# Patient Record
Sex: FEMALE | Race: WHITE | NOT HISPANIC OR LATINO | Employment: OTHER | URBAN - METROPOLITAN AREA
[De-identification: names, ages, dates, MRNs, and addresses within clinical notes are randomized per-mention and may not be internally consistent; named-entity substitution may affect disease eponyms.]

---

## 2017-02-03 ENCOUNTER — HOSPITAL ENCOUNTER (INPATIENT)
Facility: HOSPITAL | Age: 32
LOS: 6 days | Discharge: HOME | DRG: 882 | End: 2017-02-09
Attending: PSYCHIATRY & NEUROLOGY | Admitting: PSYCHIATRY & NEUROLOGY
Payer: MEDICAID

## 2017-02-03 DIAGNOSIS — F41.8 ANXIETY ASSOCIATED WITH DEPRESSION: ICD-10-CM

## 2017-02-03 DIAGNOSIS — F43.25 ADJUSTMENT DISORDER WITH MIXED DISTURBANCE OF EMOTIONS AND CONDUCT: ICD-10-CM

## 2017-02-03 DIAGNOSIS — F32.A DEPRESSION, UNSPECIFIED DEPRESSION TYPE: ICD-10-CM

## 2017-02-03 DIAGNOSIS — Z87.42 HISTORY OF PCOS: Primary | ICD-10-CM

## 2017-02-03 PROBLEM — E11.9 LONG-TERM INSULIN USE IN TYPE 2 DIABETES: Status: ACTIVE | Noted: 2017-02-03

## 2017-02-03 PROBLEM — E66.9 OBESITY: Status: ACTIVE | Noted: 2017-02-03

## 2017-02-03 PROBLEM — E78.5 HYPERLIPIDEMIA: Status: ACTIVE | Noted: 2017-02-03

## 2017-02-03 PROBLEM — Z79.4 LONG-TERM INSULIN USE IN TYPE 2 DIABETES: Status: ACTIVE | Noted: 2017-02-03

## 2017-02-03 PROBLEM — Z72.0 TOBACCO USE: Status: ACTIVE | Noted: 2017-02-03

## 2017-02-03 PROBLEM — Z86.69 HX OF MIGRAINES: Status: ACTIVE | Noted: 2017-02-03

## 2017-02-03 PROCEDURE — 25000003 PHARM REV CODE 250: Performed by: PSYCHIATRY & NEUROLOGY

## 2017-02-03 PROCEDURE — 12400001 HC PSYCH SEMI-PRIVATE ROOM

## 2017-02-03 RX ORDER — NICOTINE 7MG/24HR
1 PATCH, TRANSDERMAL 24 HOURS TRANSDERMAL DAILY
Status: DISCONTINUED | OUTPATIENT
Start: 2017-02-04 | End: 2017-02-09 | Stop reason: HOSPADM

## 2017-02-03 RX ORDER — TOPIRAMATE 25 MG/1
50 TABLET ORAL 2 TIMES DAILY
Status: DISCONTINUED | OUTPATIENT
Start: 2017-02-03 | End: 2017-02-09 | Stop reason: HOSPADM

## 2017-02-03 RX ORDER — DIPHENHYDRAMINE HCL 25 MG
25 CAPSULE ORAL NIGHTLY PRN
Status: DISCONTINUED | OUTPATIENT
Start: 2017-02-03 | End: 2017-02-09 | Stop reason: HOSPADM

## 2017-02-03 RX ORDER — SERTRALINE HYDROCHLORIDE 100 MG/1
100 TABLET, FILM COATED ORAL DAILY
Status: DISCONTINUED | OUTPATIENT
Start: 2017-02-04 | End: 2017-02-04

## 2017-02-03 RX ORDER — ATORVASTATIN CALCIUM 10 MG/1
10 TABLET, FILM COATED ORAL DAILY
Status: DISCONTINUED | OUTPATIENT
Start: 2017-02-04 | End: 2017-02-09 | Stop reason: HOSPADM

## 2017-02-03 RX ORDER — IBUPROFEN 200 MG
16 TABLET ORAL
Status: DISCONTINUED | OUTPATIENT
Start: 2017-02-03 | End: 2017-02-09 | Stop reason: HOSPADM

## 2017-02-03 RX ORDER — DOCUSATE SODIUM 100 MG/1
100 CAPSULE, LIQUID FILLED ORAL DAILY PRN
Status: DISCONTINUED | OUTPATIENT
Start: 2017-02-03 | End: 2017-02-09 | Stop reason: HOSPADM

## 2017-02-03 RX ORDER — FOLIC ACID 1 MG/1
1 TABLET ORAL DAILY
Status: DISCONTINUED | OUTPATIENT
Start: 2017-02-04 | End: 2017-02-09 | Stop reason: HOSPADM

## 2017-02-03 RX ORDER — GLUCAGON 1 MG
1 KIT INJECTION
Status: DISCONTINUED | OUTPATIENT
Start: 2017-02-03 | End: 2017-02-09 | Stop reason: HOSPADM

## 2017-02-03 RX ORDER — METFORMIN HYDROCHLORIDE 500 MG/1
1000 TABLET ORAL 2 TIMES DAILY WITH MEALS
Status: DISCONTINUED | OUTPATIENT
Start: 2017-02-04 | End: 2017-02-09 | Stop reason: HOSPADM

## 2017-02-03 RX ORDER — INSULIN ASPART 100 [IU]/ML
0-5 INJECTION, SOLUTION INTRAVENOUS; SUBCUTANEOUS
Status: DISCONTINUED | OUTPATIENT
Start: 2017-02-03 | End: 2017-02-09 | Stop reason: HOSPADM

## 2017-02-03 RX ADMIN — TOPIRAMATE 50 MG: 25 TABLET, FILM COATED ORAL at 10:02

## 2017-02-03 NOTE — IP AVS SNAPSHOT
[facility]  [address]  [facility] [address]  Phone: [phone]           Patient Discharge Instructions     Our goal is to set you up for success. This packet includes information on your condition, medications, and your home care. It will help you to care for yourself so you don't get sicker and need to go back to the hospital.     Please ask your nurse if you have any questions.        There are many details to remember when preparing to leave the hospital. Here is what you will need to do:    1. Take your medicine. If you are prescribed medications, review your Medication List in the following pages. You may have new medications to  at the pharmacy and others that you'll need to stop taking. Review the instructions for how and when to take your medications. Talk with your doctor or nurses if you are unsure of what to do.     2. Go to your follow-up appointments. Specific follow-up information is listed in the following pages. Your may be contacted by a transition nurse or clinical provider about future appointments. Be sure we have all of the phone numbers to reach you, if needed. Please contact your provider's office if you are unable to make an appointment.     3. Watch for warning signs. Your doctor or nurse will give you detailed warning signs to watch for and when to call for assistance. These instructions may also include educational information about your condition. If you experience any of warning signs to your health, call your doctor.               [facility]  Unless otherwise directed by your provider, please contact [facility], our nurse care line that is available for 24/7 assistance.     [phone] (toll-free)    Registered nurses in the [facility] provide clinical advisement, health education, appointment booking, and other advisory services.                    ** Verify the list of medication(s) below is accurate and up  to date. Carry this with you in case of emergency. If your medications have changed, please notify your healthcare provider.             Medication List      START taking these medications        Additional Info                      duloxetine 60 MG capsule   Commonly known as:  CYMBALTA   Quantity:  30 capsule   Refills:  0   Dose:  60 mg   Indications:  major depressive disorder    Last time this was given:  60 mg on 2/9/2017  8:18 AM   Instructions:  Take 1 capsule (60 mg total) by mouth once daily.     Begin Date    AM    Noon    PM    Bedtime       folic acid 1 MG tablet   Commonly known as:  FOLVITE   Quantity:  30 tablet   Refills:  0   Dose:  1 mg   Indications:  Prevention of Fetal Neural Tube Defects    Last time this was given:  1 mg on 2/9/2017  8:17 AM   Instructions:  Take 1 tablet (1 mg total) by mouth once daily.     Begin Date    AM    Noon    PM    Bedtime       pregabalin 50 MG capsule   Commonly known as:  LYRICA   Quantity:  60 capsule   Refills:  0   Dose:  50 mg   Indications:  Diabetic Peripheral Neuropathy    Last time this was given:  50 mg on 2/9/2017  8:17 AM   Instructions:  Take 1 capsule (50 mg total) by mouth 2 (two) times daily.     Begin Date    AM    Noon    PM    Bedtime         CONTINUE taking these medications        Additional Info                      atorvastatin 10 MG tablet   Commonly known as:  LIPITOR   Refills:  0   Dose:  10 mg    Last time this was given:  10 mg on 2/9/2017  8:18 AM   Instructions:  Take 10 mg by mouth once daily.     Begin Date    AM    Noon    PM    Bedtime       fish oil-omega-3 fatty acids 300-1,000 mg capsule   Refills:  0   Dose:  2 g    Instructions:  Take 2 g by mouth once daily.     Begin Date    AM    Noon    PM    Bedtime       glipiZIDE 5 MG tablet   Commonly known as:  GLUCOTROL   Refills:  0   Dose:  5 mg    Last time this was given:  5 mg on 2/9/2017  8:17 AM   Instructions:  Take 5 mg by mouth 2 (two) times daily before meals.     Begin  Date    AM    Noon    PM    Bedtime       insulin glargine 100 unit/mL injection   Commonly known as:  LANTUS   Refills:  0   Dose:  20 Units    Instructions:  Inject 20 Units into the skin every evening.     Begin Date    AM    Noon    PM    Bedtime       metformin 1000 MG tablet   Commonly known as:  GLUCOPHAGE   Refills:  0   Dose:  1000 mg    Last time this was given:  1,000 mg on 2/9/2017  8:17 AM   Instructions:  Take 1,000 mg by mouth 2 (two) times daily with meals.     Begin Date    AM    Noon    PM    Bedtime       topiramate 50 MG tablet   Commonly known as:  TOPAMAX   Quantity:  60 tablet   Refills:  0   Dose:  50 mg   Indications:  Migraine Prevention    Last time this was given:  50 mg on 2/9/2017  8:18 AM   Instructions:  Take 1 tablet (50 mg total) by mouth 2 (two) times daily.     Begin Date    AM    Noon    PM    Bedtime         STOP taking these medications     sertraline 25 MG tablet   Commonly known as:  ZOLOFT            Where to Get Your Medications      These medications were sent to [id number] - [place] - [address]  [address] [address]     Phone:  [phone]     duloxetine 60 MG capsule    folic acid 1 MG tablet    topiramate 50 MG tablet         You can get these medications from any pharmacy     Bring a paper prescription for each of these medications     pregabalin 50 MG capsule                  Please bring to all follow up appointments:    1. A copy of your discharge instructions.  2. All medicines you are currently taking in their original bottles.  3. Identification and insurance card.    Please arrive 15 minutes ahead of scheduled appointment time.    Please call 24 hours in advance if you must reschedule your appointment and/or time.        Follow-up Information     Follow up with [facility]. Go in 1 day.    Specialties:  Behavioral Health, Psychiatry, Psychology    Why:  for hospital follow up.  Walk in clinic.    Contact information:    [address]  [address]  [place] [address]  [phone]        Referrals     Future Orders    Ambulatory Referral to Gynecology         Discharge Instructions     Future Orders    Activity as tolerated     Call MD for:     Comments:    Worsening SI/HI/AVH    Diet general     Questions:    Total calories:      Fat restriction, if any:      Protein restriction, if any:      Na restriction, if any:      Fluid restriction:      Additional restrictions:        Discharge References/Attachments     DEPRESSION (ENGLISH)    OLDER ADULTS, DEPRESSION AND SUICIDE IN (ENGLISH)    DIABETES, DIET (ENGLISH)    DIABETES, FOOT CARE PROGRAM, YOUR (ENGLISH)    DIABETES, GENERAL INFORMATION (ENGLISH)        Primary Diagnosis     Your primary diagnosis was:  Adjustment Reaction      Admission Information     Date & Time Provider Department CSN    2/3/2017  9:26 PM Otto Laura Jr., MD [facility] [id number]       Admisson Diagnosis: Depression, unspecified depression type      Care Providers     Provider Role Specialty Primary office phone    Otto Laura Jr., MD Attending Provider Psychiatry [phone]      Your Vitals Were     BP Pulse Temp Resp Height Weight    98/57 (BP Location: Right arm, Patient Position: Lying, BP Method: Automatic) 80 98.9 °F (37.2 °C) (Oral) 18 5' 9" (1.753 m) 104.3 kg (229 lb 15 oz)    Last Period BMI             01/15/2017 (Approximate) 33.96 kg/m2         Recent Lab Values        2/6/2017                           5:39 AM           A1C 9.2 (H)           Comment for A1C at  5:39 AM on 2/6/2017:  According to ADA guidelines, hemoglobin A1C <7.0% represents  optimal control in non-pregnant diabetic patients.  Different  metrics may apply to specific populations.   Standards of Medical Care in Diabetes - 2016.  For the purpose of screening for the presence of diabetes:  <5.7%     Consistent with the absence of diabetes  5.7-6.4%  Consistent with increasing risk for diabetes "   (prediabetes)  >or=6.5%  Consistent with diabetes  Currently no consensus exists for use of hemoglobin A1C  for diagnosis of diabetes for children.        Blood Glucose and Lipid Panel Labs        2/7/2017 2/7/2017 2/8/2017 2/8/2017 2/8/2017 2/8/2017 2/9/2017 2/9/2017      4:33 PM  8:25 PM  7:39 AM 11:36 AM  4:31 PM  8:40 PM  5:43 AM  7:37 AM    Glucose 96 115 (H) 164 (H) 109 153 (H) 147 (H) - 149 (H)    Cholesterol - - - - - - 152 -    Triglycerides - - - - - - 197 (H) -    HDL Cholesterol - - - - - - 27 (L) -    LDL Cholesterol - - - - - - 85.6 -    HDL/Cholesterol Ratio - - - - - - 17.8 (L) -    Total Cholesterol/HDL Ratio - - - - - - 5.6 (H) -    Non-HDL Cholesterol - - - - - - 125 -    Comment for Cholesterol at  5:43 AM on 2/9/2017:  The National Cholesterol Education Program (NCEP) has set the  following guidelines (reference ranges) for Cholesterol:  Optimal.....................<200 mg/dL  Borderline High.............200-239 mg/dL  High........................> or = 240 mg/dL      Comment for Triglycerides at  5:43 AM on 2/9/2017:  The National Cholesterol Education Program (NCEP) has set the  following guidelines (reference values) for triglycerides:  Normal......................<150 mg/dL  Borderline High.............150-199 mg/dL  High........................200-499 mg/dL      Comment for HDL Cholesterol at  5:43 AM on 2/9/2017:  The National Cholesterol Education Program (NCEP) has set the  following guidelines (reference values) for HDL Cholesterol:  Low...............<40 mg/dL  Optimal...........>60 mg/dL      Comment for LDL Cholesterol at  5:43 AM on 2/9/2017:  The National Cholesterol Education Program (NCEP) has set the  following guidelines (reference values) for LDL Cholesterol:  Optimal.......................<130 mg/dL  Borderline High...............130-159 mg/dL  High..........................160-189 mg/dL  Very High.....................>190 mg/dL      Comment for Non-HDL Cholesterol at  5:43  AM on 2/9/2017:  Risk category and Non-HDL cholesterol goals:  Coronary heart disease (CHD)or equivalent (10-year risk of CHD >20%):  Non-HDL cholesterol goal     <130 mg/dL  Two or more CHD risk factors and 10-year risk of CHD <= 20%:  Non-HDL cholesterol goal     <160 mg/dL  0 to 1 CHD risk factor:  Non-HDL cholesterol goal     <190 mg/dL        Allergies as of 2/9/2017        Reactions    Beatriz Anaphylaxis    Pecan Nut Dermatitis      Advance Directives     An advance directive is a document which, in the event you are no longer able to make decisions for yourself, tells your healthcare team what kind of treatment you do or do not want to receive, or who you would like to make those decisions for you.  If you do not currently have an advance directive, [facility] encourages you to create one.  For more information call:  [phone] ([phone]), [phone] ([phone]),  or log on to [email or web address].        Advance Directive Status          Most Recent Value    Advance Directive Status  Patient does not have Advance Directive, declines information.      Smoking Cessation     If you would like to quit smoking:   You may be eligible for free services if you are a [place] resident and started smoking cigarettes before September 1, 1988.  Call the Smoking Cessation Trust (SCT) toll free at (172) 629-9884 or (903) 493-3215.   Call 7-800-QUIT-NOW if you do not meet the above criteria.            Language Assistance Services     ATTENTION: Language assistance services are available, free of charge. Please call [phone].      ATENCIÓN: Si habla español, tiene a armenta disposición servicios gratuitos de asistencia lingüística. Llame al [phone].     CHÚ Ý: N?u b?n nói Ti?ng Vi?t, có các d?ch v? h? tr? ngôn ng? mi?n phí dành cho b?n. G?i s? [phone].        National Suicide Prevention Lifeline     If you or someone you know is thinking about suicide, call the National Suicide Prevention  Lifeline.  National Suicide Hotline: 5-658-546-TALK (3500)  The lifeline is free, confidential and always available.  Help a loved one, a friend or yourself.  www.suicideprevenetionlifeline.org          Diabetes Discharge Instructions                                   [place] Sign-Up     Activating your [facility] account is as easy as 1-2-3!     1) Visit [email or web address], select Sign Up Now, enter this activation code and your date of birth, then select Next.  [email or web address]  Expires: 3/26/2017  9:24 AM      2) Create a username and password to use when you visit [facility] in the future and select a security question in case you lose your password and select Next.    3) Enter your e-mail address and click Sign Up!    Additional Information  If you have questions, please e-mail [email or web address] or call [phone] to talk to our [facility] staff. Remember, [facility] is NOT to be used for urgent needs. For medical emergencies, dial 911.          [facility] complies with applicable Federal civil rights laws and does not discriminate on the basis of race, color, national origin, age, disability, or sex.

## 2017-02-04 LAB
B-HCG UR QL: NEGATIVE
POCT GLUCOSE: 161 MG/DL (ref 70–110)
POCT GLUCOSE: 191 MG/DL (ref 70–110)
POCT GLUCOSE: 214 MG/DL (ref 70–110)

## 2017-02-04 PROCEDURE — 25000003 PHARM REV CODE 250: Performed by: PSYCHIATRY & NEUROLOGY

## 2017-02-04 PROCEDURE — 81025 URINE PREGNANCY TEST: CPT

## 2017-02-04 PROCEDURE — 63600175 PHARM REV CODE 636 W HCPCS: Performed by: PSYCHIATRY & NEUROLOGY

## 2017-02-04 PROCEDURE — 12400001 HC PSYCH SEMI-PRIVATE ROOM

## 2017-02-04 PROCEDURE — 99223 1ST HOSP IP/OBS HIGH 75: CPT | Mod: AF,HB,, | Performed by: PSYCHIATRY & NEUROLOGY

## 2017-02-04 RX ORDER — PREGABALIN 50 MG/1
50 CAPSULE ORAL 2 TIMES DAILY
Status: DISCONTINUED | OUTPATIENT
Start: 2017-02-04 | End: 2017-02-09 | Stop reason: HOSPADM

## 2017-02-04 RX ORDER — GLIPIZIDE 5 MG/1
5 TABLET ORAL 2 TIMES DAILY WITH MEALS
Status: DISCONTINUED | OUTPATIENT
Start: 2017-02-04 | End: 2017-02-09 | Stop reason: HOSPADM

## 2017-02-04 RX ORDER — SERTRALINE HYDROCHLORIDE 100 MG/1
100 TABLET, FILM COATED ORAL NIGHTLY
Status: DISCONTINUED | OUTPATIENT
Start: 2017-02-04 | End: 2017-02-06

## 2017-02-04 RX ADMIN — FOLIC ACID 1 MG: 1 TABLET ORAL at 08:02

## 2017-02-04 RX ADMIN — ATORVASTATIN CALCIUM 10 MG: 10 TABLET, FILM COATED ORAL at 08:02

## 2017-02-04 RX ADMIN — METFORMIN HYDROCHLORIDE 1000 MG: 500 TABLET, FILM COATED ORAL at 08:02

## 2017-02-04 RX ADMIN — INSULIN DETEMIR 50 UNITS: 100 INJECTION, SOLUTION SUBCUTANEOUS at 08:02

## 2017-02-04 RX ADMIN — PREGABALIN 50 MG: 50 CAPSULE ORAL at 08:02

## 2017-02-04 RX ADMIN — SERTRALINE HYDROCHLORIDE 100 MG: 100 TABLET, FILM COATED ORAL at 08:02

## 2017-02-04 RX ADMIN — DOCUSATE SODIUM 100 MG: 100 CAPSULE, LIQUID FILLED ORAL at 08:02

## 2017-02-04 RX ADMIN — NICOTINE 1 PATCH: 7 PATCH, EXTENDED RELEASE TRANSDERMAL at 08:02

## 2017-02-04 RX ADMIN — GLIPIZIDE 5 MG: 5 TABLET ORAL at 04:02

## 2017-02-04 RX ADMIN — TOPIRAMATE 50 MG: 25 TABLET, FILM COATED ORAL at 08:02

## 2017-02-04 RX ADMIN — INSULIN ASPART 2 UNITS: 100 INJECTION, SOLUTION INTRAVENOUS; SUBCUTANEOUS at 12:02

## 2017-02-04 RX ADMIN — Medication 1 CAPSULE: at 08:02

## 2017-02-04 RX ADMIN — METFORMIN HYDROCHLORIDE 1000 MG: 500 TABLET, FILM COATED ORAL at 04:02

## 2017-02-04 RX ADMIN — THERA TABS 1 TABLET: TAB at 08:02

## 2017-02-04 RX ADMIN — PREGABALIN 50 MG: 50 CAPSULE ORAL at 12:02

## 2017-02-04 NOTE — ASSESSMENT & PLAN NOTE
-Optimize Zoloft for maximum benefit for anxiety/depressive symptoms  -Consider adding Gabapentin to regimen to address diabetic neuropathy and anxiety symptoms

## 2017-02-04 NOTE — PROGRESS NOTES
Admitted to [facility] at 21:30 PM from ED via W/C accompanied by ED Staff and Security.  Changed into clean hospital gowns due to patient's size and new nonskid footwear. No contraband found. Belongings placed in locker #140B. Unit policies/expectations, orientation and meal tray provided to patient. Patient signed admission paperwork at this time. New orders noted. Admission assessment completed; see applicable flowsheets. Compliant with scheduled medication. No acute physical distress noted. Retired to bed at appropriate time. Resting quietly in bed at this time. Will continue to monitor.

## 2017-02-04 NOTE — ASSESSMENT & PLAN NOTE
-Continue PEC and admit to inpatient psychiatry for safety/stabilization once medically clear, currently acute danger to self.   -question history of Bipolar diagnosis considering current medication regimen of unopposed SSRI without result of radha and no previous hospitalizations for acute manic episodes; suspect larger personality component to manic episodes  -resume Zoloft, however will increase to 100mg PO daily to optimize benefit  -PRN vistaril 25mg PO qhs for insomnia

## 2017-02-04 NOTE — ASSESSMENT & PLAN NOTE
-Resume home regimen Metformin 1000mg PO BID with meals  -Lantus not on formulary, will substitute Levemir 50U nightly for insulin coverage with SSI   -accuchecks 4x daily with meals and nightly  -will hold Glipizide at present

## 2017-02-04 NOTE — PSYCH
Pt anxious and attention seeking at times. Complained frequently of generalized pain. Blood glucose monitoring performed per MD order. Reviewed sliding scale insulin and  insulin pen administration with pt and pt verbalized understanding. Pt spent much of this afternoon in day area playing board games with peers and appeared to enjoy the interaction. Denies SI/HI at this time. Pt was able to verbally contract for safety on unit. Denies AH/VH at this time. NAD observed. Will cont to monitor.

## 2017-02-04 NOTE — PROGRESS NOTES
Slept approximately 7-8 hours thus far this shift. Safety maintained throughout shift. See Observation Checklist. Will continue to monitor.

## 2017-02-04 NOTE — PROGRESS NOTES
02/04/17 0900 02/04/17 1000 02/04/17 1100   [facility] Group Therapy   Group Name Community Reintegration  (Morning Check-in) Other  (Tribond) Stress Management  (Relaxation)   Specific Interventions Resocialization Cognitive Stimulation Training Relaxation Training   Participation Level Active;Supportive;Appropriate Active;Supportive;Appropriate Active;Supportive;Appropriate   Participation Quality Cooperative;Social Cooperative;Social Cooperative;Social   Insight/Motivation Good Good Good   Affect/Mood Display Appropriate Appropriate Appropriate   Cognition Alert;Oriented Alert;Oriented Alert;Oriented       02/04/17 1300   [facility] Group Therapy   Group Name Therapeutic Recreation   Specific Interventions Social Skills Training   Participation Level Active;Supportive;Appropriate   Participation Quality Cooperative;Social   Insight/Motivation Good   Affect/Mood Display Appropriate   Cognition Alert;Oriented
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
## 2017-02-05 PROBLEM — Z87.42 HISTORY OF PCOS: Status: ACTIVE | Noted: 2017-02-05

## 2017-02-05 LAB
POCT GLUCOSE: 113 MG/DL (ref 70–110)
POCT GLUCOSE: 159 MG/DL (ref 70–110)
POCT GLUCOSE: 177 MG/DL (ref 70–110)
POCT GLUCOSE: 178 MG/DL (ref 70–110)

## 2017-02-05 PROCEDURE — 25000003 PHARM REV CODE 250: Performed by: PSYCHIATRY & NEUROLOGY

## 2017-02-05 PROCEDURE — 99233 SBSQ HOSP IP/OBS HIGH 50: CPT | Mod: AF,HB,, | Performed by: PSYCHIATRY & NEUROLOGY

## 2017-02-05 PROCEDURE — 12400001 HC PSYCH SEMI-PRIVATE ROOM

## 2017-02-05 RX ADMIN — GLIPIZIDE 5 MG: 5 TABLET ORAL at 07:02

## 2017-02-05 RX ADMIN — PREGABALIN 50 MG: 50 CAPSULE ORAL at 09:02

## 2017-02-05 RX ADMIN — FOLIC ACID 1 MG: 1 TABLET ORAL at 08:02

## 2017-02-05 RX ADMIN — PREGABALIN 50 MG: 50 CAPSULE ORAL at 08:02

## 2017-02-05 RX ADMIN — GLIPIZIDE 5 MG: 5 TABLET ORAL at 04:02

## 2017-02-05 RX ADMIN — THERA TABS 1 TABLET: TAB at 08:02

## 2017-02-05 RX ADMIN — DOCUSATE SODIUM 100 MG: 100 CAPSULE, LIQUID FILLED ORAL at 09:02

## 2017-02-05 RX ADMIN — NICOTINE 1 PATCH: 7 PATCH, EXTENDED RELEASE TRANSDERMAL at 08:02

## 2017-02-05 RX ADMIN — METFORMIN HYDROCHLORIDE 1000 MG: 500 TABLET, FILM COATED ORAL at 07:02

## 2017-02-05 RX ADMIN — ATORVASTATIN CALCIUM 10 MG: 10 TABLET, FILM COATED ORAL at 08:02

## 2017-02-05 RX ADMIN — SERTRALINE HYDROCHLORIDE 100 MG: 100 TABLET, FILM COATED ORAL at 09:02

## 2017-02-05 RX ADMIN — TOPIRAMATE 50 MG: 25 TABLET, FILM COATED ORAL at 10:02

## 2017-02-05 RX ADMIN — METFORMIN HYDROCHLORIDE 1000 MG: 500 TABLET, FILM COATED ORAL at 04:02

## 2017-02-05 RX ADMIN — TOPIRAMATE 50 MG: 25 TABLET, FILM COATED ORAL at 11:02

## 2017-02-05 RX ADMIN — INSULIN DETEMIR 50 UNITS: 100 INJECTION, SOLUTION SUBCUTANEOUS at 09:02

## 2017-02-05 NOTE — NURSING
Pt. appeared asleep throughout the night as noted upon frequent rounds. Rise and fall of the chest noted, resp even and unlabored. No complaints voiced during the night.Safety maintained Continue to monitor.

## 2017-02-05 NOTE — SUBJECTIVE & OBJECTIVE
"Interval History: asdf    Psychotherapeutics     Start     Stop Route Frequency Ordered    02/04/17 2100  sertraline tablet 100 mg      -- Oral Nightly 02/04/17 0847           Review of Systems  Objective:     Vital Signs (Most Recent):  Temp: 99.2 °F (37.3 °C) (02/04/17 1932)  Pulse: 91 (02/04/17 1932)  Resp: 16 (02/04/17 1932)  BP: 117/70 (02/04/17 1932) Vital Signs (24h Range):  Temp:  [99.2 °F (37.3 °C)-99.8 °F (37.7 °C)] 99.2 °F (37.3 °C)  Pulse:  [81-91] 91  Resp:  [16-17] 16  BP: (113-117)/(65-70) 117/70     Height: 5' 9" (175.3 cm)  Weight: 104.3 kg (229 lb 15 oz)  Body mass index is 33.96 kg/(m^2).    No intake or output data in the 24 hours ending 02/04/17 2212    Physical Exam     Significant Labs: Last 24 Hours:   Recent Lab Results       02/04/17  1631 02/04/17  1242 02/04/17  1130 02/04/17  0740      POCT Glucose 161(H)  214(H) 191(H)     Preg Test, Ur  Negative               Significant Imaging: None  "

## 2017-02-05 NOTE — PLAN OF CARE
Problem: Patient Care Overview (Adult)  Goal: Plan of Care Review  Outcome: Ongoing (interventions implemented as appropriate)  Pt. observed interacting appropriately with peers. Denies SI/HI, A/V hallucinations. Took scheduled medications without any problems. Denied any complaint of pain or discomfort at this time. Will continue to monitor.    Problem: Diabetes, Type 2 (Adult)  Goal: Signs and Symptoms of Listed Potential Problems Will be Absent, Minimized or Managed (Diabetes, Type 2)  Signs and symptoms of listed potential problems will be absent, minimized or managed by discharge/transition of care (reference Diabetes, Type 2 (Adult) CPG).   Outcome: Ongoing (interventions implemented as appropriate)  Blood glucose 184 at Q H.S., Pt. verbalized understanding when reviewed with her, frequent glucose monitoring, the need for insulin per sliding scale, and administration of scheduled insulin doses. Will continue to provide education.    Problem: Depression (Adult,Obstetrics,Pediatric)  Goal: Improved/Stable Mood  Patient will demonstrate the desired outcomes by discharge/transition of care.   Outcome: Ongoing (interventions implemented as appropriate)  Pt. denies SI at this time, however the pt. endorses feelings of depression when her father tells her to get out and get a job, that she does not need to apply for Social Security. Pt. allowed to verbalize feelings. Continue to monitor.

## 2017-02-05 NOTE — ASSESSMENT & PLAN NOTE
-Pt with previous diagnosis of PCOS but has not had follow-up in years  -Ambulatory referral to Gynecology
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
## 2017-02-05 NOTE — ASSESSMENT & PLAN NOTE
-Resume home regimen Metformin 1000mg PO BID with meals  -Lantus not on formulary, will substitute Levemir 50U nightly for insulin coverage with SSI  -Glipizide 5mg BID  -Accuchecks 4x daily with meals and nightly  -Hemoglobin A1c 9.2

## 2017-02-06 LAB
ESTIMATED AVG GLUCOSE: 217 MG/DL
HBA1C MFR BLD HPLC: 9.2 %
POCT GLUCOSE: 115 MG/DL (ref 70–110)
POCT GLUCOSE: 126 MG/DL (ref 70–110)
POCT GLUCOSE: 157 MG/DL (ref 70–110)
POCT GLUCOSE: 188 MG/DL (ref 70–110)

## 2017-02-06 PROCEDURE — 90853 GROUP PSYCHOTHERAPY: CPT | Mod: HB,AF,S$PBB, | Performed by: PSYCHOLOGIST

## 2017-02-06 PROCEDURE — 97165 OT EVAL LOW COMPLEX 30 MIN: CPT

## 2017-02-06 PROCEDURE — 25000003 PHARM REV CODE 250: Performed by: PSYCHIATRY & NEUROLOGY

## 2017-02-06 PROCEDURE — 12400001 HC PSYCH SEMI-PRIVATE ROOM

## 2017-02-06 PROCEDURE — 99233 SBSQ HOSP IP/OBS HIGH 50: CPT | Mod: AF,HB,, | Performed by: PSYCHIATRY & NEUROLOGY

## 2017-02-06 PROCEDURE — 83036 HEMOGLOBIN GLYCOSYLATED A1C: CPT

## 2017-02-06 PROCEDURE — 36415 COLL VENOUS BLD VENIPUNCTURE: CPT

## 2017-02-06 PROCEDURE — 97150 GROUP THERAPEUTIC PROCEDURES: CPT

## 2017-02-06 RX ORDER — POLYETHYLENE GLYCOL 3350 17 G/17G
17 POWDER, FOR SOLUTION ORAL 2 TIMES DAILY PRN
Status: DISCONTINUED | OUTPATIENT
Start: 2017-02-06 | End: 2017-02-09 | Stop reason: HOSPADM

## 2017-02-06 RX ORDER — SERTRALINE HYDROCHLORIDE 50 MG/1
50 TABLET, FILM COATED ORAL NIGHTLY
Status: DISCONTINUED | OUTPATIENT
Start: 2017-02-06 | End: 2017-02-07

## 2017-02-06 RX ORDER — DULOXETIN HYDROCHLORIDE 30 MG/1
30 CAPSULE, DELAYED RELEASE ORAL NIGHTLY
Status: DISCONTINUED | OUTPATIENT
Start: 2017-02-06 | End: 2017-02-07

## 2017-02-06 RX ORDER — ONDANSETRON 4 MG/1
8 TABLET, ORALLY DISINTEGRATING ORAL EVERY 8 HOURS PRN
Status: DISCONTINUED | OUTPATIENT
Start: 2017-02-06 | End: 2017-02-09 | Stop reason: HOSPADM

## 2017-02-06 RX ADMIN — THERA TABS 1 TABLET: TAB at 08:02

## 2017-02-06 RX ADMIN — SERTRALINE HYDROCHLORIDE 50 MG: 50 TABLET ORAL at 08:02

## 2017-02-06 RX ADMIN — FOLIC ACID 1 MG: 1 TABLET ORAL at 08:02

## 2017-02-06 RX ADMIN — Medication 1 CAPSULE: at 08:02

## 2017-02-06 RX ADMIN — GLIPIZIDE 5 MG: 5 TABLET ORAL at 04:02

## 2017-02-06 RX ADMIN — TOPIRAMATE 50 MG: 25 TABLET, FILM COATED ORAL at 08:02

## 2017-02-06 RX ADMIN — ONDANSETRON 8 MG: 4 TABLET, ORALLY DISINTEGRATING ORAL at 08:02

## 2017-02-06 RX ADMIN — METFORMIN HYDROCHLORIDE 1000 MG: 500 TABLET, FILM COATED ORAL at 04:02

## 2017-02-06 RX ADMIN — PREGABALIN 50 MG: 50 CAPSULE ORAL at 08:02

## 2017-02-06 RX ADMIN — METFORMIN HYDROCHLORIDE 1000 MG: 500 TABLET, FILM COATED ORAL at 07:02

## 2017-02-06 RX ADMIN — DULOXETINE 30 MG: 30 CAPSULE, DELAYED RELEASE ORAL at 08:02

## 2017-02-06 RX ADMIN — NICOTINE 1 PATCH: 7 PATCH, EXTENDED RELEASE TRANSDERMAL at 08:02

## 2017-02-06 RX ADMIN — POLYETHYLENE GLYCOL 3350 17 G: 17 POWDER, FOR SOLUTION ORAL at 08:02

## 2017-02-06 RX ADMIN — ATORVASTATIN CALCIUM 10 MG: 10 TABLET, FILM COATED ORAL at 08:02

## 2017-02-06 RX ADMIN — INSULIN DETEMIR 50 UNITS: 100 INJECTION, SOLUTION SUBCUTANEOUS at 08:02

## 2017-02-06 RX ADMIN — GLIPIZIDE 5 MG: 5 TABLET ORAL at 07:02

## 2017-02-06 NOTE — ASSESSMENT & PLAN NOTE
-Cross taper Zoloft with Cymbalta: decrease Zoloft to 50mg qHS and start Cymbalta 30mg qHS  -Consider addition of gabapentin, but patient already on Lyrica 50mg BID

## 2017-02-06 NOTE — PROGRESS NOTES
02/06/17 0900 02/06/17 1100 02/06/17 1400   [facility] Group Therapy   Group Name Community Reintegration Therapeutic Recreation Therapeutic Recreation   Specific Interventions Resocialization Resocialization Crafts   Participation Level Supportive;Active;Appropriate Supportive;Appropriate;Active Supportive;Attentive;Appropriate   Participation Quality Cooperative;Social Cooperative Cooperative   Insight/Motivation Good Good Good   Affect/Mood Display Appropriate Appropriate Appropriate   Cognition Alert;Oriented Alert;Oriented Alert;Oriented
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
## 2017-02-06 NOTE — ASSESSMENT & PLAN NOTE
-Continue PEC for safety/stabilization   -Questionable history of Bipolar diagnosis considering current medication regimen of unopposed SSRI without result of radha and no previous hospitalizations for acute manic episodes; suspect larger personality component to manic episodes  -Started on home Zoloft, however will cross taper to Cymbalta given patient's neuropathy: decrease Zoloft to 50mg qHS and start Cymbalta 30mg qHS  -PRN vistaril 25mg PO qhs for insomnia

## 2017-02-06 NOTE — PLAN OF CARE
Problem: Patient Care Overview (Adult)  Goal: Plan of Care Review  Outcome: Ongoing (interventions implemented as appropriate)  Patient showered and dressed with fair grooming. Calm and cooperative. Denies SI/HI/AVH. Mood good and sociable with peers. Stated her stressors is living with father and trying to get disability. Compliant with medications. Plan of care reviewed including blood glucose monitoring, diet and exercise. Verbalized understanding. Will continue to monitor patient.

## 2017-02-06 NOTE — PROGRESS NOTES
Group Psychotherapy (PhD/LCSW)    Site: [facility]    Clinical status of patient: Inpatient    Date: 2/6/2017    Group Focus: Life Skills    Length of service: 21462 - 35-40 minutes    Number of patients in attendance: 6    Referred by: Acute Psychiatry Unit Treatment Team    Target symptoms: Depression and Anxiety    Patient's response to treatment: Active Listening and Self-disclosure    Progress toward goals: Progressing slowly    Interval History: Pt was alert and attentive in group. She participated appropriately in a discussion of coping with depression. She alluded to being bipolar and stated that the one of the main factors in her depression was her father (with whom she lives).     Diagnosis: See Dr Molina's notes dated 2/6/17    Plan: Continue treatment on APU

## 2017-02-06 NOTE — ASSESSMENT & PLAN NOTE
-Resume Lipitor 10mg PO daily & Fish oil/Omega 3  -Encourage improved nutritional diet and exercise
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
## 2017-02-06 NOTE — SUBJECTIVE & OBJECTIVE
"Interval History:   Per staff: "Observed interacting appropriately with peers and staff on the unit. Denied SI/HI, A/V hallucinations. No complaints of pain or discomfort voiced. Took scheduled medications, consumed evening snacks while watching the Super Bowl game. Will continue to monitor.  "    Patient seen and examined with treatment team; chart and nursing notes reviewed. No distress noted, and patient was agreeable and cooperative with interview. No acute events overnight. Patient has been compliant with medications, and no psychiatric PRNs have been required. Patient reports fighting for disability due to neuropathy. Patient reports previous SI with plan to overdose on her insulin or other pills, but no current SI; although depression is "pretty bad" right now. Patient reports that her biggest stressor is her father (raised by grandparents and just recently moved in with him in November). Reports a poor relationship with her mother. State she and her  are considering moving back to [place], but they don't have the finances at this time. States she does not have plans to harm herself here on the unit.     Psychotherapeutics     Start     Stop Route Frequency Ordered    02/04/17 2100  sertraline tablet 100 mg      -- Oral Nightly 02/04/17 0847           Review of Systems   Constitutional: Negative for chills and fever.   Respiratory: Negative for shortness of breath.    Cardiovascular: Negative for chest pain.   Gastrointestinal: Positive for abdominal pain and constipation. Negative for diarrhea, nausea and vomiting.   Neurological: Positive for numbness. Negative for headaches.     Objective:     Vital Signs (Most Recent):  Temp: 98.6 °F (37 °C) (02/06/17 0745)  Pulse: 77 (02/06/17 0745)  Resp: 16 (02/06/17 0745)  BP: 115/64 (02/06/17 0745) Vital Signs (24h Range):  Temp:  [98.6 °F (37 °C)-98.7 °F (37.1 °C)] 98.6 °F (37 °C)  Pulse:  [77-86] 77  Resp:  [16-18] 16  BP: (115-116)/(64-73) 115/64 " "    Height: 5' 9" (175.3 cm)  Weight: 104.3 kg (229 lb 15 oz)  Body mass index is 33.96 kg/(m^2).    No intake or output data in the 24 hours ending 02/06/17 1115     PFHx  Past Family History Reviewed - no change since admit    EXAMINATION    CONSTITUTIONAL  General Appearance: Alert, awake, casually dressed, no distress noted     MUSCULOSKELETAL  Muscle Strength and Tone: normal strength and tone  Abnormal Involuntary Movements: no abnormal involuntary movements appreciated  Gait and Station: ambulates with steady gait without assistance    PSYCHIATRIC   Arousal: alert  Sensorium/Orientation: grossly intact  Behavior/Cooperation: normal, cooperative, eye contact normal   Psychomotor: unremarkable and within normal limits  Speech: normal  Language: intact  Mood: "okay"   Affect: flat, constricted  Thought Process: linear, logical  Thought Content:   Auditory hallucinations: NO  Visual hallucinations: NO  Paranoia: NO  Delusions:  NO  Suicidal ideation: YES, recently with plan; denies current SI     Homicidal ideation: NO  Attention/Concentration:  intact  able to focus  Memory: Intact  Fund of Knowledge: Vocabulary appropriate    Insight: Intact  Judgment:Intact     Physical Exam   Constitutional: She is oriented to person, place, and time. She appears well-developed and well-nourished. No distress.   HENT:   Head: Normocephalic and atraumatic.   Hair dyed   Eyes: Conjunctivae and EOM are normal.   Neck: Normal range of motion.   Pulmonary/Chest: Effort normal. No respiratory distress.   Abdominal: She exhibits no distension.   Musculoskeletal: Normal range of motion.   Neurological: She is alert and oriented to person, place, and time.   Skin: She is not diaphoretic.        Significant Labs: All pertinent labs within the past 24 hours have been reviewed.    Significant Imaging: None  "

## 2017-02-06 NOTE — PLAN OF CARE
Problem: Patient Care Overview (Adult)  Goal: Plan of Care Review  Outcome: Ongoing (interventions implemented as appropriate)  Observed interacting appropriately with peers and staff on the unit. Denied SI/HI, A/V hallucinations. No complaints of pain or discomfort voiced. Took scheduled medications, consumed evening snacks while watching the Super Bowl game. Will continue to monitor.    Problem: Diabetes, Type 2 (Adult)  Goal: Signs and Symptoms of Listed Potential Problems Will be Absent, Minimized or Managed (Diabetes, Type 2)  Signs and symptoms of listed potential problems will be absent, minimized or managed by discharge/transition of care (reference Diabetes, Type 2 (Adult) CPG).   Outcome: Ongoing (interventions implemented as appropriate)  Q H.S. Blood glucose 177. Pt's compliant with medication administration and diet. Able to state S/S hypo/hyperglycemia and understand the importance of frequent blood glucose monitoring. Continue plan of care.    Problem: Depression (Adult,Obstetrics,Pediatric)  Goal: Improved/Stable Mood  Patient will demonstrate the desired outcomes by discharge/transition of care.   Pt's brighter today, interacted in group activities, observed enjoying the game with peers. No S/S depression noted at this time. Continue to monitor.

## 2017-02-07 PROBLEM — F41.8 ANXIETY ASSOCIATED WITH DEPRESSION: Status: RESOLVED | Noted: 2017-02-03 | Resolved: 2017-02-07

## 2017-02-07 PROBLEM — F43.25 ADJUSTMENT DISORDER WITH MIXED DISTURBANCE OF EMOTIONS AND CONDUCT: Status: ACTIVE | Noted: 2017-02-07

## 2017-02-07 PROBLEM — F32.A DEPRESSION: Status: RESOLVED | Noted: 2017-02-03 | Resolved: 2017-02-07

## 2017-02-07 LAB
FOLATE SERPL-MCNC: 13.6 NG/ML
POCT GLUCOSE: 115 MG/DL (ref 70–110)
POCT GLUCOSE: 139 MG/DL (ref 70–110)
POCT GLUCOSE: 163 MG/DL (ref 70–110)
POCT GLUCOSE: 96 MG/DL (ref 70–110)
T3 SERPL-MCNC: 107 NG/DL
T4 FREE SERPL-MCNC: 0.85 NG/DL
VIT B12 SERPL-MCNC: 404 PG/ML

## 2017-02-07 PROCEDURE — 25000003 PHARM REV CODE 250: Performed by: PSYCHIATRY & NEUROLOGY

## 2017-02-07 PROCEDURE — 82607 VITAMIN B-12: CPT

## 2017-02-07 PROCEDURE — 12400001 HC PSYCH SEMI-PRIVATE ROOM

## 2017-02-07 PROCEDURE — 97150 GROUP THERAPEUTIC PROCEDURES: CPT

## 2017-02-07 PROCEDURE — 36415 COLL VENOUS BLD VENIPUNCTURE: CPT

## 2017-02-07 PROCEDURE — 82746 ASSAY OF FOLIC ACID SERUM: CPT

## 2017-02-07 PROCEDURE — 84439 ASSAY OF FREE THYROXINE: CPT

## 2017-02-07 PROCEDURE — 90847 FAMILY PSYTX W/PT 50 MIN: CPT | Mod: AF,HB,, | Performed by: PSYCHIATRY & NEUROLOGY

## 2017-02-07 PROCEDURE — 90853 GROUP PSYCHOTHERAPY: CPT | Mod: HB,HP,S$PBB, | Performed by: PSYCHOLOGIST

## 2017-02-07 PROCEDURE — 84480 ASSAY TRIIODOTHYRONINE (T3): CPT

## 2017-02-07 RX ORDER — DULOXETIN HYDROCHLORIDE 60 MG/1
60 CAPSULE, DELAYED RELEASE ORAL DAILY
Status: DISCONTINUED | OUTPATIENT
Start: 2017-02-07 | End: 2017-02-09 | Stop reason: HOSPADM

## 2017-02-07 RX ADMIN — DIPHENHYDRAMINE HYDROCHLORIDE 25 MG: 25 CAPSULE ORAL at 11:02

## 2017-02-07 RX ADMIN — INSULIN DETEMIR 50 UNITS: 100 INJECTION, SOLUTION SUBCUTANEOUS at 08:02

## 2017-02-07 RX ADMIN — GLIPIZIDE 5 MG: 5 TABLET ORAL at 08:02

## 2017-02-07 RX ADMIN — Medication 1 CAPSULE: at 08:02

## 2017-02-07 RX ADMIN — ATORVASTATIN CALCIUM 10 MG: 10 TABLET, FILM COATED ORAL at 08:02

## 2017-02-07 RX ADMIN — METFORMIN HYDROCHLORIDE 1000 MG: 500 TABLET, FILM COATED ORAL at 04:02

## 2017-02-07 RX ADMIN — NICOTINE 1 PATCH: 7 PATCH, EXTENDED RELEASE TRANSDERMAL at 08:02

## 2017-02-07 RX ADMIN — DULOXETINE 60 MG: 60 CAPSULE, DELAYED RELEASE ORAL at 01:02

## 2017-02-07 RX ADMIN — TOPIRAMATE 50 MG: 25 TABLET, FILM COATED ORAL at 08:02

## 2017-02-07 RX ADMIN — POLYETHYLENE GLYCOL 3350 17 G: 17 POWDER, FOR SOLUTION ORAL at 01:02

## 2017-02-07 RX ADMIN — PREGABALIN 50 MG: 50 CAPSULE ORAL at 08:02

## 2017-02-07 RX ADMIN — FOLIC ACID 1 MG: 1 TABLET ORAL at 08:02

## 2017-02-07 RX ADMIN — GLIPIZIDE 5 MG: 5 TABLET ORAL at 04:02

## 2017-02-07 RX ADMIN — METFORMIN HYDROCHLORIDE 1000 MG: 500 TABLET, FILM COATED ORAL at 08:02

## 2017-02-07 RX ADMIN — THERA TABS 1 TABLET: TAB at 08:02

## 2017-02-07 NOTE — ASSESSMENT & PLAN NOTE
-Cross taper Zoloft with Cymbalta: discontinue Zoloft and increase Cymbalta 60mg qAM  -Consider addition of gabapentin, but patient already on Lyrica 50mg BID

## 2017-02-07 NOTE — CONSULTS
RD consulted for Diabetes education.    RD reviewed foods that are carbohydrates.  Discussed serving size for 1 CHO is 15 grams, reviewed serving sizes of CHO foods.  Recommended 1-2 servings of CHO for snacks and 3-4 servings of CHO for meal, trying to always pair CHO with protein.  Recommend pt consumes balanced plate of CHO, non-starchy vegetables, starches and fruits.  Reviewed how to read a food label.  Recommend pt to stay hydrated and promote fluid intake, pt states she dislikes the taste of water and was requesting her  bring her water enhancers (strawberry kiwi, or watermelon flavored), decision up to MD.  Handouts on Diabetic CHO counting and balanced plate left with pt. All questions addressed. Please consult RD if warranted.

## 2017-02-07 NOTE — SUBJECTIVE & OBJECTIVE
Interval History:   Per staff:   Patient states she does not want her father to call or visit.  Compliant with scheduled snack and HS medication. POCT glucose: 115 this evening. Requested medication for persistent constipation unresponsive to docusate over past two days. Also c/o nausea. Dr Molina notified; new orders noted. At 20:33, PRN polyethylene glycol 17g and ondansetron 8 mg po given as ordered for c/o constipation and nausea. Nausea medication effective immediately. No SI/HI/AVH or abnormal behavior reported or noted this evening. Retired to bed at appropriate time. No acute physical distress. Will continue to monitor. Slept approximately 7-8 hours    Patient seen and examined with treatment team; chart and nursing notes reviewed. No distress noted, and patient was agreeable and cooperative with interview. No acute events overnight. Patient still complains of some abdominal pain, nausea, and constipation unrelieved by PRN medications. Patient reports tossing and turning last night. Feels anxious this morning, but cannot identify any specific triggers. Family meeting held with patient's . Strained relationship with patient's father discussed, and appears to be major source of patient's stress and anxiety. Denies SI, HI, AVH, delusions, or paranoia. Tolerating medications without adverse side effects. Patient has been compliant with medications. No psychiatric PRNs required.        Psychotherapeutics     Start     Stop Route Frequency Ordered    02/06/17 2100  sertraline tablet 50 mg      -- Oral Nightly 02/06/17 1146    02/06/17 2100  duloxetine DR capsule 30 mg      -- Oral Nightly 02/06/17 1146           Review of Systems   Constitutional: Negative for chills and fever.   Respiratory: Negative for shortness of breath.    Cardiovascular: Negative for chest pain.   Gastrointestinal: Positive for abdominal pain and constipation. Negative for diarrhea, nausea and vomiting.   Neurological: Positive for  "numbness. Negative for headaches.     Objective:     Vital Signs (Most Recent):  Temp: 98.8 °F (37.1 °C) (02/06/17 1931)  Pulse: 84 (02/06/17 1931)  Resp: 16 (02/06/17 1931)  BP: 128/79 (02/06/17 1931) Vital Signs (24h Range):  Temp:  [98.8 °F (37.1 °C)] 98.8 °F (37.1 °C)  Pulse:  [84] 84  Resp:  [16] 16  BP: (128)/(79) 128/79     Height: 5' 9" (175.3 cm)  Weight: 104.3 kg (229 lb 15 oz)  Body mass index is 33.96 kg/(m^2).    No intake or output data in the 24 hours ending 02/07/17 0905    Physical Exam   Constitutional: She is oriented to person, place, and time. She appears well-developed and well-nourished. No distress.   HENT:   Head: Normocephalic and atraumatic.   Hair dyed in bright colors   Eyes: Conjunctivae and EOM are normal.   Neck: Normal range of motion.   Pulmonary/Chest: Effort normal. No respiratory distress.   Abdominal: She exhibits no distension.   Musculoskeletal: Normal range of motion.   Neurological: She is alert and oriented to person, place, and time.   Skin: She is not diaphoretic.        Significant Labs: Last 24 Hours:   Recent Lab Results       02/07/17  0730 02/07/17  0520 02/06/17  2031 02/06/17  1627 02/06/17  1140      Folate  13.6        Free T4  0.85        POCT Glucose 139(H)  115(H) 126(H) 157(H)     T3, Total  107        Vitamin B-12  404                          Significant Imaging: None  "
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
## 2017-02-07 NOTE — PSYCH
Pt displayed a depressed mood. Pt had family meeting today with treatment team and her . Pt tearful at times when speaking about her relationship with her father. Denies SI/HI. Denies AH/VH. Compliant with all medications. Compliant with blood glucose monitoring. Reviewed with pt blood glucose monitoring procedures and carb controlled diet. Pt verbalized understanding. Pt also met with dietician today. Pt attend unit activities. Complained of constipation and was given miralax PO. NAD observed. Will cont to monitor.

## 2017-02-07 NOTE — ASSESSMENT & PLAN NOTE
-Resume home regimen Metformin 1000mg PO BID with meals  -Lantus not on formulary, will substitute Levemir 50U nightly for insulin coverage with SSI  -Glipizide 5mg BID  -Accuchecks 4x daily with meals and nightly  -Hemoglobin A1c 9.2  -Met with diabetic nutritionist

## 2017-02-07 NOTE — ASSESSMENT & PLAN NOTE
-Patient signed as an FVA  -Questionable history of Bipolar diagnosis considering current medication regimen of unopposed SSRI without result of radha and no previous hospitalizations for acute manic episodes; suspect larger personality component to manic episodes  -Started on home Zoloft, however will cross taper to Cymbalta given patient's neuropathy: discontinue Zoloft and increase Cymbalta 60mg qAM  -PRN vistaril 25mg PO qhs for insomnia  -Consider addition of gabapentin, but patient already on Lyrica 50mg BID
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
## 2017-02-07 NOTE — PROGRESS NOTES
Group Psychotherapy (PhD/LCSW)    Site: [facility]    Clinical status of patient: Inpatient    Date: 2/7/2017    Group Focus: Life Skills    Length of service: 85432 - 35-40 minutes    Number of patients in attendance: 6    Referred by: Acute Psychiatry Unit Treatment Team    Target symptoms: Depression and Anxiety    Patient's response to treatment: Active Listening and Self-disclosure    Progress toward goals: Progressing slowly    Interval History: Pt was alert and attentive in group. She participated appropriately in a discussion of coping with anxiety. She noted that she was very anxious this morning but couldn't identify any particular thing that set it off. She said sometimes this just happens to her. She was fidgety during group but appeared to settle down by the end of it.      Diagnosis: See Dr Molina's notes dated 2/7/17    Plan: Continue treatment on [facility]

## 2017-02-07 NOTE — PROGRESS NOTES
02/07/17 0900 02/07/17 1100 02/07/17 1300   [facility] Group Therapy   Group Name Community Reintegration (pet therapy) Therapeutic Recreation   Specific Interventions Current Events Sensory Stimulation --    Participation Level Active;Supportive;Appropriate;Attentive Active;Supportive;Appropriate;Attentive --    Participation Quality Cooperative;Social Cooperative;Social Refused   Insight/Motivation Good Good --    Affect/Mood Display Appropriate Appropriate --    Cognition Alert;Oriented Alert;Oriented --
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
## 2017-02-07 NOTE — PROGRESS NOTES
Compliant with scheduled snack and HS medication. POCT glucose: 115 this evening. Requested medication for persistent constipation unresponsive to docusate over past two days. Also c/o nausea. Dr Molina notified; new orders noted. At 20:33, PRN polyethylene glycol 17g and ondansetron 8 mg po given as ordered for c/o constipation and nausea. Nausea medication effective immediately. No SI/HI/AVH or abnormal behavior reported or noted this evening. Retired to bed at appropriate time. No acute physical distress. Will continue to monitor.

## 2017-02-08 LAB
POCT GLUCOSE: 109 MG/DL (ref 70–110)
POCT GLUCOSE: 147 MG/DL (ref 70–110)
POCT GLUCOSE: 153 MG/DL (ref 70–110)
POCT GLUCOSE: 164 MG/DL (ref 70–110)

## 2017-02-08 PROCEDURE — 25000003 PHARM REV CODE 250: Performed by: PSYCHIATRY & NEUROLOGY

## 2017-02-08 PROCEDURE — 12400001 HC PSYCH SEMI-PRIVATE ROOM

## 2017-02-08 PROCEDURE — 90853 GROUP PSYCHOTHERAPY: CPT | Mod: HB,HP,S$PBB, | Performed by: PSYCHOLOGIST

## 2017-02-08 PROCEDURE — 99233 SBSQ HOSP IP/OBS HIGH 50: CPT | Mod: HB,AF,S$PBB, | Performed by: PSYCHIATRY & NEUROLOGY

## 2017-02-08 RX ORDER — SYRING-NEEDL,DISP,INSUL,0.3 ML 29 G X1/2"
296 SYRINGE, EMPTY DISPOSABLE MISCELLANEOUS ONCE
Status: COMPLETED | OUTPATIENT
Start: 2017-02-08 | End: 2017-02-08

## 2017-02-08 RX ORDER — ACETAMINOPHEN 325 MG/1
650 TABLET ORAL EVERY 6 HOURS PRN
Status: DISCONTINUED | OUTPATIENT
Start: 2017-02-08 | End: 2017-02-09 | Stop reason: HOSPADM

## 2017-02-08 RX ADMIN — MAGESIUM CITRATE 296 ML: 1.75 LIQUID ORAL at 02:02

## 2017-02-08 RX ADMIN — INSULIN DETEMIR 50 UNITS: 100 INJECTION, SOLUTION SUBCUTANEOUS at 08:02

## 2017-02-08 RX ADMIN — METFORMIN HYDROCHLORIDE 1000 MG: 500 TABLET, FILM COATED ORAL at 07:02

## 2017-02-08 RX ADMIN — PREGABALIN 50 MG: 50 CAPSULE ORAL at 08:02

## 2017-02-08 RX ADMIN — THERA TABS 1 TABLET: TAB at 08:02

## 2017-02-08 RX ADMIN — METFORMIN HYDROCHLORIDE 1000 MG: 500 TABLET, FILM COATED ORAL at 05:02

## 2017-02-08 RX ADMIN — ACETAMINOPHEN 650 MG: 325 TABLET, FILM COATED ORAL at 09:02

## 2017-02-08 RX ADMIN — Medication 1 CAPSULE: at 08:02

## 2017-02-08 RX ADMIN — FOLIC ACID 1 MG: 1 TABLET ORAL at 08:02

## 2017-02-08 RX ADMIN — GLIPIZIDE 5 MG: 5 TABLET ORAL at 05:02

## 2017-02-08 RX ADMIN — TOPIRAMATE 50 MG: 25 TABLET, FILM COATED ORAL at 08:02

## 2017-02-08 RX ADMIN — ATORVASTATIN CALCIUM 10 MG: 10 TABLET, FILM COATED ORAL at 08:02

## 2017-02-08 RX ADMIN — NICOTINE 1 PATCH: 7 PATCH, EXTENDED RELEASE TRANSDERMAL at 08:02

## 2017-02-08 RX ADMIN — GLIPIZIDE 5 MG: 5 TABLET ORAL at 07:02

## 2017-02-08 RX ADMIN — DULOXETINE 60 MG: 60 CAPSULE, DELAYED RELEASE ORAL at 08:02

## 2017-02-08 RX ADMIN — DOCUSATE SODIUM 100 MG: 100 CAPSULE, LIQUID FILLED ORAL at 08:02

## 2017-02-08 NOTE — SUBJECTIVE & OBJECTIVE
"Interval History:   Per staff: "Calm, cooperative. Cheerful. Interacting well with patients and staff. Participating in groups and unit activities. Denies depressive symptoms or suicidal ideations. Medication compliant. Suicide precautions and modified visual contact maintained per MD orders."    Patient seen and examined with treatment team; chart and nursing notes reviewed. No distress noted, and patient was agreeable and cooperative with interview. No acute events overnight. Patient reports sleeping well, but does endorse a decreased appetite. Tolerating medications without adverse side effects, and feels medication is helping with both mood and pain. Denies SI, HI, AVH, delusions, or paranoia. Patient has been compliant with medications. No psychiatric PRNs required other than Benadryl for sleep. Patient reports she spoke with her grandmother this morning. States she will pursue an individual therapist when she relocates. Still complaining of constipation. Patient also endorses some complaints, but all related to starting her menstrual cycle.       Psychotherapeutics     Start     Stop Route Frequency Ordered    02/07/17 1100  duloxetine DR capsule 60 mg      -- Oral Daily 02/07/17 1047           Review of Systems   Constitutional: Negative for chills and fever.   Respiratory: Negative for shortness of breath.    Cardiovascular: Negative for chest pain.   Gastrointestinal: Positive for abdominal pain and constipation. Negative for diarrhea, nausea and vomiting.   Neurological: Negative for headaches.     Objective:     Vital Signs (Most Recent):  Temp: 98 °F (36.7 °C) (02/08/17 0756)  Pulse: 77 (02/08/17 0756)  Resp: 18 (02/08/17 0756)  BP: 104/63 (02/08/17 0756) Vital Signs (24h Range):  Temp:  [98 °F (36.7 °C)-99.4 °F (37.4 °C)] 98 °F (36.7 °C)  Pulse:  [77-90] 77  Resp:  [18] 18  BP: (104-136)/(63-76) 104/63     Height: 5' 9" (175.3 cm)  Weight: 104.3 kg (229 lb 15 oz)  Body mass index is 33.96 " kg/(m^2).    No intake or output data in the 24 hours ending 02/08/17 1108    Physical Exam   Constitutional: She is oriented to person, place, and time. She appears well-developed and well-nourished. No distress.   Hair dyed   HENT:   Head: Normocephalic and atraumatic.   Eyes: Conjunctivae and EOM are normal.   Neck: Normal range of motion.   Pulmonary/Chest: Effort normal. No respiratory distress.   Abdominal: She exhibits no distension.   Musculoskeletal: Normal range of motion.   Neurological: She is alert and oriented to person, place, and time.   Skin: No rash noted. She is not diaphoretic. No erythema.        Significant Labs: Last 24 Hours:   Recent Lab Results       02/08/17  0739 02/07/17  2025 02/07/17  1633 02/07/17  1136      POCT Glucose 164(H) 115(H) 96 163(H)             Significant Imaging: None
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
## 2017-02-08 NOTE — MEDICAL/APP STUDENT
Progress Note  Psychiatry    Admit Date: 2/3/2017   LOS: 5 days   Patient care and case discussed with nursing/staff.  Chart reviewed.    SUBJECTIVE:     Follow-up For:  Depression    Interval History:  Patient DARLEEN LANG was calm and cooperative during the interview. Patient states that she called her grandmother about her current situation. Grandmother does not want her to run away from the problem and stay here in [place], but grandmother is cooperative to let patient go to [place]. Patient reported that grandmother is happy to hear that patient is going back. Patient agreed to see an individual therapist after transferring to a new [facility]. Patient states that Cymbalta is taking its course. Patient slept well last night with Benadryl, other than one kishore patient yelling outside her room. Patient admits trying to keep from being violent against this patient. Patient states decreased appetite because she continues to have constipation. Doctor will order Maxitrate. Patient admits completely loss of appetite, feels like forcing down food she has to take in. Patient denies SI, HI. Patient states that Cymbalta is helping with her pain in feet, feels better than before with Lyrica.  will be ready to pick patient up tomorrow.   ROS: patient admits to feeling cold this morning. Patient denies headache, SOB, chest pain. Patient admits to feel nausea but probably due to menstruation and constipation.    Scheduled Meds:   atorvastatin  10 mg Oral Daily    duloxetine  60 mg Oral Daily    folic acid  1 mg Oral Daily    glipiZIDE  5 mg Oral BID WM    insulin detemir  50 Units Subcutaneous QHS    metformin  1,000 mg Oral BID WM    multivitamin  1 tablet Oral Daily    nicotine  1 patch Transdermal Daily    omega-3 fatty acids-fish oil  1 capsule Oral Daily    pregabalin  50 mg Oral BID    topiramate  50 mg Oral BID     PRN Meds:acetaminophen, dextrose 50%, diphenhydrAMINE, docusate sodium, glucagon  (human recombinant), glucose, insulin aspart, ondansetron, polyethylene glycol  Psychotherapeutics     Start     Stop Route Frequency Ordered    02/07/17 1100  duloxetine DR capsule 60 mg      -- Oral Daily 02/07/17 1047          Review of patient's allergies indicates:   Allergen Reactions    Beatriz Anaphylaxis    Pecan nut Dermatitis       Psychiatric ROS:   See Dr. Otto Laura Jr., MD's Admission Note of date 2/6/17 for ROS.    Gastrointestinal: positive for abdominal pain, constipation and nausea        OBJECTIVE:     Vital Signs (Most Recent)  Temp: 98 °F (36.7 °C) (02/08/17 0756)  Pulse: 77 (02/08/17 0756)  Resp: 18 (02/08/17 0756)  BP: 104/63 (02/08/17 0756)  Weight: 104.3 kg (229 lb 15 oz) (02/03/17 2130)  BMI (Calculated): 34 (02/03/17 2130)    Mental Status Exam:  Appearance: Casually dressed, Behavior:  calm and cooperative             , Psychomotor: Within Normal Limits              , Speech:  normal rate, rhythm and volume, Mood:  depressed and the same/no change, Affect:  blunted, Thought Process:  linear, Thought Content:  denies SI, HI     , Attention Span and Concentration:  Normal, Insight/Judgement:  Fair    Laboratory:  **need the individual components still**  Recent Results (from the past 24 hour(s))   POCT glucose    Collection Time: 02/07/17 11:36 AM   Result Value Ref Range    POCT Glucose 163 (H) 70 - 110 mg/dL   POCT glucose    Collection Time: 02/07/17  4:33 PM   Result Value Ref Range    POCT Glucose 96 70 - 110 mg/dL   POCT glucose    Collection Time: 02/07/17  8:25 PM   Result Value Ref Range    POCT Glucose 115 (H) 70 - 110 mg/dL   POCT glucose    Collection Time: 02/08/17  7:39 AM   Result Value Ref Range    POCT Glucose 164 (H) 70 - 110 mg/dL       Diagnosis:  Adjustment disorder    ASSESSMENT/PLAN:     Need for continued hospitalization (from psychiatric standpoint):  awaiting disposition plan and continue protective hospitalization while awaiting disposition  plan    Target Disposition:  Plan to leave [place] and move back to [place].    Complexity Level:  Low    FORMULATION/IMPRESSION:   Patient is a 31 y.o. old, female, admitted with principal problem of Adjustment disorder with mixed disturbance of emotions and conduct.   Patient is showing Patient is showing moderate improvement (in regards to psychiatric care).    Patient DARLEEN LANG was followed for depressive episodes and suicidal ideation to OD on her insulin after moving in with her father in [place]. Patient currently feels improvement with hospitalization and medications. Patient is willing to move back to [place], where her and  and she lived before coming to [place]. Today patient is calm and cooperative during meeting with the treatment teams. Patient is educated on Cymbalta. Patient is ready to be discharged tomorrow.        Caroline Yu [facility] - Psychiatry  [facility]

## 2017-02-08 NOTE — PROGRESS NOTES
02/08/17 0900 02/08/17 1000 02/08/17 1100   [facility] Group Therapy   Group Name Community Reintegration Education (guided imagery)   Specific Interventions Current Events Relapse Prevention Relaxation Training   Participation Level Active;Supportive;Appropriate;Attentive Active;Supportive;Appropriate;Attentive Active;Supportive;Appropriate;Attentive   Participation Quality Cooperative;Social Cooperative;Social Cooperative;Social   Insight/Motivation Good Good Good;Improved   Affect/Mood Display Appropriate Appropriate Appropriate   Cognition Alert;Oriented Alert;Oriented Alert;Oriented       02/08/17 1300   [facility] Group Therapy   Group Name Therapeutic Recreation   Specific Interventions Crafts   Participation Level Supportive;Active;Appropriate;Attentive   Participation Quality Cooperative;Social   Insight/Motivation Good   Affect/Mood Display Appropriate   Cognition Alert;Oriented

## 2017-02-08 NOTE — PLAN OF CARE
Problem: Patient Care Overview (Adult)  Goal: Plan of Care Review  Outcome: Ongoing (interventions implemented as appropriate)  Calm, cooperative. Cheerful. Interacting well with patients and staff. Participating in groups and unit activities. Denies depressive symptoms or suicidal ideations. Medication compliant. Suicide precautions and modified visual contact maintained per MD orders.

## 2017-02-08 NOTE — ASSESSMENT & PLAN NOTE
-Patient signed as an FVA  -Questionable history of Bipolar diagnosis considering current medication regimen of unopposed SSRI without result of radha and no previous hospitalizations for acute manic episodes; suspect larger personality component to manic episodes  -Started on home Zoloft, however will cross taper to Cymbalta given patient's neuropathy: discontinue Zoloft and increase Cymbalta 60mg qAM  -PRN Benadryl 25mg PO qhs for insomnia  -Consider addition of gabapentin, but patient already on Lyrica 50mg BID

## 2017-02-09 VITALS
TEMPERATURE: 98 F | WEIGHT: 229.94 LBS | SYSTOLIC BLOOD PRESSURE: 116 MMHG | HEIGHT: 69 IN | HEART RATE: 85 BPM | RESPIRATION RATE: 18 BRPM | DIASTOLIC BLOOD PRESSURE: 77 MMHG | BODY MASS INDEX: 34.06 KG/M2

## 2017-02-09 LAB
CHOLEST/HDLC SERPL: 5.6 {RATIO}
HDL/CHOLESTEROL RATIO: 17.8 %
HDLC SERPL-MCNC: 152 MG/DL
HDLC SERPL-MCNC: 27 MG/DL
LDLC SERPL CALC-MCNC: 85.6 MG/DL
NONHDLC SERPL-MCNC: 125 MG/DL
POCT GLUCOSE: 116 MG/DL (ref 70–110)
POCT GLUCOSE: 149 MG/DL (ref 70–110)
TRIGL SERPL-MCNC: 197 MG/DL

## 2017-02-09 PROCEDURE — 25000003 PHARM REV CODE 250: Performed by: PSYCHIATRY & NEUROLOGY

## 2017-02-09 PROCEDURE — 99238 HOSP IP/OBS DSCHRG MGMT 30/<: CPT | Mod: HB,AF,S$PBB, | Performed by: PSYCHIATRY & NEUROLOGY

## 2017-02-09 PROCEDURE — 80061 LIPID PANEL: CPT

## 2017-02-09 PROCEDURE — 36415 COLL VENOUS BLD VENIPUNCTURE: CPT

## 2017-02-09 RX ORDER — DULOXETIN HYDROCHLORIDE 60 MG/1
60 CAPSULE, DELAYED RELEASE ORAL DAILY
Qty: 30 CAPSULE | Refills: 0 | Status: SHIPPED | OUTPATIENT
Start: 2017-02-09 | End: 2017-05-13

## 2017-02-09 RX ORDER — PREGABALIN 50 MG/1
50 CAPSULE ORAL 2 TIMES DAILY
Qty: 60 CAPSULE | Refills: 0 | Status: SHIPPED | OUTPATIENT
Start: 2017-02-09 | End: 2017-02-09

## 2017-02-09 RX ORDER — TOPIRAMATE 50 MG/1
50 TABLET, FILM COATED ORAL 2 TIMES DAILY
Qty: 60 TABLET | Refills: 0 | Status: SHIPPED | OUTPATIENT
Start: 2017-02-09 | End: 2021-01-12

## 2017-02-09 RX ORDER — PREGABALIN 50 MG/1
50 CAPSULE ORAL 2 TIMES DAILY
Qty: 60 CAPSULE | Refills: 0 | Status: SHIPPED | OUTPATIENT
Start: 2017-02-09 | End: 2017-02-13

## 2017-02-09 RX ORDER — FOLIC ACID 1 MG/1
1 TABLET ORAL DAILY
Qty: 30 TABLET | Refills: 0 | Status: SHIPPED | OUTPATIENT
Start: 2017-02-09 | End: 2020-03-10 | Stop reason: CLARIF

## 2017-02-09 RX ADMIN — NICOTINE 1 PATCH: 7 PATCH, EXTENDED RELEASE TRANSDERMAL at 08:02

## 2017-02-09 RX ADMIN — Medication 1 CAPSULE: at 08:02

## 2017-02-09 RX ADMIN — TOPIRAMATE 50 MG: 25 TABLET, FILM COATED ORAL at 08:02

## 2017-02-09 RX ADMIN — ATORVASTATIN CALCIUM 10 MG: 10 TABLET, FILM COATED ORAL at 08:02

## 2017-02-09 RX ADMIN — THERA TABS 1 TABLET: TAB at 08:02

## 2017-02-09 RX ADMIN — METFORMIN HYDROCHLORIDE 1000 MG: 500 TABLET, FILM COATED ORAL at 08:02

## 2017-02-09 RX ADMIN — FOLIC ACID 1 MG: 1 TABLET ORAL at 08:02

## 2017-02-09 RX ADMIN — PREGABALIN 50 MG: 50 CAPSULE ORAL at 08:02

## 2017-02-09 RX ADMIN — GLIPIZIDE 5 MG: 5 TABLET ORAL at 08:02

## 2017-02-09 RX ADMIN — DULOXETINE 60 MG: 60 CAPSULE, DELAYED RELEASE ORAL at 08:02

## 2017-02-09 NOTE — DISCHARGE INSTRUCTIONS
H&P, D/C summary, Med. Rec., transmitted to next level of care. WALK-IN FOR APPOINTMENT BETWEEN 8-3:00 at [facility].  PHONE:[phone].

## 2017-02-09 NOTE — MEDICAL/APP STUDENT
Progress Note  Psychiatry    Admit Date: 2/3/2017   LOS: 6 days   Patient care and case discussed with nursing/staff.  Chart reviewed.    SUBJECTIVE:     Follow-up For:  Depression, suicidal ideation    Interval History:    In Interview  Patient LIZZIE SAUNDERS is calm and cooperative. Patient reports improvement of mood. Patient currently denies active thoughts of suicide. Patient states that she knows how to cope with stressful situations with her father. Patient states that her meeting with Dr. Burns the previous day helps her with coping strategies. Patient reported one bowl motion the previous night, which relieved her stomach pain. Patient states that she has a plan to move back to [place] with her  after she clears her court date for a traffic violation. Patient states she is ready to home.    In Meeting with Tx Team  Patient Lizzie Saunders states that she will move back to [place] with her  after she goes to court for a traffic ticket. Patient is planning to save up to $3000 before the move. Patient states she is prepared to tell her father about the leave. Patient anticipates today may be bad when she gets home if her father is present. Patient states that she will remain positive, using Dr. Burns's strategy. Patient states she knows how to cope if her father acts up on her. Patient states she will have a good support from her  if her mood is depressed again. Patient states that her  may think it is his fault that she is suicidal. Patient would like to see a therapist near her house. Patient denies suicidal ideation nor hallucinations.  Medication side effects and benefits discussed with the patient.     Scheduled Meds:   atorvastatin  10 mg Oral Daily    duloxetine  60 mg Oral Daily    folic acid  1 mg Oral Daily    glipiZIDE  5 mg Oral BID WM    insulin detemir  50 Units Subcutaneous QHS    metformin  1,000 mg Oral BID WM    multivitamin  1 tablet Oral Daily     nicotine  1 patch Transdermal Daily    omega-3 fatty acids-fish oil  1 capsule Oral Daily    pregabalin  50 mg Oral BID    topiramate  50 mg Oral BID     PRN Meds:acetaminophen, dextrose 50%, diphenhydrAMINE, docusate sodium, glucagon (human recombinant), glucose, insulin aspart, ondansetron, polyethylene glycol  Psychotherapeutics     Start     Stop Route Frequency Ordered    02/07/17 1100  duloxetine DR capsule 60 mg      -- Oral Daily 02/07/17 1047          Review of patient's allergies indicates:   Allergen Reactions    Beatriz Anaphylaxis    Pecan nut Dermatitis       Psychiatric ROS:   See Dr. Milad Molina's Admission Note of date 2/8/17 for ROS.  No acute changes overnight.  Had one bowel movement previous night.        OBJECTIVE:     Vital Signs (Most Recent)  Temp: 98.9 °F (37.2 °C) (02/09/17 0747)  Pulse: 80 (02/09/17 0747)  Resp: 18 (02/09/17 0747)  BP: (!) 98/57 (02/09/17 0747)  Weight: 104.3 kg (229 lb 15 oz) (02/03/17 2130)  BMI (Calculated): 34 (02/03/17 2130)    Mental Status Exam:  Appearance: Casually dressed, Psychomotor: Within Normal Limits              , Speech:  normal rate, rhythm and volume, Mood:  neutral, Affect:  normal, Thought Process:  linear and within normal limits, Thought Content:  denies SI, HI     , Insight/Judgement:  Fair    Laboratory:  **need the individual components still**  Recent Results (from the past 24 hour(s))   POCT glucose    Collection Time: 02/08/17 11:36 AM   Result Value Ref Range    POCT Glucose 109 70 - 110 mg/dL   POCT glucose    Collection Time: 02/08/17  4:31 PM   Result Value Ref Range    POCT Glucose 153 (H) 70 - 110 mg/dL   POCT glucose    Collection Time: 02/08/17  8:40 PM   Result Value Ref Range    POCT Glucose 147 (H) 70 - 110 mg/dL   Lipid panel    Collection Time: 02/09/17  5:43 AM   Result Value Ref Range    Cholesterol 152 120 - 199 mg/dL    Triglycerides 197 (H) 30 - 150 mg/dL    HDL 27 (L) 40 - 75 mg/dL    LDL Cholesterol 85.6 63.0 -  159.0 mg/dL    HDL/Chol Ratio 17.8 (L) 20.0 - 50.0 %    Total Cholesterol/HDL Ratio 5.6 (H) 2.0 - 5.0    Non-HDL Cholesterol 125 mg/dL   POCT glucose    Collection Time: 02/09/17  7:37 AM   Result Value Ref Range    POCT Glucose 149 (H) 70 - 110 mg/dL     Diagnosis:  Adjustment Disorder    ASSESSMENT/PLAN:     Need for continued hospitalization (from psychiatric standpoint):  awaiting disposition plan, follow up with [facility] with an individual therapist.    Target Disposition:  Home     Complexity Level:  Low    FORMULATION/IMPRESSION:   Patient is a 31 y.o. old, female, admitted with principal problem of Adjustment disorder with mixed disturbance of emotions and conduct.   Patient is showing Patient is back to baseline (in regards to psychiatric care).    Patient DARLEEN LANG is ready to go home. Follow up with an individual therapist is recommended.   Medication plan is discussed in regards to her psychiatric care.         Marlene Key  Psychiatry  [facility]

## 2017-02-09 NOTE — PROGRESS NOTES
H&P, D/C summary, Med. Rec., transmitted to next level of care. WALK-IN FOR APPOINTMENT BETWEEN 8-3:00 at [facility].  PHONE:[phone].

## 2017-02-09 NOTE — ASSESSMENT & PLAN NOTE
- patient's current living situation contributing to her mood and anxiety.    - She has tolerated Cymbalta 60mg daily without issue.  - Continue current regimen and [facility]

## 2017-02-09 NOTE — ASSESSMENT & PLAN NOTE
-Continue home regimen Metformin 1000mg PO BID with meals, Lantus, Glipizide 5mg BID  -Hemoglobin A1c 9.2  -Met with diabetic nutritionist while inpatient.
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
## 2017-02-09 NOTE — PLAN OF CARE
Problem: Patient Care Overview (Adult)  Goal: Plan of Care Review  Outcome: Outcome(s) achieved Date Met:  02/09/17  Patient denies SI/HI/AVH. Calm and cooperative. Mood good. Interactive with peers and staff. Meet with treatment team. Dr. Laura discussed medications, follow up and discharge home instructions. Given copy of discharge home instructions and prescriptions. Reviewed care plan including diabetic teaching. Verbalized understanding. Discharge ambulatory accompanied by  to home.

## 2017-02-09 NOTE — PLAN OF CARE
"Problem: Patient Care Overview (Adult)  Goal: Plan of Care Review  Outcome: Ongoing (interventions implemented as appropriate)  Pt's visible on the unit interacting appropriately with peers and staff. Anticipating discharge to home today, stating, " I'm going to stay as far away from my Dad as possible!" Denied S/IHI, A/V hallucinations. No management problems noted compliant with medication administration. Appetite is good. Continue to monitor.    Problem: Diabetes, Type 2 (Adult)  Goal: Signs and Symptoms of Listed Potential Problems Will be Absent, Minimized or Managed (Diabetes, Type 2)  Signs and symptoms of listed potential problems will be absent, minimized or managed by discharge/transition of care (reference Diabetes, Type 2 (Adult) CPG).   Outcome: Ongoing (interventions implemented as appropriate)   Blood glucose 147, compliant with medication administration and diabetic diet. Educated the pt on the importance of frequent glucose monitoring, and S/S hypo/hyperglycemia. Verbalized understanding. Continue plan of care.      "

## 2017-02-09 NOTE — PROGRESS NOTES
Pt is being discharged with follow up care at [facility]. Walk in policy explained to pt for follow up appointment.   Pt understands that they must bring hospital discharge paper to the appointment with them.  Walk-in  1or 2 days from today.  Info faxed to clinic.

## 2017-02-13 ENCOUNTER — PATIENT OUTREACH (OUTPATIENT)
Dept: ADMINISTRATIVE | Facility: CLINIC | Age: 32
End: 2017-02-13
Payer: MEDICAID

## 2017-02-13 RX ORDER — GABAPENTIN 600 MG/1
600 TABLET ORAL 3 TIMES DAILY
COMMUNITY
End: 2019-10-04 | Stop reason: SDUPTHER

## 2017-04-30 ENCOUNTER — HOSPITAL ENCOUNTER (EMERGENCY)
Facility: HOSPITAL | Age: 32
Discharge: HOME | End: 2017-05-01
Attending: EMERGENCY MEDICINE
Payer: MEDICAID

## 2017-04-30 VITALS
HEIGHT: 68 IN | TEMPERATURE: 98 F | OXYGEN SATURATION: 98 % | BODY MASS INDEX: 33.34 KG/M2 | SYSTOLIC BLOOD PRESSURE: 130 MMHG | HEART RATE: 90 BPM | RESPIRATION RATE: 18 BRPM | DIASTOLIC BLOOD PRESSURE: 81 MMHG | WEIGHT: 220 LBS

## 2017-04-30 DIAGNOSIS — M54.42 ACUTE LOW BACK PAIN WITH LEFT-SIDED SCIATICA, UNSPECIFIED BACK PAIN LATERALITY: Primary | ICD-10-CM

## 2017-04-30 PROCEDURE — 99284 EMERGENCY DEPT VISIT MOD MDM: CPT | Mod: ,,, | Performed by: EMERGENCY MEDICINE

## 2017-04-30 PROCEDURE — 81025 URINE PREGNANCY TEST: CPT | Performed by: EMERGENCY MEDICINE

## 2017-04-30 PROCEDURE — 99283 EMERGENCY DEPT VISIT LOW MDM: CPT | Mod: 25

## 2017-04-30 RX ORDER — TRAMADOL HYDROCHLORIDE 50 MG/1
50 TABLET ORAL
Status: COMPLETED | OUTPATIENT
Start: 2017-05-01 | End: 2017-05-01

## 2017-04-30 NOTE — ED AVS SNAPSHOT
[facility]  [address]  [facility] [address]               Lizzie Langlps   2017 10:58 PM   ED    Description:  Female : 1985   Department:  [facility]           Your Care was Coordinated By:     Provider Role From To    Rosario Rodriguez MD Attending Provider [phone] --      Reason for Visit     Sciatica           Diagnoses this Visit        Comments    Acute low back pain with left-sided sciatica, unspecified back pain laterality    -  Primary       ED Disposition     ED Disposition Condition Comment    Discharge             To Do List           Follow-up Information     Follow up with Lilian Hopkins MD. Schedule an appointment as soon as possible for a visit in 1 week.    Specialty:  General Practice    Contact information:    [address]  [place] [address]  [phone]         These Medications        Disp Refills Start End    tramadol (ULTRAM) 50 mg tablet 12 tablet 0 2017    Take 1 tablet (50 mg total) by mouth every 6 (six) hours as needed for Pain. - Oral    Pharmacy: [facility] [address] [place] [address] [address] Ph #: [phone]         [facility]     [facility] -  Assistance  Unless otherwise directed by your provider, please contact [facility], our nurse care line that is available for  assistance.     Registered nurses in the [facility] provide: appointment scheduling, clinical advisement, health education, and other advisory services.  Call: [phone] (toll free)               Medications           Message regarding Medications     Verify the changes and/or additions to your medication regime listed below are the same as discussed with your clinician today.  If any of these changes or additions are incorrect, please notify your healthcare provider.        START taking these NEW medications        Refills     "tramadol (ULTRAM) 50 mg tablet 0    Sig: Take 1 tablet (50 mg total) by mouth every 6 (six) hours as needed for Pain.    Class: Print    Route: Oral      These medications were administered today        Dose Freq    tramadol tablet 50 mg 50 mg ED 1 Time    Starting on: 5/1/2017    Sig: Take 1 tablet (50 mg total) by mouth ED 1 Time.    Class: Normal    Route: Oral           Verify that the below list of medications is an accurate representation of the medications you are currently taking.  If none reported, the list may be blank. If incorrect, please contact your healthcare provider. Carry this list with you in case of emergency.           Current Medications     atorvastatin (LIPITOR) 10 MG tablet Take 10 mg by mouth once daily.    duloxetine (CYMBALTA) 60 MG capsule Take 1 capsule (60 mg total) by mouth once daily.    fish oil-omega-3 fatty acids 300-1,000 mg capsule Take 2 g by mouth once daily.    folic acid (FOLVITE) 1 MG tablet Take 1 tablet (1 mg total) by mouth once daily.    gabapentin (NEURONTIN) 600 MG tablet Take 600 mg by mouth 3 (three) times daily.    glipiZIDE (GLUCOTROL) 5 MG tablet Take 5 mg by mouth 2 (two) times daily before meals.    insulin glargine (LANTUS) 100 unit/mL injection Inject 20 Units into the skin every evening.    metformin (GLUCOPHAGE) 1000 MG tablet Take 1,000 mg by mouth 2 (two) times daily with meals.    topiramate (TOPAMAX) 50 MG tablet Take 1 tablet (50 mg total) by mouth 2 (two) times daily.    tramadol (ULTRAM) 50 mg tablet Take 1 tablet (50 mg total) by mouth every 6 (six) hours as needed for Pain.           Clinical Reference Information           Your Vitals Were     BP Pulse Temp Resp Height Weight    130/81 90 98.4 °F (36.9 °C) (Oral) 18 5' 8" (1.727 m) 99.8 kg (220 lb)    SpO2 BMI             98% 33.45 kg/m2         Allergies as of 5/1/2017        Reactions    Beatriz Anaphylaxis    Pecan Nut Dermatitis      Immunizations Administered on Date of Encounter - 5/1/2017  "    None      ED Micro, Lab, POCT     Start Ordered       Status Ordering Provider    04/30/17 2351 04/30/17 2351  POCT urine pregnancy  Once      Final result       ED Imaging Orders     None        Discharge Instructions       Follow up with your doctor. Call tomorrow for an appointment.  Tylenol or motrin if needed for pain. Tramadol if needed for severe pain.  Return to ED for weakness, bowel or bladder problems, numbness or tingling or any other concerns.        Back Pain (Acute or Chronic)    Back pain is one of the most common problems. The good news is that most people feel better in 1 to 2 weeks, and most of the rest in 1 to 2 months. Most people can remain active.  People experience and describe pain differently; not everyone is the same.  · The pain can be sharp, stabbing, shooting, aching, cramping or burning.  · Movement, standing, bending, lifting, sitting, or walking may worsen pain.  · It can be localized to one spot or area, or it can be more generalized.  · It can spread or radiate upwards, to the front, or go down your arms or legs (sciatica).  · It can cause muscle spasm.  Most of the time, mechanical problems with the muscles or spine cause the pain. Mechanical problems are usually caused by an injury to the muscles or ligaments. While illness can cause back pain, it is usually not caused by a serious illness. Mechanical problems include:   · Physical activity such as sports, exercise, work, or normal activity  · Overexertion, lifting, pushing, pulling incorrectly or too aggressively  · Sudden twisting, bending, or stretching from an accident, or accidental movement  · Poor posture  · Stretching or moving wrong, without noticing pain at the time  · Poor coordination, lack of regular exercise (check with your doctor about this)  · Spinal disc disease or arthritis  · Stress  Pain can also be related to pregnancy, or illness like appendicitis, bladder or kidney infections, pelvic infections, and  many other things.  Acute back pain usually gets better in 1 to 2 weeks. Back pain related to disk disease, arthritis in the spinal joints or spinal stenosis (narrowing of the spinal canal) can become chronic and last for months or years.  Unless you had a physical injury (for example, a car accident or fall) X-rays are usually not needed for the initial evaluation of back pain. If pain continues and does not respond to medical treatment, X-rays and other tests may be needed.  Home care  Try these home care recommendations:  · When in bed, try to find a position of comfort. A firm mattress is best. Try lying flat on your back with pillows under your knees. You can also try lying on your side with your knees bent up towards your chest and a pillow between your knees.  · At first, do not try to stretch out the sore spots. If there is a strain, it is not like the good soreness you get after exercising without an injury. In this case, stretching may make it worse.  · Avoid prolong sitting, long car rides, or travel. This puts more stress on the lower back than standing or walking.  · During the first 24 to 72 hours after an acute injury or flare up of chronic back pain, apply an ice pack to the painful area for 20 minutes and then remove it for 20 minutes. Do this over a period of 60 to 90 minutes or several times a day. This will reduce swelling and pain. Wrap the ice pack in a thin towel or plastic to protect your skin.  · You can start with ice, then switch to heat. Heat (hot shower, hot bath, or heating pad) reduces pain and works well for muscle spasms. Heat can be applied to the painful area for 20 minutes then remove it for 20 minutes. Do this over a period of 60 to 90 minutes or several times a day. Do not sleep on a heating pad. It can lead to skin burns or tissue damage.  · You can alternate ice and heat therapy. Talk with your doctor about the best treatment for your back pain.  · Therapeutic massage can help  relax the back muscles without stretching them.  · Be aware of safe lifting methods and do not lift anything without stretching first.  Medicines  Talk to your doctor before using medicine, especially if you have other medical problems or are taking other medicines.  · You may use over-the-counter medicine as directed on the bottle to control pain, unless another pain medicine was prescribed. If you have chronic conditions like diabetes, liver or kidney disease, stomach ulcers, or gastrointestinal bleeding, or are taking blood thinners, talk to your doctor before taking any medicine.  · Be careful if you are given a prescription medicines, narcotics, or medicine for muscle spasms. They can cause drowsiness, affect your coordination, reflexes, and judgement. Do not drive or operate heavy machinery.  Follow-up care  Follow up with your healthcare provider, or as advised.   A radiologist will review any X-rays that were taken. Your provide will notify you of any new findings that may affect your care.  Call 911  Call emergency services if any of the following occur:  · Trouble breathing  · Confusion  · Very drowsy or trouble awakening  · Fainting or loss of consciousness  · Rapid or very slow heart rate  · Loss of bowel or bladder control  When to seek medical advice  Call your healthcare provider right away if any of these occur:   · Pain becomes worse or spreads to your legs  · Weakness or numbness in one or both legs  · Numbness in the groin or genital area  Date Last Reviewed: 7/1/2016  © 0467-4051 [employer]. [address] [place] [address]. All rights reserved. This information is not intended as a substitute for professional medical care. Always follow your healthcare professional's instructions.          Back Care Tips    Caring for your back  These are things you can do to prevent a recurrence of acute back pain and to reduce symptoms from chronic back pain:  · Maintain a healthy  weight. If you are overweight, losing weight will help most types of back pain.  · Exercise is an important part of recovery from most types of back pain. The muscles behind and in front of the spine support the back. This means strengthening both the back muscles and the abdominal muscles will provide better support for your spine.   · Swimming and brisk walking are good overall exercises to improve your fitness level.  · Practice safe lifting methods (below).  · Practice good posture when sitting, standing and walking. Avoid prolonged sitting. This puts more stress on the lower back than standing or walking.  · Wear quality shoes with sufficient arch support. Foot and ankle alignment can affect back symptoms. Women should avoid wearing high heels.  · Therapeutic massage can help relax the back muscles without stretching them.  · During the first 24 to 72 hours after an acute injury or flare-up of chronic back pain, apply an ice pack to the painful area for 20 minutes and then remove it for 20 minutes, over a period of 60 to 90 minutes, or several times a day. As a safety precaution, do not use a heating pad at bedtime. Sleeping on a heating pad can lead to skin burns or tissue damage.  · You can alternate ice and heat therapies.  Medications  Talk to your healthcare provider before using medicines, especially if you have other medical problems or are taking other medicines.  · You may use acetaminophen or ibuprofen to control pain, unless your healthcare provider prescribed other pain medicine. If you have chronic conditions like diabetes, liver or kidney disease, stomach ulcers, or gastrointestinal bleeding, or are taking blood thinners, talk with your healthcare provider before taking any medicines.  · Be careful if you are given prescription pain medicines, narcotics, or medicine for muscle spasm. They can cause drowsiness, affect your coordination, reflexes, and judgment. Do not drive or operate heavy  machinery while taking these types of medicines. Take prescription pain medicine only as prescribed by your healthcare provider.  Lumbar stretch  Here is a simple stretching exercise that will help relax muscle spasm and keep your back more limber. If exercise makes your back pain worse, dont do it.  · Lie on your back with your knees bent and both feet on the ground.  · Slowly raise your left knee to your chest as you flatten your lower back against the floor. Hold for 5 seconds.  · Relax and repeat the exercise with your right knee.  · Do 10 of these exercises for each leg.  Safe lifting method  · Dont bend over at the waist to lift an object off the floor.  Instead, bend your knees and hips in a squat.   · Keep your back and head upright  · Hold the object close to your body, directly in front of you.  · Straighten your legs to lift the object.   · Lower the object to the floor in the reverse fashion.  · If you must slide something across the floor, push it.  Posture tips  Sitting  Sit in chairs with straight backs or low-back support. Keep your knees lower than your hips, with your feet flat on the floor.  When driving, sit up straight. Adjust the seat forward so you are not leaning toward the steering wheel.  A small pillow or rolled towel behind your lower back may help if you are driving long distances.   Standing  When standing for long periods, shift most of your weight to one leg at a time. Alternate legs every few minutes.   Sleeping  The best way to sleep is on your side with your knees bent. Put a low pillow under your head to support your neck in a neutral spine position. Avoid thick pillows that bend your neck to one side. Put a pillow between your legs to further relax your lower back. If you sleep on your back, put pillows under your knees to support your legs in a slightly flexed position. Use a firm mattress. If your mattress sags, replace it, or use a 1/2-inch plywood board under the mattress  to add support.  Follow-up care  Follow up with your healthcare provider, or as advised.  If X-rays, a CT scan or an MRI scan were taken, they will be reviewed by a radiologist. You will be notified of any new findings that may affect your care.  Call 911  Seek emergency medical care if any of the following occur:  · Trouble breathing  · Confusion  · Very drowsy  · Fainting or loss of consciousness  · Rapid or very slow heart rate  · Loss of  bowel or bladder control  When to seek medical care  Call your healthcare provider if any of the following occur:  · Pain becomes worse or spreads to your arms or legs  · Weakness or numbness in one or both arms or legs  · Numbness in the groin area  Date Last Reviewed: 6/1/2016 © 2000-2016 [employer]. [address] [place] [address]. All rights reserved. This information is not intended as a substitute for professional medical care. Always follow your healthcare professional's instructions.          Smoking Cessation     If you would like to quit smoking:   You may be eligible for free services if you are a [place] resident and started smoking cigarettes before September 1, 1988.  Call the Smoking Cessation Trust (SCT) toll free at (845) 908-4549 or (416) 918-4374.   Call 1-800-QUIT-NOW if you do not meet the above criteria.   Contact us via email: [email or web address]   View our website for more information: [email or web address]         [facility] complies with applicable Federal civil rights laws and does not discriminate on the basis of race, color, national origin, age, disability, or sex.        Language Assistance Services     ATTENTION: Language assistance services are available, free of charge. Please call [phone].      ATENCIÓN: Si habla español, tiene a armenta disposición servicios gratuitos de asistencia lingüística. Llame al [phone].     CHÚ Ý: N?u b?n nói Ti?ng Vi?t, có các d?ch v? h? tr? ngôn  ng? mi?n phí dành cho b?n. G?i s? [phone].

## 2017-05-01 LAB
B-HCG UR QL: NEGATIVE
CTP QC/QA: YES

## 2017-05-01 PROCEDURE — 25000003 PHARM REV CODE 250: Performed by: EMERGENCY MEDICINE

## 2017-05-01 RX ORDER — TRAMADOL HYDROCHLORIDE 50 MG/1
50 TABLET ORAL EVERY 6 HOURS PRN
Qty: 12 TABLET | Refills: 0 | Status: SHIPPED | OUTPATIENT
Start: 2017-05-01 | End: 2017-05-11

## 2017-05-01 RX ADMIN — TRAMADOL HYDROCHLORIDE 50 MG: 50 TABLET, FILM COATED ORAL at 12:05

## 2017-05-01 NOTE — ED PROVIDER NOTES
"Encounter Date: 4/30/2017    SCRIBE #1 NOTE: I, Jenn Conrad, am scribing for, and in the presence of,  . I have scribed the entire note.       History     Chief Complaint   Patient presents with    Sciatica     Pt states it started yesterday. Has hx of sciatica. Pt states pain is on left side.      Review of patient's allergies indicates:   Allergen Reactions    Beatriz Anaphylaxis    Pecan nut Dermatitis     HPI Comments:    This is a 31 y.o. female with PMHx of DM who presents with a complaint of sciatica for 1 year. She states having a "sciatica flare" yesterday that caused her severe pain. She endorses sleep disturbance, lower back pain, leg soreness, and neck soreness. She denies CP, AP, and dysuria. She notes no recent trauma or injury. She notes not taking any treatment to alleviate her pain.      The history is provided by the patient.     Past Medical History:   Diagnosis Date    Bipolar disorder     per patient report    Depression     Diabetes mellitus     Hx of psychiatric care     Neuropathy     per patient report    Psychiatric problem     Therapy      Past Surgical History:   Procedure Laterality Date    BACK SURGERY       No family history on file.  Social History   Substance Use Topics    Smoking status: Current Some Day Smoker     Packs/day: 0.25     Types: Cigarettes    Smokeless tobacco: None    Alcohol use Yes      Comment: rare     Review of Systems   Constitutional: Negative for chills and fever.   HENT: Negative for congestion and facial swelling.    Eyes: Negative for visual disturbance.   Respiratory: Negative for shortness of breath.    Cardiovascular: Negative for chest pain.   Gastrointestinal: Negative for abdominal pain, diarrhea, nausea and vomiting.   Endocrine: Negative for polyuria.   Genitourinary: Negative for dysuria.   Musculoskeletal: Positive for back pain.   Skin: Negative for rash.   Neurological: Negative for headaches. "   Psychiatric/Behavioral: Positive for sleep disturbance.   All other systems reviewed and are negative.      Physical Exam   Initial Vitals   BP Pulse Resp Temp SpO2   04/30/17 2153 04/30/17 2153 04/30/17 2153 04/30/17 2153 04/30/17 2153   130/81 90 18 98.4 °F (36.9 °C) 98 %     Physical Exam    Nursing note and vitals reviewed.  Constitutional: She appears well-developed and well-nourished. She is not diaphoretic. No distress.   HENT:   Head: Normocephalic and atraumatic.   Mouth/Throat: Oropharynx is clear and moist.   Eyes: Conjunctivae and EOM are normal. Pupils are equal, round, and reactive to light. Right eye exhibits no discharge. Left eye exhibits no discharge. No scleral icterus.   Neck: Normal range of motion. Neck supple.   Cardiovascular: Normal rate, regular rhythm, normal heart sounds and intact distal pulses. Exam reveals no gallop and no friction rub.    No murmur heard.  Pulmonary/Chest: Breath sounds normal. No respiratory distress. She has no wheezes. She has no rhonchi. She has no rales.   Abdominal: Soft. Bowel sounds are normal. There is no tenderness. There is no rebound and no guarding.   Musculoskeletal: Normal range of motion. She exhibits no edema or tenderness.   Neurological: She is alert and oriented to person, place, and time.   Skin: Skin is warm and dry. No erythema. No pallor.   Psychiatric: She has a normal mood and affect. Her behavior is normal. Thought content normal.         ED Course   Procedures  Labs Reviewed   POCT URINE PREGNANCY             Medical Decision Making:   History:   Old Medical Records: I decided to obtain old medical records.  Old Records Summarized: records from clinic visits.  Initial Assessment:   30 y/o F c/o lower back pain h/o sciatica in the past.   No trauma. No red flags.  No indication for emergent imaging.  Treat pain and discharge home  Clinical Tests:   Lab Tests: Ordered and Reviewed            Scribe Attestation:   Scribe #1: I performed the  above scribed service and the documentation accurately describes the services I performed. I attest to the accuracy of the note.    Attending Attestation:           Physician Attestation for Scribe:  Physician Attestation Statement for Scribe #1: I, , reviewed documentation, as scribed by Jenn Conrad in my presence, and it is both accurate and complete.                 ED Course     Clinical Impression:     1. Acute low back pain with left-sided sciatica, unspecified back pain laterality          Disposition:   Disposition: Discharged  Condition: Stable       Rosario Rodriguez MD  05/15/17 2429

## 2017-05-01 NOTE — DISCHARGE INSTRUCTIONS
Follow up with your doctor. Call tomorrow for an appointment.  Tylenol or motrin if needed for pain. Tramadol if needed for severe pain.  Return to ED for weakness, bowel or bladder problems, numbness or tingling or any other concerns.        Back Pain (Acute or Chronic)    Back pain is one of the most common problems. The good news is that most people feel better in 1 to 2 weeks, and most of the rest in 1 to 2 months. Most people can remain active.  People experience and describe pain differently; not everyone is the same.  · The pain can be sharp, stabbing, shooting, aching, cramping or burning.  · Movement, standing, bending, lifting, sitting, or walking may worsen pain.  · It can be localized to one spot or area, or it can be more generalized.  · It can spread or radiate upwards, to the front, or go down your arms or legs (sciatica).  · It can cause muscle spasm.  Most of the time, mechanical problems with the muscles or spine cause the pain. Mechanical problems are usually caused by an injury to the muscles or ligaments. While illness can cause back pain, it is usually not caused by a serious illness. Mechanical problems include:   · Physical activity such as sports, exercise, work, or normal activity  · Overexertion, lifting, pushing, pulling incorrectly or too aggressively  · Sudden twisting, bending, or stretching from an accident, or accidental movement  · Poor posture  · Stretching or moving wrong, without noticing pain at the time  · Poor coordination, lack of regular exercise (check with your doctor about this)  · Spinal disc disease or arthritis  · Stress  Pain can also be related to pregnancy, or illness like appendicitis, bladder or kidney infections, pelvic infections, and many other things.  Acute back pain usually gets better in 1 to 2 weeks. Back pain related to disk disease, arthritis in the spinal joints or spinal stenosis (narrowing of the spinal canal) can become chronic and last for months or  years.  Unless you had a physical injury (for example, a car accident or fall) X-rays are usually not needed for the initial evaluation of back pain. If pain continues and does not respond to medical treatment, X-rays and other tests may be needed.  Home care  Try these home care recommendations:  · When in bed, try to find a position of comfort. A firm mattress is best. Try lying flat on your back with pillows under your knees. You can also try lying on your side with your knees bent up towards your chest and a pillow between your knees.  · At first, do not try to stretch out the sore spots. If there is a strain, it is not like the good soreness you get after exercising without an injury. In this case, stretching may make it worse.  · Avoid prolong sitting, long car rides, or travel. This puts more stress on the lower back than standing or walking.  · During the first 24 to 72 hours after an acute injury or flare up of chronic back pain, apply an ice pack to the painful area for 20 minutes and then remove it for 20 minutes. Do this over a period of 60 to 90 minutes or several times a day. This will reduce swelling and pain. Wrap the ice pack in a thin towel or plastic to protect your skin.  · You can start with ice, then switch to heat. Heat (hot shower, hot bath, or heating pad) reduces pain and works well for muscle spasms. Heat can be applied to the painful area for 20 minutes then remove it for 20 minutes. Do this over a period of 60 to 90 minutes or several times a day. Do not sleep on a heating pad. It can lead to skin burns or tissue damage.  · You can alternate ice and heat therapy. Talk with your doctor about the best treatment for your back pain.  · Therapeutic massage can help relax the back muscles without stretching them.  · Be aware of safe lifting methods and do not lift anything without stretching first.  Medicines  Talk to your doctor before using medicine, especially if you have other medical  problems or are taking other medicines.  · You may use over-the-counter medicine as directed on the bottle to control pain, unless another pain medicine was prescribed. If you have chronic conditions like diabetes, liver or kidney disease, stomach ulcers, or gastrointestinal bleeding, or are taking blood thinners, talk to your doctor before taking any medicine.  · Be careful if you are given a prescription medicines, narcotics, or medicine for muscle spasms. They can cause drowsiness, affect your coordination, reflexes, and judgement. Do not drive or operate heavy machinery.  Follow-up care  Follow up with your healthcare provider, or as advised.   A radiologist will review any X-rays that were taken. Your provide will notify you of any new findings that may affect your care.  Call 911  Call emergency services if any of the following occur:  · Trouble breathing  · Confusion  · Very drowsy or trouble awakening  · Fainting or loss of consciousness  · Rapid or very slow heart rate  · Loss of bowel or bladder control  When to seek medical advice  Call your healthcare provider right away if any of these occur:   · Pain becomes worse or spreads to your legs  · Weakness or numbness in one or both legs  · Numbness in the groin or genital area  Date Last Reviewed: 7/1/2016  © 3493-4666 [employer]. [address] [place] [address], [place] [address]. All rights reserved. This information is not intended as a substitute for professional medical care. Always follow your healthcare professional's instructions.          Back Care Tips    Caring for your back  These are things you can do to prevent a recurrence of acute back pain and to reduce symptoms from chronic back pain:  · Maintain a healthy weight. If you are overweight, losing weight will help most types of back pain.  · Exercise is an important part of recovery from most types of back pain. The muscles behind and in front of the spine support the back. This means  strengthening both the back muscles and the abdominal muscles will provide better support for your spine.   · Swimming and brisk walking are good overall exercises to improve your fitness level.  · Practice safe lifting methods (below).  · Practice good posture when sitting, standing and walking. Avoid prolonged sitting. This puts more stress on the lower back than standing or walking.  · Wear quality shoes with sufficient arch support. Foot and ankle alignment can affect back symptoms. Women should avoid wearing high heels.  · Therapeutic massage can help relax the back muscles without stretching them.  · During the first 24 to 72 hours after an acute injury or flare-up of chronic back pain, apply an ice pack to the painful area for 20 minutes and then remove it for 20 minutes, over a period of 60 to 90 minutes, or several times a day. As a safety precaution, do not use a heating pad at bedtime. Sleeping on a heating pad can lead to skin burns or tissue damage.  · You can alternate ice and heat therapies.  Medications  Talk to your healthcare provider before using medicines, especially if you have other medical problems or are taking other medicines.  · You may use acetaminophen or ibuprofen to control pain, unless your healthcare provider prescribed other pain medicine. If you have chronic conditions like diabetes, liver or kidney disease, stomach ulcers, or gastrointestinal bleeding, or are taking blood thinners, talk with your healthcare provider before taking any medicines.  · Be careful if you are given prescription pain medicines, narcotics, or medicine for muscle spasm. They can cause drowsiness, affect your coordination, reflexes, and judgment. Do not drive or operate heavy machinery while taking these types of medicines. Take prescription pain medicine only as prescribed by your healthcare provider.  Lumbar stretch  Here is a simple stretching exercise that will help relax muscle spasm and keep your back  more limber. If exercise makes your back pain worse, dont do it.  · Lie on your back with your knees bent and both feet on the ground.  · Slowly raise your left knee to your chest as you flatten your lower back against the floor. Hold for 5 seconds.  · Relax and repeat the exercise with your right knee.  · Do 10 of these exercises for each leg.  Safe lifting method  · Dont bend over at the waist to lift an object off the floor.  Instead, bend your knees and hips in a squat.   · Keep your back and head upright  · Hold the object close to your body, directly in front of you.  · Straighten your legs to lift the object.   · Lower the object to the floor in the reverse fashion.  · If you must slide something across the floor, push it.  Posture tips  Sitting  Sit in chairs with straight backs or low-back support. Keep your knees lower than your hips, with your feet flat on the floor.  When driving, sit up straight. Adjust the seat forward so you are not leaning toward the steering wheel.  A small pillow or rolled towel behind your lower back may help if you are driving long distances.   Standing  When standing for long periods, shift most of your weight to one leg at a time. Alternate legs every few minutes.   Sleeping  The best way to sleep is on your side with your knees bent. Put a low pillow under your head to support your neck in a neutral spine position. Avoid thick pillows that bend your neck to one side. Put a pillow between your legs to further relax your lower back. If you sleep on your back, put pillows under your knees to support your legs in a slightly flexed position. Use a firm mattress. If your mattress sags, replace it, or use a 1/2-inch plywood board under the mattress to add support.  Follow-up care  Follow up with your healthcare provider, or as advised.  If X-rays, a CT scan or an MRI scan were taken, they will be reviewed by a radiologist. You will be notified of any new findings that may affect  your care.  Call 911  Seek emergency medical care if any of the following occur:  · Trouble breathing  · Confusion  · Very drowsy  · Fainting or loss of consciousness  · Rapid or very slow heart rate  · Loss of  bowel or bladder control  When to seek medical care  Call your healthcare provider if any of the following occur:  · Pain becomes worse or spreads to your arms or legs  · Weakness or numbness in one or both arms or legs  · Numbness in the groin area  Date Last Reviewed: 6/1/2016 © 2000-2016 [employer]. [address] [place] [id number]. All rights reserved. This information is not intended as a substitute for professional medical care. Always follow your healthcare professional's instructions.

## 2017-05-13 ENCOUNTER — HOSPITAL ENCOUNTER (EMERGENCY)
Facility: HOSPITAL | Age: 32
Discharge: HOME | End: 2017-05-13
Attending: EMERGENCY MEDICINE | Admitting: EMERGENCY MEDICINE
Payer: MEDICAID

## 2017-05-13 VITALS
OXYGEN SATURATION: 98 % | SYSTOLIC BLOOD PRESSURE: 103 MMHG | RESPIRATION RATE: 20 BRPM | WEIGHT: 220 LBS | DIASTOLIC BLOOD PRESSURE: 59 MMHG | TEMPERATURE: 99 F | HEIGHT: 69 IN | HEART RATE: 74 BPM | BODY MASS INDEX: 32.58 KG/M2

## 2017-05-13 DIAGNOSIS — R73.9 HYPERGLYCEMIA: Primary | ICD-10-CM

## 2017-05-13 DIAGNOSIS — R55 SYNCOPE: ICD-10-CM

## 2017-05-13 LAB
ALBUMIN SERPL BCP-MCNC: 3.5 G/DL
ALLENS TEST: ABNORMAL
ALP SERPL-CCNC: 60 U/L
ALT SERPL W/O P-5'-P-CCNC: 28 U/L
ANION GAP SERPL CALC-SCNC: 10 MMOL/L
AST SERPL-CCNC: 16 U/L
B-HCG UR QL: NEGATIVE
B-OH-BUTYR BLD STRIP-SCNC: 0.2 MMOL/L
BASOPHILS # BLD AUTO: 0.06 K/UL
BASOPHILS NFR BLD: 0.7 %
BILIRUB SERPL-MCNC: 0.4 MG/DL
BUN SERPL-MCNC: 7 MG/DL
CALCIUM SERPL-MCNC: 8.6 MG/DL
CHLORIDE SERPL-SCNC: 103 MMOL/L
CO2 SERPL-SCNC: 22 MMOL/L
CREAT SERPL-MCNC: 0.7 MG/DL
CTP QC/QA: YES
DIFFERENTIAL METHOD: ABNORMAL
EOSINOPHIL # BLD AUTO: 0.2 K/UL
EOSINOPHIL NFR BLD: 2.3 %
ERYTHROCYTE [DISTWIDTH] IN BLOOD BY AUTOMATED COUNT: 12.7 %
EST. GFR  (AFRICAN AMERICAN): >60 ML/MIN/1.73 M^2
EST. GFR  (NON AFRICAN AMERICAN): >60 ML/MIN/1.73 M^2
GLUCOSE SERPL-MCNC: 345 MG/DL
HCO3 UR-SCNC: 22.8 MMOL/L (ref 24–28)
HCT VFR BLD AUTO: 36.9 %
HGB BLD-MCNC: 12.5 G/DL
LYMPHOCYTES # BLD AUTO: 2.2 K/UL
LYMPHOCYTES NFR BLD: 25 %
MCH RBC QN AUTO: 28.8 PG
MCHC RBC AUTO-ENTMCNC: 33.9 %
MCV RBC AUTO: 85 FL
MONOCYTES # BLD AUTO: 0.5 K/UL
MONOCYTES NFR BLD: 5.5 %
NEUTROPHILS # BLD AUTO: 6 K/UL
NEUTROPHILS NFR BLD: 66.3 %
PCO2 BLDA: 43.9 MMHG (ref 35–45)
PH SMN: 7.32 [PH] (ref 7.35–7.45)
PLATELET # BLD AUTO: 480 K/UL
PMV BLD AUTO: 9.3 FL
PO2 BLDA: 27 MMHG (ref 40–60)
POC BE: -3 MMOL/L
POC SATURATED O2: 45 % (ref 95–100)
POC TCO2: 24 MMOL/L (ref 24–29)
POCT GLUCOSE: 228 MG/DL (ref 70–110)
POCT GLUCOSE: 284 MG/DL (ref 70–110)
POTASSIUM SERPL-SCNC: 4.1 MMOL/L
PROT SERPL-MCNC: 7 G/DL
RBC # BLD AUTO: 4.34 M/UL
SAMPLE: ABNORMAL
SITE: ABNORMAL
SODIUM SERPL-SCNC: 135 MMOL/L
WBC # BLD AUTO: 8.97 K/UL

## 2017-05-13 PROCEDURE — 85025 COMPLETE CBC W/AUTO DIFF WBC: CPT

## 2017-05-13 PROCEDURE — 99284 EMERGENCY DEPT VISIT MOD MDM: CPT | Mod: ,,, | Performed by: EMERGENCY MEDICINE

## 2017-05-13 PROCEDURE — 63600175 PHARM REV CODE 636 W HCPCS: Performed by: STUDENT IN AN ORGANIZED HEALTH CARE EDUCATION/TRAINING PROGRAM

## 2017-05-13 PROCEDURE — 25000003 PHARM REV CODE 250: Performed by: STUDENT IN AN ORGANIZED HEALTH CARE EDUCATION/TRAINING PROGRAM

## 2017-05-13 PROCEDURE — 99284 EMERGENCY DEPT VISIT MOD MDM: CPT | Mod: 25

## 2017-05-13 PROCEDURE — 80053 COMPREHEN METABOLIC PANEL: CPT

## 2017-05-13 PROCEDURE — 96361 HYDRATE IV INFUSION ADD-ON: CPT

## 2017-05-13 PROCEDURE — 25000003 PHARM REV CODE 250: Performed by: EMERGENCY MEDICINE

## 2017-05-13 PROCEDURE — 63600175 PHARM REV CODE 636 W HCPCS: Performed by: EMERGENCY MEDICINE

## 2017-05-13 PROCEDURE — 93010 ELECTROCARDIOGRAM REPORT: CPT | Mod: ,,, | Performed by: INTERNAL MEDICINE

## 2017-05-13 PROCEDURE — 82962 GLUCOSE BLOOD TEST: CPT

## 2017-05-13 PROCEDURE — 82010 KETONE BODYS QUAN: CPT

## 2017-05-13 PROCEDURE — 82803 BLOOD GASES ANY COMBINATION: CPT

## 2017-05-13 PROCEDURE — 93005 ELECTROCARDIOGRAM TRACING: CPT

## 2017-05-13 PROCEDURE — 96374 THER/PROPH/DIAG INJ IV PUSH: CPT

## 2017-05-13 PROCEDURE — 81025 URINE PREGNANCY TEST: CPT | Performed by: EMERGENCY MEDICINE

## 2017-05-13 PROCEDURE — 96372 THER/PROPH/DIAG INJ SC/IM: CPT

## 2017-05-13 RX ORDER — ACETAMINOPHEN 325 MG/1
650 TABLET ORAL ONCE
Status: COMPLETED | OUTPATIENT
Start: 2017-05-13 | End: 2017-05-13

## 2017-05-13 RX ORDER — INSULIN ASPART 100 [IU]/ML
10 INJECTION, SOLUTION INTRAVENOUS; SUBCUTANEOUS ONCE
Status: DISCONTINUED | OUTPATIENT
Start: 2017-05-13 | End: 2017-05-13

## 2017-05-13 RX ORDER — ONDANSETRON 2 MG/ML
4 INJECTION INTRAMUSCULAR; INTRAVENOUS
Status: COMPLETED | OUTPATIENT
Start: 2017-05-13 | End: 2017-05-13

## 2017-05-13 RX ORDER — METHOCARBAMOL 500 MG/1
500 TABLET, FILM COATED ORAL
Status: COMPLETED | OUTPATIENT
Start: 2017-05-13 | End: 2017-05-13

## 2017-05-13 RX ORDER — METHOCARBAMOL 500 MG/1
500 TABLET, FILM COATED ORAL 4 TIMES DAILY
Qty: 40 TABLET | Refills: 0 | Status: SHIPPED | OUTPATIENT
Start: 2017-05-13 | End: 2017-05-23

## 2017-05-13 RX ORDER — METFORMIN HYDROCHLORIDE 1000 MG/1
1000 TABLET ORAL 2 TIMES DAILY WITH MEALS
Qty: 60 TABLET | Refills: 0 | Status: SHIPPED | OUTPATIENT
Start: 2017-05-13 | End: 2019-10-04 | Stop reason: SDUPTHER

## 2017-05-13 RX ORDER — DULOXETIN HYDROCHLORIDE 60 MG/1
60 CAPSULE, DELAYED RELEASE ORAL DAILY
Qty: 30 CAPSULE | Refills: 0 | Status: SHIPPED | OUTPATIENT
Start: 2017-05-13 | End: 2021-04-27

## 2017-05-13 RX ORDER — GLIPIZIDE 5 MG/1
5 TABLET ORAL
Qty: 30 TABLET | Refills: 0 | Status: SHIPPED | OUTPATIENT
Start: 2017-05-13 | End: 2019-10-04 | Stop reason: SDUPTHER

## 2017-05-13 RX ORDER — METHOCARBAMOL 500 MG/1
500 TABLET, FILM COATED ORAL 4 TIMES DAILY
Qty: 40 TABLET | Refills: 0 | Status: SHIPPED | OUTPATIENT
Start: 2017-05-13 | End: 2017-05-13

## 2017-05-13 RX ORDER — INSULIN ASPART 100 [IU]/ML
8 INJECTION, SOLUTION INTRAVENOUS; SUBCUTANEOUS ONCE
Status: COMPLETED | OUTPATIENT
Start: 2017-05-13 | End: 2017-05-13

## 2017-05-13 RX ORDER — GLIPIZIDE 5 MG/1
5 TABLET ORAL
Qty: 30 TABLET | Refills: 0 | Status: SHIPPED | OUTPATIENT
Start: 2017-05-13 | End: 2017-05-13

## 2017-05-13 RX ORDER — CLINDAMYCIN HYDROCHLORIDE 300 MG/1
300 CAPSULE ORAL EVERY 6 HOURS
Qty: 40 CAPSULE | Refills: 0 | Status: SHIPPED | OUTPATIENT
Start: 2017-05-13 | End: 2017-05-23

## 2017-05-13 RX ADMIN — ONDANSETRON 4 MG: 2 INJECTION INTRAMUSCULAR; INTRAVENOUS at 04:05

## 2017-05-13 RX ADMIN — METHOCARBAMOL 500 MG: 500 TABLET ORAL at 06:05

## 2017-05-13 RX ADMIN — INSULIN ASPART 8 UNITS: 100 INJECTION, SOLUTION INTRAVENOUS; SUBCUTANEOUS at 05:05

## 2017-05-13 RX ADMIN — ACETAMINOPHEN 650 MG: 325 TABLET ORAL at 06:05

## 2017-05-13 RX ADMIN — SODIUM CHLORIDE 1000 ML: 0.9 INJECTION, SOLUTION INTRAVENOUS at 04:05

## 2017-05-13 NOTE — ED PROVIDER NOTES
Encounter Date: 5/13/2017    SCRIBE #1 NOTE: I, Katrina Calderon, am scribing for, and in the presence of,  Dr. Marshall. I have scribed the following portions of the note - the EKG reading.       History     Chief Complaint   Patient presents with    Hyperglycemia      per EMS.  PT dizzy and NV at work with syncopal episode 30 seconds     Review of patient's allergies indicates:   Allergen Reactions    Beatriz Anaphylaxis    Pecan nut Dermatitis     HPI Comments: 31 year old woman with history of diabetes mellitus presenting to the ED with hyperglycemia.  Pt normally takes 50 units of lantus daily, metformin, and glipizide for glucose control but lost her metformin and glipizide during a recent move and has not been taking these for the last week. Still currently taking lantus daily.  She has noted dizziness and nausea over the past week and today had a syncopal episode at work.  She denies fevers but does note a chronic abscess in her inferior gluteal region/ upper thigh which is not currently draining pus.  No dysuria or shortness of breath.    Past Medical History:   Diagnosis Date    Bipolar disorder     per patient report    Depression     Diabetes mellitus     Hx of psychiatric care     Neuropathy     per patient report    Psychiatric problem     Therapy      Past Surgical History:   Procedure Laterality Date    BACK SURGERY       History reviewed. No pertinent family history.  Social History   Substance Use Topics    Smoking status: Current Some Day Smoker     Packs/day: 0.25     Types: Cigarettes    Smokeless tobacco: None    Alcohol use Yes      Comment: rare     Review of Systems   Constitutional: Positive for fatigue. Negative for chills, diaphoresis and fever.   HENT: Negative for trouble swallowing.    Eyes: Negative for visual disturbance.   Respiratory: Negative for cough and shortness of breath.    Cardiovascular: Negative for chest pain.   Gastrointestinal: Positive for nausea.  Negative for abdominal pain, diarrhea and vomiting.   Genitourinary: Negative for difficulty urinating, dysuria and urgency.   Musculoskeletal: Positive for back pain. Negative for arthralgias.        +sciatica   Skin: Negative for rash.   Neurological: Positive for dizziness and syncope. Negative for headaches.   Hematological: Negative for adenopathy.   Psychiatric/Behavioral: Negative for behavioral problems.       Physical Exam   Initial Vitals   BP Pulse Resp Temp SpO2   05/13/17 1512 05/13/17 1512 05/13/17 1512 05/13/17 1512 05/13/17 1512   121/69 78 16 98.9 °F (37.2 °C) 100 %     Physical Exam    Constitutional: She appears well-developed and well-nourished. She is not diaphoretic. No distress.   HENT:   Head: Normocephalic.   Mouth/Throat: Oropharynx is clear and moist.   Eyes: Pupils are equal, round, and reactive to light.   Neck: Normal range of motion. Neck supple.   Cardiovascular: Normal rate and regular rhythm.   No murmur heard.  Pulmonary/Chest: No respiratory distress. She has no wheezes. She has no rales.   Abdominal: Soft. She exhibits no distension. There is no tenderness.   Musculoskeletal: Normal range of motion. She exhibits no edema.   Neurological: She is alert and oriented to person, place, and time.   Skin: Skin is warm and dry.        1-2 cm region of fluctuance within the upper posterior right thigh without evidence of erythema or purulent drainage         ED Course   Procedures  Labs Reviewed   CBC W/ AUTO DIFFERENTIAL - Abnormal; Notable for the following:        Result Value    Hematocrit 36.9 (*)     Platelets 480 (*)     All other components within normal limits   COMPREHENSIVE METABOLIC PANEL - Abnormal; Notable for the following:     Sodium 135 (*)     CO2 22 (*)     Glucose 345 (*)     Calcium 8.6 (*)     All other components within normal limits   ISTAT PROCEDURE - Abnormal; Notable for the following:     POC PH 7.324 (*)     POC PO2 27 (*)     POC HCO3 22.8 (*)     POC  SATURATED O2 45 (*)     All other components within normal limits   POCT URINE PREGNANCY - Normal   BETA - HYDROXYBUTYRATE, SERUM     EKG Readings: (Independently Interpreted)   Sinus Rhythm 77 bpm, No ST elevations or depressions.             Medical Decision Making:   History:   Old Medical Records: I decided to obtain old medical records.  Independently Interpreted Test(s):   I have ordered and independently interpreted EKG Reading(s) - see prior notes  Clinical Tests:   Lab Tests: Ordered and Reviewed  Medical Tests: Ordered and Reviewed       APC / Resident Notes:   31 year old woman with diabetes mellitus presenting to the ED with symptomatic hyperglycemia likely secondary to medical non-adherence.     DDx includes DKA, HHS, infection, viral gastroenteritis, URI    Plan: CBC, BMP, UA, poc glucose, betahydroxybutyrate, IV fluids, and will reassess    4:40 PM CBC wnl, BMP and blood gas significant to mild metabolic acidosis, likely secondary to dehyration; beta hydroxbutyrate normal--UA pending DKA and HHS unlikely     8units insulin aspart given and pts symptoms improved after IV fluids, will discharge with prescriptions for metformin and glipizide and clindamycin for right gluteal cellulitis and pt was instructed to f/u with PCP Dr. Hopkins in one week        Scribe Attestation:   Scribe #1: I performed the above scribed service and the documentation accurately describes the services I performed. I attest to the accuracy of the note.    Attending Attestation:           Physician Attestation for Scribe:  Physician Attestation Statement for Scribe #1: I, Dr. Marshall, reviewed documentation, as scribed by Katrina Calderon in my presence, and it is both accurate and complete.                 ED Course     Clinical Impression:   The primary encounter diagnosis was Hyperglycemia. A diagnosis of Syncope was also pertinent to this visit.          Alberto Gamez MD  Resident  05/13/17 1507

## 2017-05-13 NOTE — ED TRIAGE NOTES
Pt reports nausea, dizziness, SOB, heart racing. Reports hands and feet feel swollen and couldn't move fingers or toes. Pt reports passing out. Reports feeling like her blood glucose was elevated and thinks that's why she passed out. Reports being out of all diabetes meds except lantis x1 week. Denies SOB and chest pain at this time.

## 2017-05-13 NOTE — DISCHARGE INSTRUCTIONS
How to Check Your Blood Sugar    Keeping track of how much sugar (glucose) is in your blood is an important part of self-care when you have diabetes. This is also called self-monitoring of blood glucose (SMBG). To make sure your glucose and insulin are in balance, check your blood sugar as instructed by your healthcare provider. You may need to check your blood glucose levels at certain times every day. Or you may need to check them only a few times a week.  What you need  To check your blood sugar, make sure you have the following:   · A small pricking needle (lancing device)  · Test strips  · A glucose meter  · A notebook, chart, or log book and pen or pencil   Using a blood glucose meter  You can check your blood sugar at home, at work, and anywhere else. Your diabetes team will help you choose a blood glucose meter. A meter measures the amount of glucose in a tiny drop of blood. Youll use a device called a lancet to draw a drop of blood. Put the strip in the meter first. Then touch the test strip to the drop of blood. The meter then gives you a number (reading) that tells you the level of your blood sugar.  Aim for your target range  Your blood sugar should be in your target range--not too high and not too low. A target range is where your blood sugar level is healthiest. Staying in this range as much as possible will help lower your risk for health problems (complications). Your diabetes team will help you figure out the best target range for you. That range depends on many things. They include your age, other health problems, how well your diabetes is controlled, and how long you have had diabetes. In general, target ranges are:  · Control of blood glucose (hemoglobin A1c or A1c): generally, 7.0% or less. You will usually have this test at the lab.  · Before a meal (preprandial glucose): Between 80 and 130 mg/dL.  · One to 2 hours after a meal (postprandial glucose): Less than 180 mg/dL.  Track your  readings  Use a notebook, chart, or log book to keep track of your readings. Write down the date, time, and your blood sugar level numbers. This helps you see patterns, such as high blood sugar after eating certain foods. Take your log along when you see your healthcare provider. Your blood glucose levels will help your provider decide if he or she needs to make any changes to your management plan. To check your blood sugar, follow the steps below.  Step 1. Get ready  · Wash your hands with soap and warm (not hot) water.  · Follow all of the instructions that came with your glucometer. Be sure that your test strips were designed to be used with your meter and are not .   Step 2. Draw a drop of blood  · Prick the side of your finger with the lancet. Squeeze gently until you get a drop of blood. Squeezing too hard can cause an inaccurate reading.  · Put the lancet in a special sharps container. Ask your healthcare team where you can buy one or what you can use to throw away any sharps.  · If you are unable to get enough blood, hold your hand at your side and gently shake it.  Step 3. Place the drop on a strip  · Wait for the meter to show a message or symbol that it is time to test.  · Touch the test strip to the drop of blood.  · Be sure to follow the instructions included with meter.  Step 4. Read and record your results  · Wait for your meter to show the result.  · If you see an error message, recheck using a fresh strip and a fresh drop of blood. Also recheck if the glucose numbers aren't what you expect--too low without symptoms, or too high for no reason.  · Write the results in your log book.  Date Last Reviewed: 2016  © 0497-2625 [employer]. [address] [place] [address], [place] [address]. All rights reserved. This information is not intended as a substitute for professional medical care. Always follow your healthcare professional's instructions.

## 2017-05-13 NOTE — ED AVS SNAPSHOT
[facility]  [address]  [facility] [address]               Lizzie Saunders   2017  3:13 PM   ED    Description:  Female : 1985   Department:  [facility]           Your Care was Coordinated By:     Provider Role From To    Geo Marshall MD Attending Provider 17 1518 --    Alberto Gamez MD Resident 17 1516 --      Reason for Visit     Hyperglycemia           Diagnoses this Visit        Comments    Hyperglycemia    -  Primary     Syncope           ED Disposition     ED Disposition Condition Comment    Discharge             To Do List           Follow-up Information     Follow up with Lilian Hopkins MD. Schedule an appointment as soon as possible for a visit in 1 week.    Specialty:  General Practice    Contact information:    [address]  [facility]  [place] [address]  [phone]         These Medications        Disp Refills Start End    metformin (GLUCOPHAGE) 1000 MG tablet 60 tablet 0 2017     Take 1 tablet (1,000 mg total) by mouth 2 (two) times daily with meals. - Oral    Pharmacy: [facility] [address] [place] [address] Ph #: [phone]       duloxetine (CYMBALTA) 60 MG capsule 30 capsule 0 2017    Take 1 capsule (60 mg total) by mouth once daily. - Oral    Pharmacy: [facility] [address] [place] [address] Ph #: [phone]       glipiZIDE (GLUCOTROL) 5 MG tablet 30 tablet 0 2017     Take 1 tablet (5 mg total) by mouth 2 (two) times daily before meals. - Oral    Pharmacy: [facility] [address] [place] [address] Ph #: [phone]       methocarbamol (ROBAXIN) 500 MG Tab 40 tablet 0 2017    Take 1 tablet (500 mg total) by mouth 4 (four) times daily. - Oral    Pharmacy: [facility] [address] [place] [address] Ph #: [phone]         [facility] Care [facility] -   Assistance  Unless otherwise directed by your provider, please contact [facility], our nurse care line that is available for 24/7 assistance.     Registered nurses in the [facility] provide: appointment scheduling, clinical advisement, health education, and other advisory services.  Call: [phone] (toll free)               Medications           Message regarding Medications     Verify the changes and/or additions to your medication regime listed below are the same as discussed with your clinician today.  If any of these changes or additions are incorrect, please notify your healthcare provider.        START taking these NEW medications        Refills    methocarbamol (ROBAXIN) 500 MG Tab 0    Sig: Take 1 tablet (500 mg total) by mouth 4 (four) times daily.    Class: Print    Route: Oral      These medications were administered today        Dose Freq    sodium chloride 0.9% bolus 1,000 mL 1,000 mL ED 1 Time    Sig: Inject 1,000 mLs into the vein ED 1 Time.    Class: Normal    Route: Intravenous    ondansetron injection 4 mg 4 mg ED 1 Time    Sig: Inject 4 mg into the vein ED 1 Time.    Class: Normal    Route: Intravenous    insulin aspart pen 8 Units 8 Units Once    Sig: Inject 8 Units into the skin once.    Class: Normal    Route: Subcutaneous    acetaminophen tablet 650 mg 650 mg Once    Sig: Take 2 tablets (650 mg total) by mouth once.    Class: Normal    Route: Oral    methocarbamol tablet 500 mg 500 mg ED 1 Time    Sig: Take 1 tablet (500 mg total) by mouth ED 1 Time.    Class: Normal    Route: Oral      CHANGE how you are taking these medications     Start Taking Instead of    metformin (GLUCOPHAGE) 1000 MG tablet metformin (GLUCOPHAGE) 1000 MG tablet    Dosage:  Take 1 tablet (1,000 mg total) by mouth 2 (two) times daily with meals. Dosage:  Take 1,000 mg by mouth 2 (two) times daily with meals.    glipiZIDE (GLUCOTROL) 5 MG tablet glipiZIDE (GLUCOTROL) 5 MG tablet    Dosage:  Take 1  "tablet (5 mg total) by mouth 2 (two) times daily before meals. Dosage:  Take 5 mg by mouth 2 (two) times daily before meals.           Verify that the below list of medications is an accurate representation of the medications you are currently taking.  If none reported, the list may be blank. If incorrect, please contact your healthcare provider. Carry this list with you in case of emergency.           Current Medications     atorvastatin (LIPITOR) 10 MG tablet Take 10 mg by mouth once daily.    fish oil-omega-3 fatty acids 300-1,000 mg capsule Take 2 g by mouth once daily.    folic acid (FOLVITE) 1 MG tablet Take 1 tablet (1 mg total) by mouth once daily.    gabapentin (NEURONTIN) 600 MG tablet Take 600 mg by mouth 3 (three) times daily.    insulin glargine (LANTUS) 100 unit/mL injection Inject 20 Units into the skin every evening.    topiramate (TOPAMAX) 50 MG tablet Take 1 tablet (50 mg total) by mouth 2 (two) times daily.    acetaminophen tablet 650 mg Take 2 tablets (650 mg total) by mouth once.    duloxetine (CYMBALTA) 60 MG capsule Take 1 capsule (60 mg total) by mouth once daily.    glipiZIDE (GLUCOTROL) 5 MG tablet Take 1 tablet (5 mg total) by mouth 2 (two) times daily before meals.    insulin aspart pen 8 Units Inject 8 Units into the skin once.    metformin (GLUCOPHAGE) 1000 MG tablet Take 1 tablet (1,000 mg total) by mouth 2 (two) times daily with meals.    methocarbamol (ROBAXIN) 500 MG Tab Take 1 tablet (500 mg total) by mouth 4 (four) times daily.    methocarbamol tablet 500 mg Take 1 tablet (500 mg total) by mouth ED 1 Time.           Clinical Reference Information           Your Vitals Were     BP Pulse Temp Resp Height Weight    109/66 72 98.9 °F (37.2 °C) (Oral) 24 5' 9" (1.753 m) 99.8 kg (220 lb)    SpO2 BMI             97% 32.49 kg/m2         Allergies as of 5/13/2017        Reactions    Beatriz Anaphylaxis    Pecan Nut Dermatitis      Immunizations Administered on Date of Encounter - " 5/13/2017     None      ED Micro, Lab, POCT     Start Ordered       Status Ordering Provider    05/13/17 1602 05/13/17 1602  ISTAT PROCEDURE  Once      Final result     05/13/17 1530 05/13/17 1529  Beta - Hydroxybutyrate, Serum  Once      Final result     05/13/17 1520 05/13/17 1519  CBC auto differential  STAT      Final result     05/13/17 1520 05/13/17 1519  Comprehensive metabolic panel  STAT      Final result     05/13/17 1520 05/13/17 1519  POCT urine pregnancy  Once      Final result       ED Imaging Orders     None        Discharge Instructions         How to Check Your Blood Sugar    Keeping track of how much sugar (glucose) is in your blood is an important part of self-care when you have diabetes. This is also called self-monitoring of blood glucose (SMBG). To make sure your glucose and insulin are in balance, check your blood sugar as instructed by your healthcare provider. You may need to check your blood glucose levels at certain times every day. Or you may need to check them only a few times a week.  What you need  To check your blood sugar, make sure you have the following:   · A small pricking needle (lancing device)  · Test strips  · A glucose meter  · A notebook, chart, or log book and pen or pencil   Using a blood glucose meter  You can check your blood sugar at home, at work, and anywhere else. Your diabetes team will help you choose a blood glucose meter. A meter measures the amount of glucose in a tiny drop of blood. Youll use a device called a lancet to draw a drop of blood. Put the strip in the meter first. Then touch the test strip to the drop of blood. The meter then gives you a number (reading) that tells you the level of your blood sugar.  Aim for your target range  Your blood sugar should be in your target range--not too high and not too low. A target range is where your blood sugar level is healthiest. Staying in this range as much as possible will help lower your risk for health  problems (complications). Your diabetes team will help you figure out the best target range for you. That range depends on many things. They include your age, other health problems, how well your diabetes is controlled, and how long you have had diabetes. In general, target ranges are:  · Control of blood glucose (hemoglobin A1c or A1c): generally, 7.0% or less. You will usually have this test at the lab.  · Before a meal (preprandial glucose): Between 80 and 130 mg/dL.  · One to 2 hours after a meal (postprandial glucose): Less than 180 mg/dL.  Track your readings  Use a notebook, chart, or log book to keep track of your readings. Write down the date, time, and your blood sugar level numbers. This helps you see patterns, such as high blood sugar after eating certain foods. Take your log along when you see your healthcare provider. Your blood glucose levels will help your provider decide if he or she needs to make any changes to your management plan. To check your blood sugar, follow the steps below.  Step 1. Get ready  · Wash your hands with soap and warm (not hot) water.  · Follow all of the instructions that came with your glucometer. Be sure that your test strips were designed to be used with your meter and are not .   Step 2. Draw a drop of blood  · Prick the side of your finger with the lancet. Squeeze gently until you get a drop of blood. Squeezing too hard can cause an inaccurate reading.  · Put the lancet in a special sharps container. Ask your healthcare team where you can buy one or what you can use to throw away any sharps.  · If you are unable to get enough blood, hold your hand at your side and gently shake it.  Step 3. Place the drop on a strip  · Wait for the meter to show a message or symbol that it is time to test.  · Touch the test strip to the drop of blood.  · Be sure to follow the instructions included with meter.  Step 4. Read and record your results  · Wait for your meter to show the  result.  · If you see an error message, recheck using a fresh strip and a fresh drop of blood. Also recheck if the glucose numbers aren't what you expect--too low without symptoms, or too high for no reason.  · Write the results in your log book.  Date Last Reviewed: 6/1/2016 © 2000-2016 [employer]. [address] [place] [address], [place] [address]. All rights reserved. This information is not intended as a substitute for professional medical care. Always follow your healthcare professional's instructions.          Smoking Cessation     If you would like to quit smoking:   You may be eligible for free services if you are a [place] resident and started smoking cigarettes before September 1, 1988.  Call the Smoking Cessation Trust (SCT) toll free at (605) 701-8393 or (199) 764-4103.   Call 1-800-QUIT-NOW if you do not meet the above criteria.   Contact us via email: [email or web address]   View our website for more information: [email or web address]         [facility] complies with applicable Federal civil rights laws and does not discriminate on the basis of race, color, national origin, age, disability, or sex.        Language Assistance Services     ATTENTION: Language assistance services are available, free of charge. Please call [phone].      ATENCIÓN: Si habla español, tiene a armenta disposición servicios gratuitos de asistencia lingüística. Llame al [phone].     CHÚ Ý: N?u b?n nói Ti?ng Vi?t, có các d?ch v? h? tr? ngôn ng? mi?n phí dành cho b?n. G?i s? [phone].

## 2017-05-13 NOTE — ED NOTES
Pt irena Saunders checked and correct  LOC: The patient is awake, alert, aware of environment with an appropriate affect. Oriented x3, speaking appropriately  APPEARANCE: Pt resting comfortably, in no acute distress, pt is clean and well groomed, clothing properly fastened  SKIN: Skin warm, dry and intact, normal skin turgor, moist mucus membranes.  RESPIRATORY: Airway is open and patent, respirations are spontaneous, even and unlabored, normal effort and rate  CARDIAC: Normal rate and rhythm, no peripheral edema noted, capillary refill < 3 seconds, bilateral radial pulses 2+  ABDOMEN: Soft, nontender, nondistended. Bowel sounds present   NEUROLOGIC:  facial expression is symmetrical, patient moving all extremities spontaneously, normal sensation in all extremities when touched with a finger.  Follows all commands appropriately  MUSCULOSKELETAL: No obvious deformities.

## 2017-05-15 LAB — POCT GLUCOSE: 338 MG/DL (ref 70–110)

## 2017-06-08 NOTE — PT/OT/SLP DISCHARGE
"Occupational Therapy Discharge Summary    Lizzie Saunders  MRN: [id number]   Adjustment disorder with mixed disturbance of emotions and conduct   Patient Discharged from acute Occupational Therapy on 2/9/17.  Please refer to prior OT note dated on 2/7/17 for functional status.     Assessment:   Patient appropriate for care in another setting.  GOALS:   1. Pt will be able to manage task with min level of frustration.   2. Pt will be able to initiate participation in task without verbal cues. - MET 2/7  3. Pt will participate in group without encouragement. - MET 2/7  4. Pt will interact with 2 group members in immediate environment during session.   5. Pt will verbalize/demonstrate understanding of group purpose without verbal cues at end of session.   6. Pt will report and demo understanding of 1 positive self-affirmation to use to as coping skills. - MET 2/7  7. Pt will verbalize/demo understanding and identify use of 1-2 coping skills to use when upset.      Patient Goals: "I dont have a life plan or goals."  1. To get on disability  2. To move out  Reasons for Discontinuation of Therapy Services  Transfer to alternate level of care.      Plan:  Patient Discharged to: Home.    SAJAN Dudley  6/8/2017      "

## 2019-10-04 ENCOUNTER — HOSPITAL ENCOUNTER (EMERGENCY)
Facility: HOSPITAL | Age: 34
Discharge: HOME | End: 2019-10-04
Attending: EMERGENCY MEDICINE
Payer: MEDICAID

## 2019-10-04 VITALS
HEIGHT: 69 IN | SYSTOLIC BLOOD PRESSURE: 133 MMHG | HEART RATE: 80 BPM | RESPIRATION RATE: 14 BRPM | OXYGEN SATURATION: 97 % | BODY MASS INDEX: 32.58 KG/M2 | DIASTOLIC BLOOD PRESSURE: 83 MMHG | WEIGHT: 220 LBS | TEMPERATURE: 99 F

## 2019-10-04 DIAGNOSIS — S70.02XA CONTUSION OF LEFT HIP: ICD-10-CM

## 2019-10-04 DIAGNOSIS — S09.90XA CLOSED HEAD INJURY, INITIAL ENCOUNTER: Primary | ICD-10-CM

## 2019-10-04 DIAGNOSIS — R73.9 HYPERGLYCEMIA: ICD-10-CM

## 2019-10-04 LAB
ALBUMIN SERPL BCP-MCNC: 3.8 G/DL (ref 3.5–5.2)
ALP SERPL-CCNC: 53 U/L (ref 55–135)
ALT SERPL W/O P-5'-P-CCNC: 29 U/L (ref 10–44)
ANION GAP SERPL CALC-SCNC: 11 MMOL/L (ref 8–16)
AST SERPL-CCNC: 14 U/L (ref 10–40)
B-OH-BUTYR BLD STRIP-SCNC: 0.7 MMOL/L (ref 0–0.5)
BILIRUB SERPL-MCNC: 0.5 MG/DL (ref 0.1–1)
BUN SERPL-MCNC: 10 MG/DL (ref 6–20)
CALCIUM SERPL-MCNC: 9.8 MG/DL (ref 8.7–10.5)
CHLORIDE SERPL-SCNC: 101 MMOL/L (ref 95–110)
CO2 SERPL-SCNC: 24 MMOL/L (ref 23–29)
CREAT SERPL-MCNC: 0.7 MG/DL (ref 0.5–1.4)
EST. GFR  (AFRICAN AMERICAN): >60 ML/MIN/1.73 M^2
EST. GFR  (NON AFRICAN AMERICAN): >60 ML/MIN/1.73 M^2
GLUCOSE SERPL-MCNC: 262 MG/DL (ref 70–110)
POCT GLUCOSE: 203 MG/DL (ref 70–110)
POCT GLUCOSE: 289 MG/DL (ref 70–110)
POTASSIUM SERPL-SCNC: 4.3 MMOL/L (ref 3.5–5.1)
PROT SERPL-MCNC: 6.9 G/DL (ref 6–8.4)
SODIUM SERPL-SCNC: 136 MMOL/L (ref 136–145)

## 2019-10-04 PROCEDURE — 99284 EMERGENCY DEPT VISIT MOD MDM: CPT | Mod: 25

## 2019-10-04 PROCEDURE — 36415 COLL VENOUS BLD VENIPUNCTURE: CPT

## 2019-10-04 PROCEDURE — 80053 COMPREHEN METABOLIC PANEL: CPT

## 2019-10-04 PROCEDURE — 63600175 PHARM REV CODE 636 W HCPCS: Performed by: EMERGENCY MEDICINE

## 2019-10-04 PROCEDURE — 82010 KETONE BODYS QUAN: CPT

## 2019-10-04 PROCEDURE — 96372 THER/PROPH/DIAG INJ SC/IM: CPT

## 2019-10-04 PROCEDURE — 82962 GLUCOSE BLOOD TEST: CPT

## 2019-10-04 RX ORDER — GABAPENTIN 600 MG/1
600 TABLET ORAL 3 TIMES DAILY
Qty: 60 TABLET | Refills: 1 | Status: SHIPPED | OUTPATIENT
Start: 2019-10-04 | End: 2021-04-27

## 2019-10-04 RX ORDER — HYDROCODONE BITARTRATE AND ACETAMINOPHEN 5; 325 MG/1; MG/1
1 TABLET ORAL EVERY 4 HOURS PRN
Qty: 12 TABLET | Refills: 0 | Status: SHIPPED | OUTPATIENT
Start: 2019-10-04 | End: 2019-10-06

## 2019-10-04 RX ORDER — INSULIN GLARGINE 100 [IU]/ML
20 INJECTION, SOLUTION SUBCUTANEOUS NIGHTLY
Qty: 60 ML | Refills: 1 | Status: SHIPPED | OUTPATIENT
Start: 2019-10-04 | End: 2021-04-29 | Stop reason: SDUPTHER

## 2019-10-04 RX ORDER — KETOROLAC TROMETHAMINE 30 MG/ML
10 INJECTION, SOLUTION INTRAMUSCULAR; INTRAVENOUS
Status: COMPLETED | OUTPATIENT
Start: 2019-10-04 | End: 2019-10-04

## 2019-10-04 RX ORDER — GLIPIZIDE 5 MG/1
5 TABLET ORAL
Qty: 30 TABLET | Refills: 0 | Status: SHIPPED | OUTPATIENT
Start: 2019-10-04 | End: 2020-03-10 | Stop reason: CLARIF

## 2019-10-04 RX ORDER — METFORMIN HYDROCHLORIDE 1000 MG/1
1000 TABLET ORAL 2 TIMES DAILY WITH MEALS
Qty: 60 TABLET | Refills: 0 | Status: SHIPPED | OUTPATIENT
Start: 2019-10-04 | End: 2021-04-27

## 2019-10-04 RX ADMIN — KETOROLAC TROMETHAMINE 10 MG: 30 INJECTION, SOLUTION INTRAMUSCULAR at 04:10

## 2019-10-04 NOTE — ED NOTES
States slipped and fell in bathtub this afternoon. Denies loc. Hit L forehead on tub and c/o L hip pain.

## 2019-10-04 NOTE — ED PROVIDER NOTES
Encounter Date: 10/4/2019    SCRIBE #1 NOTE: I, Joaquina Duran, am scribing for, and in the presence of, Rafael Dupont MD.       History     Chief Complaint   Patient presents with    Fall     slipped and fell in bathtub 1 hr ago. Denies loc. Hit L forehead on tub and L hip pain.       Time seen by provider: 1:20 PM on 10/04/2019    Lizzie Saunders is a 33 y.o. female  who presents to the ED with an onset of left forehead pain, worsening chronic back pain and left hip pain s/p fall. Patient states she slipped and fell in the shower today, hitting the top left side of her head and injuring her back and left hip. The patient denies passing out, double vision, eye motion problems, or any other symptoms at this time. PMHx of neuropathy and DM. Patient is not currently taking medication for DM due to delay in attaining Medicaid and denies monitoring blood glucose levels recently. She also complains of dizziness, blurred vision, and difficulty focusing. She also recently stopped taking prednisone and ultram for pain management two weeks ago. Pertinent PSHx of back surgery. No known drug allergies.    The history is provided by the patient.     Review of patient's allergies indicates:   Allergen Reactions    Beatriz Anaphylaxis    Pecan nut Dermatitis     Past Medical History:   Diagnosis Date    Bipolar disorder     per patient report    Depression     Diabetes mellitus     Hx of psychiatric care     Neuropathy     per patient report    Psychiatric problem     Therapy      Past Surgical History:   Procedure Laterality Date    BACK SURGERY       History reviewed. No pertinent family history.  Social History     Tobacco Use    Smoking status: Current Some Day Smoker     Packs/day: 0.25     Types: Cigarettes   Substance Use Topics    Alcohol use: Yes     Comment: rare    Drug use: No     Review of Systems   Constitutional: Negative for fever.   HENT: Negative for sore throat.    Eyes: Positive for visual  disturbance.   Respiratory: Negative for shortness of breath.    Cardiovascular: Negative for chest pain.   Gastrointestinal: Negative for nausea.   Genitourinary: Negative for dysuria.   Musculoskeletal: Positive for arthralgias (left hip) and back pain.   Skin: Negative for rash.   Neurological: Positive for dizziness and headaches (left forehead). Negative for syncope and weakness.   Hematological: Does not bruise/bleed easily.   Psychiatric/Behavioral: Positive for decreased concentration.       Physical Exam     Initial Vitals [10/04/19 1250]   BP Pulse Resp Temp SpO2   133/83 80 14 98.7 °F (37.1 °C) 97 %      MAP       --         Physical Exam    Nursing note and vitals reviewed.  Constitutional: She appears well-developed and well-nourished.  Non-toxic appearance. No distress.   HENT:   Head: Normocephalic.   Tenderness of left forehead without hematoma.   Eyes: EOM are normal. Pupils are equal, round, and reactive to light.   Neck: Normal range of motion. Neck supple. No neck rigidity. No JVD present.   Cardiovascular: Normal rate, regular rhythm, normal heart sounds, intact distal pulses and normal pulses. Exam reveals no gallop and no friction rub.    No murmur heard.  Pulmonary/Chest: Breath sounds normal. She has no wheezes. She has no rhonchi. She has no rales.   Abdominal: Soft. Bowel sounds are normal. She exhibits no distension. There is no tenderness. There is no rebound and no guarding.   Musculoskeletal: Normal range of motion.        Right shoulder: She exhibits normal range of motion.        Left hip: She exhibits tenderness.   Tenderness over left hip. Painful ROM.    Neurological: She is alert and oriented to person, place, and time. She has normal strength and normal reflexes. No cranial nerve deficit or sensory deficit. She exhibits normal muscle tone. Coordination normal. GCS eye subscore is 4. GCS verbal subscore is 5. GCS motor subscore is 6.   Skin: Skin is warm and dry.   Psychiatric:  She has a normal mood and affect. Her speech is normal and behavior is normal. She is not actively hallucinating.         ED Course   Procedures  Labs Reviewed   COMPREHENSIVE METABOLIC PANEL - Abnormal; Notable for the following components:       Result Value    Glucose 262 (*)     Alkaline Phosphatase 53 (*)     All other components within normal limits   BETA - HYDROXYBUTYRATE, SERUM - Abnormal; Notable for the following components:    Beta-Hydroxybutyrate 0.7 (*)     All other components within normal limits   POCT GLUCOSE - Abnormal; Notable for the following components:    POCT Glucose 289 (*)     All other components within normal limits   POCT GLUCOSE - Abnormal; Notable for the following components:    POCT Glucose 203 (*)     All other components within normal limits          Imaging Results          X-Ray Hip 2 View Left (Final result)  Result time 10/04/19 14:15:07    Final result by Christy Moon MD (10/04/19 14:15:07)                 Impression:      As above      Electronically signed by: Christy Moon MD  Date:    10/04/2019  Time:    14:15             Narrative:    EXAMINATION:  XR HIP 2 VIEW LEFT    CLINICAL HISTORY:  Contusion of left hip, initial encounter    TECHNIQUE:  AP view of the pelvis and frog leg lateral view of the left hip were performed.    COMPARISON:  None    FINDINGS:  No acute fracture or dislocation left hip                                 Medical Decision Making:   History:   Old Medical Records: I decided to obtain old medical records.  Initial Assessment:   33-year-old woman noncompliant with medications presents emergency department status post slip and fall in her bathroom.  She reports striking her head but not losing consciousness.  She also has chronic sciatica and hit her left hip.  She is complaining of left hip pain and left forehead pain. She endorses some blurry vision intermittently.  Neurological exam is normal.  She has no evidence of head trauma on physical  examination. No neck tenderness. She has no weakness or numbness on extremities and has normal pulses.  X-ray of her left hip was obtained since patient had tenderness on the left hip.  I have low suspicion for fracture.  X-ray shows no fracture dislocation.  Her blood sugars elevated suspected seizure since she has not been taking insulin.  There is no evidence of diabetic ketoacidosis.  Patient will be given a refill of her medications.  She is given a Toradol injection the ER.  I will prescribe a short course of Norco in place patient back on gabapentin.  She is to follow-up at [facility] until she obtains primary care physician.  I do not think a head CT is warranted.  She is given concussion precautions.  Return precautions discussed.  She is discharged improved in no acute distress.  Clinical Tests:   Lab Tests: Ordered and Reviewed  Radiological Study: Ordered and Reviewed            Scribe Attestation:   Scribe #1: I performed the above scribed service and the documentation accurately describes the services I performed. I attest to the accuracy of the note.     I, Cresencio Mendiola, personally performed the services described in this documentation. All medical record entries made by the scribe were at my direction and in my presence.  I have reviewed the chart and agree that the record reflects my personal performance and is accurate and complete. Rafael Dupont MD.           Clinical Impression:       ICD-10-CM ICD-9-CM   1. Closed head injury, initial encounter S09.90XA 959.01   2. Contusion of left hip S70.02XA 924.01   3. Hyperglycemia R73.9 790.29         Disposition:   Disposition: Discharged  Condition: Stable                        Rafael Dupont MD  10/04/19 5900

## 2019-10-20 ENCOUNTER — HOSPITAL ENCOUNTER (EMERGENCY)
Facility: HOSPITAL | Age: 34
Discharge: HOME | End: 2019-10-20
Attending: EMERGENCY MEDICINE
Payer: MEDICAID

## 2019-10-20 VITALS
RESPIRATION RATE: 16 BRPM | HEIGHT: 66 IN | OXYGEN SATURATION: 95 % | SYSTOLIC BLOOD PRESSURE: 143 MMHG | BODY MASS INDEX: 36.16 KG/M2 | DIASTOLIC BLOOD PRESSURE: 88 MMHG | HEART RATE: 107 BPM | WEIGHT: 225 LBS | TEMPERATURE: 99 F

## 2019-10-20 DIAGNOSIS — M54.32 SCIATICA OF LEFT SIDE: Primary | ICD-10-CM

## 2019-10-20 LAB
B-HCG UR QL: NEGATIVE
CTP QC/QA: YES

## 2019-10-20 PROCEDURE — 96372 THER/PROPH/DIAG INJ SC/IM: CPT

## 2019-10-20 PROCEDURE — 81025 URINE PREGNANCY TEST: CPT | Performed by: NURSE PRACTITIONER

## 2019-10-20 PROCEDURE — 63600175 PHARM REV CODE 636 W HCPCS: Performed by: NURSE PRACTITIONER

## 2019-10-20 PROCEDURE — 99284 EMERGENCY DEPT VISIT MOD MDM: CPT | Mod: 25

## 2019-10-20 RX ORDER — DICLOFENAC SODIUM 50 MG/1
50 TABLET, DELAYED RELEASE ORAL 3 TIMES DAILY PRN
Qty: 30 TABLET | Refills: 0 | Status: SHIPPED | OUTPATIENT
Start: 2019-10-20 | End: 2020-03-10 | Stop reason: CLARIF

## 2019-10-20 RX ORDER — TIZANIDINE 4 MG/1
4 TABLET ORAL EVERY 8 HOURS PRN
Qty: 30 TABLET | Refills: 0 | Status: SHIPPED | OUTPATIENT
Start: 2019-10-20 | End: 2019-10-30

## 2019-10-20 RX ORDER — ORPHENADRINE CITRATE 30 MG/ML
60 INJECTION INTRAMUSCULAR; INTRAVENOUS
Status: COMPLETED | OUTPATIENT
Start: 2019-10-20 | End: 2019-10-20

## 2019-10-20 RX ORDER — KETOROLAC TROMETHAMINE 30 MG/ML
30 INJECTION, SOLUTION INTRAMUSCULAR; INTRAVENOUS
Status: COMPLETED | OUTPATIENT
Start: 2019-10-20 | End: 2019-10-20

## 2019-10-20 RX ADMIN — ORPHENADRINE CITRATE 60 MG: 60 INJECTION INTRAMUSCULAR; INTRAVENOUS at 10:10

## 2019-10-20 RX ADMIN — KETOROLAC TROMETHAMINE 30 MG: 30 INJECTION, SOLUTION INTRAMUSCULAR at 10:10

## 2019-10-20 NOTE — ED PROVIDER NOTES
Encounter Date: 10/20/2019    SCRIBE #1 NOTE: I, Bala Perdue am scribing for, and in the presence of, Dia Wilkes NP.       History     Chief Complaint   Patient presents with    Back Pain       Time seen by provider: 9:59 AM on 10/20/2019    Lizzie Saunders is a 33 y.o. female who presents to the ED with an onset of progressively worsening lower left-sided back pain radiating down to the left thigh for 1 wk. Pt underwent surgery for repair of a fragmented bulging disc and associated sciatica exacerbation 2 yrs ago in [place]. She was see here 16 days ago (10/04/2019) for pain following a fall in the shower. Her initial sx began approximately 2-3 mo ago. Now, she states that it has progressed to a point of only sleeping for 1 hr per day. This pain has been unalleviated by Tylenol, however she endorses improvement when leaning on her right side. Pt  She denies recent loss of bladder or bowel control. PMHx of DM and neuropathy. Endorses compliance with her medication regimen. NKDA but expresses adverse reaction to Dilaudid.    The history is provided by the patient.     Review of patient's allergies indicates:   Allergen Reactions    Beatriz Anaphylaxis    Pecan nut Dermatitis     Past Medical History:   Diagnosis Date    Bipolar disorder     per patient report    Depression     Diabetes mellitus     Hx of psychiatric care     Neuropathy     per patient report    Psychiatric problem     Therapy      Past Surgical History:   Procedure Laterality Date    BACK SURGERY       History reviewed. No pertinent family history.  Social History     Tobacco Use    Smoking status: Current Some Day Smoker     Packs/day: 0.25     Types: Cigarettes   Substance Use Topics    Alcohol use: Yes     Comment: rare    Drug use: No     Review of Systems   Constitutional: Negative for chills and fever.   HENT: Negative for congestion, rhinorrhea and sore throat.    Eyes: Negative for pain and redness.   Respiratory:  Negative for cough and shortness of breath.    Cardiovascular: Negative for chest pain and palpitations.   Gastrointestinal: Negative for abdominal pain, diarrhea and nausea.   Genitourinary: Negative for dysuria, enuresis, flank pain, frequency, hematuria and urgency.   Musculoskeletal: Positive for back pain (Left lower, radiating to thigh). Negative for gait problem, myalgias and neck pain.   Skin: Negative for rash.   Neurological: Negative for dizziness, light-headedness and headaches.       Physical Exam     Initial Vitals [10/20/19 0936]   BP Pulse Resp Temp SpO2   (!) 143/88 107 16 98.6 °F (37 °C) 95 %      MAP       --         Physical Exam    Nursing note and vitals reviewed.  Constitutional: She appears well-developed and well-nourished. She is not diaphoretic. She is active. She does not have a sickly appearance. No distress.   HENT:   Head: Normocephalic and atraumatic.   Eyes: Conjunctivae are normal.   Neck: Normal range of motion and full passive range of motion without pain.   Cardiovascular: Normal rate, regular rhythm and normal heart sounds. Exam reveals no gallop and no friction rub.    No murmur heard.  Pulmonary/Chest: Breath sounds normal. She has no wheezes. She has no rhonchi. She has no rales.   Abdominal: Soft. Bowel sounds are normal. There is no tenderness.   Musculoskeletal:        Cervical back: She exhibits decreased range of motion. She exhibits no tenderness.        Thoracic back: She exhibits decreased range of motion. She exhibits no tenderness.        Lumbar back: She exhibits decreased range of motion and tenderness. She exhibits no bony tenderness, no swelling and no deformity.   Palpable left lower lumbar tenderness with no midline vertebral tenderness present. Decreased ROM of the spine due to pain.   Neurological: She is alert. She has normal strength. No sensory deficit. Gait abnormal.   5/5 motor strength to the bilateral lower extremities. Intact sensation to light  touch. Witnessed her walking to the bathroom with antalgic gait.   Skin: Skin is warm and dry. Capillary refill takes less than 2 seconds.   No rash noted.   Psychiatric: She has a normal mood and affect.         ED Course   Procedures  Labs Reviewed   POCT URINE PREGNANCY          Imaging Results    None          Medical Decision Making:   History:   Old Medical Records: I decided to obtain old medical records.  Clinical Tests:   Lab Tests: Ordered and Reviewed       APC / Resident Notes:   Patient presents with acute on chronic lower back pain without recent injury or trauma. She has no red flag symptoms concerning for cauda equina. No midline vertebral tenderness to suggest vertebral fracture or subluxation. Will treat with NSAIDs and muscle relaxants. She is a diabetic so will avoid steroids at this time. She has an appointment with her PCP scheduled for tomorrow and was instructed to keep this appointment. Based on my clinical evaluation, I do not appreciate any immediate, emergent, or life threatening condition or etiology that warrants additional workup today and feel that the patient can be discharged with close follow up care.          Scribe Attestation:   Scribe #1: I performed the above scribed service and the documentation accurately describes the services I performed. I attest to the accuracy of the note.    Attending Attestation:     Physician Attestation Statement for NP/PA:   I discussed this assessment and plan of this patient with the NP/PA, but I did not personally examine the patient. The face to face encounter was performed by the NP/PA.    Other NP/PA Attestation Additions:    History of Present Illness: 33-year-old female presented with a chief complaint of back pain.    Medical Decision Making: Initial differential diagnosis included but not limited to chronic pain, degenerative disc disease, and musculoskeletal pain.  I am in agreement with the nurse practitioner's assessment, treatment,  and plan of care.         I, MARLIN Rollins, personally performed the services described in this documentation. All medical record entries made by the scribe were at my direction and in my presence.  I have reviewed the chart and agree that the record reflects my personal performance and is accurate and complete. MARLIN Rollins.  4:24 PM 10/20/2019             Clinical Impression:       ICD-10-CM ICD-9-CM   1. Sciatica of left side M54.32 724.3         Disposition:   Disposition: Discharged  Condition: Stable                        Dia Wilkes NP  10/20/19 1625       Calixto Barber MD  10/20/19 1739

## 2020-03-10 ENCOUNTER — HOSPITAL ENCOUNTER (EMERGENCY)
Facility: HOSPITAL | Age: 35
Discharge: HOME | End: 2020-03-10
Attending: EMERGENCY MEDICINE
Payer: MEDICAID

## 2020-03-10 VITALS
DIASTOLIC BLOOD PRESSURE: 70 MMHG | RESPIRATION RATE: 16 BRPM | TEMPERATURE: 99 F | OXYGEN SATURATION: 100 % | HEART RATE: 107 BPM | WEIGHT: 225 LBS | BODY MASS INDEX: 36.32 KG/M2 | SYSTOLIC BLOOD PRESSURE: 122 MMHG

## 2020-03-10 DIAGNOSIS — M54.42 ACUTE BILATERAL LOW BACK PAIN WITH LEFT-SIDED SCIATICA: Primary | ICD-10-CM

## 2020-03-10 LAB
ALBUMIN SERPL BCP-MCNC: 3.9 G/DL (ref 3.5–5.2)
ALP SERPL-CCNC: 73 U/L (ref 55–135)
ALT SERPL W/O P-5'-P-CCNC: 50 U/L (ref 10–44)
ANION GAP SERPL CALC-SCNC: 10 MMOL/L (ref 8–16)
AST SERPL-CCNC: 23 U/L (ref 10–40)
B-HCG UR QL: NEGATIVE
BACTERIA #/AREA URNS HPF: ABNORMAL /HPF
BASOPHILS # BLD AUTO: 0.08 K/UL (ref 0–0.2)
BASOPHILS NFR BLD: 0.7 % (ref 0–1.9)
BILIRUB SERPL-MCNC: 0.5 MG/DL (ref 0.1–1)
BILIRUB UR QL STRIP: NEGATIVE
BUN SERPL-MCNC: 9 MG/DL (ref 6–20)
CALCIUM SERPL-MCNC: 9.9 MG/DL (ref 8.7–10.5)
CHLORIDE SERPL-SCNC: 105 MMOL/L (ref 95–110)
CLARITY UR: CLEAR
CO2 SERPL-SCNC: 25 MMOL/L (ref 23–29)
COLOR UR: YELLOW
CREAT SERPL-MCNC: 0.7 MG/DL (ref 0.5–1.4)
CTP QC/QA: YES
DIFFERENTIAL METHOD: ABNORMAL
EOSINOPHIL # BLD AUTO: 0.4 K/UL (ref 0–0.5)
EOSINOPHIL NFR BLD: 3 % (ref 0–8)
ERYTHROCYTE [DISTWIDTH] IN BLOOD BY AUTOMATED COUNT: 13.1 % (ref 11.5–14.5)
EST. GFR  (AFRICAN AMERICAN): >60 ML/MIN/1.73 M^2
EST. GFR  (NON AFRICAN AMERICAN): >60 ML/MIN/1.73 M^2
GLUCOSE SERPL-MCNC: 333 MG/DL (ref 70–110)
GLUCOSE UR QL STRIP: ABNORMAL
HCT VFR BLD AUTO: 45 % (ref 37–48.5)
HGB BLD-MCNC: 14 G/DL (ref 12–16)
HGB UR QL STRIP: NEGATIVE
IMM GRANULOCYTES # BLD AUTO: 0.05 K/UL (ref 0–0.04)
KETONES UR QL STRIP: NEGATIVE
LEUKOCYTE ESTERASE UR QL STRIP: NEGATIVE
LYMPHOCYTES # BLD AUTO: 2.1 K/UL (ref 1–4.8)
LYMPHOCYTES NFR BLD: 17.8 % (ref 18–48)
MCH RBC QN AUTO: 27.8 PG (ref 27–31)
MCHC RBC AUTO-ENTMCNC: 31.1 G/DL (ref 32–36)
MCV RBC AUTO: 89 FL (ref 82–98)
MICROSCOPIC COMMENT: ABNORMAL
MONOCYTES # BLD AUTO: 0.9 K/UL (ref 0.3–1)
MONOCYTES NFR BLD: 7.3 % (ref 4–15)
NEUTROPHILS # BLD AUTO: 8.5 K/UL (ref 1.8–7.7)
NEUTROPHILS NFR BLD: 70.8 % (ref 38–73)
NITRITE UR QL STRIP: NEGATIVE
NRBC BLD-RTO: 0 /100 WBC
PH UR STRIP: 7 [PH] (ref 5–8)
PLATELET # BLD AUTO: 573 K/UL (ref 150–350)
PMV BLD AUTO: 8.9 FL (ref 9.2–12.9)
POTASSIUM SERPL-SCNC: 4.1 MMOL/L (ref 3.5–5.1)
PROT SERPL-MCNC: 7.1 G/DL (ref 6–8.4)
PROT UR QL STRIP: NEGATIVE
RBC # BLD AUTO: 5.04 M/UL (ref 4–5.4)
SODIUM SERPL-SCNC: 140 MMOL/L (ref 136–145)
SP GR UR STRIP: 1.01 (ref 1–1.03)
URN SPEC COLLECT METH UR: ABNORMAL
UROBILINOGEN UR STRIP-ACNC: 1 EU/DL
WBC # BLD AUTO: 11.98 K/UL (ref 3.9–12.7)
WBC #/AREA URNS HPF: 1 /HPF (ref 0–5)
YEAST URNS QL MICRO: ABNORMAL

## 2020-03-10 PROCEDURE — 80053 COMPREHEN METABOLIC PANEL: CPT

## 2020-03-10 PROCEDURE — 85025 COMPLETE CBC W/AUTO DIFF WBC: CPT

## 2020-03-10 PROCEDURE — 51798 US URINE CAPACITY MEASURE: CPT

## 2020-03-10 PROCEDURE — 81000 URINALYSIS NONAUTO W/SCOPE: CPT

## 2020-03-10 PROCEDURE — 63600175 PHARM REV CODE 636 W HCPCS: Performed by: EMERGENCY MEDICINE

## 2020-03-10 PROCEDURE — 81025 URINE PREGNANCY TEST: CPT | Performed by: EMERGENCY MEDICINE

## 2020-03-10 PROCEDURE — 36415 COLL VENOUS BLD VENIPUNCTURE: CPT

## 2020-03-10 PROCEDURE — 99284 EMERGENCY DEPT VISIT MOD MDM: CPT | Mod: 25

## 2020-03-10 PROCEDURE — 96374 THER/PROPH/DIAG INJ IV PUSH: CPT

## 2020-03-10 RX ORDER — MORPHINE SULFATE 8 MG/ML
8 INJECTION INTRAMUSCULAR; INTRAVENOUS; SUBCUTANEOUS
Status: COMPLETED | OUTPATIENT
Start: 2020-03-10 | End: 2020-03-10

## 2020-03-10 RX ORDER — LISINOPRIL 5 MG/1
5 TABLET ORAL DAILY
COMMUNITY
End: 2021-04-27

## 2020-03-10 RX ORDER — METHOCARBAMOL 500 MG/1
1000 TABLET, FILM COATED ORAL 3 TIMES DAILY
Qty: 30 TABLET | Refills: 0 | Status: SHIPPED | OUTPATIENT
Start: 2020-03-10 | End: 2020-03-15

## 2020-03-10 RX ORDER — HYDROXYZINE PAMOATE 50 MG/1
50 CAPSULE ORAL 3 TIMES DAILY
COMMUNITY
End: 2021-05-12

## 2020-03-10 RX ORDER — QUETIAPINE FUMARATE 50 MG/1
50 TABLET, FILM COATED ORAL NIGHTLY
COMMUNITY
End: 2021-04-27

## 2020-03-10 RX ORDER — HYDROCODONE BITARTRATE AND ACETAMINOPHEN 7.5; 325 MG/1; MG/1
1 TABLET ORAL EVERY 4 HOURS PRN
Qty: 12 TABLET | Refills: 0 | Status: SHIPPED | OUTPATIENT
Start: 2020-03-10 | End: 2020-03-12

## 2020-03-10 RX ORDER — NAPROXEN 500 MG/1
500 TABLET ORAL 2 TIMES DAILY WITH MEALS
Qty: 30 TABLET | Refills: 0 | Status: SHIPPED | OUTPATIENT
Start: 2020-03-10 | End: 2020-03-25

## 2020-03-10 RX ORDER — GLIPIZIDE 10 MG/1
10 TABLET ORAL
COMMUNITY
End: 2021-04-27

## 2020-03-10 RX ORDER — CHOLECALCIFEROL (VITAMIN D3) 1250 MCG
50000 TABLET ORAL
COMMUNITY
End: 2021-05-12

## 2020-03-10 RX ADMIN — MORPHINE SULFATE 8 MG: 8 INJECTION, SOLUTION INTRAMUSCULAR; INTRAVENOUS at 07:03

## 2020-03-10 NOTE — ED PROVIDER NOTES
Encounter Date: 3/10/2020    SCRIBE #1 NOTE: I, Solange Floyd, am scribing for, and in the presence of, Russ Skinner MD.       History     Chief Complaint   Patient presents with    Back Pain     back pain, left leg numbness, and reports urine and fecal incontinence       Time seen by provider: 5:03 PM on 03/10/2020    Lizzie Saunders is a 34 y.o. female with PMHx of chronic sciatica, bulging discs and DM who presents to the ED with complaints of chronic lower back pain and left leg numbness with reports of defecating on herself while sleeping but not while awake intermittently over the past week. She reports that she has had intermittent numbness of her pelvic region as well.  She reports that this is a baseline occurrence for her.  Pt had surgery to repair a fragmented bulging disc 2 yrs ago. Her last MRI was 2 years ago prior to the surgery. The patient denies fever or any other symptoms at this time. No other pertinent PSHx.      The history is provided by the patient.     Review of patient's allergies indicates:   Allergen Reactions    Baetriz Anaphylaxis    Pecan nut Dermatitis     Past Medical History:   Diagnosis Date    Bipolar disorder     per patient report    Depression     Diabetes mellitus     Hx of psychiatric care     Neuropathy     per patient report    Psychiatric problem     Therapy      Past Surgical History:   Procedure Laterality Date    BACK SURGERY       History reviewed. No pertinent family history.  Social History     Tobacco Use    Smoking status: Current Some Day Smoker     Packs/day: 0.25     Types: Cigarettes   Substance Use Topics    Alcohol use: Yes     Comment: rare    Drug use: No     Review of Systems   Constitutional: Negative for fever.   HENT: Negative for congestion.    Eyes: Negative for visual disturbance.   Respiratory: Negative for wheezing.    Cardiovascular: Negative for chest pain.   Gastrointestinal: Negative for abdominal pain and nausea.        Positive  for bowel incontinence.   Genitourinary: Negative for dysuria.        Positive for urinary incontinence.   Musculoskeletal: Positive for back pain. Negative for joint swelling.   Skin: Negative for rash.   Neurological: Positive for numbness. Negative for syncope.   Hematological: Does not bruise/bleed easily.       Physical Exam     Initial Vitals [03/10/20 1636]   BP Pulse Resp Temp SpO2   111/65 104 16 98.7 °F (37.1 °C) 98 %      MAP       --         Physical Exam    Nursing note and vitals reviewed.  Constitutional: She appears well-developed and well-nourished. She is Obese .   HENT:   Head: Normocephalic and atraumatic.   Eyes: Conjunctivae and EOM are normal.   Neck: Normal range of motion. Neck supple. No thyroid mass present.   Cardiovascular: Normal rate, regular rhythm and normal heart sounds. Exam reveals no gallop and no friction rub.    No murmur heard.  Pulmonary/Chest: Breath sounds normal. She has no wheezes. She has no rhonchi. She has no rales.   Abdominal: Soft. Normal appearance and bowel sounds are normal. There is no tenderness.   Musculoskeletal: She exhibits tenderness. She exhibits no edema.   No lobe midline back pain, crepitus, step-offs to palpation, fully intact strength and sensation bilaterally on examination.  Bladder scan indicates patient has no retained urine in her bladder after voiding just prior to study   Neurological: She is alert and oriented to person, place, and time. No cranial nerve deficit.   Chronic lower extremity weakness.   Skin: Skin is warm and dry. No rash noted. No erythema.   Psychiatric: She has a normal mood and affect. Her speech is normal. Cognition and memory are normal.         ED Course   Procedures  Labs Reviewed   CBC W/ AUTO DIFFERENTIAL - Abnormal; Notable for the following components:       Result Value    Mean Corpuscular Hemoglobin Conc 31.1 (*)     Platelets 573 (*)     MPV 8.9 (*)     Gran # (ANC) 8.5 (*)     Immature Grans (Abs) 0.05 (*)      Lymph% 17.8 (*)     All other components within normal limits   COMPREHENSIVE METABOLIC PANEL   URINALYSIS, REFLEX TO URINE CULTURE   POCT URINE PREGNANCY          Imaging Results          MRI Lumbar Spine Without Contrast (In process)                  Medical Decision Making:   History:   Old Medical Records: I decided to obtain old medical records.  Clinical Tests:   Lab Tests: Ordered and Reviewed  Radiological Study: Ordered and Reviewed  ED Management:  This patient was interviewed and assessed emergently.  Vital signs are stable.  Patient denies new trauma.  Screening labs and urinalysis were obtained found be significant for hyperglycemia without anion gap widening.  Urinalysis indicates the presence of many bacteria without other additional findings consistent with infection.  Will wait for urine culture.  MRI of the lumbar spine without contrast indicates no acute fracture malalignment there are multilevel degenerative changes with disc space narrowing, disc bulge and extrusion in addition to facet joint arthropathy.  These findings are most predominantly at L3-L4, L4-L5, L5-S1 without findings consistent with acute cord compression.  Low suspicion for central pontine myelinolysis, malignancy, CNS infection including diskitis or epidural abscess.  Patient will receive multiple medications for pain improvement home and is provided multiple neurosurgeons in spine surgeons.  She is asked to follow up with 1 as soon as possible regarding further management.  She is asked return to the ER immediately for any new, concerning, or worsening symptoms.  Patient is agreeable with this plan for follow-up and she was discharged in stable condition.            Scribe Attestation:   Scribe #1: I performed the above scribed service and the documentation accurately describes the services I performed. I attest to the accuracy of the note.    I, Dr. Russ Skinner, personally performed the services described in this  documentation. All medical record entries made by the scribe were at my direction and in my presence.  I have reviewed the chart and agree that the record reflects my personal performance and is accurate and complete. Russ Skinner MD.  11:26 AM 03/11/2020                        Clinical Impression:       ICD-10-CM ICD-9-CM   1. Acute bilateral low back pain with left-sided sciatica M54.42 724.2     724.3         Disposition:   Disposition: Discharged  Condition: Stable                        Russ Skinner MD  03/11/20 1126

## 2020-03-10 NOTE — ED NOTES
"Patient complaining of left leg numbness that extends to left hip.  Patient states that left leg is normally numb from knee down, however while sitting at home watching tv today she noted that the numbness had extended to left hip. Patient demonstrates full range of motion.  Left extremity warm to touch with good skin turgor.  Patient also stated that when she got up to walk, that it was then she realized that she had a bowel movement while sitting on couch.  Patient states "I had no idea because I never felt it".  "

## 2021-01-12 RX ORDER — FREMANEZUMAB-VFRM 225 MG/1.5ML
INJECTION SUBCUTANEOUS
COMMUNITY
Start: 2020-12-07 | End: 2021-04-27

## 2021-01-12 RX ORDER — PROPRANOLOL HYDROCHLORIDE 20 MG/1
20 TABLET ORAL 3 TIMES DAILY
COMMUNITY
Start: 2020-12-07 | End: 2021-04-27

## 2021-04-27 ENCOUNTER — OFFICE VISIT (OUTPATIENT)
Dept: FAMILY MEDICINE | Facility: CLINIC | Age: 36
End: 2021-04-27
Payer: MEDICAID

## 2021-04-27 VITALS
WEIGHT: 210 LBS | TEMPERATURE: 99 F | BODY MASS INDEX: 33.75 KG/M2 | OXYGEN SATURATION: 98 % | HEIGHT: 66 IN | DIASTOLIC BLOOD PRESSURE: 74 MMHG | HEART RATE: 97 BPM | SYSTOLIC BLOOD PRESSURE: 110 MMHG

## 2021-04-27 DIAGNOSIS — N89.8 VAGINAL ITCHING: ICD-10-CM

## 2021-04-27 DIAGNOSIS — F31.4 BIPOLAR DISORDER, CURRENT EPISODE DEPRESSED, SEVERE, WITHOUT PSYCHOTIC FEATURES: ICD-10-CM

## 2021-04-27 DIAGNOSIS — Z79.4 TYPE 2 DIABETES MELLITUS WITH HYPERGLYCEMIA, WITH LONG-TERM CURRENT USE OF INSULIN: Primary | ICD-10-CM

## 2021-04-27 DIAGNOSIS — Z11.3 SCREENING EXAMINATION FOR STD (SEXUALLY TRANSMITTED DISEASE): ICD-10-CM

## 2021-04-27 DIAGNOSIS — F17.200 TOBACCO DEPENDENCE: ICD-10-CM

## 2021-04-27 DIAGNOSIS — G43.009 MIGRAINE WITHOUT AURA AND WITHOUT STATUS MIGRAINOSUS, NOT INTRACTABLE: ICD-10-CM

## 2021-04-27 DIAGNOSIS — E11.65 TYPE 2 DIABETES MELLITUS WITH HYPERGLYCEMIA, WITH LONG-TERM CURRENT USE OF INSULIN: Primary | ICD-10-CM

## 2021-04-27 PROBLEM — Z72.0 TOBACCO USE: Status: RESOLVED | Noted: 2017-02-03 | Resolved: 2021-04-27

## 2021-04-27 PROBLEM — F31.30 BIPOLAR AFFECTIVE DISORDER, CURRENT EPISODE DEPRESSED: Status: ACTIVE | Noted: 2021-04-27

## 2021-04-27 PROCEDURE — 99215 OFFICE O/P EST HI 40 MIN: CPT | Performed by: NURSE PRACTITIONER

## 2021-04-27 PROCEDURE — 99204 OFFICE O/P NEW MOD 45 MIN: CPT | Mod: S$PBB,,, | Performed by: NURSE PRACTITIONER

## 2021-04-27 PROCEDURE — 99204 PR OFFICE/OUTPT VISIT, NEW, LEVL IV, 45-59 MIN: ICD-10-PCS | Mod: S$PBB,,, | Performed by: NURSE PRACTITIONER

## 2021-04-27 RX ORDER — FLUCONAZOLE 150 MG/1
150 TABLET ORAL DAILY
Qty: 1 TABLET | Refills: 0 | Status: SHIPPED | OUTPATIENT
Start: 2021-04-27 | End: 2021-04-28

## 2021-04-29 ENCOUNTER — TELEPHONE (OUTPATIENT)
Dept: FAMILY MEDICINE | Facility: CLINIC | Age: 36
End: 2021-04-29

## 2021-04-29 DIAGNOSIS — Z79.4 TYPE 2 DIABETES MELLITUS WITH HYPERGLYCEMIA, WITH LONG-TERM CURRENT USE OF INSULIN: Primary | ICD-10-CM

## 2021-04-29 DIAGNOSIS — E11.65 TYPE 2 DIABETES MELLITUS WITH HYPERGLYCEMIA, WITH LONG-TERM CURRENT USE OF INSULIN: Primary | ICD-10-CM

## 2021-04-29 DIAGNOSIS — E78.2 MIXED HYPERLIPIDEMIA: ICD-10-CM

## 2021-04-29 LAB
ALBUMIN SERPL-MCNC: 4.4 G/DL (ref 3.8–4.8)
ALBUMIN/CREAT UR: 128 MG/G CREAT (ref 0–29)
ALBUMIN/GLOB SERPL: 1.6 {RATIO} (ref 1.2–2.2)
ALP SERPL-CCNC: 77 IU/L (ref 39–117)
ALT SERPL-CCNC: 20 IU/L (ref 0–32)
APPEARANCE UR: ABNORMAL
AST SERPL-CCNC: 11 IU/L (ref 0–40)
BACTERIA #/AREA URNS HPF: ABNORMAL /[HPF]
BASOPHILS # BLD AUTO: 0.1 X10E3/UL (ref 0–0.2)
BASOPHILS NFR BLD AUTO: 1 %
BILIRUB SERPL-MCNC: 0.6 MG/DL (ref 0–1.2)
BILIRUB UR QL STRIP: NEGATIVE
BUN SERPL-MCNC: 10 MG/DL (ref 6–20)
BUN/CREAT SERPL: 19 (ref 9–23)
C TRACH RRNA SPEC QL NAA+PROBE: NEGATIVE
CALCIUM SERPL-MCNC: 10.1 MG/DL (ref 8.7–10.2)
CASTS URNS QL MICRO: ABNORMAL /LPF
CHLORIDE SERPL-SCNC: 97 MMOL/L (ref 96–106)
CHOLEST SERPL-MCNC: 280 MG/DL (ref 100–199)
CO2 SERPL-SCNC: 22 MMOL/L (ref 20–29)
COLOR UR: YELLOW
CREAT SERPL-MCNC: 0.54 MG/DL (ref 0.57–1)
CREAT UR-MCNC: 101.9 MG/DL
EOSINOPHIL # BLD AUTO: 0.1 X10E3/UL (ref 0–0.4)
EOSINOPHIL NFR BLD AUTO: 1 %
EPI CELLS #/AREA URNS HPF: >10 /HPF (ref 0–10)
ERYTHROCYTE [DISTWIDTH] IN BLOOD BY AUTOMATED COUNT: 12.5 % (ref 11.7–15.4)
GLOBULIN SER CALC-MCNC: 2.7 G/DL (ref 1.5–4.5)
GLUCOSE SERPL-MCNC: 349 MG/DL (ref 65–99)
GLUCOSE UR QL: ABNORMAL
HAV IGM SERPL QL IA: NEGATIVE
HBA1C MFR BLD: 11.9 % (ref 4.8–5.6)
HBV CORE IGM SERPL QL IA: NEGATIVE
HBV SURFACE AG SERPL QL IA: NEGATIVE
HCT VFR BLD AUTO: 45.7 % (ref 34–46.6)
HCV AB S/CO SERPL IA: <0.1 S/CO RATIO (ref 0–0.9)
HDLC SERPL-MCNC: 35 MG/DL
HGB BLD-MCNC: 15 G/DL (ref 11.1–15.9)
HGB UR QL STRIP: NEGATIVE
HIV 1+2 AB+HIV1 P24 AG SERPL QL IA: NON REACTIVE
IMM GRANULOCYTES # BLD AUTO: 0 X10E3/UL (ref 0–0.1)
IMM GRANULOCYTES NFR BLD AUTO: 0 %
KETONES UR QL STRIP: ABNORMAL
LDLC SERPL CALC-MCNC: 189 MG/DL (ref 0–99)
LEUKOCYTE ESTERASE UR QL STRIP: NEGATIVE
LYMPHOCYTES # BLD AUTO: 1.7 X10E3/UL (ref 0.7–3.1)
LYMPHOCYTES NFR BLD AUTO: 16 %
MCH RBC QN AUTO: 28.5 PG (ref 26.6–33)
MCHC RBC AUTO-ENTMCNC: 32.8 G/DL (ref 31.5–35.7)
MCV RBC AUTO: 87 FL (ref 79–97)
MICRO URNS: ABNORMAL
MICROALBUMIN UR-MCNC: 130 UG/ML
MONOCYTES # BLD AUTO: 0.6 X10E3/UL (ref 0.1–0.9)
MONOCYTES NFR BLD AUTO: 6 %
N GONORRHOEA RRNA SPEC QL NAA+PROBE: NEGATIVE
NEUTROPHILS # BLD AUTO: 7.8 X10E3/UL (ref 1.4–7)
NEUTROPHILS NFR BLD AUTO: 76 %
NITRITE UR QL STRIP: NEGATIVE
PH UR STRIP: 5 [PH] (ref 5–7.5)
PLATELET # BLD AUTO: 631 X10E3/UL (ref 150–450)
POTASSIUM SERPL-SCNC: 4.9 MMOL/L (ref 3.5–5.2)
PROT SERPL-MCNC: 7.1 G/DL (ref 6–8.5)
PROT UR QL STRIP: ABNORMAL
RBC # BLD AUTO: 5.27 X10E6/UL (ref 3.77–5.28)
RBC #/AREA URNS HPF: ABNORMAL /HPF (ref 0–2)
RPR SER QL: NON REACTIVE
SODIUM SERPL-SCNC: 135 MMOL/L (ref 134–144)
SP GR UR: 1.05 (ref 1–1.03)
T VAGINALIS DNA SPEC QL NAA+PROBE: NEGATIVE
TRIGL SERPL-MCNC: 284 MG/DL (ref 0–149)
TSH SERPL DL<=0.005 MIU/L-ACNC: 0.91 UIU/ML (ref 0.45–4.5)
UROBILINOGEN UR STRIP-MCNC: 0.2 MG/DL (ref 0.2–1)
VLDLC SERPL CALC-MCNC: 56 MG/DL (ref 5–40)
WBC # BLD AUTO: 10.3 X10E3/UL (ref 3.4–10.8)
WBC #/AREA URNS HPF: ABNORMAL /HPF (ref 0–5)
YEAST #/AREA URNS HPF: PRESENT /[HPF]

## 2021-04-29 RX ORDER — METFORMIN HYDROCHLORIDE 500 MG/1
500 TABLET ORAL 2 TIMES DAILY WITH MEALS
Qty: 180 TABLET | Refills: 1 | Status: SHIPPED | OUTPATIENT
Start: 2021-04-29 | End: 2021-05-12 | Stop reason: SDUPTHER

## 2021-04-29 RX ORDER — INSULIN GLARGINE 100 [IU]/ML
20 INJECTION, SOLUTION SUBCUTANEOUS NIGHTLY
Qty: 10 ML | Refills: 1 | Status: SHIPPED | OUTPATIENT
Start: 2021-04-29 | End: 2021-05-12 | Stop reason: SDUPTHER

## 2021-04-29 RX ORDER — ATORVASTATIN CALCIUM 40 MG/1
40 TABLET, FILM COATED ORAL NIGHTLY
Qty: 90 TABLET | Refills: 1 | Status: SHIPPED | OUTPATIENT
Start: 2021-04-29 | End: 2021-05-12 | Stop reason: SDUPTHER

## 2021-05-04 DIAGNOSIS — G43.009 MIGRAINE WITHOUT AURA AND WITHOUT STATUS MIGRAINOSUS, NOT INTRACTABLE: Primary | ICD-10-CM

## 2021-05-12 ENCOUNTER — HOSPITAL ENCOUNTER (OUTPATIENT)
Dept: RADIOLOGY | Facility: HOSPITAL | Age: 36
Discharge: HOME | End: 2021-05-12
Payer: MEDICAID

## 2021-05-12 ENCOUNTER — OFFICE VISIT (OUTPATIENT)
Dept: FAMILY MEDICINE | Facility: CLINIC | Age: 36
End: 2021-05-12
Payer: MEDICAID

## 2021-05-12 ENCOUNTER — PATIENT MESSAGE (OUTPATIENT)
Dept: RESEARCH | Facility: HOSPITAL | Age: 36
End: 2021-05-12

## 2021-05-12 VITALS
DIASTOLIC BLOOD PRESSURE: 76 MMHG | HEIGHT: 66 IN | SYSTOLIC BLOOD PRESSURE: 118 MMHG | BODY MASS INDEX: 34.07 KG/M2 | OXYGEN SATURATION: 98 % | HEART RATE: 90 BPM | WEIGHT: 212 LBS | RESPIRATION RATE: 19 BRPM

## 2021-05-12 DIAGNOSIS — F17.200 TOBACCO DEPENDENCE: ICD-10-CM

## 2021-05-12 DIAGNOSIS — E78.2 MIXED HYPERLIPIDEMIA: ICD-10-CM

## 2021-05-12 DIAGNOSIS — Z87.42 HISTORY OF PCOS: ICD-10-CM

## 2021-05-12 DIAGNOSIS — N92.0 MENORRHAGIA WITH REGULAR CYCLE: ICD-10-CM

## 2021-05-12 DIAGNOSIS — Z79.4 TYPE 2 DIABETES MELLITUS WITH HYPERGLYCEMIA, WITH LONG-TERM CURRENT USE OF INSULIN: Primary | ICD-10-CM

## 2021-05-12 DIAGNOSIS — E11.65 TYPE 2 DIABETES MELLITUS WITH HYPERGLYCEMIA, WITH LONG-TERM CURRENT USE OF INSULIN: Primary | ICD-10-CM

## 2021-05-12 DIAGNOSIS — G43.009 MIGRAINE WITHOUT AURA AND WITHOUT STATUS MIGRAINOSUS, NOT INTRACTABLE: ICD-10-CM

## 2021-05-12 DIAGNOSIS — J20.9 ACUTE BRONCHITIS, UNSPECIFIED ORGANISM: ICD-10-CM

## 2021-05-12 PROCEDURE — 99214 OFFICE O/P EST MOD 30 MIN: CPT | Mod: S$PBB,,, | Performed by: NURSE PRACTITIONER

## 2021-05-12 PROCEDURE — 70551 MRI BRAIN STEM W/O DYE: CPT | Mod: TC,PO

## 2021-05-12 PROCEDURE — 99215 OFFICE O/P EST HI 40 MIN: CPT | Mod: 25 | Performed by: NURSE PRACTITIONER

## 2021-05-12 PROCEDURE — 99214 PR OFFICE/OUTPT VISIT, EST, LEVL IV, 30-39 MIN: ICD-10-PCS | Mod: S$PBB,,, | Performed by: NURSE PRACTITIONER

## 2021-05-12 RX ORDER — FREMANEZUMAB-VFRM 225 MG/1.5ML
INJECTION SUBCUTANEOUS
COMMUNITY
Start: 2021-05-04

## 2021-05-12 RX ORDER — LANCETS
EACH MISCELLANEOUS
Qty: 50 EACH | Refills: 5 | Status: SHIPPED | OUTPATIENT
Start: 2021-05-12 | End: 2022-11-22 | Stop reason: SDUPTHER

## 2021-05-12 RX ORDER — INSULIN PUMP SYRINGE, 3 ML
EACH MISCELLANEOUS
Qty: 1 EACH | Refills: 0 | Status: SHIPPED | OUTPATIENT
Start: 2021-05-12 | End: 2022-11-22 | Stop reason: SDUPTHER

## 2021-05-12 RX ORDER — ATORVASTATIN CALCIUM 40 MG/1
40 TABLET, FILM COATED ORAL NIGHTLY
Qty: 90 TABLET | Refills: 1 | Status: SHIPPED | OUTPATIENT
Start: 2021-05-12 | End: 2021-10-19

## 2021-05-12 RX ORDER — ALBUTEROL SULFATE 90 UG/1
2 AEROSOL, METERED RESPIRATORY (INHALATION) EVERY 6 HOURS PRN
Qty: 18 G | Refills: 0 | Status: SHIPPED | OUTPATIENT
Start: 2021-05-12 | End: 2021-06-07 | Stop reason: SDUPTHER

## 2021-05-12 RX ORDER — INSULIN GLARGINE 100 [IU]/ML
20 INJECTION, SOLUTION SUBCUTANEOUS NIGHTLY
Qty: 10 ML | Refills: 1 | Status: SHIPPED | OUTPATIENT
Start: 2021-05-12 | End: 2021-08-23

## 2021-05-12 RX ORDER — SUMATRIPTAN SUCCINATE 25 MG/1
TABLET ORAL
COMMUNITY
Start: 2021-05-04 | End: 2022-08-12

## 2021-05-12 RX ORDER — METFORMIN HYDROCHLORIDE 500 MG/1
500 TABLET ORAL 2 TIMES DAILY WITH MEALS
Qty: 180 TABLET | Refills: 1 | Status: SHIPPED | OUTPATIENT
Start: 2021-05-12 | End: 2021-10-19

## 2021-05-13 ENCOUNTER — IMMUNIZATION (OUTPATIENT)
Dept: PRIMARY CARE CLINIC | Facility: CLINIC | Age: 36
End: 2021-05-13
Payer: MEDICAID

## 2021-05-13 DIAGNOSIS — Z23 NEED FOR VACCINATION: Primary | ICD-10-CM

## 2021-05-13 PROCEDURE — 0001A COVID-19, MRNA, LNP-S, PF, 30 MCG/0.3 ML DOSE VACCINE: ICD-10-PCS | Mod: CV19,S$GLB,, | Performed by: FAMILY MEDICINE

## 2021-05-13 PROCEDURE — 0001A COVID-19, MRNA, LNP-S, PF, 30 MCG/0.3 ML DOSE VACCINE: CPT | Mod: CV19,S$GLB,, | Performed by: FAMILY MEDICINE

## 2021-05-13 PROCEDURE — 91300 COVID-19, MRNA, LNP-S, PF, 30 MCG/0.3 ML DOSE VACCINE: CPT | Mod: S$GLB,,, | Performed by: FAMILY MEDICINE

## 2021-05-13 PROCEDURE — 91300 COVID-19, MRNA, LNP-S, PF, 30 MCG/0.3 ML DOSE VACCINE: ICD-10-PCS | Mod: S$GLB,,, | Performed by: FAMILY MEDICINE

## 2021-06-03 ENCOUNTER — IMMUNIZATION (OUTPATIENT)
Dept: PRIMARY CARE CLINIC | Facility: CLINIC | Age: 36
End: 2021-06-03
Payer: MEDICAID

## 2021-06-03 DIAGNOSIS — Z23 NEED FOR VACCINATION: Primary | ICD-10-CM

## 2021-06-03 PROCEDURE — 91300 COVID-19, MRNA, LNP-S, PF, 30 MCG/0.3 ML DOSE VACCINE: ICD-10-PCS | Mod: S$GLB,,, | Performed by: FAMILY MEDICINE

## 2021-06-03 PROCEDURE — 0002A COVID-19, MRNA, LNP-S, PF, 30 MCG/0.3 ML DOSE VACCINE: ICD-10-PCS | Mod: CV19,S$GLB,, | Performed by: FAMILY MEDICINE

## 2021-06-03 PROCEDURE — 91300 COVID-19, MRNA, LNP-S, PF, 30 MCG/0.3 ML DOSE VACCINE: CPT | Mod: S$GLB,,, | Performed by: FAMILY MEDICINE

## 2021-06-03 PROCEDURE — 0002A COVID-19, MRNA, LNP-S, PF, 30 MCG/0.3 ML DOSE VACCINE: CPT | Mod: CV19,S$GLB,, | Performed by: FAMILY MEDICINE

## 2021-06-07 DIAGNOSIS — J20.9 ACUTE BRONCHITIS, UNSPECIFIED ORGANISM: ICD-10-CM

## 2021-06-07 RX ORDER — ALBUTEROL SULFATE 90 UG/1
2 AEROSOL, METERED RESPIRATORY (INHALATION) EVERY 6 HOURS PRN
Qty: 18 G | Refills: 1 | Status: SHIPPED | OUTPATIENT
Start: 2021-06-07 | End: 2022-08-12

## 2021-06-17 ENCOUNTER — TELEPHONE (OUTPATIENT)
Dept: FAMILY MEDICINE | Facility: CLINIC | Age: 36
End: 2021-06-17

## 2021-06-21 ENCOUNTER — OFFICE VISIT (OUTPATIENT)
Dept: FAMILY MEDICINE | Facility: CLINIC | Age: 36
End: 2021-06-21
Payer: MEDICAID

## 2021-06-21 VITALS
SYSTOLIC BLOOD PRESSURE: 120 MMHG | HEIGHT: 66 IN | OXYGEN SATURATION: 98 % | TEMPERATURE: 99 F | WEIGHT: 205 LBS | HEART RATE: 92 BPM | DIASTOLIC BLOOD PRESSURE: 72 MMHG | BODY MASS INDEX: 32.95 KG/M2

## 2021-06-21 DIAGNOSIS — E11.65 TYPE 2 DIABETES MELLITUS WITH HYPERGLYCEMIA, WITH LONG-TERM CURRENT USE OF INSULIN: Primary | ICD-10-CM

## 2021-06-21 DIAGNOSIS — E78.2 MIXED HYPERLIPIDEMIA: ICD-10-CM

## 2021-06-21 DIAGNOSIS — Z23 IMMUNIZATION DUE: ICD-10-CM

## 2021-06-21 DIAGNOSIS — Z79.4 TYPE 2 DIABETES MELLITUS WITH HYPERGLYCEMIA, WITH LONG-TERM CURRENT USE OF INSULIN: Primary | ICD-10-CM

## 2021-06-21 DIAGNOSIS — R30.0 DYSURIA: ICD-10-CM

## 2021-06-21 DIAGNOSIS — E66.09 CLASS 1 OBESITY DUE TO EXCESS CALORIES WITH SERIOUS COMORBIDITY AND BODY MASS INDEX (BMI) OF 33.0 TO 33.9 IN ADULT: ICD-10-CM

## 2021-06-21 DIAGNOSIS — F17.200 TOBACCO DEPENDENCE: ICD-10-CM

## 2021-06-21 PROCEDURE — 99215 OFFICE O/P EST HI 40 MIN: CPT | Performed by: NURSE PRACTITIONER

## 2021-06-21 PROCEDURE — 99214 PR OFFICE/OUTPT VISIT, EST, LEVL IV, 30-39 MIN: ICD-10-PCS | Mod: S$PBB,,, | Performed by: NURSE PRACTITIONER

## 2021-06-21 PROCEDURE — 99214 OFFICE O/P EST MOD 30 MIN: CPT | Mod: S$PBB,,, | Performed by: NURSE PRACTITIONER

## 2021-08-02 ENCOUNTER — HOSPITAL ENCOUNTER (EMERGENCY)
Facility: HOSPITAL | Age: 36
Discharge: HOME | End: 2021-08-02
Attending: EMERGENCY MEDICINE
Payer: MEDICAID

## 2021-08-02 VITALS
HEIGHT: 67 IN | TEMPERATURE: 99 F | HEART RATE: 80 BPM | OXYGEN SATURATION: 98 % | RESPIRATION RATE: 16 BRPM | BODY MASS INDEX: 31.71 KG/M2 | DIASTOLIC BLOOD PRESSURE: 71 MMHG | WEIGHT: 202 LBS | SYSTOLIC BLOOD PRESSURE: 126 MMHG

## 2021-08-02 DIAGNOSIS — M25.561 ACUTE PAIN OF RIGHT KNEE: Primary | ICD-10-CM
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
## 2021-08-02 PROCEDURE — 25000003 PHARM REV CODE 250: Performed by: PHYSICIAN ASSISTANT

## 2021-08-02 PROCEDURE — 99282 EMERGENCY DEPT VISIT SF MDM: CPT

## 2021-08-02 PROCEDURE — 99284 EMERGENCY DEPT VISIT MOD MDM: CPT | Mod: ,,, | Performed by: PHYSICIAN ASSISTANT

## 2021-08-02 PROCEDURE — 99284 PR EMERGENCY DEPT VISIT,LEVEL IV: ICD-10-PCS | Mod: ,,, | Performed by: PHYSICIAN ASSISTANT

## 2021-08-02 RX ORDER — NAPROXEN 500 MG/1
500 TABLET ORAL 2 TIMES DAILY WITH MEALS
Qty: 20 TABLET | Refills: 0 | Status: SHIPPED | OUTPATIENT
Start: 2021-08-02 | End: 2022-05-12

## 2021-08-02 RX ORDER — ACETAMINOPHEN 500 MG
1000 TABLET ORAL
Status: COMPLETED | OUTPATIENT
Start: 2021-08-02 | End: 2021-08-02

## 2021-08-02 RX ADMIN — ACETAMINOPHEN 1000 MG: 500 TABLET ORAL at 12:08

## 2021-09-14 ENCOUNTER — HOSPITAL ENCOUNTER (EMERGENCY)
Facility: HOSPITAL | Age: 36
Discharge: HOME | End: 2021-09-14
Attending: EMERGENCY MEDICINE
Payer: MEDICAID

## 2021-09-14 VITALS
WEIGHT: 203 LBS | SYSTOLIC BLOOD PRESSURE: 128 MMHG | TEMPERATURE: 99 F | HEART RATE: 80 BPM | DIASTOLIC BLOOD PRESSURE: 72 MMHG | RESPIRATION RATE: 16 BRPM | BODY MASS INDEX: 31.86 KG/M2 | HEIGHT: 67 IN | OXYGEN SATURATION: 98 %

## 2021-09-14 DIAGNOSIS — S70.01XA CONTUSION OF RIGHT HIP, INITIAL ENCOUNTER: Primary | ICD-10-CM

## 2021-09-14 DIAGNOSIS — S76.011A HIP STRAIN, RIGHT, INITIAL ENCOUNTER: ICD-10-CM
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
## 2021-09-14 LAB
B-HCG UR QL: NEGATIVE
CTP QC/QA: YES

## 2021-09-14 PROCEDURE — 96372 THER/PROPH/DIAG INJ SC/IM: CPT

## 2021-09-14 PROCEDURE — 25000003 PHARM REV CODE 250: Performed by: PHYSICIAN ASSISTANT

## 2021-09-14 PROCEDURE — 99284 EMERGENCY DEPT VISIT MOD MDM: CPT | Mod: ,,, | Performed by: PHYSICIAN ASSISTANT

## 2021-09-14 PROCEDURE — 81025 URINE PREGNANCY TEST: CPT | Performed by: PHYSICIAN ASSISTANT

## 2021-09-14 PROCEDURE — 99284 EMERGENCY DEPT VISIT MOD MDM: CPT | Mod: 25

## 2021-09-14 PROCEDURE — 99284 PR EMERGENCY DEPT VISIT,LEVEL IV: ICD-10-PCS | Mod: ,,, | Performed by: PHYSICIAN ASSISTANT

## 2021-09-14 PROCEDURE — 63600175 PHARM REV CODE 636 W HCPCS: Performed by: PHYSICIAN ASSISTANT

## 2021-09-14 RX ORDER — DICLOFENAC SODIUM 10 MG/G
2 GEL TOPICAL 4 TIMES DAILY
Qty: 50 G | Refills: 0 | Status: SHIPPED | OUTPATIENT
Start: 2021-09-14 | End: 2022-08-12

## 2021-09-14 RX ORDER — HYDROCODONE BITARTRATE AND ACETAMINOPHEN 5; 325 MG/1; MG/1
1 TABLET ORAL
Status: COMPLETED | OUTPATIENT
Start: 2021-09-14 | End: 2021-09-14

## 2021-09-14 RX ORDER — METHOCARBAMOL 500 MG/1
500 TABLET, FILM COATED ORAL 3 TIMES DAILY PRN
Qty: 9 TABLET | Refills: 0 | Status: SHIPPED | OUTPATIENT
Start: 2021-09-14 | End: 2021-09-24

## 2021-09-14 RX ORDER — KETOROLAC TROMETHAMINE 30 MG/ML
10 INJECTION, SOLUTION INTRAMUSCULAR; INTRAVENOUS
Status: COMPLETED | OUTPATIENT
Start: 2021-09-14 | End: 2021-09-14

## 2021-09-14 RX ADMIN — KETOROLAC TROMETHAMINE 10 MG: 30 INJECTION, SOLUTION INTRAMUSCULAR; INTRAVENOUS at 12:09

## 2021-09-14 RX ADMIN — HYDROCODONE BITARTRATE AND ACETAMINOPHEN 1 TABLET: 5; 325 TABLET ORAL at 11:09

## 2021-09-21 ENCOUNTER — OFFICE VISIT (OUTPATIENT)
Dept: FAMILY MEDICINE | Facility: CLINIC | Age: 36
End: 2021-09-21
Payer: MEDICAID

## 2021-09-21 VITALS — BODY MASS INDEX: 32.15 KG/M2 | HEIGHT: 67 IN | WEIGHT: 204.81 LBS

## 2021-09-21 DIAGNOSIS — Z79.4 TYPE 2 DIABETES MELLITUS WITH HYPERGLYCEMIA, WITH LONG-TERM CURRENT USE OF INSULIN: Primary | ICD-10-CM

## 2021-09-21 DIAGNOSIS — Z23 NEED FOR INFLUENZA VACCINATION: ICD-10-CM

## 2021-09-21 DIAGNOSIS — E11.65 TYPE 2 DIABETES MELLITUS WITH HYPERGLYCEMIA, WITH LONG-TERM CURRENT USE OF INSULIN: Primary | ICD-10-CM

## 2021-09-21 DIAGNOSIS — Z23 IMMUNIZATION DUE: ICD-10-CM

## 2021-09-21 DIAGNOSIS — F17.200 TOBACCO DEPENDENCE: ICD-10-CM

## 2021-09-21 DIAGNOSIS — E78.2 MIXED HYPERLIPIDEMIA: ICD-10-CM

## 2021-09-21 LAB — HBA1C MFR BLD: 11.6 %

## 2021-09-21 PROCEDURE — 99214 PR OFFICE/OUTPT VISIT, EST, LEVL IV, 30-39 MIN: ICD-10-PCS | Mod: S$PBB,,, | Performed by: NURSE PRACTITIONER

## 2021-09-21 PROCEDURE — 99213 OFFICE O/P EST LOW 20 MIN: CPT | Performed by: NURSE PRACTITIONER

## 2021-09-21 PROCEDURE — 83036 HEMOGLOBIN GLYCOSYLATED A1C: CPT | Mod: PBBFAC | Performed by: NURSE PRACTITIONER

## 2021-09-21 PROCEDURE — 99214 OFFICE O/P EST MOD 30 MIN: CPT | Mod: S$PBB,,, | Performed by: NURSE PRACTITIONER

## 2021-09-21 PROCEDURE — 90686 IIV4 VACC NO PRSV 0.5 ML IM: CPT | Mod: PBBFAC | Performed by: NURSE PRACTITIONER

## 2021-09-21 PROCEDURE — 90471 IMMUNIZATION ADMIN: CPT | Mod: PBBFAC | Performed by: NURSE PRACTITIONER

## 2021-09-21 RX ORDER — DULAGLUTIDE 0.75 MG/.5ML
0.75 INJECTION, SOLUTION SUBCUTANEOUS
Qty: 4 PEN | Refills: 2 | Status: SHIPPED | OUTPATIENT
Start: 2021-09-21 | End: 2021-12-16

## 2021-09-21 RX ORDER — INSULIN GLARGINE 100 [IU]/ML
25 INJECTION, SOLUTION SUBCUTANEOUS NIGHTLY
Qty: 9 ML | Refills: 5 | Status: SHIPPED | OUTPATIENT
Start: 2021-09-21 | End: 2022-01-12 | Stop reason: SDUPTHER

## 2021-10-13 ENCOUNTER — HOSPITAL ENCOUNTER (EMERGENCY)
Facility: HOSPITAL | Age: 36
Discharge: HOME | End: 2021-10-13
Attending: EMERGENCY MEDICINE
Payer: MEDICAID

## 2021-10-13 VITALS
TEMPERATURE: 99 F | BODY MASS INDEX: 32.18 KG/M2 | SYSTOLIC BLOOD PRESSURE: 109 MMHG | HEIGHT: 67 IN | DIASTOLIC BLOOD PRESSURE: 86 MMHG | OXYGEN SATURATION: 98 % | RESPIRATION RATE: 16 BRPM | WEIGHT: 205 LBS | HEART RATE: 85 BPM

## 2021-10-13 DIAGNOSIS — S20.219A CHEST WALL CONTUSION: ICD-10-CM

## 2021-10-13 DIAGNOSIS — S20.212A RIB CONTUSION, LEFT, INITIAL ENCOUNTER: Primary | ICD-10-CM

## 2021-10-13 LAB
B-HCG UR QL: NEGATIVE
CTP QC/QA: YES

## 2021-10-13 PROCEDURE — 96372 THER/PROPH/DIAG INJ SC/IM: CPT

## 2021-10-13 PROCEDURE — 99284 EMERGENCY DEPT VISIT MOD MDM: CPT | Mod: 25

## 2021-10-13 PROCEDURE — 63600175 PHARM REV CODE 636 W HCPCS: Performed by: PHYSICIAN ASSISTANT

## 2021-10-13 PROCEDURE — 81025 URINE PREGNANCY TEST: CPT | Performed by: PHYSICIAN ASSISTANT

## 2021-10-13 PROCEDURE — 99284 EMERGENCY DEPT VISIT MOD MDM: CPT | Mod: ,,, | Performed by: PHYSICIAN ASSISTANT

## 2021-10-13 PROCEDURE — 99284 PR EMERGENCY DEPT VISIT,LEVEL IV: ICD-10-PCS | Mod: ,,, | Performed by: PHYSICIAN ASSISTANT

## 2021-10-13 PROCEDURE — 25000003 PHARM REV CODE 250: Performed by: PHYSICIAN ASSISTANT

## 2021-10-13 RX ORDER — ACETAMINOPHEN 500 MG
1000 TABLET ORAL EVERY 8 HOURS
Refills: 0 | COMMUNITY
Start: 2021-10-13 | End: 2021-10-20

## 2021-10-13 RX ORDER — IBUPROFEN 600 MG/1
600 TABLET ORAL EVERY 8 HOURS PRN
Qty: 15 TABLET | Refills: 0 | Status: SHIPPED | OUTPATIENT
Start: 2021-10-13 | End: 2022-04-12

## 2021-10-13 RX ORDER — ACETAMINOPHEN 325 MG/1
650 TABLET ORAL
Status: COMPLETED | OUTPATIENT
Start: 2021-10-13 | End: 2021-10-13

## 2021-10-13 RX ORDER — CYCLOBENZAPRINE HCL 10 MG
10 TABLET ORAL
Status: COMPLETED | OUTPATIENT
Start: 2021-10-13 | End: 2021-10-13

## 2021-10-13 RX ORDER — KETOROLAC TROMETHAMINE 30 MG/ML
15 INJECTION, SOLUTION INTRAMUSCULAR; INTRAVENOUS
Status: COMPLETED | OUTPATIENT
Start: 2021-10-13 | End: 2021-10-13

## 2021-10-13 RX ORDER — CYCLOBENZAPRINE HCL 10 MG
10 TABLET ORAL 3 TIMES DAILY PRN
Qty: 15 TABLET | Refills: 0 | Status: SHIPPED | OUTPATIENT
Start: 2021-10-13 | End: 2021-10-18

## 2021-10-13 RX ADMIN — CYCLOBENZAPRINE 10 MG: 10 TABLET, FILM COATED ORAL at 01:10

## 2021-10-13 RX ADMIN — ACETAMINOPHEN 650 MG: 325 TABLET ORAL at 01:10

## 2021-10-13 RX ADMIN — KETOROLAC TROMETHAMINE 15 MG: 30 INJECTION, SOLUTION INTRAMUSCULAR at 01:10

## 2021-10-19 DIAGNOSIS — Z79.4 TYPE 2 DIABETES MELLITUS WITH HYPERGLYCEMIA, WITH LONG-TERM CURRENT USE OF INSULIN: ICD-10-CM

## 2021-10-19 DIAGNOSIS — E78.2 MIXED HYPERLIPIDEMIA: ICD-10-CM

## 2021-10-19 DIAGNOSIS — E11.65 TYPE 2 DIABETES MELLITUS WITH HYPERGLYCEMIA, WITH LONG-TERM CURRENT USE OF INSULIN: ICD-10-CM

## 2021-10-19 RX ORDER — METFORMIN HYDROCHLORIDE 500 MG/1
TABLET ORAL
Qty: 60 TABLET | Refills: 0 | Status: SHIPPED | OUTPATIENT
Start: 2021-10-19 | End: 2022-01-12 | Stop reason: SDUPTHER

## 2021-10-19 RX ORDER — ATORVASTATIN CALCIUM 40 MG/1
TABLET, FILM COATED ORAL
Qty: 30 TABLET | Refills: 0 | Status: SHIPPED | OUTPATIENT
Start: 2021-10-19 | End: 2022-01-12 | Stop reason: SDUPTHER

## 2021-12-16 DIAGNOSIS — E11.65 TYPE 2 DIABETES MELLITUS WITH HYPERGLYCEMIA, WITH LONG-TERM CURRENT USE OF INSULIN: ICD-10-CM

## 2021-12-16 DIAGNOSIS — Z79.4 TYPE 2 DIABETES MELLITUS WITH HYPERGLYCEMIA, WITH LONG-TERM CURRENT USE OF INSULIN: ICD-10-CM

## 2021-12-16 RX ORDER — DULAGLUTIDE 0.75 MG/.5ML
INJECTION, SOLUTION SUBCUTANEOUS
Qty: 4 PEN | Refills: 0 | Status: SHIPPED | OUTPATIENT
Start: 2021-12-16 | End: 2022-01-12 | Stop reason: SDUPTHER

## 2022-01-12 ENCOUNTER — OFFICE VISIT (OUTPATIENT)
Dept: FAMILY MEDICINE | Facility: CLINIC | Age: 37
End: 2022-01-12
Payer: MEDICAID

## 2022-01-12 VITALS
BODY MASS INDEX: 33.21 KG/M2 | HEART RATE: 86 BPM | HEIGHT: 67 IN | SYSTOLIC BLOOD PRESSURE: 120 MMHG | TEMPERATURE: 99 F | RESPIRATION RATE: 18 BRPM | DIASTOLIC BLOOD PRESSURE: 68 MMHG | WEIGHT: 211.63 LBS | OXYGEN SATURATION: 98 %

## 2022-01-12 DIAGNOSIS — E11.65 TYPE 2 DIABETES MELLITUS WITH HYPERGLYCEMIA, WITH LONG-TERM CURRENT USE OF INSULIN: Primary | ICD-10-CM

## 2022-01-12 DIAGNOSIS — Z23 NEED FOR TDAP VACCINATION: ICD-10-CM
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
## 2022-01-12 DIAGNOSIS — S50.812A CAT SCRATCH OF LEFT FOREARM, INITIAL ENCOUNTER: ICD-10-CM

## 2022-01-12 DIAGNOSIS — E78.2 MIXED HYPERLIPIDEMIA: ICD-10-CM

## 2022-01-12 DIAGNOSIS — Z79.4 TYPE 2 DIABETES MELLITUS WITH HYPERGLYCEMIA, WITH LONG-TERM CURRENT USE OF INSULIN: Primary | ICD-10-CM

## 2022-01-12 LAB
ALBUMIN SERPL-MCNC: 4.2 G/DL (ref 3.8–4.8)
ALBUMIN/GLOB SERPL: 1.7 {RATIO} (ref 1.2–2.2)
ALP SERPL-CCNC: 72 IU/L (ref 44–121)
ALT SERPL-CCNC: 14 IU/L (ref 0–32)
AST SERPL-CCNC: 10 IU/L (ref 0–40)
BILIRUB SERPL-MCNC: 0.4 MG/DL (ref 0–1.2)
BUN SERPL-MCNC: 6 MG/DL (ref 6–20)
BUN/CREAT SERPL: 11 (ref 9–23)
CALCIUM SERPL-MCNC: 9.5 MG/DL (ref 8.7–10.2)
CHLORIDE SERPL-SCNC: 101 MMOL/L (ref 96–106)
CHOLEST SERPL-MCNC: 238 MG/DL (ref 100–199)
CO2 SERPL-SCNC: 23 MMOL/L (ref 20–29)
CREAT SERPL-MCNC: 0.55 MG/DL (ref 0.57–1)
GLOBULIN SER CALC-MCNC: 2.5 G/DL (ref 1.5–4.5)
GLUCOSE SERPL-MCNC: 315 MG/DL (ref 65–99)
HBA1C MFR BLD: 9.2 % (ref 4.8–5.6)
HDLC SERPL-MCNC: 40 MG/DL
LDLC SERPL CALC-MCNC: 171 MG/DL (ref 0–99)
POTASSIUM SERPL-SCNC: 4.8 MMOL/L (ref 3.5–5.2)
PROT SERPL-MCNC: 6.7 G/DL (ref 6–8.5)
SODIUM SERPL-SCNC: 138 MMOL/L (ref 134–144)
TRIGL SERPL-MCNC: 149 MG/DL (ref 0–149)
VLDLC SERPL CALC-MCNC: 27 MG/DL (ref 5–40)

## 2022-01-12 PROCEDURE — 99214 OFFICE O/P EST MOD 30 MIN: CPT | Mod: S$PBB,,, | Performed by: NURSE PRACTITIONER

## 2022-01-12 PROCEDURE — 99214 PR OFFICE/OUTPT VISIT, EST, LEVL IV, 30-39 MIN: ICD-10-PCS | Mod: S$PBB,,, | Performed by: NURSE PRACTITIONER

## 2022-01-12 PROCEDURE — 90715 TDAP VACCINE 7 YRS/> IM: CPT | Mod: PBBFAC | Performed by: NURSE PRACTITIONER

## 2022-01-12 PROCEDURE — 3078F PR MOST RECENT DIASTOLIC BLOOD PRESSURE < 80 MM HG: ICD-10-PCS | Mod: ,,, | Performed by: NURSE PRACTITIONER

## 2022-01-12 PROCEDURE — 3008F BODY MASS INDEX DOCD: CPT | Mod: ,,, | Performed by: NURSE PRACTITIONER

## 2022-01-12 PROCEDURE — 99215 OFFICE O/P EST HI 40 MIN: CPT | Performed by: NURSE PRACTITIONER

## 2022-01-12 PROCEDURE — 1160F PR REVIEW ALL MEDS BY PRESCRIBER/CLIN PHARMACIST DOCUMENTED: ICD-10-PCS | Mod: ,,, | Performed by: NURSE PRACTITIONER

## 2022-01-12 PROCEDURE — 3078F DIAST BP <80 MM HG: CPT | Mod: ,,, | Performed by: NURSE PRACTITIONER

## 2022-01-12 PROCEDURE — 3074F SYST BP LT 130 MM HG: CPT | Mod: ,,, | Performed by: NURSE PRACTITIONER

## 2022-01-12 PROCEDURE — 3074F PR MOST RECENT SYSTOLIC BLOOD PRESSURE < 130 MM HG: ICD-10-PCS | Mod: ,,, | Performed by: NURSE PRACTITIONER

## 2022-01-12 PROCEDURE — 1160F RVW MEDS BY RX/DR IN RCRD: CPT | Mod: ,,, | Performed by: NURSE PRACTITIONER

## 2022-01-12 PROCEDURE — 3008F PR BODY MASS INDEX (BMI) DOCUMENTED: ICD-10-PCS | Mod: ,,, | Performed by: NURSE PRACTITIONER

## 2022-01-12 RX ORDER — INSULIN GLARGINE 100 [IU]/ML
25 INJECTION, SOLUTION SUBCUTANEOUS NIGHTLY
Qty: 9 ML | Refills: 5 | Status: SHIPPED | OUTPATIENT
Start: 2022-01-12 | End: 2022-04-12 | Stop reason: SDUPTHER

## 2022-01-12 RX ORDER — DOXYCYCLINE HYCLATE 100 MG
100 TABLET ORAL 2 TIMES DAILY
Qty: 14 TABLET | Refills: 0 | Status: SHIPPED | OUTPATIENT
Start: 2022-01-12 | End: 2022-01-19

## 2022-01-12 RX ORDER — ATORVASTATIN CALCIUM 40 MG/1
40 TABLET, FILM COATED ORAL NIGHTLY
Qty: 30 TABLET | Refills: 5 | Status: SHIPPED | OUTPATIENT
Start: 2022-01-12 | End: 2022-04-12 | Stop reason: SDUPTHER

## 2022-01-12 RX ORDER — METFORMIN HYDROCHLORIDE 500 MG/1
500 TABLET ORAL 2 TIMES DAILY WITH MEALS
Qty: 60 TABLET | Refills: 5 | Status: SHIPPED | OUTPATIENT
Start: 2022-01-12 | End: 2022-04-12 | Stop reason: SDUPTHER

## 2022-01-12 RX ORDER — DULAGLUTIDE 0.75 MG/.5ML
INJECTION, SOLUTION SUBCUTANEOUS
Qty: 4 PEN | Refills: 5 | Status: SHIPPED | OUTPATIENT
Start: 2022-01-12 | End: 2022-04-12 | Stop reason: SDUPTHER

## 2022-01-12 NOTE — PROGRESS NOTES
SUBJECTIVE:      Patient ID: Lizzie Saunders is a 36 y.o. female.    Chief Complaint: Diabetes    Lizzie is here today for follow-up on diabetes and hyperlipidemia.  She had recent labs done which were reviewed today during appointment- A1c down to 9.2 despite being out of Trulicity for the past several weeks.  Patient reports a lot of issues getting her medications refilled at the pharmacy and no reminders when they are ready.  Patient has been encouraged to please change pharmacies for better adherence to medications as her A1c is decreasing.  Cholesterol is improving but still elevated.  Liver function tests are now normal and she is taking the statin daily.  Metformin she has been taking but is missing some doses as well due to refill issues.  She has not seen Podiatry yet for her foot exam.  Other health maintenance outstanding reviewed today.     She is also complaining of an abrasion on her left forearm and hand from her CT who scratched her about 2-3 weeks ago.  She was trying to give her a bath when this occurred.  She denies any redness, drainage, or pain, but it seems like it is slowly healing and she was concerned about infection due to her diabetes.  Tdap is over 5 years ago and will be updated today.    Diabetes  She presents for her follow-up diabetic visit. She has type 2 diabetes mellitus. No MedicAlert identification noted. The initial diagnosis of diabetes was made 15 years ago. Her disease course has been improving. Hypoglycemia symptoms include mood changes and nervousness/anxiousness. Pertinent negatives for hypoglycemia include no confusion, dizziness, hunger, pallor, seizures, speech difficulty or tremors. Associated symptoms include foot paresthesias. Pertinent negatives for diabetes include no blurred vision, no chest pain, no fatigue, no foot ulcerations, no polydipsia, no polyphagia, no polyuria, no visual change, no weakness and no weight loss. Pertinent negatives for hypoglycemia  complications include no blackouts, no hospitalization, no nocturnal hypoglycemia, no required assistance and no required glucagon injection. Symptoms are improving. Diabetic complications include peripheral neuropathy and retinopathy (mild- reviewed eye exam ). Pertinent negatives for diabetic complications include no autonomic neuropathy, CVA, impotence or nephropathy. Risk factors for coronary artery disease include dyslipidemia, family history, obesity, sedentary lifestyle, stress, tobacco exposure and diabetes mellitus. Current diabetic treatment includes diet, insulin injections and oral agent (monotherapy) (Trulicity ). She is compliant with treatment most of the time. She is currently taking insulin pre-breakfast and at bedtime. Insulin injections are given by patient. Rotation sites for injection include the abdominal wall. Her weight is stable. She is following a generally healthy diet. Meal planning includes avoidance of concentrated sweets and carbohydrate counting. She has not had a previous visit with a dietitian. She participates in exercise intermittently. There is no compliance with monitoring of blood glucose. Her home blood glucose trend is decreasing steadily. Her breakfast blood glucose range is generally >200 mg/dl. An ACE inhibitor/angiotensin II receptor blocker is not being taken. She does not see a podiatrist.Eye exam is current.   Hyperlipidemia  This is a chronic problem. The current episode started more than 1 year ago. The problem is uncontrolled. Recent lipid tests were reviewed and are high (trigs normal, LDL elevated). Exacerbating diseases include diabetes and obesity. She has no history of chronic renal disease, hypothyroidism or liver disease. Factors aggravating her hyperlipidemia include smoking. Pertinent negatives include no chest pain, leg pain, myalgias or shortness of breath. Current antihyperlipidemic treatment includes statins and diet change. The current treatment  provides mild improvement of lipids. Compliance problems include adherence to exercise and adherence to diet.  Risk factors for coronary artery disease include diabetes mellitus, dyslipidemia, obesity and a sedentary lifestyle.       Family History   Problem Relation Age of Onset    Bipolar disorder Mother     Bipolar disorder Father     Cancer Paternal Grandfather       Social History     Socioeconomic History    Marital status: Legally      Spouse name: Sonja Saunders    Number of children: 0   Occupational History     Comment: currently unemployed   Tobacco Use    Smoking status: Former Smoker     Packs/day: 0.25     Types: Cigarettes     Quit date: 2020     Years since quittin.3    Smokeless tobacco: Never Used   Substance and Sexual Activity    Alcohol use: Yes     Comment: rare    Drug use: Yes     Types: Marijuana    Sexual activity: Yes     Comment:    Social History Narrative    Patient tearful and anxious on admit; unable to answer all admit questions at this time. 2/3/17 @ 21:45     Current Outpatient Medications   Medication Sig Dispense Refill    AJOVY AUTOINJECTOR 225 mg/1.5 mL autoinjector INJECT UNDER THE SKIN EVERY MONTH      albuterol (VENTOLIN HFA) 90 mcg/actuation inhaler Inhale 2 puffs into the lungs every 6 (six) hours as needed for Wheezing. Rescue 18 g 1    diclofenac sodium (VOLTAREN) 1 % Gel Apply 2 g topically 4 (four) times daily. 50 g 0    ibuprofen (ADVIL,MOTRIN) 600 MG tablet Take 1 tablet (600 mg total) by mouth every 8 (eight) hours as needed for Pain. 15 tablet 0    naproxen (NAPROSYN) 500 MG tablet Take 1 tablet (500 mg total) by mouth 2 (two) times daily with meals. 20 tablet 0    sumatriptan (IMITREX) 25 MG Tab TAKE 1 TABLET BY MOUTH TWICE DAILY AT LEAST 2 HOURS BETWEEN DOSES      atorvastatin (LIPITOR) 40 MG tablet Take 1 tablet (40 mg total) by mouth every evening. 30 tablet 5    blood sugar diagnostic Strp To check BG two times daily,  to use with insurance preferred meter 50 strip 5    blood-glucose meter kit To check BG two  times daily, to use with insurance preferred meter 1 each 0    doxycycline (VIBRA-TABS) 100 MG tablet Take 1 tablet (100 mg total) by mouth 2 (two) times daily. for 7 days 14 tablet 0    dulaglutide (TRULICITY) 0.75 mg/0.5 mL pen injector ADMINISTER 0.75 MG UNDER THE SKIN EVERY 7 DAYS 4 pen 5    insulin (LANTUS SOLOSTAR U-100 INSULIN) glargine 100 units/mL (3mL) SubQ pen Inject 25 Units into the skin every evening. 9 mL 5    lancets Misc To check BG two times daily, to use with insurance preferred meter 50 each 5    metFORMIN (GLUCOPHAGE) 500 MG tablet Take 1 tablet (500 mg total) by mouth 2 (two) times daily with meals. 60 tablet 5     No current facility-administered medications for this visit.     Review of patient's allergies indicates:   Allergen Reactions    Beatriz Anaphylaxis    Pecan nut Dermatitis    Sulfa (sulfonamide antibiotics) Itching      Past Medical History:   Diagnosis Date    Bipolar disorder     per patient report    Depression     Diabetes mellitus     Diabetes mellitus, type 2     Hx of psychiatric care     Neuropathy     per patient report    Psychiatric problem     Therapy      Past Surgical History:   Procedure Laterality Date    BACK SURGERY      EYE SURGERY         Review of Systems   Constitutional: Negative for appetite change, chills, diaphoresis, fatigue, fever, unexpected weight change and weight loss.   HENT: Negative for congestion, ear pain, rhinorrhea and sore throat.    Eyes: Positive for visual disturbance. Negative for blurred vision, photophobia, pain and discharge.   Respiratory: Negative for cough, chest tightness and shortness of breath.    Cardiovascular: Negative for chest pain, palpitations and leg swelling.   Gastrointestinal: Positive for constipation (x 3 days ). Negative for abdominal pain, blood in stool, diarrhea, nausea and vomiting.   Endocrine:  "Negative for cold intolerance, heat intolerance, polydipsia, polyphagia and polyuria.        Dark Hair growth on chin/face    Genitourinary: Positive for menstrual problem. Negative for difficulty urinating, dysuria, flank pain, frequency, hematuria, impotence, pelvic pain, urgency, vaginal discharge and vaginal pain.   Musculoskeletal: Positive for back pain (chronic- no changes ). Negative for arthralgias, joint swelling, myalgias and neck pain.   Skin: Positive for wound. Negative for color change, pallor and rash.   Neurological: Positive for numbness (feet). Negative for dizziness, tremors, seizures, syncope, speech difficulty and weakness.   Hematological: Negative for adenopathy. Does not bruise/bleed easily.   Psychiatric/Behavioral: Negative for confusion, decreased concentration, dysphoric mood, sleep disturbance and suicidal ideas. The patient is nervous/anxious.       OBJECTIVE:      Vitals:    01/12/22 0922   BP: 120/68   BP Location: Left arm   Patient Position: Sitting   BP Method: Large (Manual)   Pulse: 86   Resp: 18   Temp: 98.8 °F (37.1 °C)   TempSrc: Oral   SpO2: 98%   Weight: 96 kg (211 lb 9.6 oz)   Height: 5' 7" (1.702 m)     Physical Exam  Vitals and nursing note reviewed.   Constitutional:       General: She is not in acute distress.     Appearance: Normal appearance. She is well-developed. She is obese. She is not ill-appearing.   HENT:      Head: Normocephalic and atraumatic.      Mouth/Throat:      Mouth: Mucous membranes are moist.   Eyes:      General: No scleral icterus.     Extraocular Movements: Extraocular movements intact.      Conjunctiva/sclera: Conjunctivae normal.      Pupils: Pupils are equal, round, and reactive to light.   Neck:      Thyroid: No thyroid mass or thyromegaly.      Trachea: Trachea normal.   Cardiovascular:      Rate and Rhythm: Normal rate and regular rhythm.      Pulses:           Dorsalis pedis pulses are 1+ on the right side and 1+ on the left side.        " Posterior tibial pulses are 1+ on the right side and 1+ on the left side.      Heart sounds: Normal heart sounds. No murmur heard.      Pulmonary:      Effort: Pulmonary effort is normal. No respiratory distress.      Breath sounds: Normal breath sounds. No wheezing, rhonchi or rales.   Abdominal:      General: Bowel sounds are normal.      Palpations: Abdomen is soft.      Tenderness: There is no abdominal tenderness.   Musculoskeletal:         General: Normal range of motion.      Cervical back: Normal range of motion and neck supple.      Right lower leg: No edema.      Left lower leg: No edema.      Right foot: Normal range of motion. No deformity.      Left foot: Normal range of motion. No deformity.   Feet:      Right foot:      Protective Sensation: 10 sites tested. 8 sites sensed.      Skin integrity: Dry skin present. No ulcer, blister, skin breakdown, erythema, warmth or callus.      Toenail Condition: Right toenails are normal.      Left foot:      Protective Sensation: 10 sites tested. 8 sites sensed.      Skin integrity: Dry skin present. No ulcer, blister, skin breakdown, erythema, warmth or callus.      Toenail Condition: Left toenails are normal.   Lymphadenopathy:      Cervical: No cervical adenopathy.   Skin:     General: Skin is warm and dry.      Coloration: Skin is not jaundiced or pale.      Findings: Abrasion and wound present. No abscess, bruising, erythema, laceration or rash.          Neurological:      Mental Status: She is alert and oriented to person, place, and time.      Gait: Gait normal.   Psychiatric:         Mood and Affect: Mood normal.         Behavior: Behavior normal.         Thought Content: Thought content normal.         Judgment: Judgment normal.        Assessment:       1. Type 2 diabetes mellitus with hyperglycemia, with long-term current use of insulin    2. Mixed hyperlipidemia    3. Cat scratch of left forearm, initial encounter    4. Need for Tdap vaccination         Plan:       Type 2 diabetes mellitus with hyperglycemia, with long-term current use of insulin  -     dulaglutide (TRULICITY) 0.75 mg/0.5 mL pen injector; ADMINISTER 0.75 MG UNDER THE SKIN EVERY 7 DAYS  Dispense: 4 pen; Refill: 5  -     metFORMIN (GLUCOPHAGE) 500 MG tablet; Take 1 tablet (500 mg total) by mouth 2 (two) times daily with meals.  Dispense: 60 tablet; Refill: 5  -     insulin (LANTUS SOLOSTAR U-100 INSULIN) glargine 100 units/mL (3mL) SubQ pen; Inject 25 Units into the skin every evening.  Dispense: 9 mL; Refill: 5  -     CBC Auto Differential; Future; Expected date: 04/15/2022  -     Comprehensive Metabolic Panel; Future; Expected date: 04/15/2022  -     TSH; Future; Expected date: 04/15/2022  -     Urinalysis; Future; Expected date: 04/15/2022  -     Microalbumin/Creatinine Ratio, Urine; Future; Expected date: 04/15/2022  -     Hemoglobin A1C; Future; Expected date: 04/15/2022  -change pharmacies to try for better compliance; cont meds and diet; recheck in 3 mo     Mixed hyperlipidemia  -     atorvastatin (LIPITOR) 40 MG tablet; Take 1 tablet (40 mg total) by mouth every evening.  Dispense: 30 tablet; Refill: 5  -     Comprehensive Metabolic Panel; Future; Expected date: 04/15/2022  -     Lipid Panel; Future; Expected date: 04/15/2022  -improving; need compliance with meds     Cat scratch of left forearm, initial encounter  -     doxycycline (VIBRA-TABS) 100 MG tablet; Take 1 tablet (100 mg total) by mouth 2 (two) times daily. for 7 days  Dispense: 14 tablet; Refill: 0    Need for Tdap vaccination  -     (In Office Administered) Tdap Vaccine        Follow up in about 3 months (around 4/12/2022) for DM, HLD .      1/12/2022 RIYA Noriega, TERRIEP    This note was created using [employer] voice recognition software that occasionally misinterprets phrases or words.

## 2022-01-12 NOTE — PATIENT INSTRUCTIONS
Patient Education       Skin Abrasions Discharge Instructions   About this topic   An abrasion is when you have cut or scraped off the top layers of skin. Most of the time, you can care for your wound at home. How long it will take to heal is based on how serious the wound is.     What care is needed at home?   · Ask your doctor what you need to do when you go home. Make sure you ask questions if you do not understand what the doctor says.  · The doctor may want you to keep your wound covered as it heals. You may want to use a thin layer of antibiotic ointment to help keep the wound moist. This will also keep the dressing from sticking to the wound.  · After 24 hours, you can gently wash the wound with soap and water. Pat dry and put on a clean dressing.  · Change your dressing at least once a day or if it gets dirty. Gently wash the wound each day.  · Always wash your hands before and after touching the wound.  · Each time you change the dressing, look closely at the wound to be sure it is healing the right way. The wound may have a thin, yellowish discharge, and this is normal.  · Avoid picking the scab or scratching the site which may cause more irritation.  · You may take a shower, but do not soak in water or swim with an open wound. After 2 days, you can use a waterproof bandage for swimming.  What follow-up care is needed?   · Your doctor may ask you to make visits to the office to check on your progress. Be sure to keep these visits.  · If you have stitches or staples, you will need to have them taken out. Your doctor will often want to do this in 1 to 2 weeks. Do not attempt to remove your stitches or staples yourself.  What drugs may be needed?   The doctor may order drugs to:  · Prevent or fight an infection  · Prevent a scar  You may also get a tetanus shot from your doctor.  Will physical activity be limited?   Physical activities may be limited if you have deep skin abrasions.  What problems could  happen?   · Infection  · Scars  What can be done to prevent this health problem?   · Wear protective pads and a helmet when you do sports like bike, rollerblade, skateboard, and other activities.  · Cover skin with clothing.  · Use more light in your home.  · Wear shoes that fit the right way to avoid falls.  · Take out or move things in your home that could add to your chance of tripping and/or falling. This would be things like a lamp or extension cord.  · Replace or secure loose carpeting.  · Avoid throw rugs ? use only nonskid rugs.  · Fix any loose parts of your floor or steps both inside and outside of your home.  · Fix uneven sidewalks or holes and cracks.  When do I need to call the doctor?   · You have a fever of 100.4°F (38°C) or higher or chills.  · Your wound is swollen, red, or warm.  · Your wound has thick yellow or green drainage.  · Your wound has not healed after 10 days.  Teach Back: Helping You Understand   The Teach Back Method helps you understand the information we are giving you. After you talk with the staff, tell them in your own words what you learned. This helps to make sure the staff has described each thing clearly. It also helps to explain things that may have been confusing. Before going home, make sure you can do these:  · I can tell you about my condition.  · I can tell you how to care for my abrasion.  · I can tell you what I will do if I have swelling, redness, or warmth around my wound.  Where can I learn more?   [facility] [employer] [facility]  https://www.betterhealth.[place].gov.au/health/conditionsandtreatments/skin-cuts-and-abrasions   [facility]  https://familydoctor.org/treat-common-household-injuries   Last Reviewed Date   2021-06-22  Consumer Information Use and Disclaimer   This information is not specific medical advice and does not replace information you receive from your health care provider. This is only a brief summary of general information. It does NOT include all  "information about conditions, illnesses, injuries, tests, procedures, treatments, therapies, discharge instructions or life-style choices that may apply to you. You must talk with your health care provider for complete information about your health and treatment options. This information should not be used to decide whether or not to accept your health care providers advice, instructions or recommendations. Only your health care provider has the knowledge and training to provide advice that is right for you.  Copyright   Copyright © 2021 Up[employer] Inc. and its affiliates and/or licensors. All rights reserved.  Patient Education       Tdap Vaccine   The Basics   Written by the doctors and editors at [employer]   What is the Tdap vaccine? -- The Tdap vaccine is a treatment to prevent 3 different diseases: tetanus, diphtheria, and pertussis. All the vaccines are given together in 1 shot.  All 3 of these diseases can be serious and even deadly. Diphtheria can cause a thick covering in the back of the throat that can lead to breathing problems. Tetanus is a serious infection that causes muscle stiffness and spasms. Pertussis can cause a severe cough. Another name for pertussis is "whooping cough."  Why should I get the Tdap vaccine? -- The Tdap vaccine can help keep you from getting diphtheria, tetanus, or whooping cough. If you do get sick, being vaccinated can keep you from getting severely ill. Being vaccinated also helps keep people around you from getting sick.  Who should get the Tdap vaccine? -- The following people should get a Tdap vaccine:  · Children age 7 to 18 - Doctors recommend a Tdap vaccine as a "booster." This is given to children who got diphtheria, tetanus, and whooping cough vaccines usually given before age 7 (table 1). A booster is a vaccine dose given a number of years after the first dose. It reminds the body how to prevent infection. Boosters are important because some vaccines, including shots " given to young children for diphtheria, tetanus, and whooping cough, stop working well over time. If your child missed the earlier vaccine, they can get a Tdap vaccine whenever your doctor recommends it.  · Adults age 19 and older who have never had a Tdap vaccine - This vaccine is especially important for adults age 65 and older who spend time with children younger than 1. These adults include grandparents, childcare workers, doctors, nurses, and other health care workers.  If you don't know if you have had a Tdap vaccine, your doctor will probably give you one. This vaccine is important to protect you and other people, especially babies. Whooping cough can be very serious for babies and even cause death.  After getting the Tdap vaccine, adults should get vaccines against diphtheria and tetanus every 10 years. These are also given together in 1 shot.  What side effects can the Tdap vaccine cause? -- The Tdap vaccine might not cause any side effects. If it does, they can include:  · Redness, swelling, or soreness where the shot was given  · Headache  · Tiredness  · Fever  Like all vaccines, the Tdap vaccine sometimes causes more serious side effects, such as severe allergic reactions. But serious side effects are rare. If you ever had a severe allergic reaction to latex, tell your doctor or nurse. Some Tdap vaccines contain latex, but others do not.  Should pregnant women get the Tdap vaccine? -- Yes. The Tdap vaccine is safe to use in pregnancy and all pregnant women should get it after 20 weeks of pregnancy, even if they have had the Tdap vaccine or the pertussis vaccine before.  All topics are updated as new evidence becomes available and our peer review process is complete.  This topic retrieved from [employer] on: Sep 21, 2021.  Topic 01395 Version 9.0  Release: 29.4.2 - C29.263  © 2021 UpToDate, Inc. and/or its affiliates. All rights reserved.  table 1: Recommended vaccine schedule for children age 7 through 18  years ([place])  Age  Vaccine  Dose  Notes    In general, doctors recommend the following vaccines    7 through 18 years Influenza (flu) 1 dose each year Children get 1 dose each fall. Children younger than 9 years may need more than 1 dose.   11 through 12 years Tetanus, diphtheria, pertussis (Tdap booster) 1 dose This is usually given at age 11 through 12 years, but can be given later, at any age.    Human papillomavirus (HPV) 2 doses This is usually given at age 11 through 12 years, but can be given from age 9 through 26. People younger than 15 usually get 2 doses over 6 months. People 15 and older get 3 doses over 6 months.   11 through 18 years Meningococcal 2 doses The first dose is usually given at age 11 through 12 years. A booster dose is usually given at age 16.   The following vaccines might be given to children with certain conditions    7 through 10 years Meningococcal 1 dose This is given to children earlier than the recommended age if they have certain medical conditions, or live in or travel to certain places.   9 through 10 years Human papillomavirus (HPV) 2 or 3 doses This is given to children earlier than the recommended age if they have certain medical conditions.   7 through 18 years Haemophilus influenza type b (Hib) 1 dose This is given to children who have certain medical conditions.    Pneumococcal 1 or 2 doses This is given to children who have certain medical conditions.    Hepatitis A (HepA) 2 doses Some children who never got this vaccine might need it.   NOTE: If your child didn't get all of their scheduled vaccines from age 0 through 6 years, they might need to get those vaccines from age 7 through 18.  Graphic 45118 Version 18.0  Consumer Information Use and Disclaimer   This information is not specific medical advice and does not replace information you receive from your health care provider. This is only a brief summary of general information. It does NOT include all  information about conditions, illnesses, injuries, tests, procedures, treatments, therapies, discharge instructions or life-style choices that may apply to you. You must talk with your health care provider for complete information about your health and treatment options. This information should not be used to decide whether or not to accept your health care provider's advice, instructions or recommendations. Only your health care provider has the knowledge and training to provide advice that is right for you. The use of this information is governed by the [employer] End User License Agreement, available at https://www.[employer]/en/solutions/myQaa/about/andrea.The use of [employer] content is governed by the [employer] Terms of Use. ©2021 [employer] All rights reserved.  Copyright   © 2021 UpToDate, Inc. and/or its affiliates. All rights reserved.

## 2022-02-04 DIAGNOSIS — M54.42 LUMBAGO WITH SCIATICA, LEFT SIDE: Primary | ICD-10-CM

## 2022-03-02 ENCOUNTER — HOSPITAL ENCOUNTER (OUTPATIENT)
Dept: RADIOLOGY | Facility: HOSPITAL | Age: 37
Discharge: HOME | End: 2022-03-02
Attending: STUDENT IN AN ORGANIZED HEALTH CARE EDUCATION/TRAINING PROGRAM
Payer: MEDICAID

## 2022-03-02 DIAGNOSIS — M54.42 LUMBAGO WITH SCIATICA, LEFT SIDE: ICD-10-CM

## 2022-03-02 PROCEDURE — 72148 MRI LUMBAR SPINE W/O DYE: CPT | Mod: TC,PO

## 2022-04-12 ENCOUNTER — OFFICE VISIT (OUTPATIENT)
Dept: FAMILY MEDICINE | Facility: CLINIC | Age: 37
End: 2022-04-12
Payer: MEDICAID

## 2022-04-12 VITALS
RESPIRATION RATE: 20 BRPM | HEIGHT: 67 IN | HEART RATE: 87 BPM | OXYGEN SATURATION: 99 % | TEMPERATURE: 99 F | SYSTOLIC BLOOD PRESSURE: 120 MMHG | BODY MASS INDEX: 33.3 KG/M2 | WEIGHT: 212.19 LBS | DIASTOLIC BLOOD PRESSURE: 79 MMHG

## 2022-04-12 DIAGNOSIS — E11.65 TYPE 2 DIABETES MELLITUS WITH HYPERGLYCEMIA, WITH LONG-TERM CURRENT USE OF INSULIN: Primary | ICD-10-CM

## 2022-04-12 DIAGNOSIS — Z79.4 TYPE 2 DIABETES MELLITUS WITH HYPERGLYCEMIA, WITH LONG-TERM CURRENT USE OF INSULIN: Primary | ICD-10-CM

## 2022-04-12 DIAGNOSIS — E78.2 MIXED HYPERLIPIDEMIA: ICD-10-CM

## 2022-04-12 DIAGNOSIS — R82.90 ABNORMAL URINE: ICD-10-CM

## 2022-04-12 LAB
ALBUMIN SERPL-MCNC: 3.9 G/DL (ref 3.8–4.8)
ALBUMIN/CREAT UR: 396 MG/G CREAT (ref 0–29)
ALBUMIN/GLOB SERPL: 1.2 {RATIO} (ref 1.2–2.2)
ALP SERPL-CCNC: 62 IU/L (ref 44–121)
ALT SERPL-CCNC: 13 IU/L (ref 0–32)
APPEARANCE UR: ABNORMAL
AST SERPL-CCNC: 13 IU/L (ref 0–40)
BACTERIA #/AREA URNS HPF: ABNORMAL /[HPF]
BASOPHILS # BLD AUTO: 0.1 X10E3/UL (ref 0–0.2)
BASOPHILS NFR BLD AUTO: 1 %
BILIRUB SERPL-MCNC: 0.6 MG/DL (ref 0–1.2)
BILIRUB UR QL STRIP: NEGATIVE
BUN SERPL-MCNC: 10 MG/DL (ref 6–20)
BUN/CREAT SERPL: 15 (ref 9–23)
CALCIUM SERPL-MCNC: 10.3 MG/DL (ref 8.7–10.2)
CASTS URNS MICRO: ABNORMAL
CASTS URNS QL MICRO: PRESENT /LPF
CHLORIDE SERPL-SCNC: 98 MMOL/L (ref 96–106)
CHOLEST SERPL-MCNC: 255 MG/DL (ref 100–199)
CO2 SERPL-SCNC: 21 MMOL/L (ref 20–29)
COLOR UR: YELLOW
CREAT SERPL-MCNC: 0.65 MG/DL (ref 0.57–1)
CREAT UR-MCNC: 124.2 MG/DL
CRYSTALS URNS MICRO: ABNORMAL
EOSINOPHIL # BLD AUTO: 0.4 X10E3/UL (ref 0–0.4)
EOSINOPHIL NFR BLD AUTO: 4 %
EPI CELLS #/AREA URNS HPF: >10 /HPF (ref 0–10)
ERYTHROCYTE [DISTWIDTH] IN BLOOD BY AUTOMATED COUNT: 12.4 % (ref 11.7–15.4)
EST. GFR  (NO RACE VARIABLE): 116 ML/MIN/1.73
GLOBULIN SER CALC-MCNC: 3.2 G/DL (ref 1.5–4.5)
GLUCOSE SERPL-MCNC: 289 MG/DL (ref 65–99)
GLUCOSE UR QL STRIP: ABNORMAL
HBA1C MFR BLD: 10.6 % (ref 4.8–5.6)
HCT VFR BLD AUTO: 44.3 % (ref 34–46.6)
HDLC SERPL-MCNC: 37 MG/DL
HGB BLD-MCNC: 14.2 G/DL (ref 11.1–15.9)
HGB UR QL STRIP: ABNORMAL
IMM GRANULOCYTES # BLD AUTO: 0.1 X10E3/UL (ref 0–0.1)
IMM GRANULOCYTES NFR BLD AUTO: 1 %
KETONES UR QL STRIP: ABNORMAL
LDLC SERPL CALC-MCNC: 173 MG/DL (ref 0–99)
LEUKOCYTE ESTERASE UR QL STRIP: NEGATIVE
LYMPHOCYTES # BLD AUTO: 2 X10E3/UL (ref 0.7–3.1)
LYMPHOCYTES NFR BLD AUTO: 23 %
MCH RBC QN AUTO: 27.7 PG (ref 26.6–33)
MCHC RBC AUTO-ENTMCNC: 32.1 G/DL (ref 31.5–35.7)
MCV RBC AUTO: 86 FL (ref 79–97)
MICRO URNS: ABNORMAL
MICROALBUMIN UR-MCNC: 491.9 UG/ML
MONOCYTES # BLD AUTO: 0.5 X10E3/UL (ref 0.1–0.9)
MONOCYTES NFR BLD AUTO: 6 %
NEUTROPHILS # BLD AUTO: 5.7 X10E3/UL (ref 1.4–7)
NEUTROPHILS NFR BLD AUTO: 65 %
NITRITE UR QL STRIP: POSITIVE
PH UR STRIP: 5.5 [PH] (ref 5–7.5)
PLATELET # BLD AUTO: 696 X10E3/UL (ref 150–450)
POTASSIUM SERPL-SCNC: 5.2 MMOL/L (ref 3.5–5.2)
PROT SERPL-MCNC: 7.1 G/DL (ref 6–8.5)
PROT UR QL STRIP: ABNORMAL
RBC # BLD AUTO: 5.13 X10E6/UL (ref 3.77–5.28)
RBC #/AREA URNS HPF: >30 /HPF (ref 0–2)
SODIUM SERPL-SCNC: 137 MMOL/L (ref 134–144)
SP GR UR STRIP: >=1.03 (ref 1–1.03)
TRIGL SERPL-MCNC: 238 MG/DL (ref 0–149)
TSH SERPL DL<=0.005 MIU/L-ACNC: 0.88 UIU/ML (ref 0.45–4.5)
UNIDENT CRYS URNS QL MICRO: PRESENT
UROBILINOGEN UR STRIP-MCNC: 0.2 MG/DL (ref 0.2–1)
VLDLC SERPL CALC-MCNC: 45 MG/DL (ref 5–40)
WBC # BLD AUTO: 8.7 X10E3/UL (ref 3.4–10.8)
WBC #/AREA URNS HPF: ABNORMAL /HPF (ref 0–5)

## 2022-04-12 PROCEDURE — 1160F RVW MEDS BY RX/DR IN RCRD: CPT | Mod: ,,, | Performed by: NURSE PRACTITIONER

## 2022-04-12 PROCEDURE — 3066F PR DOCUMENTATION OF TREATMENT FOR NEPHROPATHY: ICD-10-PCS | Mod: ,,, | Performed by: NURSE PRACTITIONER

## 2022-04-12 PROCEDURE — 3061F NEG MICROALBUMINURIA REV: CPT | Mod: ,,, | Performed by: NURSE PRACTITIONER

## 2022-04-12 PROCEDURE — 3066F NEPHROPATHY DOC TX: CPT | Mod: ,,, | Performed by: NURSE PRACTITIONER

## 2022-04-12 PROCEDURE — 3008F BODY MASS INDEX DOCD: CPT | Mod: ,,, | Performed by: NURSE PRACTITIONER

## 2022-04-12 PROCEDURE — 3074F SYST BP LT 130 MM HG: CPT | Mod: ,,, | Performed by: NURSE PRACTITIONER

## 2022-04-12 PROCEDURE — 99215 OFFICE O/P EST HI 40 MIN: CPT | Performed by: NURSE PRACTITIONER

## 2022-04-12 PROCEDURE — 1160F PR REVIEW ALL MEDS BY PRESCRIBER/CLIN PHARMACIST DOCUMENTED: ICD-10-PCS | Mod: ,,, | Performed by: NURSE PRACTITIONER

## 2022-04-12 PROCEDURE — 3008F PR BODY MASS INDEX (BMI) DOCUMENTED: ICD-10-PCS | Mod: ,,, | Performed by: NURSE PRACTITIONER

## 2022-04-12 PROCEDURE — 99213 PR OFFICE/OUTPT VISIT, EST, LEVL III, 20-29 MIN: ICD-10-PCS | Mod: S$PBB,,, | Performed by: NURSE PRACTITIONER

## 2022-04-12 PROCEDURE — 3061F PR NEG MICROALBUMINURIA RESULT DOCUMENTED/REVIEW: ICD-10-PCS | Mod: ,,, | Performed by: NURSE PRACTITIONER

## 2022-04-12 PROCEDURE — 3074F PR MOST RECENT SYSTOLIC BLOOD PRESSURE < 130 MM HG: ICD-10-PCS | Mod: ,,, | Performed by: NURSE PRACTITIONER

## 2022-04-12 PROCEDURE — 99213 OFFICE O/P EST LOW 20 MIN: CPT | Mod: S$PBB,,, | Performed by: NURSE PRACTITIONER

## 2022-04-12 PROCEDURE — 3078F PR MOST RECENT DIASTOLIC BLOOD PRESSURE < 80 MM HG: ICD-10-PCS | Mod: ,,, | Performed by: NURSE PRACTITIONER

## 2022-04-12 PROCEDURE — 3078F DIAST BP <80 MM HG: CPT | Mod: ,,, | Performed by: NURSE PRACTITIONER

## 2022-04-12 RX ORDER — METFORMIN HYDROCHLORIDE 500 MG/1
500 TABLET ORAL 2 TIMES DAILY WITH MEALS
Qty: 180 TABLET | Refills: 1 | Status: SHIPPED | OUTPATIENT
Start: 2022-04-12 | End: 2022-06-27

## 2022-04-12 RX ORDER — NITROFURANTOIN 25; 75 MG/1; MG/1
100 CAPSULE ORAL 2 TIMES DAILY
Qty: 10 CAPSULE | Refills: 0 | Status: SHIPPED | OUTPATIENT
Start: 2022-04-12 | End: 2022-04-17

## 2022-04-12 RX ORDER — ATORVASTATIN CALCIUM 40 MG/1
40 TABLET, FILM COATED ORAL NIGHTLY
Qty: 90 TABLET | Refills: 1 | Status: SHIPPED | OUTPATIENT
Start: 2022-04-12 | End: 2022-06-27

## 2022-04-12 RX ORDER — INSULIN GLARGINE 100 [IU]/ML
25 INJECTION, SOLUTION SUBCUTANEOUS NIGHTLY
Qty: 9 ML | Refills: 5 | Status: SHIPPED | OUTPATIENT
Start: 2022-04-12 | End: 2022-08-12 | Stop reason: SDUPTHER

## 2022-04-12 RX ORDER — DULAGLUTIDE 0.75 MG/.5ML
INJECTION, SOLUTION SUBCUTANEOUS
Qty: 4 PEN | Refills: 2 | Status: SHIPPED | OUTPATIENT
Start: 2022-04-12 | End: 2022-08-12 | Stop reason: SDUPTHER

## 2022-04-12 NOTE — PROGRESS NOTES
"    SUBJECTIVE:      Patient ID: Lizzie Saunders is a 36 y.o. female.    Chief Complaint: Follow-up, Diabetes, and Hyperlipidemia    Lizzie is here for follow up on diabetes, HLD and recent labs. She has not been taking her medications as prescribed daily- she "fell off the wagon" since last visit and was homeless for a few weeks. Now she is living with her boyfriend. Last med fills per system show January 2022 for 30 days.she needs to get them filled at the pharmacy. Highly motivated to get her diabetes back under control, discussed importance of compliance with meds and diet.     Latest known visit with results is:  Office Visit on 01/12/2022/resulted yesterday:   WBC                                           Date: 04/11/2022  Value: 8.7         Ref range: 3.4 - 10.8 x10E3*  Status: Final  RBC                                           Date: 04/11/2022  Value: 5.13        Ref range: 3.77 - 5.28 x10E*  Status: Final  Hemoglobin                                    Date: 04/11/2022  Value: 14.2        Ref range: 11.1 - 15.9 g/dL   Status: Final  Hematocrit                                    Date: 04/11/2022  Value: 44.3        Ref range: 34.0 - 46.6 %      Status: Final  MCV                                           Date: 04/11/2022  Value: 86          Ref range: 79 - 97 fL         Status: Final  MCH                                           Date: 04/11/2022  Value: 27.7        Ref range: 26.6 - 33.0 pg     Status: Final  MCHC                                          Date: 04/11/2022  Value: 32.1        Ref range: 31.5 - 35.7 g/dL   Status: Final  RDW                                           Date: 04/11/2022  Value: 12.4        Ref range: 11.7 - 15.4 %      Status: Final  Platelets                                     Date: 04/11/2022  Value: 696 (A)     Ref range: 150 - 450 x10E3/*  Status: Final  Neutrophils                                   Date: 04/11/2022  Value: 65          Ref range: Not Estab. %       Status: " Final  Lymphs                                        Date: 04/11/2022  Value: 23          Ref range: Not Estab. %       Status: Final  Monocytes                                     Date: 04/11/2022  Value: 6           Ref range: Not Estab. %       Status: Final  Eos                                           Date: 04/11/2022  Value: 4           Ref range: Not Estab. %       Status: Final  Basos                                         Date: 04/11/2022  Value: 1           Ref range: Not Estab. %       Status: Final  Neutrophils (Absolute)                        Date: 04/11/2022  Value: 5.7         Ref range: 1.4 - 7.0 x10E3/*  Status: Final  Lymphs (Absolute)                             Date: 04/11/2022  Value: 2.0         Ref range: 0.7 - 3.1 x10E3/*  Status: Final  Monocytes(Absolute)                           Date: 04/11/2022  Value: 0.5         Ref range: 0.1 - 0.9 x10E3/*  Status: Final  Eos (Absolute)                                Date: 04/11/2022  Value: 0.4         Ref range: 0.0 - 0.4 x10E3/*  Status: Final  Baso (Absolute)                               Date: 04/11/2022  Value: 0.1         Ref range: 0.0 - 0.2 x10E3/*  Status: Final  Immature Granulocytes                         Date: 04/11/2022  Value: 1           Ref range: Not Estab. %       Status: Final  Immature Grans (Abs)                          Date: 04/11/2022  Value: 0.1         Ref range: 0.0 - 0.1 x10E3/*  Status: Final  Creatinine                                    Date: 04/11/2022  Value: 0.65        Ref range: 0.57 - 1.00 mg/dL  Status: Final  eGFR no Race variable                         Date: 04/11/2022  Value: 116         Ref range: >59 mL/min/1.73    Status: Final  BUN/Creatinine Ratio                          Date: 04/11/2022  Value: 15          Ref range: 9 - 23             Status: Final  Sodium                                        Date: 04/11/2022  Value: 137         Ref range: 134 - 144 mmol/L   Status: Final  Potassium                                      Date: 04/11/2022  Value: 5.2         Ref range: 3.5 - 5.2 mmol/L   Status: Final  Chloride                                      Date: 04/11/2022  Value: 98          Ref range: 96 - 106 mmol/L    Status: Final  ALT                                           Date: 04/11/2022  Value: 13          Ref range: 0 - 32 IU/L        Status: Final  Cholesterol                                   Date: 04/11/2022  Value: 255 (A)     Ref range: 100 - 199 mg/dL    Status: Final  Triglycerides                                 Date: 04/11/2022  Value: 238 (A)     Ref range: 0 - 149 mg/dL      Status: Final  HDL                                           Date: 04/11/2022  Value: 37 (A)      Ref range: >39 mg/dL          Status: Final  VLDL Cholesterol [place]                          Date: 04/11/2022  Value: 45 (A)      Ref range: 5 - 40 mg/dL       Status: Final  LDL Calculated                                Date: 04/11/2022  Value: 173 (A)     Ref range: 0 - 99 mg/dL       Status: Final  TSH                                           Date: 04/11/2022  Value: 0.884       Ref range: 0.450 - 4.500 uI*  Status: Final  Specific [place], UA                          Date: 04/11/2022  Value: >=1.030 (A) Ref range: 1.005 - 1.030      Status: Final  pH, UA                                        Date: 04/11/2022  Value: 5.5         Ref range: 5.0 - 7.5          Status: Final  Color, UA                                     Date: 04/11/2022  Value: Yellow      Ref range: Yellow             Status: Final  Clarity, UA                                   Date: 04/11/2022  Value: Cloudy (A)  Ref range: Clear              Status: Final  Leukocytes, UA                                Date: 04/11/2022  Value: Negative    Ref range: Negative           Status: Final  Protein, UA                                   Date: 04/11/2022  Value: 2+ (A)      Ref range: Negative/Trace     Status: Final  Glucose, UA                                   Date:  04/11/2022  Value: 3+ (A)      Ref range: Negative           Status: Final  Ketones, UA                                   Date: 04/11/2022  Value: Trace (A)   Ref range: Negative           Status: Final  Occult Blood UA                               Date: 04/11/2022  Value: 3+ (A)      Ref range: Negative           Status: Final  Bilirubin, UA                                 Date: 04/11/2022  Value: Negative    Ref range: Negative           Status: Final  Urobilinogen, UA                              Date: 04/11/2022  Value: 0.2         Ref range: 0.2 - 1.0 mg/dL    Status: Final  Nitrite, UA                                   Date: 04/11/2022  Value: Positive (A)Ref range: Negative           Status: Final  Microscopic Examination                       Date: 04/11/2022  Value: See below:    Status: Final                Comment: Microscopic was indicated and was performed.  Creatinine, Urine                             Date: 04/11/2022  Value: WILL FOLLOW   Status: Preliminary  Microalb, Ur                                  Date: 04/11/2022  Value: WILL FOLLOW   Status: Preliminary  Microalb/Crt. Ratio                           Date: 04/11/2022  Value: WILL FOLLOW   Status: Preliminary  Hemoglobin A1c                                Date: 04/11/2022  Value: 10.6 (A)    Ref range: 4.8 - 5.6 %        Status: Final                Comment:          Prediabetes: 5.7 - 6.4           Diabetes: >6.4           Glycemic control for adults with diabetes: <7.0    WBC, UA                                       Date: 04/11/2022  Value: 11-30 (A)   Ref range: 0 - 5 /hpf         Status: Final  RBC, UA                                       Date: 04/11/2022  Value: >30 (A)     Ref range: 0 - 2 /hpf         Status: Final  Epithelial Cells (non renal)                  Date: 04/11/2022  Value: >10 (A)     Ref range: 0 - 10 /hpf        Status: Final  Casts                                         Date: 04/11/2022  Value: Present (A) Ref range:  None seen /lpf     Status: Final  Cast Type                                     Date: 04/11/2022  Value: Hyaline casts                     Ref range: N/A                Status: Final  Crystals                                      Date: 04/11/2022  Value: Present (A) Ref range: N/A                Status: Final  Crystal Type                                  Date: 04/11/2022  Value: Amorphous Sediment                     Ref range: N/A                Status: Final  Bacteria, UA                                  Date: 04/11/2022  Value: Moderate (A)Ref range: None seen/Few      Status: Final  ------------)    Diabetes  She presents for her follow-up diabetic visit. She has type 2 diabetes mellitus. No MedicAlert identification noted. The initial diagnosis of diabetes was made 15 years ago. Her disease course has been worsening. Hypoglycemia symptoms include mood changes and nervousness/anxiousness. Pertinent negatives for hypoglycemia include no confusion, dizziness, headaches, hunger, pallor, seizures, speech difficulty or tremors. Associated symptoms include foot paresthesias. Pertinent negatives for diabetes include no blurred vision, no chest pain, no fatigue, no foot ulcerations, no polydipsia, no polyphagia, no polyuria, no visual change, no weakness and no weight loss. Pertinent negatives for hypoglycemia complications include no blackouts, no hospitalization, no nocturnal hypoglycemia, no required assistance and no required glucagon injection. Symptoms are worsening. Diabetic complications include peripheral neuropathy and retinopathy (mild- reviewed eye exam ). Pertinent negatives for diabetic complications include no autonomic neuropathy, CVA, impotence or nephropathy. Risk factors for coronary artery disease include dyslipidemia, family history, obesity, sedentary lifestyle, stress, tobacco exposure and diabetes mellitus. Current diabetic treatment includes diet, insulin injections and oral agent (monotherapy)  (Trulicity ). She is compliant with treatment most of the time. She is currently taking insulin pre-breakfast and at bedtime. Insulin injections are given by patient. Rotation sites for injection include the abdominal wall. Her weight is stable. She is following a generally healthy diet. When asked about meal planning, she reported none. She has not had a previous visit with a dietitian. She participates in exercise intermittently. There is no compliance with monitoring of blood glucose. Her home blood glucose trend is decreasing steadily. Her breakfast blood glucose range is generally >200 mg/dl. An ACE inhibitor/angiotensin II receptor blocker is not being taken. She does not see a podiatrist.Eye exam is current.   Hyperlipidemia  This is a chronic problem. The current episode started more than 1 year ago. The problem is uncontrolled. Recent lipid tests were reviewed and are high. Exacerbating diseases include diabetes and obesity. She has no history of chronic renal disease, hypothyroidism or liver disease. Factors aggravating her hyperlipidemia include smoking. Pertinent negatives include no chest pain, leg pain, myalgias or shortness of breath. Current antihyperlipidemic treatment includes statins and diet change. The current treatment provides mild improvement of lipids. Compliance problems include adherence to exercise and adherence to diet.  Risk factors for coronary artery disease include diabetes mellitus, dyslipidemia, obesity and a sedentary lifestyle.       Family History   Problem Relation Age of Onset    Bipolar disorder Mother     Bipolar disorder Father     Cancer Paternal Grandfather       Social History     Socioeconomic History    Marital status: Legally      Spouse name: Sonja Saunders    Number of children: 0   Occupational History     Comment: currently unemployed   Tobacco Use    Smoking status: Former Smoker     Packs/day: 0.25     Types: Cigarettes     Quit date: 9/1/2020      Years since quittin.6    Smokeless tobacco: Never Used   Substance and Sexual Activity    Alcohol use: Yes     Comment: rare    Drug use: Yes     Types: Marijuana    Sexual activity: Yes     Comment:    Social History Narrative    Patient tearful and anxious on admit; unable to answer all admit questions at this time. 2/3/17 @ 21:45     Current Outpatient Medications   Medication Sig Dispense Refill    AJOVY AUTOINJECTOR 225 mg/1.5 mL autoinjector INJECT UNDER THE SKIN EVERY MONTH      albuterol (VENTOLIN HFA) 90 mcg/actuation inhaler Inhale 2 puffs into the lungs every 6 (six) hours as needed for Wheezing. Rescue 18 g 1    blood sugar diagnostic Strp To check BG two times daily, to use with insurance preferred meter 50 strip 5    blood-glucose meter kit To check BG two  times daily, to use with insurance preferred meter 1 each 0    diclofenac sodium (VOLTAREN) 1 % Gel Apply 2 g topically 4 (four) times daily. 50 g 0    lancets Misc To check BG two times daily, to use with insurance preferred meter 50 each 5    naproxen (NAPROSYN) 500 MG tablet Take 1 tablet (500 mg total) by mouth 2 (two) times daily with meals. 20 tablet 0    sumatriptan (IMITREX) 25 MG Tab TAKE 1 TABLET BY MOUTH TWICE DAILY AT LEAST 2 HOURS BETWEEN DOSES      atorvastatin (LIPITOR) 40 MG tablet Take 1 tablet (40 mg total) by mouth every evening. 90 tablet 1    dulaglutide (TRULICITY) 0.75 mg/0.5 mL pen injector ADMINISTER 0.75 MG UNDER THE SKIN EVERY 7 DAYS 4 pen 2    insulin (LANTUS SOLOSTAR U-100 INSULIN) glargine 100 units/mL (3mL) SubQ pen Inject 25 Units into the skin every evening. 9 mL 5    metFORMIN (GLUCOPHAGE) 500 MG tablet Take 1 tablet (500 mg total) by mouth 2 (two) times daily with meals. 180 tablet 1    nitrofurantoin, macrocrystal-monohydrate, (MACROBID) 100 MG capsule Take 1 capsule (100 mg total) by mouth 2 (two) times daily. for 5 days 10 capsule 0     No current facility-administered medications for  this visit.     Review of patient's allergies indicates:   Allergen Reactions    Beatriz Anaphylaxis    Pecan nut Dermatitis    Sulfa (sulfonamide antibiotics) Itching      Past Medical History:   Diagnosis Date    Bipolar disorder     per patient report    Depression     Diabetes mellitus     Diabetes mellitus, type 2     Hx of psychiatric care     Neuropathy     per patient report    Psychiatric problem     Therapy      Past Surgical History:   Procedure Laterality Date    BACK SURGERY      EYE SURGERY         Review of Systems   Constitutional: Negative for appetite change, chills, diaphoresis, fatigue, fever, unexpected weight change and weight loss.   HENT: Negative for congestion, ear pain, rhinorrhea and sore throat.    Eyes: Negative for blurred vision and pain.   Respiratory: Negative for cough, chest tightness and shortness of breath.    Cardiovascular: Negative for chest pain, palpitations and leg swelling.   Gastrointestinal: Positive for constipation. Negative for abdominal pain, blood in stool, diarrhea, nausea and vomiting.   Endocrine: Negative for cold intolerance, heat intolerance, polydipsia, polyphagia and polyuria.        Dark Hair growth on chin/face    Genitourinary: Positive for menstrual problem. Negative for difficulty urinating, dysuria, flank pain, frequency, hematuria, impotence, pelvic pain, urgency, vaginal discharge and vaginal pain.   Musculoskeletal: Positive for back pain (chronic- no changes ). Negative for arthralgias, joint swelling, myalgias and neck pain.   Skin: Negative for color change, pallor, rash and wound.   Neurological: Positive for numbness (feet). Negative for dizziness, tremors, seizures, syncope, speech difficulty, weakness and headaches.   Hematological: Negative for adenopathy. Does not bruise/bleed easily.   Psychiatric/Behavioral: Negative for confusion, decreased concentration, dysphoric mood, sleep disturbance and suicidal ideas. The patient is  "nervous/anxious.       OBJECTIVE:      Vitals:    04/12/22 0849   BP: 120/79   BP Location: Left arm   Patient Position: Sitting   BP Method: Medium (Manual)   Pulse: 87   Resp: 20   Temp: 98.8 °F (37.1 °C)   SpO2: 99%   Weight: 96.3 kg (212 lb 3.2 oz)   Height: 5' 7" (1.702 m)     Physical Exam  Vitals and nursing note reviewed.   Constitutional:       General: She is not in acute distress.     Appearance: Normal appearance. She is well-developed. She is obese. She is not ill-appearing.   HENT:      Head: Normocephalic and atraumatic.   Eyes:      General: No scleral icterus.     Extraocular Movements: Extraocular movements intact.      Conjunctiva/sclera: Conjunctivae normal.      Pupils: Pupils are equal, round, and reactive to light.   Neck:      Thyroid: No thyroid mass or thyromegaly.      Trachea: Trachea normal.   Cardiovascular:      Rate and Rhythm: Normal rate and regular rhythm.      Heart sounds: Normal heart sounds. No murmur heard.  Pulmonary:      Effort: Pulmonary effort is normal. No respiratory distress.      Breath sounds: Normal breath sounds.   Abdominal:      General: Bowel sounds are normal.      Palpations: Abdomen is soft.      Tenderness: There is no abdominal tenderness. There is no right CVA tenderness or left CVA tenderness.   Musculoskeletal:         General: Normal range of motion.      Cervical back: Normal range of motion and neck supple.      Right lower leg: No edema.      Left lower leg: No edema.   Lymphadenopathy:      Cervical: No cervical adenopathy.   Skin:     General: Skin is warm and dry.      Coloration: Skin is not jaundiced or pale.   Neurological:      Mental Status: She is alert and oriented to person, place, and time.      Gait: Gait normal.   Psychiatric:         Mood and Affect: Mood normal.         Behavior: Behavior normal.         Thought Content: Thought content normal.         Judgment: Judgment normal.        Assessment:       1. Type 2 diabetes mellitus " with hyperglycemia, with long-term current use of insulin    2. Mixed hyperlipidemia    3. Adult BMI 33.0-33.9 kg/sq m    4. Abnormal urine        Plan:       Type 2 diabetes mellitus with hyperglycemia, with long-term current use of insulin  -     dulaglutide (TRULICITY) 0.75 mg/0.5 mL pen injector; ADMINISTER 0.75 MG UNDER THE SKIN EVERY 7 DAYS  Dispense: 4 pen; Refill: 2  -     insulin (LANTUS SOLOSTAR U-100 INSULIN) glargine 100 units/mL (3mL) SubQ pen; Inject 25 Units into the skin every evening.  Dispense: 9 mL; Refill: 5  -     metFORMIN (GLUCOPHAGE) 500 MG tablet; Take 1 tablet (500 mg total) by mouth 2 (two) times daily with meals.  Dispense: 180 tablet; Refill: 1    Mixed hyperlipidemia  -     atorvastatin (LIPITOR) 40 MG tablet; Take 1 tablet (40 mg total) by mouth every evening.  Dispense: 90 tablet; Refill: 1    Adult BMI 33.0-33.9 kg/sq m    Abnormal urine  -     nitrofurantoin, macrocrystal-monohydrate, (MACROBID) 100 MG capsule; Take 1 capsule (100 mg total) by mouth 2 (two) times daily. for 5 days  Dispense: 10 capsule; Refill: 0    -restart meds and bring BS diary to next visit; will treat for UTI per urine results although no sx;  Importance of compliance with meds and diet discussed to prevent long term complications discussed- voiced understanding     Follow up in about 1 month (around 5/12/2022) for f/u DM .      4/12/2022 RIYA Noriega, FNP    This note was created using [employer] voice recognition software that occasionally misinterprets phrases or words.

## 2022-05-12 ENCOUNTER — OFFICE VISIT (OUTPATIENT)
Dept: FAMILY MEDICINE | Facility: CLINIC | Age: 37
End: 2022-05-12
Payer: MEDICAID

## 2022-05-12 VITALS
TEMPERATURE: 99 F | OXYGEN SATURATION: 99 % | HEART RATE: 89 BPM | WEIGHT: 207.63 LBS | RESPIRATION RATE: 18 BRPM | DIASTOLIC BLOOD PRESSURE: 68 MMHG | SYSTOLIC BLOOD PRESSURE: 110 MMHG | HEIGHT: 67 IN | BODY MASS INDEX: 32.59 KG/M2

## 2022-05-12 DIAGNOSIS — F31.4 BIPOLAR DISORDER, CURRENT EPISODE DEPRESSED, SEVERE, WITHOUT PSYCHOTIC FEATURES: ICD-10-CM

## 2022-05-12 DIAGNOSIS — R82.90 ABNORMAL URINE: ICD-10-CM

## 2022-05-12 DIAGNOSIS — F41.9 ANXIETY: ICD-10-CM

## 2022-05-12 DIAGNOSIS — R79.89 ELEVATED PLATELET COUNT: ICD-10-CM

## 2022-05-12 DIAGNOSIS — E78.2 MIXED HYPERLIPIDEMIA: ICD-10-CM

## 2022-05-12 DIAGNOSIS — Z79.4 TYPE 2 DIABETES MELLITUS WITH HYPERGLYCEMIA, WITH LONG-TERM CURRENT USE OF INSULIN: Primary | ICD-10-CM

## 2022-05-12 DIAGNOSIS — E11.65 TYPE 2 DIABETES MELLITUS WITH HYPERGLYCEMIA, WITH LONG-TERM CURRENT USE OF INSULIN: Primary | ICD-10-CM

## 2022-05-12 PROCEDURE — 3074F PR MOST RECENT SYSTOLIC BLOOD PRESSURE < 130 MM HG: ICD-10-PCS | Mod: CPTII,,, | Performed by: NURSE PRACTITIONER

## 2022-05-12 PROCEDURE — 1160F RVW MEDS BY RX/DR IN RCRD: CPT | Mod: CPTII,,, | Performed by: NURSE PRACTITIONER

## 2022-05-12 PROCEDURE — 3062F PR POS MACROALBUMINURIA RESULT DOCUMENTED/REVIEW: ICD-10-PCS | Mod: CPTII,,, | Performed by: NURSE PRACTITIONER

## 2022-05-12 PROCEDURE — 3066F NEPHROPATHY DOC TX: CPT | Mod: CPTII,,, | Performed by: NURSE PRACTITIONER

## 2022-05-12 PROCEDURE — 99214 PR OFFICE/OUTPT VISIT, EST, LEVL IV, 30-39 MIN: ICD-10-PCS | Mod: S$PBB,,, | Performed by: NURSE PRACTITIONER

## 2022-05-12 PROCEDURE — 1159F MED LIST DOCD IN RCRD: CPT | Mod: CPTII,,, | Performed by: NURSE PRACTITIONER

## 2022-05-12 PROCEDURE — 3078F DIAST BP <80 MM HG: CPT | Mod: CPTII,,, | Performed by: NURSE PRACTITIONER

## 2022-05-12 PROCEDURE — 3008F PR BODY MASS INDEX (BMI) DOCUMENTED: ICD-10-PCS | Mod: CPTII,,, | Performed by: NURSE PRACTITIONER

## 2022-05-12 PROCEDURE — 3062F POS MACROALBUMINURIA REV: CPT | Mod: CPTII,,, | Performed by: NURSE PRACTITIONER

## 2022-05-12 PROCEDURE — 3074F SYST BP LT 130 MM HG: CPT | Mod: CPTII,,, | Performed by: NURSE PRACTITIONER

## 2022-05-12 PROCEDURE — 99215 OFFICE O/P EST HI 40 MIN: CPT | Performed by: NURSE PRACTITIONER

## 2022-05-12 PROCEDURE — 3066F PR DOCUMENTATION OF TREATMENT FOR NEPHROPATHY: ICD-10-PCS | Mod: CPTII,,, | Performed by: NURSE PRACTITIONER

## 2022-05-12 PROCEDURE — 99214 OFFICE O/P EST MOD 30 MIN: CPT | Mod: S$PBB,,, | Performed by: NURSE PRACTITIONER

## 2022-05-12 PROCEDURE — 1160F PR REVIEW ALL MEDS BY PRESCRIBER/CLIN PHARMACIST DOCUMENTED: ICD-10-PCS | Mod: CPTII,,, | Performed by: NURSE PRACTITIONER

## 2022-05-12 PROCEDURE — 1159F PR MEDICATION LIST DOCUMENTED IN MEDICAL RECORD: ICD-10-PCS | Mod: CPTII,,, | Performed by: NURSE PRACTITIONER

## 2022-05-12 PROCEDURE — 3078F PR MOST RECENT DIASTOLIC BLOOD PRESSURE < 80 MM HG: ICD-10-PCS | Mod: CPTII,,, | Performed by: NURSE PRACTITIONER

## 2022-05-12 PROCEDURE — 3008F BODY MASS INDEX DOCD: CPT | Mod: CPTII,,, | Performed by: NURSE PRACTITIONER

## 2022-05-12 RX ORDER — ALBUTEROL SULFATE 90 UG/1
2 AEROSOL, METERED RESPIRATORY (INHALATION) EVERY 6 HOURS PRN
Qty: 18 G | Refills: 1 | Status: CANCELLED | OUTPATIENT
Start: 2022-05-12 | End: 2023-05-12

## 2022-05-12 NOTE — PROGRESS NOTES
"    SUBJECTIVE:      Patient ID: Lizzie Saunders is a 36 y.o. female.    Chief Complaint: Diabetes    Lizzie is here today to follow-up on her diabetes.  She has been taking her medication as prescribed since last visit without side effects or complaints.  Fasting blood sugars have been ranging between 140 and 150 in the morning most days. Eye exam upcoming.  She has seen gynecology since her last visit with me and is currently taking birth control.  Screening Pap smear results reviewed.      Need follow up/recheck on UTI from last month. Feels occasional frequency and burning- none today. Admits to drinking coffee and energy drinks daily, tries to drink low sugar but does not always. "Cheats" on [employer] frozen coffees a few times weekly.     She is complaining of some intermittent anxiety and does have a history of bipolar disorder.  She has failed many drugs for treatment of her bipolar disorder previously including Cymbalta, Seroquel, Abilify, and many others.  She does not wish to take control substances such as Xanax, but she would like to find a psychiatrist who would find something more mild for her anxiety.  Referral will be given to her today.  She denies any depression, suicidal or homicidal thoughts or plans.  She does admit to feeling very stressed out with all of the political unrest currently.      Family History   Problem Relation Age of Onset    Bipolar disorder Mother     Bipolar disorder Father     Cancer Paternal Grandfather       Social History     Socioeconomic History    Marital status: Legally      Spouse name: Sonja Saunders    Number of children: 0   Occupational History     Comment: currently unemployed   Tobacco Use    Smoking status: Former Smoker     Packs/day: 0.25     Types: Cigarettes     Quit date: 2020     Years since quittin.6    Smokeless tobacco: Never Used   Substance and Sexual Activity    Alcohol use: Yes     Comment: rare    Drug use: Yes     " Types: Marijuana    Sexual activity: Yes     Comment:    Social History Narrative    Patient tearful and anxious on admit; unable to answer all admit questions at this time. 2/3/17 @ 21:45     Current Outpatient Medications   Medication Sig Dispense Refill    AJOVY AUTOINJECTOR 225 mg/1.5 mL autoinjector INJECT UNDER THE SKIN EVERY MONTH      albuterol (VENTOLIN HFA) 90 mcg/actuation inhaler Inhale 2 puffs into the lungs every 6 (six) hours as needed for Wheezing. Rescue 18 g 1    atorvastatin (LIPITOR) 40 MG tablet Take 1 tablet (40 mg total) by mouth every evening. 90 tablet 1    diclofenac sodium (VOLTAREN) 1 % Gel Apply 2 g topically 4 (four) times daily. 50 g 0    dulaglutide (TRULICITY) 0.75 mg/0.5 mL pen injector ADMINISTER 0.75 MG UNDER THE SKIN EVERY 7 DAYS 4 pen 2    insulin (LANTUS SOLOSTAR U-100 INSULIN) glargine 100 units/mL (3mL) SubQ pen Inject 25 Units into the skin every evening. 9 mL 5    metFORMIN (GLUCOPHAGE) 500 MG tablet Take 1 tablet (500 mg total) by mouth 2 (two) times daily with meals. 180 tablet 1    sumatriptan (IMITREX) 25 MG Tab TAKE 1 TABLET BY MOUTH TWICE DAILY AT LEAST 2 HOURS BETWEEN DOSES      blood sugar diagnostic Strp To check BG two times daily, to use with insurance preferred meter 50 strip 5    blood-glucose meter kit To check BG two  times daily, to use with insurance preferred meter 1 each 0    lancets Misc To check BG two times daily, to use with insurance preferred meter 50 each 5     No current facility-administered medications for this visit.     Review of patient's allergies indicates:   Allergen Reactions    Beatriz Anaphylaxis    Pecan nut Dermatitis    Sulfa (sulfonamide antibiotics) Itching      Past Medical History:   Diagnosis Date    Bipolar disorder     per patient report    Depression     Diabetes mellitus     Diabetes mellitus, type 2     Hx of psychiatric care     Neuropathy     per patient report    Psychiatric problem      "Therapy      Past Surgical History:   Procedure Laterality Date    BACK SURGERY      EYE SURGERY         Review of Systems   Constitutional: Negative for activity change, chills, fatigue, fever and unexpected weight change.   HENT: Negative for congestion, hearing loss, rhinorrhea and trouble swallowing.    Eyes: Negative for discharge and visual disturbance.   Respiratory: Negative for chest tightness and wheezing.    Cardiovascular: Negative for chest pain and palpitations.   Gastrointestinal: Negative for abdominal pain, blood in stool, constipation, diarrhea, nausea and vomiting.   Endocrine: Negative for cold intolerance, heat intolerance, polydipsia and polyuria.   Genitourinary: Negative for difficulty urinating, dysuria, hematuria, menstrual problem, pelvic pain and vaginal discharge.   Musculoskeletal: Negative for joint swelling and neck pain.   Skin: Negative for rash and wound.   Allergic/Immunologic: Positive for environmental allergies.   Neurological: Positive for headaches. Negative for dizziness and weakness.   Hematological: Negative for adenopathy. Does not bruise/bleed easily.   Psychiatric/Behavioral: Negative for agitation, confusion, dysphoric mood, sleep disturbance and suicidal ideas. The patient is nervous/anxious.       OBJECTIVE:      Vitals:    05/12/22 0829   BP: 110/68   BP Location: Left arm   Patient Position: Sitting   BP Method: Large (Manual)   Pulse: 89   Resp: 18   Temp: 99.2 °F (37.3 °C)   TempSrc: Oral   SpO2: 99%   Weight: 94.2 kg (207 lb 9.6 oz)   Height: 5' 7" (1.702 m)     Physical Exam  Vitals and nursing note reviewed.   Constitutional:       General: She is not in acute distress.     Appearance: Normal appearance. She is well-developed. She is obese. She is not ill-appearing.   HENT:      Head: Normocephalic and atraumatic.   Eyes:      General: No scleral icterus.     Extraocular Movements: Extraocular movements intact.      Conjunctiva/sclera: Conjunctivae normal. "      Pupils: Pupils are equal, round, and reactive to light.   Neck:      Thyroid: No thyroid mass or thyromegaly.      Trachea: Trachea normal.   Cardiovascular:      Rate and Rhythm: Normal rate and regular rhythm.      Heart sounds: Normal heart sounds.   Pulmonary:      Effort: Pulmonary effort is normal. No respiratory distress.      Breath sounds: Normal breath sounds. No wheezing or rales.   Abdominal:      General: Bowel sounds are normal.      Palpations: Abdomen is soft.      Tenderness: There is no abdominal tenderness.   Musculoskeletal:         General: Normal range of motion.      Cervical back: Normal range of motion and neck supple.      Right lower leg: No edema.      Left lower leg: No edema.   Lymphadenopathy:      Cervical: No cervical adenopathy.   Skin:     General: Skin is warm and dry.      Coloration: Skin is not jaundiced or pale.   Neurological:      Mental Status: She is alert and oriented to person, place, and time.   Psychiatric:         Attention and Perception: Attention normal.         Mood and Affect: Mood is anxious. Mood is not depressed.         Speech: Speech is rapid and pressured.         Behavior: Behavior normal.         Thought Content: Thought content normal.         Judgment: Judgment normal.        Assessment:       1. Type 2 diabetes mellitus with hyperglycemia, with long-term current use of insulin    2. Mixed hyperlipidemia    3. Elevated platelet count    4. Anxiety    5. Bipolar disorder, current episode depressed, severe, without psychotic features    6. Abnormal urine        Plan:       Type 2 diabetes mellitus with hyperglycemia, with long-term current use of insulin  -     Comprehensive Metabolic Panel; Future; Expected date: 07/15/2022  -     Urinalysis; Future; Expected date: 07/15/2022  -     Hemoglobin A1C; Future; Expected date: 07/15/2022  -improving; recheck in July     Mixed hyperlipidemia  -     Comprehensive Metabolic Panel; Future; Expected date:  07/15/2022  -     Lipid Panel; Future; Expected date: 07/15/2022    Elevated platelet count  -     CBC Auto Differential; Future; Expected date: 07/15/2022    Anxiety  -     Ambulatory referral/consult to Psychiatry; Future; Expected date: 05/19/2022    Bipolar disorder, current episode depressed, severe, without psychotic features  -     Ambulatory referral/consult to Psychiatry; Future; Expected date: 05/19/2022    Abnormal urine  -     Urinalysis, Reflex to Urine Culture Urine, Clean Catch; Future; Expected date: 05/12/2022        Follow up in about 3 months (around 8/12/2022) for f/u DM.      5/12/2022 Tracey Dobson, APRN, FNP    This note was created using MailWriter voice recognition software that occasionally misinterprets phrases or words.

## 2022-06-08 ENCOUNTER — HOSPITAL ENCOUNTER (EMERGENCY)
Facility: HOSPITAL | Age: 37
Discharge: HOME | End: 2022-06-08
Attending: EMERGENCY MEDICINE
Payer: MEDICAID

## 2022-06-08 VITALS
SYSTOLIC BLOOD PRESSURE: 166 MMHG | BODY MASS INDEX: 31.22 KG/M2 | HEART RATE: 67 BPM | OXYGEN SATURATION: 98 % | RESPIRATION RATE: 18 BRPM | WEIGHT: 206 LBS | DIASTOLIC BLOOD PRESSURE: 91 MMHG | HEIGHT: 68 IN | TEMPERATURE: 99 F

## 2022-06-08 DIAGNOSIS — L03.113 CELLULITIS OF RIGHT UPPER EXTREMITY: Primary | ICD-10-CM

## 2022-06-08 DIAGNOSIS — R52 PAIN: ICD-10-CM

## 2022-06-08 LAB
ALBUMIN SERPL BCP-MCNC: 4.2 G/DL (ref 3.5–5.2)
ALP SERPL-CCNC: 37 U/L (ref 55–135)
ALT SERPL W/O P-5'-P-CCNC: 18 U/L (ref 10–44)
ANION GAP SERPL CALC-SCNC: 9 MMOL/L (ref 8–16)
AST SERPL-CCNC: 16 U/L (ref 10–40)
B-HCG UR QL: NEGATIVE
BASOPHILS # BLD AUTO: 0.08 K/UL (ref 0–0.2)
BASOPHILS NFR BLD: 0.7 % (ref 0–1.9)
BILIRUB SERPL-MCNC: 0.7 MG/DL (ref 0.1–1)
BUN SERPL-MCNC: 10 MG/DL (ref 6–20)
CALCIUM SERPL-MCNC: 9.3 MG/DL (ref 8.7–10.5)
CHLORIDE SERPL-SCNC: 102 MMOL/L (ref 95–110)
CO2 SERPL-SCNC: 24 MMOL/L (ref 23–29)
CREAT SERPL-MCNC: 0.5 MG/DL (ref 0.5–1.4)
CTP QC/QA: YES
DIFFERENTIAL METHOD: ABNORMAL
EOSINOPHIL # BLD AUTO: 0.2 K/UL (ref 0–0.5)
EOSINOPHIL NFR BLD: 1.4 % (ref 0–8)
ERYTHROCYTE [DISTWIDTH] IN BLOOD BY AUTOMATED COUNT: 12.8 % (ref 11.5–14.5)
EST. GFR  (AFRICAN AMERICAN): >60 ML/MIN/1.73 M^2
EST. GFR  (NON AFRICAN AMERICAN): >60 ML/MIN/1.73 M^2
GLUCOSE SERPL-MCNC: 170 MG/DL (ref 70–110)
HCT VFR BLD AUTO: 41.4 % (ref 37–48.5)
HGB BLD-MCNC: 13.7 G/DL (ref 12–16)
IMM GRANULOCYTES # BLD AUTO: 0.03 K/UL (ref 0–0.04)
IMM GRANULOCYTES NFR BLD AUTO: 0.3 % (ref 0–0.5)
LYMPHOCYTES # BLD AUTO: 1.4 K/UL (ref 1–4.8)
LYMPHOCYTES NFR BLD: 12.3 % (ref 18–48)
MCH RBC QN AUTO: 28.7 PG (ref 27–31)
MCHC RBC AUTO-ENTMCNC: 33.1 G/DL (ref 32–36)
MCV RBC AUTO: 87 FL (ref 82–98)
MONOCYTES # BLD AUTO: 0.6 K/UL (ref 0.3–1)
MONOCYTES NFR BLD: 4.9 % (ref 4–15)
NEUTROPHILS # BLD AUTO: 9.1 K/UL (ref 1.8–7.7)
NEUTROPHILS NFR BLD: 80.4 % (ref 38–73)
NRBC BLD-RTO: 0 /100 WBC
PLATELET # BLD AUTO: 563 K/UL (ref 150–450)
PMV BLD AUTO: 9.2 FL (ref 9.2–12.9)
POTASSIUM SERPL-SCNC: 4.3 MMOL/L (ref 3.5–5.1)
PROT SERPL-MCNC: 7.4 G/DL (ref 6–8.4)
RBC # BLD AUTO: 4.77 M/UL (ref 4–5.4)
SODIUM SERPL-SCNC: 135 MMOL/L (ref 136–145)
WBC # BLD AUTO: 11.34 K/UL (ref 3.9–12.7)

## 2022-06-08 PROCEDURE — 80053 COMPREHEN METABOLIC PANEL: CPT | Performed by: NURSE PRACTITIONER

## 2022-06-08 PROCEDURE — 99285 EMERGENCY DEPT VISIT HI MDM: CPT | Mod: 25

## 2022-06-08 PROCEDURE — 81025 URINE PREGNANCY TEST: CPT | Performed by: EMERGENCY MEDICINE

## 2022-06-08 PROCEDURE — 96365 THER/PROPH/DIAG IV INF INIT: CPT

## 2022-06-08 PROCEDURE — 87040 BLOOD CULTURE FOR BACTERIA: CPT | Performed by: NURSE PRACTITIONER

## 2022-06-08 PROCEDURE — 96375 TX/PRO/DX INJ NEW DRUG ADDON: CPT

## 2022-06-08 PROCEDURE — 63600175 PHARM REV CODE 636 W HCPCS: Performed by: EMERGENCY MEDICINE

## 2022-06-08 PROCEDURE — 85025 COMPLETE CBC W/AUTO DIFF WBC: CPT | Performed by: NURSE PRACTITIONER

## 2022-06-08 PROCEDURE — 63600175 PHARM REV CODE 636 W HCPCS: Performed by: NURSE PRACTITIONER

## 2022-06-08 RX ORDER — KETOROLAC TROMETHAMINE 30 MG/ML
10 INJECTION, SOLUTION INTRAMUSCULAR; INTRAVENOUS
Status: COMPLETED | OUTPATIENT
Start: 2022-06-08 | End: 2022-06-08

## 2022-06-08 RX ORDER — CEPHALEXIN 500 MG/1
500 CAPSULE ORAL EVERY 6 HOURS
Qty: 20 CAPSULE | Refills: 0 | Status: SHIPPED | OUTPATIENT
Start: 2022-06-08 | End: 2022-06-13

## 2022-06-08 RX ADMIN — KETOROLAC TROMETHAMINE 10 MG: 30 INJECTION, SOLUTION INTRAMUSCULAR at 11:06

## 2022-06-08 RX ADMIN — CEFTRIAXONE 1 G: 1 INJECTION, SOLUTION INTRAVENOUS at 11:06

## 2022-06-08 NOTE — ED NOTES
"Patient refusing to give urine.  She said she already went to the bathroom and unable to produce urine anyway.  States that she "hasn't had sex in a year so I'm positive I'm not pregnant."  "

## 2022-06-09 NOTE — ED PROVIDER NOTES
Encounter Date: 2022       History     Chief Complaint   Patient presents with    Wrist Pain     Patient reports R wrist pain when waking up, denies trauma, reports woke up and R wrist was itchy, swollen, and stiff      Lizzie Saunders is a 36 year old female with pmh bipolar, depression, DM, neuropathy presenting to the ED with c/o right forearm pain/swelling/redness. She states that she woke up this morning and her right wrist was itchy so she scratched the area and then began having the symptoms. She has had no fever but states she has pain with wrist movement and when touching the forearm. She does have a cat that scratched her over one week ago but she had no symptoms prior to today.         Review of patient's allergies indicates:   Allergen Reactions    Beatriz Anaphylaxis    Pecan nut Dermatitis    Sulfa (sulfonamide antibiotics) Itching     Past Medical History:   Diagnosis Date    Bipolar disorder     per patient report    Depression     Diabetes mellitus     Diabetes mellitus, type 2     Hx of psychiatric care     Neuropathy     per patient report    Psychiatric problem     Therapy      Past Surgical History:   Procedure Laterality Date    BACK SURGERY      EYE SURGERY       Family History   Problem Relation Age of Onset    Bipolar disorder Mother     Bipolar disorder Father     Cancer Paternal Grandfather      Social History     Tobacco Use    Smoking status: Former Smoker     Packs/day: 0.25     Types: Cigarettes     Quit date: 2020     Years since quittin.7    Smokeless tobacco: Never Used   Substance Use Topics    Alcohol use: Yes     Comment: rare    Drug use: Yes     Types: Marijuana     Review of Systems   Constitutional: Negative for fever.   HENT: Negative for sore throat.    Respiratory: Negative for shortness of breath.    Cardiovascular: Negative for chest pain.   Gastrointestinal: Negative for nausea.   Genitourinary: Negative for dysuria.   Musculoskeletal:  Positive for arthralgias (right wrist) and joint swelling (right forearm). Negative for back pain.   Skin: Positive for color change. Negative for rash.   Neurological: Negative for weakness.   Hematological: Does not bruise/bleed easily.       Physical Exam     Initial Vitals [06/08/22 0725]   BP Pulse Resp Temp SpO2   128/79 79 16 98.5 °F (36.9 °C) 98 %      MAP       --         Physical Exam    Nursing note and vitals reviewed.  Constitutional: She appears well-developed and well-nourished. She is not diaphoretic. No distress.   HENT:   Head: Normocephalic and atraumatic.   Mouth/Throat: Oropharynx is clear and moist.   Eyes: Conjunctivae are normal.   Neck: Neck supple.   Cardiovascular: Normal rate, regular rhythm, normal heart sounds and intact distal pulses. Exam reveals no gallop and no friction rub.    No murmur heard.  Pulses:       Radial pulses are 2+ on the right side and 2+ on the left side.   Pulmonary/Chest: Breath sounds normal. No respiratory distress. She has no wheezes. She has no rhonchi. She has no rales.   Abdominal: Abdomen is soft. She exhibits no distension. There is no abdominal tenderness.   Musculoskeletal:      Cervical back: Neck supple.      Comments: Mild decrease in passive ROM of right wrist due to pain    Tenderness to palpation of the right forearm with mild swelling noted     Neurological: She is alert and oriented to person, place, and time.   Skin: No rash noted. No erythema.   Mild erythema to volar surface of mid forearm  Scabbed cat scratch to distal forearm (volar) with no surrounding erythema     Psychiatric: Her speech is normal.         ED Course   Procedures  Labs Reviewed   CBC W/ AUTO DIFFERENTIAL - Abnormal; Notable for the following components:       Result Value    Platelets 563 (*)     Gran # (ANC) 9.1 (*)     Gran % 80.4 (*)     Lymph % 12.3 (*)     All other components within normal limits   COMPREHENSIVE METABOLIC PANEL - Abnormal; Notable for the following  components:    Sodium 135 (*)     Glucose 170 (*)     Alkaline Phosphatase 37 (*)     All other components within normal limits   CULTURE, BLOOD   POCT URINE PREGNANCY          Imaging Results          US Upper Extremity Veins Right (Final result)  Result time 06/08/22 09:37:01    Final result by Tom Duarte MD (06/08/22 09:37:01)                 Narrative:    US UPPER EXTREMITY VEINS LIMITED FOLLOW-UP UNILATERAL    CLINICAL HISTORY:  36 years Female forearm swelling    FINDINGS: Grayscale, color and spectral Doppler analysis of the right upper extremity deep venous system was performed.    There is normal compressibility, with normal flow by color and spectral Doppler analysis in the right upper extremity deep venous system, with normal augmentation and no evidence of deep venous thrombosis.    IMPRESSION: Negative for DVT.    Electronically signed by:  Tom Duarte MD  6/8/2022 9:37 AM CDT Workstation: [id number]                             X-Ray Wrist Complete Right (Final result)  Result time 06/08/22 08:07:08    Final result by Corky Antonio IV, MD (06/08/22 08:07:08)                 Narrative:    Right wrist, 4 views    HISTORY: Swelling.    No fracture, dislocation or osseous destruction is observed. The joint spaces are well-maintained. There is ulnar minus variance. There is apparent soft tissue swelling along the anteromedial aspect of the distal forearm and wrist.    IMPRESSION: No acute osseous abnormality.    Electronically signed by:  Corky Antonio MD  6/8/2022 8:07 AM CDT Workstation: [id number]                               Medications   cefTRIAXone (ROCEPHIN) 1 g/50 mL D5W IVPB (0 g Intravenous Stopped 6/8/22 1258)   ketorolac injection 9.999 mg (9.999 mg Intravenous Given 6/8/22 1157)     Medical Decision Making:   Clinical Tests:   Lab Tests: Ordered and Reviewed  The following lab test(s) were unremarkable: UPT and CBC       <> Summary of Lab: Sodium 130 glucose 170    Blood cultures  x2  Radiological Study: Ordered and Reviewed       APC / Resident Notes:   The patient has no leukocytosis or fever. Ultrasound is negative for DVT. Xray shows no acute osseous abnormality. Do not suspect septic joint. She does have an old cat scratch but this does not appear to be the source of erythema. Will treat for cellulitis and have patient rechecked by PCP in 2 days. Strict ED return precautions discussed for any worsening symptoms and patient verbalized understanding. Based on my clinical evaluation, I do not appreciate any immediate, emergent, or life threatening condition or etiology that warrants additional workup today and feel that the patient can be discharged with close follow up care.                   Attending Note:  .  I discussed the patient's care with Advanced Practice Clinician.  I reviewed their note and agree with the history, physical, assessment, diagnosis, treatment, all procedures performed, xray and EKG interpretations and discharge plan provided by the Advanced Practice Clinician. My overall impression is cellulitis of right upper extremity.  The patient has been instructed to follow up with their physician or the one provided as well as specific return precautions.   Pema Canseco M.D. 6/10/2022 3:22 AM      Clinical Impression:   Final diagnoses:  [R52] Pain  [L03.113] Cellulitis of right upper extremity (Primary)          ED Disposition Condition    Discharge Stable        ED Prescriptions     Medication Sig Dispense Start Date End Date Auth. Provider    cephALEXin (KEFLEX) 500 MG capsule Take 1 capsule (500 mg total) by mouth every 6 (six) hours. for 5 days 20 capsule 6/8/2022 6/13/2022 Dia Wilkes NP        Follow-up Information     Follow up With Specialties Details Why Contact Info Additional Information    [facility] - Emergency Dept Emergency Medicine  As needed, If symptoms worsen [address] [facility] [id number] [phone] 1st floor     Tracey Dobson, P Family Medicine Schedule an appointment as soon as possible for a visit in 2 days For wound re-check [address] [facility]  [address]  [facility] [address]  [phone]              Dia Wilkes NP  06/08/22 0340       Pema Canseco MD  06/10/22 3581

## 2022-06-13 LAB — BACTERIA BLD CULT: NORMAL

## 2022-06-14 ENCOUNTER — PATIENT OUTREACH (OUTPATIENT)
Dept: EMERGENCY MEDICINE | Facility: HOSPITAL | Age: 37
End: 2022-06-14

## 2022-07-05 ENCOUNTER — HOSPITAL ENCOUNTER (EMERGENCY)
Facility: HOSPITAL | Age: 37
Discharge: HOME | End: 2022-07-05
Attending: EMERGENCY MEDICINE
Payer: MEDICAID

## 2022-07-05 VITALS
BODY MASS INDEX: 31.17 KG/M2 | SYSTOLIC BLOOD PRESSURE: 118 MMHG | OXYGEN SATURATION: 95 % | RESPIRATION RATE: 18 BRPM | WEIGHT: 205 LBS | DIASTOLIC BLOOD PRESSURE: 68 MMHG | HEART RATE: 78 BPM | TEMPERATURE: 98 F

## 2022-07-05 DIAGNOSIS — M54.42 CHRONIC MIDLINE LOW BACK PAIN WITH BILATERAL SCIATICA: Primary | ICD-10-CM

## 2022-07-05 DIAGNOSIS — G89.29 CHRONIC MIDLINE LOW BACK PAIN WITH BILATERAL SCIATICA: Primary | ICD-10-CM

## 2022-07-05 DIAGNOSIS — M54.41 CHRONIC MIDLINE LOW BACK PAIN WITH BILATERAL SCIATICA: Primary | ICD-10-CM

## 2022-07-05 LAB
ALBUMIN SERPL BCP-MCNC: 4.2 G/DL (ref 3.5–5.2)
ALP SERPL-CCNC: 63 U/L (ref 55–135)
ALT SERPL W/O P-5'-P-CCNC: 20 U/L (ref 10–44)
ANION GAP SERPL CALC-SCNC: 10 MMOL/L (ref 8–16)
AST SERPL-CCNC: 13 U/L (ref 10–40)
B-HCG UR QL: NEGATIVE
BASOPHILS # BLD AUTO: 0.06 K/UL (ref 0–0.2)
BASOPHILS NFR BLD: 0.7 % (ref 0–1.9)
BILIRUB SERPL-MCNC: 0.5 MG/DL (ref 0.1–1)
BILIRUB UR QL STRIP: NEGATIVE
BUN SERPL-MCNC: 8 MG/DL (ref 6–20)
CALCIUM SERPL-MCNC: 9.8 MG/DL (ref 8.7–10.5)
CHLORIDE SERPL-SCNC: 104 MMOL/L (ref 95–110)
CLARITY UR REFRACT.AUTO: CLEAR
CO2 SERPL-SCNC: 23 MMOL/L (ref 23–29)
COLOR UR AUTO: YELLOW
CREAT SERPL-MCNC: 0.6 MG/DL (ref 0.5–1.4)
CRP SERPL-MCNC: 10.7 MG/L (ref 0–8.2)
CTP QC/QA: YES
DIFFERENTIAL METHOD: ABNORMAL
EOSINOPHIL # BLD AUTO: 0.1 K/UL (ref 0–0.5)
EOSINOPHIL NFR BLD: 1.5 % (ref 0–8)
ERYTHROCYTE [DISTWIDTH] IN BLOOD BY AUTOMATED COUNT: 12.5 % (ref 11.5–14.5)
ERYTHROCYTE [SEDIMENTATION RATE] IN BLOOD BY PHOTOMETRIC METHOD: 14 MM/HR (ref 0–36)
EST. GFR  (AFRICAN AMERICAN): >60 ML/MIN/1.73 M^2
EST. GFR  (NON AFRICAN AMERICAN): >60 ML/MIN/1.73 M^2
GLUCOSE SERPL-MCNC: 125 MG/DL (ref 70–110)
GLUCOSE UR QL STRIP: NEGATIVE
HCT VFR BLD AUTO: 35.8 % (ref 37–48.5)
HCV AB SERPL QL IA: NEGATIVE
HGB BLD-MCNC: 11.5 G/DL (ref 12–16)
HGB UR QL STRIP: NEGATIVE
HIV 1+2 AB+HIV1 P24 AG SERPL QL IA: NEGATIVE
IMM GRANULOCYTES # BLD AUTO: 0.03 K/UL (ref 0–0.04)
IMM GRANULOCYTES NFR BLD AUTO: 0.3 % (ref 0–0.5)
KETONES UR QL STRIP: NEGATIVE
LACTATE SERPL-SCNC: 1.2 MMOL/L (ref 0.5–2.2)
LEUKOCYTE ESTERASE UR QL STRIP: NEGATIVE
LYMPHOCYTES # BLD AUTO: 1.1 K/UL (ref 1–4.8)
LYMPHOCYTES NFR BLD: 12.9 % (ref 18–48)
MAGNESIUM SERPL-MCNC: 2.1 MG/DL (ref 1.6–2.6)
MCH RBC QN AUTO: 28.2 PG (ref 27–31)
MCHC RBC AUTO-ENTMCNC: 32.1 G/DL (ref 32–36)
MCV RBC AUTO: 88 FL (ref 82–98)
MONOCYTES # BLD AUTO: 0.6 K/UL (ref 0.3–1)
MONOCYTES NFR BLD: 6.9 % (ref 4–15)
NEUTROPHILS # BLD AUTO: 6.8 K/UL (ref 1.8–7.7)
NEUTROPHILS NFR BLD: 77.7 % (ref 38–73)
NITRITE UR QL STRIP: NEGATIVE
NRBC BLD-RTO: 0 /100 WBC
PH UR STRIP: 7 [PH] (ref 5–8)
PLATELET # BLD AUTO: 434 K/UL (ref 150–450)
PMV BLD AUTO: 9.7 FL (ref 9.2–12.9)
POTASSIUM SERPL-SCNC: 4 MMOL/L (ref 3.5–5.1)
PROT SERPL-MCNC: 8 G/DL (ref 6–8.4)
PROT UR QL STRIP: NEGATIVE
RBC # BLD AUTO: 4.08 M/UL (ref 4–5.4)
SODIUM SERPL-SCNC: 137 MMOL/L (ref 136–145)
SP GR UR STRIP: 1.02 (ref 1–1.03)
URN SPEC COLLECT METH UR: NORMAL
WBC # BLD AUTO: 8.74 K/UL (ref 3.9–12.7)

## 2022-07-05 PROCEDURE — 80053 COMPREHEN METABOLIC PANEL: CPT | Performed by: STUDENT IN AN ORGANIZED HEALTH CARE EDUCATION/TRAINING PROGRAM

## 2022-07-05 PROCEDURE — 87389 HIV-1 AG W/HIV-1&-2 AB AG IA: CPT | Performed by: EMERGENCY MEDICINE

## 2022-07-05 PROCEDURE — 99285 EMERGENCY DEPT VISIT HI MDM: CPT | Mod: 25

## 2022-07-05 PROCEDURE — 99284 EMERGENCY DEPT VISIT MOD MDM: CPT | Mod: ,,, | Performed by: EMERGENCY MEDICINE

## 2022-07-05 PROCEDURE — 86140 C-REACTIVE PROTEIN: CPT | Performed by: STUDENT IN AN ORGANIZED HEALTH CARE EDUCATION/TRAINING PROGRAM

## 2022-07-05 PROCEDURE — 99284 PR EMERGENCY DEPT VISIT,LEVEL IV: ICD-10-PCS | Mod: ,,, | Performed by: EMERGENCY MEDICINE

## 2022-07-05 PROCEDURE — A9585 GADOBUTROL INJECTION: HCPCS | Performed by: EMERGENCY MEDICINE

## 2022-07-05 PROCEDURE — 85025 COMPLETE CBC W/AUTO DIFF WBC: CPT | Performed by: STUDENT IN AN ORGANIZED HEALTH CARE EDUCATION/TRAINING PROGRAM

## 2022-07-05 PROCEDURE — 96374 THER/PROPH/DIAG INJ IV PUSH: CPT | Mod: 59

## 2022-07-05 PROCEDURE — 25500020 PHARM REV CODE 255: Performed by: EMERGENCY MEDICINE

## 2022-07-05 PROCEDURE — 86803 HEPATITIS C AB TEST: CPT | Performed by: EMERGENCY MEDICINE

## 2022-07-05 PROCEDURE — 83605 ASSAY OF LACTIC ACID: CPT | Performed by: STUDENT IN AN ORGANIZED HEALTH CARE EDUCATION/TRAINING PROGRAM

## 2022-07-05 PROCEDURE — 83735 ASSAY OF MAGNESIUM: CPT | Performed by: STUDENT IN AN ORGANIZED HEALTH CARE EDUCATION/TRAINING PROGRAM

## 2022-07-05 PROCEDURE — 81003 URINALYSIS AUTO W/O SCOPE: CPT | Performed by: STUDENT IN AN ORGANIZED HEALTH CARE EDUCATION/TRAINING PROGRAM

## 2022-07-05 PROCEDURE — 96376 TX/PRO/DX INJ SAME DRUG ADON: CPT | Mod: 59

## 2022-07-05 PROCEDURE — 85652 RBC SED RATE AUTOMATED: CPT | Performed by: STUDENT IN AN ORGANIZED HEALTH CARE EDUCATION/TRAINING PROGRAM

## 2022-07-05 PROCEDURE — 81025 URINE PREGNANCY TEST: CPT | Performed by: STUDENT IN AN ORGANIZED HEALTH CARE EDUCATION/TRAINING PROGRAM

## 2022-07-05 PROCEDURE — 63600175 PHARM REV CODE 636 W HCPCS: Performed by: STUDENT IN AN ORGANIZED HEALTH CARE EDUCATION/TRAINING PROGRAM

## 2022-07-05 RX ORDER — MORPHINE SULFATE 2 MG/ML
5 INJECTION, SOLUTION INTRAMUSCULAR; INTRAVENOUS
Status: COMPLETED | OUTPATIENT
Start: 2022-07-05 | End: 2022-07-05

## 2022-07-05 RX ORDER — CYCLOBENZAPRINE HCL 10 MG
10 TABLET ORAL 3 TIMES DAILY PRN
Qty: 15 TABLET | Refills: 0 | Status: SHIPPED | OUTPATIENT
Start: 2022-07-05 | End: 2022-07-10

## 2022-07-05 RX ORDER — MORPHINE SULFATE 4 MG/ML
4 INJECTION, SOLUTION INTRAMUSCULAR; INTRAVENOUS
Status: COMPLETED | OUTPATIENT
Start: 2022-07-05 | End: 2022-07-05

## 2022-07-05 RX ORDER — IBUPROFEN 600 MG/1
600 TABLET ORAL EVERY 6 HOURS PRN
Qty: 60 TABLET | Refills: 0 | Status: ON HOLD | OUTPATIENT
Start: 2022-07-05 | End: 2023-05-11 | Stop reason: HOSPADM

## 2022-07-05 RX ORDER — GADOBUTROL 604.72 MG/ML
10 INJECTION INTRAVENOUS
Status: COMPLETED | OUTPATIENT
Start: 2022-07-05 | End: 2022-07-05

## 2022-07-05 RX ADMIN — GADOBUTROL 10 ML: 604.72 INJECTION INTRAVENOUS at 01:07

## 2022-07-05 RX ADMIN — MORPHINE SULFATE 4 MG: 4 INJECTION INTRAVENOUS at 12:07

## 2022-07-05 RX ADMIN — MORPHINE SULFATE 5 MG: 2 INJECTION, SOLUTION INTRAMUSCULAR; INTRAVENOUS at 12:07

## 2022-07-05 NOTE — ED NOTES
Bed: [facility]  Expected date:   Expected time:   Means of arrival:   Comments:

## 2022-07-05 NOTE — ED PROVIDER NOTES
Encounter Date: 7/5/2022       History     Chief Complaint   Patient presents with    Back Pain     Reports back pain that got worse over the last couple of days. Hx of back problems and surgery.     35 yo F with PMH of DM, sciatica, bulging lumbar discs s/p repair of fragmented disc (2017) presents to the ED with complaint of lower back pain that started over the past 2 days. She states it became more severe this morning with numbness worse in her ankles and calves. She reports not being able to feel when she is urinating. She reports some vaginal numbness when wiping herself on the toilet. She states she has been having diarrhea and her last BM was this morning. She ate a hotdog yesterday but otherwise tends to not eat meat. She states she fell due to the numbness and weakness while walking into the hospital. She denies LOC, head injury or trauma. She reports her symptoms feel similar to those she experienced before she had her disc surgery in 2017. She denies any inciting injury, recent heavy lifting, fever, diarrhea, dysuria, IVDU. She also reports congestion and rhinorrhea over the past few days which she attributes to allergies.         Review of patient's allergies indicates:   Allergen Reactions    Beatriz Anaphylaxis    Pecan nut Dermatitis    Sulfa (sulfonamide antibiotics) Itching     Past Medical History:   Diagnosis Date    Bipolar disorder     per patient report    Depression     Diabetes mellitus     Diabetes mellitus, type 2     Hx of psychiatric care     Neuropathy     per patient report    Psychiatric problem     Therapy      Past Surgical History:   Procedure Laterality Date    BACK SURGERY      EYE SURGERY       Family History   Problem Relation Age of Onset    Bipolar disorder Mother     Bipolar disorder Father     Cancer Paternal Grandfather      Social History     Tobacco Use    Smoking status: Former Smoker     Packs/day: 0.25     Types: Cigarettes     Quit date: 9/1/2020      Years since quittin.8    Smokeless tobacco: Never Used   Substance Use Topics    Alcohol use: Yes     Comment: rare    Drug use: Yes     Types: Marijuana     Review of Systems   Constitutional: Negative for chills and fever.   HENT: Positive for congestion and rhinorrhea.    Eyes: Negative for pain and visual disturbance.   Respiratory: Negative for cough and shortness of breath.    Cardiovascular: Negative for chest pain and palpitations.   Gastrointestinal: Positive for diarrhea. Negative for abdominal pain, nausea and vomiting.   Genitourinary: Negative for difficulty urinating and dysuria.   Musculoskeletal: Positive for back pain. Negative for neck pain.   Skin: Negative for color change, rash and wound.   Neurological: Positive for numbness. Negative for syncope and light-headedness.       Physical Exam     Initial Vitals [22 0934]   BP Pulse Resp Temp SpO2   134/81 87 20 98.1 °F (36.7 °C) 99 %      MAP       --         Physical Exam    Nursing note and vitals reviewed.  Constitutional: She is not diaphoretic. No distress.   HENT:   Head: Normocephalic and atraumatic.   Mouth/Throat: Oropharynx is clear and moist.   Eyes: Conjunctivae and EOM are normal.   Neck: Neck supple.   Normal range of motion.  Cardiovascular: Normal rate, regular rhythm, normal heart sounds and intact distal pulses.   Pulmonary/Chest: Breath sounds normal. She has no wheezes. She has no rhonchi. She has no rales.   Abdominal: Abdomen is soft. She exhibits no distension. There is no abdominal tenderness.   Musculoskeletal:         General: Normal range of motion.      Cervical back: Normal range of motion and neck supple.      Comments: Midline lumbar TTP.     Neurological: She is alert and oriented to person, place, and time. She has normal strength. No sensory deficit.   Decreased bilateral lower extremity reflexes. Decreased sensation at level of feet up to mid tibia bilaterally. Strength intact throughout.    Skin:  Skin is warm and dry. Capillary refill takes less than 2 seconds.         ED Course   Procedures  Labs Reviewed   CBC W/ AUTO DIFFERENTIAL - Abnormal; Notable for the following components:       Result Value    Hemoglobin 11.5 (*)     Hematocrit 35.8 (*)     Gran % 77.7 (*)     Lymph % 12.9 (*)     All other components within normal limits   COMPREHENSIVE METABOLIC PANEL - Abnormal; Notable for the following components:    Glucose 125 (*)     All other components within normal limits   C-REACTIVE PROTEIN - Abnormal; Notable for the following components:    CRP 10.7 (*)     All other components within normal limits   HIV 1 / 2 ANTIBODY    Narrative:     Release to patient->Immediate   HEPATITIS C ANTIBODY    Narrative:     Release to patient->Immediate   MAGNESIUM   URINALYSIS, REFLEX TO URINE CULTURE    Narrative:     Specimen Source->Urine   LACTIC ACID, PLASMA   SEDIMENTATION RATE   POCT URINE PREGNANCY          Imaging Results          MRI Lumbar Spine W WO Cont (Final result)  Result time 07/05/22 14:00:17    Final result by Pj Yeboah MD (07/05/22 14:00:17)                 Impression:      No significant change from 03/02/2022.  Postoperative changes L4-L5 level      Electronically signed by: Pj Yeboah MD  Date:    07/05/2022  Time:    14:00             Narrative:    EXAMINATION:  MRI LUMBAR SPINE W WO CONTRAST    CLINICAL HISTORY:  Low back pain, prior surgery, new symptoms;    TECHNIQUE:  Multiplanar, multisequence MR images were acquired from the thoracolumbar junction to the sacrum after the administration of 10 cc contrast (gadolinium).    COMPARISON:  03/02/2022 and 03/10/2020    FINDINGS:  Alignment: Normal.    Vertebrae: 5 lumbar-type vertebral bodies. No aggressive marrow replacement process or fracture.    Discs: Multilevel disc desiccation, predominantly L4-L5 and L5-S1.    Cord: Normal. Conus terminates at L1.    Degenerative findings:    T12-L1: There is no focal disc herniation. No  significant central canal narrowing . No significant neural foraminal narrowing.    L1-L2: There is no focal disc herniation. No significant central canal narrowing . No significant neural foraminal narrowing.    L2-L3: There is no focal disc herniation.Minimal broad-based bulging of disc material.  No significant central canal narrowing . No significant neural foraminal narrowing.    L3-L4: There is no focal disc herniation.Mild hypertrophic changes of facets/ligamentum flavum hypertrophy.  Annular fissure.  No significant central canal narrowing . Minimal RIGHT-sided neural foraminal narrowing.    L4-L5: Laminotomy at the L4-L5 level with extradural soft tissue density, with mild mass effect upon the ventral thecal sac margins, lateralizing to the LEFT, which demonstrates enhancement postcontrast consistent with enhancing scar.  This is similar in appearance to prior examination.  Annular fissure.  No significant central canal narrowing . Mild bilateral neural foraminal narrowing.    L5-S1: Small focal posterior central protrusion of disc material without significant mass effect upon the anterior thecal sac margins.    No significant central canal narrowing . No significant neural foraminal narrowing.    Paraspinal muscles & soft tissues: Unremarkable.                                 Medications   morphine injection 4 mg (4 mg Intravenous Given 7/5/22 1232)   morphine injection 5 mg (0 mg Intravenous Return to Cabinet 7/5/22 1245)   morphine injection 5 mg (5 mg Intravenous Given 7/5/22 1253)   gadobutroL (GADAVIST) injection 10 mL (10 mLs Intravenous Given 7/5/22 1347)     Medical Decision Making:   History:   Old Medical Records: I decided to obtain old medical records.  Initial Assessment:   35 yo F with PMH of DM, sciatica, bulging lumbar discs s/p repair of fragmented disc (2017) presents to the ED with complaint of lower back pain that started over the past 2 days.  Differential Diagnosis:   Cauda  "equina  Spinal stenosis  [place]  Acute on Chronic lower back pain with sciatica  Clinical Tests:   Lab Tests: Ordered and Reviewed  Radiological Study: Ordered and Reviewed  ED Management:  Patient is hemodynamically stable. Morphine given for pain. CBC, CMP, Mg, ESR, lactic acid and UA are within normal limits. CRP is very mildly elevated. MRI lumbar spine shows no significant change from 03/02/2022. On reassessment, patient is now stating that when she went to the bathroom, her right hip became transiently numb but it resolved spontaneously. She reports her pain is much improved after morphine. When looking back at her chart, she appears to have presented to the ED with similar complaints previously including on 3/10/22 (which occurred after her 2017 surgery). When this was clarified with the patient, she now states that her symptoms have occurred previously however, they have always been provoked by an inciting injury. She reports she was swimming earlier in the weekend but cannot recall hurting her back. Given this new information and her symptoms of "migrating numbness", I have increased suspicion that this may be an acute on chronic exacerbation of her lumbago with sciatica. Patient advised to schedule close follow-up with her spine specialist. She has been given rx for Flexeril and Ibuprofen. Patient verbalized understanding and agreement with the plan. She was discharged home with strict return precautions. All questions answered.             Attending Attestation:   Physician Attestation Statement for Resident:  As the supervising MD   Physician Attestation Statement: I have personally seen and examined this patient.   I agree with the above history. -: Patient with history of back problems and what sounds like cauda equina in the past presents with numbness to her leg and worse back pain.  Denies any fever.  She has chronic numbness to her leg but it seems to move around.   As the supervising MD I " agree with the above PE.   -: Patient is normal motor to her legs.  Reflexes are intact on my exam.   As the supervising MD I agree with the above treatment, course, plan, and disposition.   -: Patient with back pain and worsening numbness to the legs.  Will do MRI.  With intact reflexes I doubt this is [place]    MRI neg  Patient ambulatory in ED  Normal strength to legs                            Clinical Impression:   Final diagnoses:  [M54.41, M54.42, G89.29] Chronic midline low back pain with bilateral sciatica (Primary)          ED Disposition Condition    Discharge Stable        ED Prescriptions     Medication Sig Dispense Start Date End Date Auth. Provider    cyclobenzaprine (FLEXERIL) 10 MG tablet Take 1 tablet (10 mg total) by mouth 3 (three) times daily as needed for Muscle spasms. 15 tablet 7/5/2022 7/10/2022 Marbella Hameed MD    ibuprofen (ADVIL,MOTRIN) 600 MG tablet Take 1 tablet (600 mg total) by mouth every 6 (six) hours as needed for Pain. 60 tablet 7/5/2022  Marbella Hameed MD        Follow-up Information     Follow up With Specialties Details Why Contact Info    Tracey Dobson, [facility]   [address]  [address]  [facility] [address]  [phone]      [facility] Emergency Medicine  As needed, If symptoms worsen [address]  [facility] [address] [phone]           Marbella Hameed MD  Resident  07/05/22 1528       Romain Stevens MD  07/06/22 1220

## 2022-07-05 NOTE — ED TRIAGE NOTES
Patient arrived to the ED via personal vehicle with the primary concern of worsening back pain. Patient reports having sciatica, bulging discs, and having discs removed. Reports numbness and tingling in both legs, loss of sensation with urinary urgency, and fell earlier today trying to get to the restroom.

## 2022-07-05 NOTE — DISCHARGE INSTRUCTIONS
Diagnosis:   1. Chronic midline low back pain with bilateral sciatica      Home Care Instructions:  - Medications: Continue taking your home medications as prescribed  - Take Flexeril and Ibuprofen for back pain    Follow-Up Plan:  - Follow-up with: Primary care doctor within 3-5 days  - Additional testing and/or evaluation will be directed by your primary doctor    Return to the Emergency Department for symptoms including but not limited to: worsening symptoms, severe back pain, shortness of breath or chest pain, vomiting with inability to hold down fluids, blood in vomit or poop, fevers greater than 100.4°F, passing out/fainting/unconsciousness, or other concerning symptoms.

## 2022-07-05 NOTE — PROGRESS NOTES
Pt received pain medication IVP then moved via stretcher to MRI / slid to table and nurse informed staff pt is not to get up / pt to scanner and placed to O2 Sat monitor and O2 2L N/C for Sat 92% / increased to 100% with O2

## 2022-07-06 ENCOUNTER — TELEPHONE (OUTPATIENT)
Dept: FAMILY MEDICINE | Facility: CLINIC | Age: 37
End: 2022-07-06

## 2022-07-06 NOTE — TELEPHONE ENCOUNTER
Patient called yesterday stating  She was leaving PT and going to the emergency room. She stated she was experiencing severe back pain, tingling and numbness in her legs, dizziness and a really bad headache. Patient reported these were same symptoms that she experienced before her back surgery. She called because she wanted her NP to know. She was seen at ER and disharged.

## 2022-07-14 ENCOUNTER — HOSPITAL ENCOUNTER (EMERGENCY)
Facility: HOSPITAL | Age: 37
Discharge: HOME | End: 2022-07-14
Attending: EMERGENCY MEDICINE
Payer: MEDICAID

## 2022-07-14 VITALS
DIASTOLIC BLOOD PRESSURE: 68 MMHG | TEMPERATURE: 99 F | RESPIRATION RATE: 18 BRPM | OXYGEN SATURATION: 100 % | HEART RATE: 91 BPM | SYSTOLIC BLOOD PRESSURE: 119 MMHG

## 2022-07-14 DIAGNOSIS — R55 SYNCOPE: Primary | ICD-10-CM

## 2022-07-14 LAB
ALBUMIN SERPL BCP-MCNC: 3.9 G/DL (ref 3.5–5.2)
ALP SERPL-CCNC: 49 U/L (ref 55–135)
ALT SERPL W/O P-5'-P-CCNC: 14 U/L (ref 10–44)
ANION GAP SERPL CALC-SCNC: 11 MMOL/L (ref 8–16)
AST SERPL-CCNC: 13 U/L (ref 10–40)
B-HCG UR QL: NEGATIVE
BACTERIA #/AREA URNS AUTO: ABNORMAL /HPF
BASOPHILS # BLD AUTO: 0.07 K/UL (ref 0–0.2)
BASOPHILS NFR BLD: 0.7 % (ref 0–1.9)
BILIRUB SERPL-MCNC: 0.9 MG/DL (ref 0.1–1)
BILIRUB UR QL STRIP: NEGATIVE
BUN SERPL-MCNC: 8 MG/DL (ref 6–20)
CALCIUM SERPL-MCNC: 9.4 MG/DL (ref 8.7–10.5)
CHLORIDE SERPL-SCNC: 106 MMOL/L (ref 95–110)
CLARITY UR REFRACT.AUTO: ABNORMAL
CO2 SERPL-SCNC: 23 MMOL/L (ref 23–29)
COLOR UR AUTO: YELLOW
CREAT SERPL-MCNC: 0.7 MG/DL (ref 0.5–1.4)
CTP QC/QA: YES
CTP QC/QA: YES
DIFFERENTIAL METHOD: ABNORMAL
EOSINOPHIL # BLD AUTO: 0.1 K/UL (ref 0–0.5)
EOSINOPHIL NFR BLD: 1.2 % (ref 0–8)
ERYTHROCYTE [DISTWIDTH] IN BLOOD BY AUTOMATED COUNT: 12.5 % (ref 11.5–14.5)
EST. GFR  (AFRICAN AMERICAN): >60 ML/MIN/1.73 M^2
EST. GFR  (NON AFRICAN AMERICAN): >60 ML/MIN/1.73 M^2
GLUCOSE SERPL-MCNC: 92 MG/DL (ref 70–110)
GLUCOSE UR QL STRIP: NEGATIVE
HCT VFR BLD AUTO: 40.5 % (ref 37–48.5)
HGB BLD-MCNC: 13.2 G/DL (ref 12–16)
HGB UR QL STRIP: ABNORMAL
HYALINE CASTS UR QL AUTO: 0 /LPF
IMM GRANULOCYTES # BLD AUTO: 0.03 K/UL (ref 0–0.04)
IMM GRANULOCYTES NFR BLD AUTO: 0.3 % (ref 0–0.5)
KETONES UR QL STRIP: ABNORMAL
LEUKOCYTE ESTERASE UR QL STRIP: NEGATIVE
LYMPHOCYTES # BLD AUTO: 1.9 K/UL (ref 1–4.8)
LYMPHOCYTES NFR BLD: 20 % (ref 18–48)
MCH RBC QN AUTO: 28.5 PG (ref 27–31)
MCHC RBC AUTO-ENTMCNC: 32.6 G/DL (ref 32–36)
MCV RBC AUTO: 88 FL (ref 82–98)
MICROSCOPIC COMMENT: ABNORMAL
MONOCYTES # BLD AUTO: 0.5 K/UL (ref 0.3–1)
MONOCYTES NFR BLD: 5.7 % (ref 4–15)
NEUTROPHILS # BLD AUTO: 6.8 K/UL (ref 1.8–7.7)
NEUTROPHILS NFR BLD: 72.1 % (ref 38–73)
NITRITE UR QL STRIP: NEGATIVE
NRBC BLD-RTO: 0 /100 WBC
PH UR STRIP: 7 [PH] (ref 5–8)
PLATELET # BLD AUTO: 628 K/UL (ref 150–450)
PMV BLD AUTO: 9 FL (ref 9.2–12.9)
POCT GLUCOSE: 104 MG/DL (ref 70–110)
POTASSIUM SERPL-SCNC: 3.5 MMOL/L (ref 3.5–5.1)
PROT SERPL-MCNC: 7.1 G/DL (ref 6–8.4)
PROT UR QL STRIP: ABNORMAL
RBC # BLD AUTO: 4.63 M/UL (ref 4–5.4)
RBC #/AREA URNS AUTO: 11 /HPF (ref 0–4)
SARS-COV-2 RDRP RESP QL NAA+PROBE: NEGATIVE
SODIUM SERPL-SCNC: 140 MMOL/L (ref 136–145)
SP GR UR STRIP: 1.02 (ref 1–1.03)
SQUAMOUS #/AREA URNS AUTO: 7 /HPF
URN SPEC COLLECT METH UR: ABNORMAL
WBC # BLD AUTO: 9.41 K/UL (ref 3.9–12.7)
WBC #/AREA URNS AUTO: 5 /HPF (ref 0–5)

## 2022-07-14 PROCEDURE — 80047 BASIC METABLC PNL IONIZED CA: CPT

## 2022-07-14 PROCEDURE — 99284 EMERGENCY DEPT VISIT MOD MDM: CPT | Mod: CS,,, | Performed by: EMERGENCY MEDICINE

## 2022-07-14 PROCEDURE — 99284 PR EMERGENCY DEPT VISIT,LEVEL IV: ICD-10-PCS | Mod: CS,,, | Performed by: EMERGENCY MEDICINE

## 2022-07-14 PROCEDURE — 96360 HYDRATION IV INFUSION INIT: CPT

## 2022-07-14 PROCEDURE — 81001 URINALYSIS AUTO W/SCOPE: CPT

## 2022-07-14 PROCEDURE — 99284 EMERGENCY DEPT VISIT MOD MDM: CPT | Mod: 25

## 2022-07-14 PROCEDURE — 82962 GLUCOSE BLOOD TEST: CPT

## 2022-07-14 PROCEDURE — 81025 URINE PREGNANCY TEST: CPT | Performed by: EMERGENCY MEDICINE

## 2022-07-14 PROCEDURE — 93005 ELECTROCARDIOGRAM TRACING: CPT

## 2022-07-14 PROCEDURE — 80053 COMPREHEN METABOLIC PANEL: CPT

## 2022-07-14 PROCEDURE — U0002 COVID-19 LAB TEST NON-CDC: HCPCS | Performed by: EMERGENCY MEDICINE

## 2022-07-14 PROCEDURE — 25000003 PHARM REV CODE 250: Performed by: EMERGENCY MEDICINE

## 2022-07-14 PROCEDURE — 85025 COMPLETE CBC W/AUTO DIFF WBC: CPT

## 2022-07-14 PROCEDURE — 93010 EKG 12-LEAD: ICD-10-PCS | Mod: ,,, | Performed by: INTERNAL MEDICINE

## 2022-07-14 PROCEDURE — 93010 ELECTROCARDIOGRAM REPORT: CPT | Mod: ,,, | Performed by: INTERNAL MEDICINE

## 2022-07-14 RX ORDER — ACETAMINOPHEN 500 MG
1000 TABLET ORAL
Status: COMPLETED | OUTPATIENT
Start: 2022-07-14 | End: 2022-07-14

## 2022-07-14 RX ADMIN — ACETAMINOPHEN 1000 MG: 500 TABLET ORAL at 11:07

## 2022-07-14 RX ADMIN — SODIUM CHLORIDE 1000 ML: 0.9 INJECTION, SOLUTION INTRAVENOUS at 11:07

## 2022-07-14 NOTE — ED NOTES
I-STAT Chem-8+ Results:   Value Reference Range   Sodium 141 136-145 mmol/L   Potassium  3.4 3.5-5.1 mmol/L   Chloride 104  mmol/L   Ionized Calcium 1.16 1.06-1.42 mmol/L   CO2 (measured)  23-29 mmol/L   Glucose 94  mg/dL   BUN 7 6-30 mg/dL   Creatinine 0.5 0.5-1.4 mg/dL   Hematocrit 41 36-54%

## 2022-07-14 NOTE — DISCHARGE INSTRUCTIONS
Diagnosis:   1. Syncope        Tests you had showed:   Labs Reviewed   CBC W/ AUTO DIFFERENTIAL - Abnormal; Notable for the following components:       Result Value    Platelets 628 (*)     MPV 9.0 (*)     All other components within normal limits   COMPREHENSIVE METABOLIC PANEL - Abnormal; Notable for the following components:    Alkaline Phosphatase 49 (*)     All other components within normal limits   URINALYSIS, REFLEX TO URINE CULTURE - Abnormal; Notable for the following components:    Appearance, UA Hazy (*)     Protein, UA 2+ (*)     Ketones, UA Trace (*)     Occult Blood UA 1+ (*)     All other components within normal limits    Narrative:     Specimen Source->Urine   URINALYSIS MICROSCOPIC - Abnormal; Notable for the following components:    RBC, UA 11 (*)     All other components within normal limits    Narrative:     Specimen Source->Urine   SARS-COV-2 RDRP GENE   POCT URINE PREGNANCY   POCT GLUCOSE   POCT GLUCOSE MONITORING CONTINUOUS   ISTAT CHEM8      No orders to display       Treatments you received were:   Medications   acetaminophen tablet 1,000 mg (1,000 mg Oral Given 7/14/22 1152)   sodium chloride 0.9% bolus 1,000 mL (0 mLs Intravenous Stopped 7/14/22 1251)       Home Care Instructions:  - Medications: Continue taking your home medications as prescribed    Follow-Up Plan:  - Follow-up with: Primary care doctor within 7  days  - Additional testing and/or evaluation will be directed by your primary doctor    Return to the Emergency Department for symptoms including but not limited to: worsening symptoms, severe back pain, shortness of breath or chest pain, vomiting with inability to hold down fluids, blood in vomit or poop, fevers greater than 100.4°F, passing out/fainting/unconsciousness, or other concerning symptoms.

## 2022-07-14 NOTE — ED NOTES
PT care assumed, pt restless, in tears. Side rails up x3, bed locked. No needs expressed at this time.

## 2022-07-14 NOTE — ED NOTES
Patient is being evaluated by ED resident at this time.      Patient identifiers verified and correct for this patient.  ID bracelet in place.  Patient reports that she was not feeling well this am and she thought that if she took a shower she would feel better.  She states that while she was in the shower she started to feel really dizzy and lightheaded and bad and stated that she sat down to try to keep herself from passing out in the shower and she then did the best she could to finish showering.  She states that she then drank a 1/2 of a glass of water and took her diabetic medications.  After that she came into the city for her PT appointment.  She reports that she continued to not feel well and that she had continued to feel lightheaded, nauseated and she states that when she has her glasses on things around her look like they are moving slower than she is and reports that she just feels weird.  Patient became tearful during the assessment.      LOC: The patient is awake, alert and aware of environment with an appropriate affect, the patient is oriented x 4 and speaking appropriately.   APPEARANCE: Patient appears comfortable and in no acute distress, patient is clean and well groomed.  SKIN: The skin is warm and moist, color consistent with ethnicity, patient has normal skin turgor and moist mucus membranes, skin intact, no breakdown or bruising noted.   MUSCULOSKELETAL: Patient moving all extremities spontaneously, no swelling noted.  RESPIRATORY: Airway is open and patent, respirations are spontaneous, patient has a normal effort and rate, no accessory muscle use noted, pt denies any SOB at this time.  CARDIAC: Patient denied chest pain, states that she just does not feel fight, no edema noted, capillary refill < 3 seconds.   GASTRO: Soft, no distention noted, normoactive bowel sounds present in all four quadrants. Pt states bowel movements have been regular typically and that she goes daily.  However, she  "states after the incident in the shower where she had felt like she was going to vomit she stood and diarrhea just "came out".  She reports that this only happened the one time.   : Pt denies any pain or frequency with urination.   NEURO: Pt opens eyes spontaneously, behavior appropriate to situation, follows commands, facial expression symmetrical, purposeful motor response noted, patient denies any weakness on one side verses the other.    Patient also reports that she knows that her body is likely hungry but lately she can not make herself mentally hungry or not make herself want to eat.  States that has been happening for a while.      ED Tech obtaining POCT glucose and EKG at this time.  Awaiting additional orders.         "

## 2022-07-14 NOTE — ED PROVIDER NOTES
"Encounter Date: 7/14/2022       History     Chief Complaint   Patient presents with    Loss of Consciousness     Arrives via EMS with c/o dizziness, +syncope episode today while working with PT witnessed      Patient is a 35 yo female with a history of diabetes who presents due to syncope. Says that she abruptly felt clammy, nauseous, and sweaty this morning at home. She took a shower in an attempt to make this better. In the shower, she felt dizzy, had palpitations ("skipped heart beats"): she sat down in the tub, and felt better. Dizziness is described as "room is moving around me in circles." States that after this, she stood up and had an episode of diarrhea. She went downstairs and sat, took her metformin, and felt "dizzy" again. Felt better and drove from the [facility] into [place] for physical therapy, where she did not feel well. At PT, she felt as if she was going to pass out, and was lowered to the ground by staff. States that she did not hit her head, or injure herself due to being lowered to the ground by others. Says that she lost consciousness for approximately 30 seconds. States that PT staff say that she was shaking. Patient states that she has felt somewhat like this during prior hypoglycemic episodes, however this is much worse. She describes poor oral intake recently for food, but normal po intake for fluids.        Review of patient's allergies indicates:   Allergen Reactions    Beatriz Anaphylaxis    Pecan nut Dermatitis    Sulfa (sulfonamide antibiotics) Itching     Past Medical History:   Diagnosis Date    Bipolar disorder     per patient report    Depression     Diabetes mellitus     Diabetes mellitus, type 2     Hx of psychiatric care     Neuropathy     per patient report    Psychiatric problem     Therapy      Past Surgical History:   Procedure Laterality Date    BACK SURGERY      EYE SURGERY       Family History   Problem Relation Age of Onset    Bipolar disorder " Mother     Bipolar disorder Father     Cancer Paternal Grandfather      Social History     Tobacco Use    Smoking status: Former Smoker     Packs/day: 0.25     Types: Cigarettes     Quit date: 2020     Years since quittin.8    Smokeless tobacco: Never Used   Substance Use Topics    Alcohol use: Yes     Comment: rare    Drug use: Yes     Types: Marijuana     Review of Systems   Constitutional: Positive for chills and diaphoresis.   HENT: Negative for congestion, sore throat and tinnitus.    Eyes: Negative for visual disturbance.   Respiratory: Negative for cough, chest tightness, shortness of breath and wheezing.    Cardiovascular: Positive for palpitations. Negative for chest pain and leg swelling.   Gastrointestinal: Positive for diarrhea and nausea. Negative for abdominal pain, constipation and vomiting.   Genitourinary: Negative for dysuria, frequency, hematuria and urgency.   Musculoskeletal: Negative for arthralgias and myalgias.   Neurological: Positive for dizziness and light-headedness. Negative for headaches.   Hematological: Negative for adenopathy.       Physical Exam     Initial Vitals [22 1107]   BP Pulse Resp Temp SpO2   (!) 148/93 99 16 99.8 °F (37.7 °C) 99 %      MAP       --         Physical Exam    Nursing note and vitals reviewed.  Constitutional: She appears well-developed and well-nourished. She is not diaphoretic. No distress.   HENT:   Head: Normocephalic and atraumatic.   Eyes: Right eye exhibits no discharge. Left eye exhibits no discharge. No scleral icterus.   Neck: No tracheal deviation present.   Cardiovascular: Regular rhythm. Tachycardia present.    No murmur heard.  Pulmonary/Chest: Breath sounds normal. No respiratory distress. She has no wheezes. She has no rhonchi. She has no rales. She exhibits no tenderness.   Abdominal: Abdomen is soft. She exhibits no distension. There is no abdominal tenderness. There is no rebound and no guarding.   Musculoskeletal:          General: No tenderness or edema.     Neurological: She is alert and oriented to person, place, and time. She has normal strength.   Skin: Skin is dry. There is pallor.   Psychiatric: She has a normal mood and affect. Thought content normal.         ED Course   Procedures  Labs Reviewed   CBC W/ AUTO DIFFERENTIAL - Abnormal; Notable for the following components:       Result Value    Platelets 628 (*)     MPV 9.0 (*)     All other components within normal limits   COMPREHENSIVE METABOLIC PANEL - Abnormal; Notable for the following components:    Alkaline Phosphatase 49 (*)     All other components within normal limits   URINALYSIS, REFLEX TO URINE CULTURE - Abnormal; Notable for the following components:    Appearance, UA Hazy (*)     Protein, UA 2+ (*)     Ketones, UA Trace (*)     Occult Blood UA 1+ (*)     All other components within normal limits    Narrative:     Specimen Source->Urine   URINALYSIS MICROSCOPIC - Abnormal; Notable for the following components:    RBC, UA 11 (*)     All other components within normal limits    Narrative:     Specimen Source->Urine   SARS-COV-2 RDRP GENE   POCT URINE PREGNANCY   POCT GLUCOSE   POCT GLUCOSE MONITORING CONTINUOUS   ISTAT CHEM8     EKG Readings: (Independently Interpreted)   Initial Reading: No STEMI.   Rate of 100. Regular rhythm. Right axis deviation. No right rotation. No sign of right ventricular hypertrophy.       Imaging Results    None          Medications   acetaminophen tablet 1,000 mg (1,000 mg Oral Given 7/14/22 1152)   sodium chloride 0.9% bolus 1,000 mL (0 mLs Intravenous Stopped 7/14/22 1251)     Medical Decision Making:   Initial Assessment:   Syncope  Differential Diagnosis:   Hypoglycemia  Metabolic: hyponatremia, etc.  Arrhythmia  Seizure    ED Management:  EKG is normal. CBC, CMP, UA are unremarkable. Pt is not hypoglycemic. Given 1L bolus NS.    Patient was caught during fall in the hallway while walking to give urine sample. Did not hit her  head.    She is feeling better as of 2:50pm, and wants to go home. She is able to stand up and walk. Has been instructed to return to ED if symptoms recur or worsen.                       Clinical Impression:   Final diagnoses:  [R55] Syncope (Primary)          ED Disposition Condition    Discharge Stable        ED Prescriptions     None        Follow-up Information    None          Souleymane Patten, DO  Resident  07/14/22 8450

## 2022-07-14 NOTE — ED NOTES
Orthostatic vital signs obtained as documented.  Pt endorses  Dizziness &  Lightheadedness changing from lying to sitting .  Provider made aware of results.     Lying : 148/76  95  Sittin/79  102  Standin/81  107

## 2022-07-14 NOTE — ED NOTES
Pt ambulated to bathroom with assist from resident. Pt was standing against wall alone waiting to use restroom. Nurse assisted her to another bathroom, during ambulation pt had syncope episode and slid to the ground with the assistance of the nurse. + LOC for about 3 seconds, did not hit head. Pt immediately came back to it and was able  ambulate back to bed with assistance without incident. Pt AAOx4, no injury noted upon assessment. VS within pt's base line. MD and resident aware.

## 2022-08-10 ENCOUNTER — HOSPITAL ENCOUNTER (EMERGENCY)
Facility: HOSPITAL | Age: 37
Discharge: HOME | End: 2022-08-10
Attending: EMERGENCY MEDICINE
Payer: MEDICAID

## 2022-08-10 VITALS
DIASTOLIC BLOOD PRESSURE: 82 MMHG | RESPIRATION RATE: 18 BRPM | TEMPERATURE: 99 F | HEART RATE: 100 BPM | SYSTOLIC BLOOD PRESSURE: 136 MMHG | WEIGHT: 198 LBS | BODY MASS INDEX: 30.01 KG/M2 | OXYGEN SATURATION: 100 % | HEIGHT: 68 IN

## 2022-08-10 DIAGNOSIS — S63.502A SPRAIN OF LEFT WRIST, INITIAL ENCOUNTER: Primary | ICD-10-CM

## 2022-08-10 DIAGNOSIS — S80.01XA CONTUSION OF RIGHT KNEE, INITIAL ENCOUNTER: ICD-10-CM
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
## 2022-08-10 DIAGNOSIS — T14.8XXA ABRASION: ICD-10-CM

## 2022-08-10 PROCEDURE — 99284 EMERGENCY DEPT VISIT MOD MDM: CPT | Mod: ,,, | Performed by: PHYSICIAN ASSISTANT

## 2022-08-10 PROCEDURE — 99284 PR EMERGENCY DEPT VISIT,LEVEL IV: ICD-10-PCS | Mod: ,,, | Performed by: PHYSICIAN ASSISTANT

## 2022-08-10 PROCEDURE — 99284 EMERGENCY DEPT VISIT MOD MDM: CPT | Mod: 25

## 2022-08-10 PROCEDURE — 25000003 PHARM REV CODE 250: Performed by: PHYSICIAN ASSISTANT

## 2022-08-10 PROCEDURE — 29125 APPL SHORT ARM SPLINT STATIC: CPT | Mod: LT

## 2022-08-10 RX ORDER — IBUPROFEN 600 MG/1
600 TABLET ORAL
Status: COMPLETED | OUTPATIENT
Start: 2022-08-10 | End: 2022-08-10

## 2022-08-10 RX ORDER — IBUPROFEN 600 MG/1
600 TABLET ORAL EVERY 6 HOURS PRN
Qty: 20 TABLET | Refills: 0 | Status: SHIPPED | OUTPATIENT
Start: 2022-08-10 | End: 2022-08-12 | Stop reason: SDUPTHER

## 2022-08-10 RX ORDER — MUPIROCIN 20 MG/G
OINTMENT TOPICAL 3 TIMES DAILY
Qty: 22 G | Refills: 0 | Status: SHIPPED | OUTPATIENT
Start: 2022-08-10 | End: 2022-08-12

## 2022-08-10 RX ADMIN — IBUPROFEN 600 MG: 600 TABLET ORAL at 02:08

## 2022-08-10 NOTE — FIRST PROVIDER EVALUATION
Emergency Department TeleTriage Encounter Note      CHIEF COMPLAINT    Chief Complaint   Patient presents with    Fall     Slipped and fell no loc, abrasions to r elbow, forearm , r knee, L wrist pain, not on blood thinners       VITAL SIGNS   Initial Vitals [08/10/22 1248]   BP Pulse Resp Temp SpO2   (!) 141/78 100 18 98.8 °F (37.1 °C) 100 %      MAP       --            ALLERGIES    Review of patient's allergies indicates:   Allergen Reactions    Beatriz Anaphylaxis    Pecan nut Dermatitis    Sulfa (sulfonamide antibiotics) Itching       PROVIDER TRIAGE NOTE  This is a teletriage evaluation of a 36 y.o. female presenting to the ED complaining of fall. Patient reports slip and fall outside. She has abrasions to right forearm. She is complaining of pain to right knee and left wrist.     Patient is tearful during teletriage. She is sitting in wheelchair. Pain is worst with walking.     Initial orders will be placed and care will be transferred to an alternate provider when patient is roomed for a full evaluation. Any additional orders and the final disposition will be determined by that provider.           ORDERS  Labs Reviewed - No data to display    ED Orders (720h ago, onward)    Start Ordered     Status Ordering Provider    08/10/22 1313 08/10/22 1313  X-Ray Knee 1 or 2 View Right  1 time imaging         Ordered SEN KWOK    08/10/22 1313 08/10/22 1313  X-Ray Wrist Complete Left  1 time imaging         Ordered SEN KWOK            Virtual Visit Note: The provider triage portion of this emergency department evaluation and documentation was performed via [employer], a HIPAA-compliant telemedicine application, in concert with a tele-presenter in the room. A face to face patient evaluation with one of my colleagues will occur once the patient is placed in an emergency department room.      DISCLAIMER: This note was prepared with [employer] voice recognition transcription software. Garbled syntax,  mangled pronouns, and other bizarre constructions may be attributed to that software system.

## 2022-08-10 NOTE — ED TRIAGE NOTES
Lizzie Saunders, a 36 y.o. 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 She fell onto right knee and left wrist. She believes her head may have landed on her left arm. No LOC or blood thinner use. She has abrasions to right posterior forearm, right knee and left wrist. She reports 9/10 pain to right knee and left wrist. Ice pack in use to left wrist. She CP, SOB, abd pain, N/V/D, lightheadedness, palpitations.      Review of patient's allergies indicates:   Allergen Reactions    Beatriz Anaphylaxis    Pecan nut Dermatitis    Sulfa (sulfonamide antibiotics) Itching     Past Medical History:   Diagnosis Date    Bipolar disorder     per patient report    Depression     Diabetes mellitus     Diabetes mellitus, type 2     Hx of psychiatric care     Neuropathy     per patient report    Psychiatric problem     Therapy

## 2022-08-10 NOTE — ED PROVIDER NOTES
Encounter Date: 8/10/2022       History     Chief Complaint   Patient presents with    Fall     Slipped and fell no loc, abrasions to r elbow, forearm , r knee, L wrist pain, not on blood thinners     This is a 36 year old female with a PMH of DM and bipolar disorder presenting to the ED with a chief complaint of wrist pain s/p fall. She had a mechanical trip and fall in the rain, landing on an uneven sidewalk. She reports multiple superficial abrasions, pain in the left wrist and pain in the right knee. Tetanus is up to date. She rates her pain 9/10. Denies fever, decreased ROM, focal weakness or numbness, head injury or LOC.        Review of patient's allergies indicates:   Allergen Reactions    Beatriz Anaphylaxis    Pecan nut Dermatitis    Sulfa (sulfonamide antibiotics) Itching     Past Medical History:   Diagnosis Date    Bipolar disorder     per patient report    Depression     Diabetes mellitus     Diabetes mellitus, type 2     Hx of psychiatric care     Neuropathy     per patient report    Psychiatric problem     Therapy      Past Surgical History:   Procedure Laterality Date    BACK SURGERY      EYE SURGERY       Family History   Problem Relation Age of Onset    Bipolar disorder Mother     Bipolar disorder Father     Cancer Paternal Grandfather      Social History     Tobacco Use    Smoking status: Former Smoker     Packs/day: 0.25     Types: Cigarettes     Quit date: 2020     Years since quittin.9    Smokeless tobacco: Never Used   Substance Use Topics    Alcohol use: Yes     Comment: rare    Drug use: Yes     Types: Marijuana     Review of Systems   Constitutional: Negative for chills and fever.   Respiratory: Negative for shortness of breath.    Cardiovascular: Negative for chest pain.   Gastrointestinal: Negative for nausea and vomiting.   Musculoskeletal: Positive for arthralgias.   Skin: Positive for wound.   Allergic/Immunologic: Negative for immunocompromised state.    Neurological: Negative for weakness and numbness.   Hematological: Does not bruise/bleed easily.       Physical Exam     Initial Vitals [08/10/22 1248]   BP Pulse Resp Temp SpO2   (!) 141/78 100 18 98.8 °F (37.1 °C) 100 %      MAP       --         Physical Exam    Constitutional: She appears well-developed and well-nourished. No distress.   HENT:   Head: Atraumatic.   Eyes: Conjunctivae and EOM are normal. Pupils are equal, round, and reactive to light.   Cardiovascular: Normal rate, regular rhythm and normal heart sounds.   Pulmonary/Chest: Breath sounds normal. No respiratory distress. She has no wheezes. She has no rhonchi. She has no rales.   Abdominal: Abdomen is soft. Bowel sounds are normal. There is no abdominal tenderness. There is no rebound and no guarding.   Musculoskeletal:      Left wrist: Tenderness present. No swelling or snuff box tenderness. Normal range of motion. Normal pulse.      Right knee: Bony tenderness (patella) present. No effusion. Tenderness present over the patellar tendon.      Comments: TTP of right prepatellar bursa and left wrist.  Moving all extremities with symmetric strength and ROM. Sensation to light touch intact. Ambulatory without assistance.     Neurological: She is alert and oriented to person, place, and time.   Skin: Skin is warm and dry. Abrasion noted.              ED Course   Procedures  Labs Reviewed - No data to display       Imaging Results          X-Ray Wrist Complete Left (Final result)  Result time 08/10/22 15:31:52    Final result by Kana Fritz III, MD (08/10/22 15:31:52)                 Narrative:    EXAMINATION:  XR WRIST COMPLETE 3 VIEWS LEFT    CLINICAL HISTORY:  Unspecified fall, initial encounter    FINDINGS:  Wrist three views left: There is ulnar negative variance.  No fracture dislocation bone destruction seen.      Electronically signed by: Kana Fritz MD  Date:    08/10/2022  Time:    15:31                             X-Ray Knee 1 or 2  View Right (Final result)  Result time 08/10/22 15:30:28    Final result by Kana Fritz III, MD (08/10/22 15:30:28)                 Narrative:    EXAMINATION:  XR KNEE 1 OR 2 VIEW RIGHT    CLINICAL HISTORY:  fall;    FINDINGS:  Knee two views right: No fracture dislocation bone destruction seen.  No trauma seen.      Electronically signed by: Kana Fritz MD  Date:    08/10/2022  Time:    15:30                               Medications   ibuprofen tablet 600 mg (600 mg Oral Given 8/10/22 1411)     Medical Decision Making:   Clinical Tests:   Radiological Study: Ordered and Reviewed       APC / Resident Notes:   36 y.o. year old female presenting with wrist and knee pain s/p fall.    DDx includes but is not limited to fracture, sprain, contusion, abrasion.    Xrays of left wrist and right knee are negative for bony injury. Wound care provided by ED nursing staff. Will d/c with bactroban given her hx of DM and superficial abrasions. Left wrist splint placed and ace wrap applied to right knee. Discussed RICE treatment and will give a short course of NSAIDS.    She is stable for discharge.                 Clinical Impression:   Final diagnoses:  [W19.XXXA] Fall  [S80.01XA] Contusion of right knee, initial encounter  [S63.502A] Sprain of left wrist, initial encounter (Primary)  [T14.8XXA] Abrasion          ED Disposition Condition    Discharge Stable        ED Prescriptions     Medication Sig Dispense Start Date End Date Auth. Provider    mupirocin (BACTROBAN) 2 % ointment Apply topically 3 (three) times daily. 22 g 8/10/2022  Rosanna Camacho PA-C    ibuprofen (ADVIL,MOTRIN) 600 MG tablet Take 1 tablet (600 mg total) by mouth every 6 (six) hours as needed for Pain. 20 tablet 8/10/2022  Rosanna Camacho PA-C        Follow-up Information    None          Rosanna Camacho PA-C  08/10/22 9969

## 2022-08-11 LAB
ALBUMIN SERPL-MCNC: 4.4 G/DL (ref 3.8–4.8)
ALBUMIN/GLOB SERPL: 1.8 {RATIO} (ref 1.2–2.2)
ALP SERPL-CCNC: 53 IU/L (ref 44–121)
ALT SERPL-CCNC: 16 IU/L (ref 0–32)
APPEARANCE UR: CLEAR
AST SERPL-CCNC: 10 IU/L (ref 0–40)
BACTERIA #/AREA URNS HPF: ABNORMAL /[HPF]
BASOPHILS # BLD AUTO: 0.1 X10E3/UL (ref 0–0.2)
BASOPHILS NFR BLD AUTO: 1 %
BILIRUB SERPL-MCNC: 0.4 MG/DL (ref 0–1.2)
BILIRUB UR QL STRIP: NEGATIVE
BUN SERPL-MCNC: 10 MG/DL (ref 6–20)
BUN/CREAT SERPL: 18 (ref 9–23)
CALCIUM SERPL-MCNC: 10.1 MG/DL (ref 8.7–10.2)
CASTS URNS QL MICRO: ABNORMAL /LPF
CHLORIDE SERPL-SCNC: 100 MMOL/L (ref 96–106)
CHOLEST SERPL-MCNC: 180 MG/DL (ref 100–199)
CO2 SERPL-SCNC: 26 MMOL/L (ref 20–29)
COLOR UR: YELLOW
CREAT SERPL-MCNC: 0.56 MG/DL (ref 0.57–1)
CRYSTALS URNS MICRO: ABNORMAL
EOSINOPHIL # BLD AUTO: 0.2 X10E3/UL (ref 0–0.4)
EOSINOPHIL NFR BLD AUTO: 3 %
EPI CELLS #/AREA URNS HPF: ABNORMAL /HPF (ref 0–10)
ERYTHROCYTE [DISTWIDTH] IN BLOOD BY AUTOMATED COUNT: 12.5 % (ref 11.7–15.4)
EST. GFR  (NO RACE VARIABLE): 120 ML/MIN/1.73
GLOBULIN SER CALC-MCNC: 2.4 G/DL (ref 1.5–4.5)
GLUCOSE SERPL-MCNC: 241 MG/DL (ref 65–99)
GLUCOSE UR QL STRIP: ABNORMAL
HBA1C MFR BLD: 7.6 % (ref 4.8–5.6)
HCT VFR BLD AUTO: 40.5 % (ref 34–46.6)
HDLC SERPL-MCNC: 41 MG/DL
HGB BLD-MCNC: 13.3 G/DL (ref 11.1–15.9)
HGB UR QL STRIP: ABNORMAL
IMM GRANULOCYTES # BLD AUTO: 0 X10E3/UL (ref 0–0.1)
IMM GRANULOCYTES NFR BLD AUTO: 0 %
KETONES UR QL STRIP: ABNORMAL
LDLC SERPL CALC-MCNC: 116 MG/DL (ref 0–99)
LEUKOCYTE ESTERASE UR QL STRIP: NEGATIVE
LYMPHOCYTES # BLD AUTO: 1.7 X10E3/UL (ref 0.7–3.1)
LYMPHOCYTES NFR BLD AUTO: 19 %
MCH RBC QN AUTO: 28.6 PG (ref 26.6–33)
MCHC RBC AUTO-ENTMCNC: 32.8 G/DL (ref 31.5–35.7)
MCV RBC AUTO: 87 FL (ref 79–97)
MICRO URNS: ABNORMAL
MONOCYTES # BLD AUTO: 0.6 X10E3/UL (ref 0.1–0.9)
MONOCYTES NFR BLD AUTO: 6 %
NEUTROPHILS # BLD AUTO: 6.3 X10E3/UL (ref 1.4–7)
NEUTROPHILS NFR BLD AUTO: 71 %
NITRITE UR QL STRIP: NEGATIVE
PH UR STRIP: 5.5 [PH] (ref 5–7.5)
PLATELET # BLD AUTO: 596 X10E3/UL (ref 150–450)
POTASSIUM SERPL-SCNC: 4.9 MMOL/L (ref 3.5–5.2)
PROT SERPL-MCNC: 6.8 G/DL (ref 6–8.5)
PROT UR QL STRIP: ABNORMAL
RBC # BLD AUTO: 4.65 X10E6/UL (ref 3.77–5.28)
RBC #/AREA URNS HPF: ABNORMAL /HPF (ref 0–2)
SODIUM SERPL-SCNC: 139 MMOL/L (ref 134–144)
SP GR UR STRIP: >=1.03 (ref 1–1.03)
TRIGL SERPL-MCNC: 125 MG/DL (ref 0–149)
UNIDENT CRYS URNS QL MICRO: PRESENT
UROBILINOGEN UR STRIP-MCNC: 0.2 MG/DL (ref 0.2–1)
VLDLC SERPL CALC-MCNC: 23 MG/DL (ref 5–40)
WBC # BLD AUTO: 8.9 X10E3/UL (ref 3.4–10.8)
WBC #/AREA URNS HPF: ABNORMAL /HPF (ref 0–5)

## 2022-08-11 NOTE — PROGRESS NOTES
A1c is improving and down to 7.6; cholesterol is significantly improved; some blood found in the urinalysis, could be from your period if you are currently on it-please attend appointment tomorrow to discuss this issue further

## 2022-08-12 ENCOUNTER — OFFICE VISIT (OUTPATIENT)
Dept: FAMILY MEDICINE | Facility: CLINIC | Age: 37
End: 2022-08-12
Payer: MEDICAID

## 2022-08-12 VITALS
BODY MASS INDEX: 29.55 KG/M2 | HEIGHT: 68 IN | WEIGHT: 195 LBS | SYSTOLIC BLOOD PRESSURE: 100 MMHG | TEMPERATURE: 98 F | HEART RATE: 76 BPM | OXYGEN SATURATION: 98 % | RESPIRATION RATE: 20 BRPM | DIASTOLIC BLOOD PRESSURE: 68 MMHG

## 2022-08-12 DIAGNOSIS — R82.90 ABNORMAL URINE: ICD-10-CM

## 2022-08-12 DIAGNOSIS — T07.XXXA ABRASIONS OF MULTIPLE SITES: ICD-10-CM

## 2022-08-12 DIAGNOSIS — Z79.4 TYPE 2 DIABETES MELLITUS WITH HYPERGLYCEMIA, WITH LONG-TERM CURRENT USE OF INSULIN: Primary | ICD-10-CM

## 2022-08-12 DIAGNOSIS — E11.65 TYPE 2 DIABETES MELLITUS WITH HYPERGLYCEMIA, WITH LONG-TERM CURRENT USE OF INSULIN: Primary | ICD-10-CM

## 2022-08-12 DIAGNOSIS — E78.2 MIXED HYPERLIPIDEMIA: ICD-10-CM

## 2022-08-12 DIAGNOSIS — R80.9 MICROALBUMINURIA: ICD-10-CM

## 2022-08-12 DIAGNOSIS — D75.839 THROMBOCYTOSIS: ICD-10-CM

## 2022-08-12 PROCEDURE — 3062F POS MACROALBUMINURIA REV: CPT | Mod: CPTII,,, | Performed by: NURSE PRACTITIONER

## 2022-08-12 PROCEDURE — 99214 OFFICE O/P EST MOD 30 MIN: CPT | Mod: S$PBB,,, | Performed by: NURSE PRACTITIONER

## 2022-08-12 PROCEDURE — 3078F PR MOST RECENT DIASTOLIC BLOOD PRESSURE < 80 MM HG: ICD-10-PCS | Mod: CPTII,,, | Performed by: NURSE PRACTITIONER

## 2022-08-12 PROCEDURE — 3078F DIAST BP <80 MM HG: CPT | Mod: CPTII,,, | Performed by: NURSE PRACTITIONER

## 2022-08-12 PROCEDURE — 3062F PR POS MACROALBUMINURIA RESULT DOCUMENTED/REVIEW: ICD-10-PCS | Mod: CPTII,,, | Performed by: NURSE PRACTITIONER

## 2022-08-12 PROCEDURE — 96372 THER/PROPH/DIAG INJ SC/IM: CPT | Mod: PBBFAC | Performed by: NURSE PRACTITIONER

## 2022-08-12 PROCEDURE — 3074F SYST BP LT 130 MM HG: CPT | Mod: CPTII,,, | Performed by: NURSE PRACTITIONER

## 2022-08-12 PROCEDURE — 99214 PR OFFICE/OUTPT VISIT, EST, LEVL IV, 30-39 MIN: ICD-10-PCS | Mod: S$PBB,,, | Performed by: NURSE PRACTITIONER

## 2022-08-12 PROCEDURE — 3008F BODY MASS INDEX DOCD: CPT | Mod: CPTII,,, | Performed by: NURSE PRACTITIONER

## 2022-08-12 PROCEDURE — 3051F PR MOST RECENT HEMOGLOBIN A1C LEVEL 7.0 - < 8.0%: ICD-10-PCS | Mod: CPTII,,, | Performed by: NURSE PRACTITIONER

## 2022-08-12 PROCEDURE — 1160F RVW MEDS BY RX/DR IN RCRD: CPT | Mod: CPTII,,, | Performed by: NURSE PRACTITIONER

## 2022-08-12 PROCEDURE — 3074F PR MOST RECENT SYSTOLIC BLOOD PRESSURE < 130 MM HG: ICD-10-PCS | Mod: CPTII,,, | Performed by: NURSE PRACTITIONER

## 2022-08-12 PROCEDURE — 3051F HG A1C>EQUAL 7.0%<8.0%: CPT | Mod: CPTII,,, | Performed by: NURSE PRACTITIONER

## 2022-08-12 PROCEDURE — 1159F PR MEDICATION LIST DOCUMENTED IN MEDICAL RECORD: ICD-10-PCS | Mod: CPTII,,, | Performed by: NURSE PRACTITIONER

## 2022-08-12 PROCEDURE — 3008F PR BODY MASS INDEX (BMI) DOCUMENTED: ICD-10-PCS | Mod: CPTII,,, | Performed by: NURSE PRACTITIONER

## 2022-08-12 PROCEDURE — 3066F NEPHROPATHY DOC TX: CPT | Mod: CPTII,,, | Performed by: NURSE PRACTITIONER

## 2022-08-12 PROCEDURE — 99215 OFFICE O/P EST HI 40 MIN: CPT | Performed by: NURSE PRACTITIONER

## 2022-08-12 PROCEDURE — 1160F PR REVIEW ALL MEDS BY PRESCRIBER/CLIN PHARMACIST DOCUMENTED: ICD-10-PCS | Mod: CPTII,,, | Performed by: NURSE PRACTITIONER

## 2022-08-12 PROCEDURE — 3066F PR DOCUMENTATION OF TREATMENT FOR NEPHROPATHY: ICD-10-PCS | Mod: CPTII,,, | Performed by: NURSE PRACTITIONER

## 2022-08-12 PROCEDURE — 1159F MED LIST DOCD IN RCRD: CPT | Mod: CPTII,,, | Performed by: NURSE PRACTITIONER

## 2022-08-12 RX ORDER — LIDOCAINE 50 MG/G
1 PATCH TOPICAL DAILY PRN
Status: ON HOLD | COMMUNITY
Start: 2022-07-05 | End: 2023-10-13 | Stop reason: HOSPADM

## 2022-08-12 RX ORDER — DULAGLUTIDE 0.75 MG/.5ML
INJECTION, SOLUTION SUBCUTANEOUS
Qty: 4 PEN | Refills: 5 | Status: SHIPPED | OUTPATIENT
Start: 2022-08-12 | End: 2022-11-22 | Stop reason: DRUGHIGH

## 2022-08-12 RX ORDER — CEPHALEXIN 250 MG/1
250 CAPSULE ORAL EVERY 8 HOURS
Qty: 21 CAPSULE | Refills: 0 | Status: SHIPPED | OUTPATIENT
Start: 2022-08-12 | End: 2022-08-19

## 2022-08-12 RX ORDER — KETOROLAC TROMETHAMINE 30 MG/ML
30 INJECTION, SOLUTION INTRAMUSCULAR; INTRAVENOUS ONCE
Status: COMPLETED | OUTPATIENT
Start: 2022-08-12 | End: 2022-08-12

## 2022-08-12 RX ORDER — METFORMIN HYDROCHLORIDE 500 MG/1
500 TABLET ORAL 2 TIMES DAILY WITH MEALS
Qty: 180 TABLET | Refills: 1 | Status: SHIPPED | OUTPATIENT
Start: 2022-08-12 | End: 2023-02-23

## 2022-08-12 RX ORDER — INSULIN GLARGINE 100 [IU]/ML
25 INJECTION, SOLUTION SUBCUTANEOUS NIGHTLY
Qty: 9 ML | Refills: 3 | Status: SHIPPED | OUTPATIENT
Start: 2022-08-12 | End: 2022-11-22 | Stop reason: SDUPTHER

## 2022-08-12 RX ORDER — RIZATRIPTAN BENZOATE 10 MG/1
10 TABLET ORAL ONCE AS NEEDED
COMMUNITY
Start: 2022-06-27

## 2022-08-12 RX ADMIN — KETOROLAC TROMETHAMINE 30 MG: 30 INJECTION, SOLUTION INTRAMUSCULAR; INTRAVENOUS at 08:08

## 2022-08-12 NOTE — PROGRESS NOTES
SUBJECTIVE:      Patient ID: Lizzie Saunders is a 36 y.o. female.    Chief Complaint: Diabetes    Lizzie is here for follow up on diabetes, HLD and recent labs.  Taking her medications as prescribed daily without side effects or complaints.  A1c has dropped to 7.6!  Patient admits to taking her medications most of the time with the exception when her pharmacy has not filled them.  Trulicity was recently denied by her insurance and we had to complete a prior authorization.  Prior authorizations status still pending and nurse will check on this issue today.  She is checking her blood sugar intermittently and is seeing a decrease in her numbers.  She did miss her eye exam appointment and plans to reschedule soon.  Other labs reviewed today with patient.    Platelet count has been elevated most checks-patient has never had this issue evaluated previously and I will give her referral to Hematology today for further evaluation.    Urinalysis was abnormal on her blood work.  Patient just recent completed her menses cycle while she was doing her blood work.  She still has some protein in her urine as well but her blood pressure is consistently running low and she cannot tolerate an Ace or an Arb at this time.     Patient recently had a fall and was evaluated in the emergency room on 8/10/22- she had x-rays which were negative for fracture of her left wrist and she has multiple abrasions.  She is taking ibuprofen but is not helping.  She did not take any NSAIDs today.    Diabetes  She presents for her follow-up diabetic visit. She has type 2 diabetes mellitus. No MedicAlert identification noted. The initial diagnosis of diabetes was made 15 years ago. Her disease course has been improving. Hypoglycemia symptoms include nervousness/anxiousness. Pertinent negatives for hypoglycemia include no confusion, dizziness, headaches, hunger, mood changes, pallor, seizures, speech difficulty or tremors. Associated symptoms include  foot paresthesias. Pertinent negatives for diabetes include no blurred vision, no chest pain, no fatigue, no foot ulcerations, no polydipsia, no polyphagia, no polyuria, no visual change, no weakness and no weight loss. Pertinent negatives for hypoglycemia complications include no blackouts, no hospitalization, no nocturnal hypoglycemia, no required assistance and no required glucagon injection. Symptoms are improving. Diabetic complications include peripheral neuropathy and retinopathy (mild- reviewed eye exam ). Pertinent negatives for diabetic complications include no autonomic neuropathy, CVA, impotence or nephropathy. Risk factors for coronary artery disease include dyslipidemia, family history, obesity, sedentary lifestyle, stress, tobacco exposure and diabetes mellitus. Current diabetic treatment includes diet, insulin injections and oral agent (monotherapy) (Trulicity ). She is compliant with treatment most of the time. She is currently taking insulin pre-breakfast and at bedtime. Insulin injections are given by patient. Rotation sites for injection include the abdominal wall. Her weight is decreasing steadily. She is following a generally healthy diet. When asked about meal planning, she reported none. She has not had a previous visit with a dietitian. She participates in exercise intermittently. There is no compliance with monitoring of blood glucose. Her home blood glucose trend is decreasing steadily. Her breakfast blood glucose range is generally 140-180 mg/dl. An ACE inhibitor/angiotensin II receptor blocker is not being taken (BP too low ). She does not see a podiatrist.Eye exam is not current.   Hyperlipidemia  This is a chronic problem. The current episode started more than 1 year ago. Condition status: improving. Exacerbating diseases include diabetes and obesity. She has no history of chronic renal disease, hypothyroidism or liver disease. Factors aggravating her hyperlipidemia include smoking.  Pertinent negatives include no chest pain, leg pain, myalgias or shortness of breath. Current antihyperlipidemic treatment includes statins and diet change. The current treatment provides mild improvement of lipids. Compliance problems include adherence to exercise and adherence to diet.  Risk factors for coronary artery disease include diabetes mellitus, dyslipidemia, obesity and a sedentary lifestyle.       Family History   Problem Relation Age of Onset    Bipolar disorder Mother     Bipolar disorder Father     Cancer Paternal Grandfather       Social History     Socioeconomic History    Marital status:      Spouse name: Sonja Saunders    Number of children: 0   Occupational History     Comment: currently unemployed   Tobacco Use    Smoking status: Former Smoker     Packs/day: 0.25     Types: Cigarettes     Quit date: 2020     Years since quittin.9    Smokeless tobacco: Never Used   Substance and Sexual Activity    Alcohol use: Yes     Comment: rare    Drug use: Yes     Types: Marijuana    Sexual activity: Yes     Comment:    Social History Narrative    Patient tearful and anxious on admit; unable to answer all admit questions at this time. 2/3/17 @ 21:45     Current Outpatient Medications   Medication Sig Dispense Refill    AJOVY AUTOINJECTOR 225 mg/1.5 mL autoinjector INJECT UNDER THE SKIN EVERY MONTH      atorvastatin (LIPITOR) 40 MG tablet TAKE 1 TABLET BY MOUTH EVERY EVENING 90 tablet 1    ibuprofen (ADVIL,MOTRIN) 600 MG tablet Take 1 tablet (600 mg total) by mouth every 6 (six) hours as needed for Pain. 60 tablet 0    LIDOcaine (LIDODERM) 5 % 1 patch daily as needed.      metFORMIN (GLUCOPHAGE) 500 MG tablet Take 1 tablet (500 mg total) by mouth 2 (two) times daily with meals. 180 tablet 1    rizatriptan (MAXALT) 10 MG tablet Take by mouth.      blood sugar diagnostic Strp To check BG two times daily, to use with insurance preferred meter 50 strip 5    blood-glucose  meter kit To check BG two  times daily, to use with insurance preferred meter 1 each 0    cephALEXin (KEFLEX) 250 MG capsule Take 1 capsule (250 mg total) by mouth every 8 (eight) hours. for 7 days 21 capsule 0    dulaglutide (TRULICITY) 0.75 mg/0.5 mL pen injector ADMINISTER 0.75 MG UNDER THE SKIN EVERY 7 DAYS 4 pen 5    insulin (LANTUS SOLOSTAR U-100 INSULIN) glargine 100 units/mL SubQ pen Inject 25 Units into the skin every evening. 9 mL 3    lancets Misc To check BG two times daily, to use with insurance preferred meter 50 each 5     Current Facility-Administered Medications   Medication Dose Route Frequency Provider Last Rate Last Admin    ketorolac injection 30 mg  30 mg Intramuscular Once JAIRO Wilburn         Review of patient's allergies indicates:   Allergen Reactions    Beatriz Anaphylaxis    Pecan nut Dermatitis    Sulfa (sulfonamide antibiotics) Itching      Past Medical History:   Diagnosis Date    Bipolar disorder     per patient report    Depression     Diabetes mellitus     Diabetes mellitus, type 2     Hx of psychiatric care     Neuropathy     per patient report    Psychiatric problem     Therapy      Past Surgical History:   Procedure Laterality Date    BACK SURGERY      EYE SURGERY         Review of Systems   Constitutional: Negative for activity change, appetite change, chills, fatigue, fever, unexpected weight change and weight loss.   HENT: Negative for congestion, hearing loss, rhinorrhea and trouble swallowing.    Eyes: Negative for blurred vision, discharge and visual disturbance.   Respiratory: Negative for cough, chest tightness, shortness of breath and wheezing.    Cardiovascular: Negative for chest pain, palpitations and leg swelling.   Gastrointestinal: Negative for abdominal pain, blood in stool, constipation, diarrhea, nausea and vomiting.   Endocrine: Negative for cold intolerance, heat intolerance, polydipsia, polyphagia and polyuria.   Genitourinary:  "Negative for difficulty urinating, dysuria, frequency, hematuria, impotence, menstrual problem, pelvic pain and vaginal discharge.   Musculoskeletal: Negative for back pain, joint swelling, myalgias and neck pain.   Skin: Positive for wound. Negative for color change, pallor and rash.   Allergic/Immunologic: Positive for environmental allergies.   Neurological: Negative for dizziness, tremors, seizures, speech difficulty, weakness and headaches.   Hematological: Negative for adenopathy. Does not bruise/bleed easily.   Psychiatric/Behavioral: Negative for agitation, confusion, dysphoric mood, sleep disturbance and suicidal ideas. The patient is nervous/anxious.       OBJECTIVE:      Vitals:    08/12/22 0753   BP: 100/68   BP Location: Right arm   Patient Position: Sitting   BP Method: Large (Manual)   Pulse: 76   Resp: 20   Temp: 98.4 °F (36.9 °C)   TempSrc: Oral   SpO2: 98%   Weight: 88.5 kg (195 lb)   Height: 5' 8" (1.727 m)     Physical Exam  Vitals and nursing note reviewed.   Constitutional:       General: She is not in acute distress.     Appearance: Normal appearance. She is well-developed. She is obese. She is not ill-appearing.   HENT:      Head: Normocephalic and atraumatic.   Eyes:      General: No scleral icterus.     Extraocular Movements: Extraocular movements intact.      Conjunctiva/sclera: Conjunctivae normal.      Pupils: Pupils are equal, round, and reactive to light.   Neck:      Thyroid: No thyroid mass or thyromegaly.      Trachea: Trachea normal.   Cardiovascular:      Rate and Rhythm: Normal rate and regular rhythm.      Heart sounds: Normal heart sounds.   Pulmonary:      Effort: Pulmonary effort is normal. No respiratory distress.      Breath sounds: Normal breath sounds. No wheezing or rales.   Abdominal:      General: Bowel sounds are normal.      Palpations: Abdomen is soft.      Tenderness: There is no abdominal tenderness.   Musculoskeletal:         General: No deformity. Normal range " of motion.      Cervical back: Normal range of motion and neck supple.      Right lower leg: No edema.      Left lower leg: No edema.   Lymphadenopathy:      Cervical: No cervical adenopathy.   Skin:     General: Skin is warm and dry.      Coloration: Skin is not jaundiced or pale.      Findings: Abrasion (multiple sites- arms, legs, knee) present. No abscess, bruising, erythema, laceration or wound.   Neurological:      Mental Status: She is alert and oriented to person, place, and time.   Psychiatric:         Attention and Perception: Attention normal.         Mood and Affect: Mood is anxious. Mood is not depressed.         Speech: Speech is rapid and pressured.         Behavior: Behavior normal.         Thought Content: Thought content normal.         Judgment: Judgment normal.        Assessment:       1. Type 2 diabetes mellitus with hyperglycemia, with long-term current use of insulin    2. Mixed hyperlipidemia    3. Thrombocytosis    4. Microalbuminuria    5. Abnormal urine    6. Abrasions of multiple sites        Plan:       Type 2 diabetes mellitus with hyperglycemia, with long-term current use of insulin  -     dulaglutide (TRULICITY) 0.75 mg/0.5 mL pen injector; ADMINISTER 0.75 MG UNDER THE SKIN EVERY 7 DAYS  Dispense: 4 pen; Refill: 5  -     metFORMIN (GLUCOPHAGE) 500 MG tablet; Take 1 tablet (500 mg total) by mouth 2 (two) times daily with meals.  Dispense: 180 tablet; Refill: 1  -     insulin (LANTUS SOLOSTAR U-100 INSULIN) glargine 100 units/mL SubQ pen; Inject 25 Units into the skin every evening.  Dispense: 9 mL; Refill: 3  -continue medications and recheck A1c in 3 months as it is significantly improved and patient has been missing doses of Trulicity; needs to schedule eye exam    Mixed hyperlipidemia   -improving     Thrombocytosis  -     Ambulatory referral/consult to Hematology / Oncology; Future; Expected date: 08/19/2022    Microalbuminuria   -unable to tolerate ACE or ARB currently      Abnormal urine  -     Urinalysis, Reflex to Urine Culture Urine, Clean Catch; Future; Expected date: 08/26/2022  -recheck in 2 weeks     Abrasions of multiple sites  -     ketorolac injection 30 mg  -     cephALEXin (KEFLEX) 250 MG capsule; Take 1 capsule (250 mg total) by mouth every 8 (eight) hours. for 7 days  Dispense: 21 capsule; Refill: 0        Follow up in about 3 months (around 11/12/2022) for f/u DM .      8/12/2022 Tracey Dobson, RIYA, FNP    This note was created using [employer] voice recognition software that occasionally misinterprets phrases or words.

## 2022-09-09 ENCOUNTER — OFFICE VISIT (OUTPATIENT)
Dept: HEMATOLOGY/ONCOLOGY | Facility: CLINIC | Age: 37
End: 2022-09-09
Payer: MEDICAID

## 2022-09-09 VITALS
HEART RATE: 93 BPM | SYSTOLIC BLOOD PRESSURE: 116 MMHG | DIASTOLIC BLOOD PRESSURE: 82 MMHG | HEIGHT: 68 IN | TEMPERATURE: 98 F | BODY MASS INDEX: 30.46 KG/M2 | RESPIRATION RATE: 18 BRPM | WEIGHT: 201 LBS

## 2022-09-09 DIAGNOSIS — D75.839 THROMBOCYTOSIS, UNSPECIFIED: Primary | ICD-10-CM

## 2022-09-09 DIAGNOSIS — D75.839 THROMBOCYTOSIS, UNSPECIFIED: ICD-10-CM

## 2022-09-09 DIAGNOSIS — D75.839 THROMBOCYTOSIS: ICD-10-CM

## 2022-09-09 PROCEDURE — 1159F PR MEDICATION LIST DOCUMENTED IN MEDICAL RECORD: ICD-10-PCS | Mod: CPTII,S$GLB,, | Performed by: INTERNAL MEDICINE

## 2022-09-09 PROCEDURE — 3008F BODY MASS INDEX DOCD: CPT | Mod: CPTII,S$GLB,, | Performed by: INTERNAL MEDICINE

## 2022-09-09 PROCEDURE — 1159F MED LIST DOCD IN RCRD: CPT | Mod: CPTII,S$GLB,, | Performed by: INTERNAL MEDICINE

## 2022-09-09 PROCEDURE — 99204 OFFICE O/P NEW MOD 45 MIN: CPT | Mod: S$GLB,,, | Performed by: INTERNAL MEDICINE

## 2022-09-09 PROCEDURE — 3074F PR MOST RECENT SYSTOLIC BLOOD PRESSURE < 130 MM HG: ICD-10-PCS | Mod: CPTII,S$GLB,, | Performed by: INTERNAL MEDICINE

## 2022-09-09 PROCEDURE — 3079F PR MOST RECENT DIASTOLIC BLOOD PRESSURE 80-89 MM HG: ICD-10-PCS | Mod: CPTII,S$GLB,, | Performed by: INTERNAL MEDICINE

## 2022-09-09 PROCEDURE — 3008F PR BODY MASS INDEX (BMI) DOCUMENTED: ICD-10-PCS | Mod: CPTII,S$GLB,, | Performed by: INTERNAL MEDICINE

## 2022-09-09 PROCEDURE — 3066F PR DOCUMENTATION OF TREATMENT FOR NEPHROPATHY: ICD-10-PCS | Mod: CPTII,S$GLB,, | Performed by: INTERNAL MEDICINE

## 2022-09-09 PROCEDURE — 99204 PR OFFICE/OUTPT VISIT, NEW, LEVL IV, 45-59 MIN: ICD-10-PCS | Mod: S$GLB,,, | Performed by: INTERNAL MEDICINE

## 2022-09-09 PROCEDURE — 3074F SYST BP LT 130 MM HG: CPT | Mod: CPTII,S$GLB,, | Performed by: INTERNAL MEDICINE

## 2022-09-09 PROCEDURE — 3051F HG A1C>EQUAL 7.0%<8.0%: CPT | Mod: CPTII,S$GLB,, | Performed by: INTERNAL MEDICINE

## 2022-09-09 PROCEDURE — 3062F PR POS MACROALBUMINURIA RESULT DOCUMENTED/REVIEW: ICD-10-PCS | Mod: CPTII,S$GLB,, | Performed by: INTERNAL MEDICINE

## 2022-09-09 PROCEDURE — 3079F DIAST BP 80-89 MM HG: CPT | Mod: CPTII,S$GLB,, | Performed by: INTERNAL MEDICINE

## 2022-09-09 PROCEDURE — 3066F NEPHROPATHY DOC TX: CPT | Mod: CPTII,S$GLB,, | Performed by: INTERNAL MEDICINE

## 2022-09-09 PROCEDURE — 3062F POS MACROALBUMINURIA REV: CPT | Mod: CPTII,S$GLB,, | Performed by: INTERNAL MEDICINE

## 2022-09-09 PROCEDURE — 3051F PR MOST RECENT HEMOGLOBIN A1C LEVEL 7.0 - < 8.0%: ICD-10-PCS | Mod: CPTII,S$GLB,, | Performed by: INTERNAL MEDICINE

## 2022-09-09 NOTE — LETTER
September 9, 2022        Tracey Dobson, FNP  [address]  [address]  [place] [address]             [facility] - Hematology Oncology  [address] [address]  Phone: [phone]  Fax: [phone]   Patient: Lizzie Saunders   MR Number: [id number]   YOB: 1985   Date of Visit: 9/9/2022       Dear Dr. Dobson:    Thank you for referring Lizzie Saunders to me for evaluation. Below are the relevant portions of my assessment and plan of care.            If you have questions, please do not hesitate to call me. I look forward to following Lizzie along with you.    Sincerely,      HECTOR Marquez MD           CC    No Recipients

## 2022-09-10 NOTE — PROGRESS NOTES
[facility] hematology Oncology in office initial encounter note     2022     Subjective:      Patient ID:   Lizzie Saunders  36 y.o. female  1985  Tracey Dobson NP  She uses [facility] for her Lab work.      Chief Complaint:   High platelet count    HPI:  36 y.o. female referred for evaluation of increased platelet count, presently at 553,000.  She denies bruising or bleeding history.  Platelet count has been increased for many years.    She has history of bipolar affective disorder with depression, hyperlipidemia, polycystic ovarian syndrome, type 2 diabetes x's 15 years, on insulin, obesity.  She has chronic migraine headaches, peripheral neuropathy, low back pain, and difficulty with gait with frequent falls.    She had an abscess drained from the soft tissue of her back overlying the spine in .  She had back surgery x1 in .    She previously smoked 1-1-1/2 packs per day and quit in 2021, she smoked for approximately 20 years.  She told me that she was a heavy drinker in the past but now drinks only occasionally of hard liquor.    She has allergy to Rosemary with swelling of the tongue and shortness of breath.    Pecans  give her mucositis and sulfa drugs give her itching, Darvocet gives her inching.    She is a  1 para 0 miscarriage 1 individual.  She previously weighed approximately 400 lb but has now come down to 201#.    Her dad was an IV drug abuser, she does not know much of her mother's history, she has 2 half sisters alive and well.      ROS:   GEN: normal without any fever, night sweats or weight loss  HEENT: See HPI  CV: normal with no CP, SOB, PND, ALVARADO or orthopnea  PULM: normal with no SOB, cough, hemoptysis, sputum or pleuritic pain  GI: normal with no abdominal pain, nausea, vomiting, constipation, diarrhea, melanotic stools, BRBPR, or hematemesis  : normal with no hematuria, dysuria  BREAST: normal with no mass, discharge, pain  SKIN: See HPI      Past Medical History:   Diagnosis Date    Bipolar disorder     per patient report    Depression     Diabetes mellitus     Diabetes mellitus, type 2     Hx of psychiatric care     Neuropathy     per patient report    Psychiatric problem     Therapy      Past Surgical History:   Procedure Laterality Date    BACK SURGERY      EYE SURGERY         Review of patient's allergies indicates:   Allergen Reactions    Rosemary Anaphylaxis    Pecan nut Dermatitis    Sulfa (sulfonamide antibiotics) Itching     Social History     Socioeconomic History    Marital status:      Spouse name: Sonja Saunders    Number of children: 0   Occupational History     Comment: currently unemployed   Tobacco Use    Smoking status: Former     Packs/day: 0.25     Types: Cigarettes     Quit date: 2020     Years since quittin.0    Smokeless tobacco: Never   Substance and Sexual Activity    Alcohol use: Yes     Comment: rare    Drug use: Yes     Types: Marijuana    Sexual activity: Yes     Comment:    Social History Narrative    Patient tearful and anxious on admit; unable to answer all admit questions at this time. 2/3/17 @ 21:45         Current Outpatient Medications:     AJOVY AUTOINJECTOR 225 mg/1.5 mL autoinjector, INJECT UNDER THE SKIN EVERY MONTH, Disp: , Rfl:     atorvastatin (LIPITOR) 40 MG tablet, TAKE 1 TABLET BY MOUTH EVERY EVENING, Disp: 90 tablet, Rfl: 1    blood sugar diagnostic Strp, To check BG two times daily, to use with insurance preferred meter, Disp: 50 strip, Rfl: 5    dulaglutide (TRULICITY) 0.75 mg/0.5 mL pen injector, ADMINISTER 0.75 MG UNDER THE SKIN EVERY 7 DAYS, Disp: 4 pen, Rfl: 5    ibuprofen (ADVIL,MOTRIN) 600 MG tablet, Take 1 tablet (600 mg total) by mouth every 6 (six) hours as needed for Pain., Disp: 60 tablet, Rfl: 0    insulin (LANTUS SOLOSTAR U-100 INSULIN) glargine 100 units/mL SubQ pen, Inject 25 Units into the skin every evening., Disp: 9 mL, Rfl: 3    lancets Misc, To check BG two  "times daily, to use with insurance preferred meter, Disp: 50 each, Rfl: 5    LIDOcaine (LIDODERM) 5 %, 1 patch daily as needed., Disp: , Rfl:     metFORMIN (GLUCOPHAGE) 500 MG tablet, Take 1 tablet (500 mg total) by mouth 2 (two) times daily with meals., Disp: 180 tablet, Rfl: 1    rizatriptan (MAXALT) 10 MG tablet, Take by mouth., Disp: , Rfl:     blood-glucose meter kit, To check BG two  times daily, to use with insurance preferred meter, Disp: 1 each, Rfl: 0          Objective:   Vitals:  Blood pressure 116/82, pulse 93, temperature 98.2 °F (36.8 °C), resp. rate 18, height 5' 8" (1.727 m), weight 91.2 kg (201 lb).    Physical Examination:   GEN: no apparent distress, comfortable  HEAD: atraumatic and normocephalic  EYES: no pallor, no icterus  ENT: no pharyngeal erythema, external ears WNL; no nasal discharge  NECK: no masses, thyroid normal, trachea midline, no LAD/LN's, supple  CV: RRR with no murmur; normal pulse; normal S1 and S2; no pedal edema  CHEST: Normal respiratory effort; CTAB; normal breath sounds; no wheeze or crackles  ABDOM: nontender and nondistended; soft; no rebound/guarding, L/S NP  MUSC/Skeletal: ROM normal; no crepitus; extensive post op change at lumbar area  EXTREM: no clubbing, cyanosis, inflammation or swelling  SKIN: no rashes, lesions, ulcers, petechiae   : no cvat  NEURO: grossly intact; motor/sensory WNL; no tremors  PSYCH: normal mood, affect and behavior  LYMPH: normal cervical, supraclavicular, axillary and groin LN's  BREASTS: L & R breast NT, no D/C, no palpable mass      Labs:   Lab Results   Component Value Date    WBC 8.9 08/10/2022    HGB 13.3 08/10/2022    HCT 40.5 08/10/2022    MCV 87 08/10/2022     (H) 08/10/2022    CMP  Sodium   Date Value Ref Range Status   08/10/2022 139 134 - 144 mmol/L Final     Potassium   Date Value Ref Range Status   08/10/2022 4.9 3.5 - 5.2 mmol/L Final     Chloride   Date Value Ref Range Status   08/10/2022 100 96 - 106 mmol/L Final "     CO2   Date Value Ref Range Status   08/10/2022 26 20 - 29 mmol/L Final     Glucose   Date Value Ref Range Status   08/10/2022 241 (H) 65 - 99 mg/dL Final     BUN   Date Value Ref Range Status   08/10/2022 10 6 - 20 mg/dL Final     Creatinine   Date Value Ref Range Status   08/10/2022 0.56 (L) 0.57 - 1.00 mg/dL Final     Calcium   Date Value Ref Range Status   08/10/2022 10.1 8.7 - 10.2 mg/dL Final     Total Protein   Date Value Ref Range Status   07/14/2022 7.1 6.0 - 8.4 g/dL Final     Albumin   Date Value Ref Range Status   08/10/2022 4.4 3.8 - 4.8 g/dL Final   07/14/2022 3.9 3.5 - 5.2 g/dL Final     Total Bilirubin   Date Value Ref Range Status   08/10/2022 0.4 0.0 - 1.2 mg/dL Final     Alkaline Phosphatase   Date Value Ref Range Status   07/14/2022 49 (L) 55 - 135 U/L Final     AST   Date Value Ref Range Status   08/10/2022 10 0 - 40 IU/L Final     ALT   Date Value Ref Range Status   08/10/2022 16 0 - 32 IU/L Final     Anion Gap   Date Value Ref Range Status   07/14/2022 11 8 - 16 mmol/L Final     eGFR if    Date Value Ref Range Status   07/14/2022 >60.0 >60 mL/min/1.73 m^2 Final     eGFR if non    Date Value Ref Range Status   07/14/2022 >60.0 >60 mL/min/1.73 m^2 Final     Comment:     Calculation used to obtain the estimated glomerular filtration  rate (eGFR) is the CKD-EPI equation.          Assessment:   (1) 36 y.o. female with chronic thrombocytosis at 550,000 to 600,000 range for several years.    (2)Differential Dx includes Myeloproliferative Syndrome, including Essential Thrombocytosis.    (3)The differential Dx of Secondary Thrombocytosis is broad and includes anxiety,  Medication effect.    Lab orders have been placed at [facility] for 9/14/22.  Will make a decision regards bone marrow examination on RTC.    Therapeutic options include observation, hydrea, agrylin, ASA.    RTC 2-3 weeks.

## 2022-11-22 ENCOUNTER — TELEPHONE (OUTPATIENT)
Dept: HEMATOLOGY/ONCOLOGY | Facility: CLINIC | Age: 37
End: 2022-11-22

## 2022-11-22 ENCOUNTER — OFFICE VISIT (OUTPATIENT)
Dept: FAMILY MEDICINE | Facility: CLINIC | Age: 37
End: 2022-11-22
Payer: MEDICAID

## 2022-11-22 VITALS
WEIGHT: 199.63 LBS | DIASTOLIC BLOOD PRESSURE: 70 MMHG | RESPIRATION RATE: 18 BRPM | HEART RATE: 94 BPM | BODY MASS INDEX: 30.25 KG/M2 | SYSTOLIC BLOOD PRESSURE: 110 MMHG | HEIGHT: 68 IN | OXYGEN SATURATION: 100 % | TEMPERATURE: 98 F

## 2022-11-22 DIAGNOSIS — E66.09 CLASS 1 OBESITY DUE TO EXCESS CALORIES WITHOUT SERIOUS COMORBIDITY WITH BODY MASS INDEX (BMI) OF 30.0 TO 30.9 IN ADULT: ICD-10-CM

## 2022-11-22 DIAGNOSIS — R80.9 MICROALBUMINURIA: ICD-10-CM

## 2022-11-22 DIAGNOSIS — E11.65 TYPE 2 DIABETES MELLITUS WITH HYPERGLYCEMIA, WITH LONG-TERM CURRENT USE OF INSULIN: Primary | ICD-10-CM

## 2022-11-22 DIAGNOSIS — D75.839 THROMBOCYTOSIS, UNSPECIFIED: Primary | ICD-10-CM

## 2022-11-22 DIAGNOSIS — Z79.4 TYPE 2 DIABETES MELLITUS WITH HYPERGLYCEMIA, WITH LONG-TERM CURRENT USE OF INSULIN: Primary | ICD-10-CM

## 2022-11-22 DIAGNOSIS — E78.2 MIXED HYPERLIPIDEMIA: ICD-10-CM

## 2022-11-22 LAB — HBA1C MFR BLD: 7.4 %

## 2022-11-22 PROCEDURE — 3074F PR MOST RECENT SYSTOLIC BLOOD PRESSURE < 130 MM HG: ICD-10-PCS | Mod: CPTII,,, | Performed by: NURSE PRACTITIONER

## 2022-11-22 PROCEDURE — 3008F BODY MASS INDEX DOCD: CPT | Mod: CPTII,,, | Performed by: NURSE PRACTITIONER

## 2022-11-22 PROCEDURE — 3074F SYST BP LT 130 MM HG: CPT | Mod: CPTII,,, | Performed by: NURSE PRACTITIONER

## 2022-11-22 PROCEDURE — 3051F HG A1C>EQUAL 7.0%<8.0%: CPT | Mod: CPTII,,, | Performed by: NURSE PRACTITIONER

## 2022-11-22 PROCEDURE — 3062F PR POS MACROALBUMINURIA RESULT DOCUMENTED/REVIEW: ICD-10-PCS | Mod: CPTII,,, | Performed by: NURSE PRACTITIONER

## 2022-11-22 PROCEDURE — 3078F DIAST BP <80 MM HG: CPT | Mod: CPTII,,, | Performed by: NURSE PRACTITIONER

## 2022-11-22 PROCEDURE — 99215 OFFICE O/P EST HI 40 MIN: CPT | Performed by: NURSE PRACTITIONER

## 2022-11-22 PROCEDURE — 1160F PR REVIEW ALL MEDS BY PRESCRIBER/CLIN PHARMACIST DOCUMENTED: ICD-10-PCS | Mod: CPTII,,, | Performed by: NURSE PRACTITIONER

## 2022-11-22 PROCEDURE — 1160F RVW MEDS BY RX/DR IN RCRD: CPT | Mod: CPTII,,, | Performed by: NURSE PRACTITIONER

## 2022-11-22 PROCEDURE — 99214 PR OFFICE/OUTPT VISIT, EST, LEVL IV, 30-39 MIN: ICD-10-PCS | Mod: S$PBB,,, | Performed by: NURSE PRACTITIONER

## 2022-11-22 PROCEDURE — 3008F PR BODY MASS INDEX (BMI) DOCUMENTED: ICD-10-PCS | Mod: CPTII,,, | Performed by: NURSE PRACTITIONER

## 2022-11-22 PROCEDURE — 3066F NEPHROPATHY DOC TX: CPT | Mod: CPTII,,, | Performed by: NURSE PRACTITIONER

## 2022-11-22 PROCEDURE — 99214 OFFICE O/P EST MOD 30 MIN: CPT | Mod: S$PBB,,, | Performed by: NURSE PRACTITIONER

## 2022-11-22 PROCEDURE — 3062F POS MACROALBUMINURIA REV: CPT | Mod: CPTII,,, | Performed by: NURSE PRACTITIONER

## 2022-11-22 PROCEDURE — 3051F PR MOST RECENT HEMOGLOBIN A1C LEVEL 7.0 - < 8.0%: ICD-10-PCS | Mod: CPTII,,, | Performed by: NURSE PRACTITIONER

## 2022-11-22 PROCEDURE — 1159F PR MEDICATION LIST DOCUMENTED IN MEDICAL RECORD: ICD-10-PCS | Mod: CPTII,,, | Performed by: NURSE PRACTITIONER

## 2022-11-22 PROCEDURE — 83036 HEMOGLOBIN GLYCOSYLATED A1C: CPT | Mod: PBBFAC | Performed by: NURSE PRACTITIONER

## 2022-11-22 PROCEDURE — 3078F PR MOST RECENT DIASTOLIC BLOOD PRESSURE < 80 MM HG: ICD-10-PCS | Mod: CPTII,,, | Performed by: NURSE PRACTITIONER

## 2022-11-22 PROCEDURE — 3066F PR DOCUMENTATION OF TREATMENT FOR NEPHROPATHY: ICD-10-PCS | Mod: CPTII,,, | Performed by: NURSE PRACTITIONER

## 2022-11-22 PROCEDURE — 1159F MED LIST DOCD IN RCRD: CPT | Mod: CPTII,,, | Performed by: NURSE PRACTITIONER

## 2022-11-22 RX ORDER — INSULIN PUMP SYRINGE, 3 ML
EACH MISCELLANEOUS
Qty: 1 EACH | Refills: 0 | Status: SHIPPED | OUTPATIENT
Start: 2022-11-22 | End: 2023-11-22

## 2022-11-22 RX ORDER — INSULIN GLARGINE 100 [IU]/ML
25 INJECTION, SOLUTION SUBCUTANEOUS NIGHTLY
Qty: 9 ML | Refills: 3 | Status: ON HOLD | OUTPATIENT
Start: 2022-11-22 | End: 2023-05-11 | Stop reason: CLARIF

## 2022-11-22 RX ORDER — ATORVASTATIN CALCIUM 40 MG/1
40 TABLET, FILM COATED ORAL NIGHTLY
Qty: 90 TABLET | Refills: 1 | Status: ON HOLD | OUTPATIENT
Start: 2022-11-22 | End: 2023-05-11 | Stop reason: CLARIF

## 2022-11-22 RX ORDER — LANCETS
EACH MISCELLANEOUS
Qty: 50 EACH | Refills: 5 | Status: SHIPPED | OUTPATIENT
Start: 2022-11-22

## 2022-11-22 RX ORDER — DULAGLUTIDE 1.5 MG/.5ML
1.5 INJECTION, SOLUTION SUBCUTANEOUS
Qty: 4 PEN | Refills: 5 | Status: SHIPPED | OUTPATIENT
Start: 2022-11-22 | End: 2023-05-30

## 2022-11-22 NOTE — PROGRESS NOTES
SUBJECTIVE:      Patient ID: Lizzie Saunders is a 36 y.o. female.    Chief Complaint: Diabetes    Lizzie is here for follow up on diabetes.  Taking her medications as prescribed daily without side effects or complaints.  A1c has dropped to 7.4- no weight change.  She is checking her blood sugar intermittently and is seeing a decrease in her numbers.  She did miss her eye exam appointment and plans to reschedule soon- discussed importance of this screening.     She has seen hematology for thrombocytosis since last visit- labs and further eval pending.     Diabetes  She presents for her follow-up diabetic visit. She has type 2 diabetes mellitus. No MedicAlert identification noted. The initial diagnosis of diabetes was made 15 years ago. Her disease course has been improving. Hypoglycemia symptoms include nervousness/anxiousness. Pertinent negatives for hypoglycemia include no confusion, dizziness, headaches, hunger, mood changes, pallor, seizures, speech difficulty or tremors. Associated symptoms include foot paresthesias. Pertinent negatives for diabetes include no blurred vision, no chest pain, no fatigue, no foot ulcerations, no polydipsia, no polyphagia, no polyuria, no visual change, no weakness and no weight loss. Pertinent negatives for hypoglycemia complications include no blackouts, no hospitalization, no nocturnal hypoglycemia, no required assistance and no required glucagon injection. Symptoms are improving. Diabetic complications include peripheral neuropathy and retinopathy (mild- reviewed eye exam ). Pertinent negatives for diabetic complications include no autonomic neuropathy, CVA, impotence or nephropathy. Risk factors for coronary artery disease include dyslipidemia, family history, obesity, sedentary lifestyle, stress, tobacco exposure and diabetes mellitus. Current diabetic treatment includes diet, insulin injections and oral agent (monotherapy) (Trulicity). She is compliant with treatment  most of the time. She is currently taking insulin pre-breakfast and at bedtime. Insulin injections are given by patient. Rotation sites for injection include the abdominal wall. Her weight is fluctuating minimally. She is following a generally healthy diet. When asked about meal planning, she reported none. She has not had a previous visit with a dietitian. She participates in exercise intermittently. There is no compliance with monitoring of blood glucose. Her home blood glucose trend is decreasing steadily. Her breakfast blood glucose range is generally 140-180 mg/dl. An ACE inhibitor/angiotensin II receptor blocker is not being taken (BP too low ). She does not see a podiatrist.Eye exam is not current.   Hyperlipidemia  This is a chronic problem. The current episode started more than 1 year ago. The problem is controlled. Recent lipid tests were reviewed and are normal. Exacerbating diseases include diabetes and obesity. She has no history of chronic renal disease, hypothyroidism or liver disease. Factors aggravating her hyperlipidemia include smoking. Pertinent negatives include no chest pain, leg pain, myalgias or shortness of breath. Current antihyperlipidemic treatment includes statins and diet change. The current treatment provides moderate improvement of lipids. Compliance problems include adherence to exercise and adherence to diet.  Risk factors for coronary artery disease include diabetes mellitus, dyslipidemia, obesity and a sedentary lifestyle.     Family History   Problem Relation Age of Onset    Bipolar disorder Mother     Bipolar disorder Father     Cancer Paternal Grandfather       Social History     Socioeconomic History    Marital status:      Spouse name: Sonja Saunders    Number of children: 0   Occupational History     Comment: currently unemployed   Tobacco Use    Smoking status: Former     Packs/day: 0.25     Types: Cigarettes     Quit date: 2020     Years since quittin.2     Smokeless tobacco: Never   Substance and Sexual Activity    Alcohol use: Yes     Comment: rare    Drug use: Yes     Types: Marijuana    Sexual activity: Yes     Comment:    Social History Narrative    Patient tearful and anxious on admit; unable to answer all admit questions at this time. 2/3/17 @ 21:45     Current Outpatient Medications   Medication Sig Dispense Refill    AJOVY AUTOINJECTOR 225 mg/1.5 mL autoinjector INJECT UNDER THE SKIN EVERY MONTH      ibuprofen (ADVIL,MOTRIN) 600 MG tablet Take 1 tablet (600 mg total) by mouth every 6 (six) hours as needed for Pain. 60 tablet 0    LIDOcaine (LIDODERM) 5 % 1 patch daily as needed.      metFORMIN (GLUCOPHAGE) 500 MG tablet Take 1 tablet (500 mg total) by mouth 2 (two) times daily with meals. 180 tablet 1    rizatriptan (MAXALT) 10 MG tablet Take by mouth.      atorvastatin (LIPITOR) 40 MG tablet Take 1 tablet (40 mg total) by mouth every evening. 90 tablet 1    blood sugar diagnostic Strp To check BG two times daily, to use with insurance preferred meter 50 strip 5    blood-glucose meter kit To check BG two  times daily, to use with insurance preferred meter 1 each 0    dulaglutide (TRULICITY) 1.5 mg/0.5 mL pen injector Inject 1.5 mg into the skin every 7 days. 4 pen 5    insulin (LANTUS SOLOSTAR U-100 INSULIN) glargine 100 units/mL SubQ pen Inject 25 Units into the skin every evening. 9 mL 3    lancets Misc To check BG two times daily, to use with insurance preferred meter 50 each 5     No current facility-administered medications for this visit.     Review of patient's allergies indicates:   Allergen Reactions    Rosemary Anaphylaxis    Pecan nut Dermatitis    Sulfa (sulfonamide antibiotics) Itching      Past Medical History:   Diagnosis Date    Bipolar disorder     per patient report    Depression     Diabetes mellitus     Diabetes mellitus, type 2     Hx of psychiatric care     Neuropathy     per patient report    Psychiatric problem     Therapy   "    Past Surgical History:   Procedure Laterality Date    BACK SURGERY      EYE SURGERY         Review of Systems   Constitutional:  Negative for activity change, appetite change, chills, fatigue, fever, unexpected weight change and weight loss.   HENT:  Negative for congestion, hearing loss, rhinorrhea and trouble swallowing.    Eyes:  Negative for blurred vision, discharge and visual disturbance.   Respiratory:  Negative for cough, chest tightness, shortness of breath and wheezing.    Cardiovascular:  Negative for chest pain, palpitations and leg swelling.   Gastrointestinal:  Negative for abdominal pain, blood in stool, constipation, diarrhea, nausea and vomiting.   Endocrine: Negative for cold intolerance, heat intolerance, polydipsia, polyphagia and polyuria.   Genitourinary:  Negative for difficulty urinating, dysuria, frequency, hematuria, impotence, menstrual problem, pelvic pain and vaginal discharge.   Musculoskeletal:  Positive for back pain (chronic). Negative for arthralgias, joint swelling, myalgias and neck pain.   Skin:  Negative for color change, pallor, rash and wound.   Allergic/Immunologic: Positive for environmental allergies.   Neurological:  Negative for dizziness, tremors, seizures, speech difficulty, weakness and headaches.   Hematological:  Negative for adenopathy. Does not bruise/bleed easily.   Psychiatric/Behavioral:  Negative for agitation, confusion, dysphoric mood, sleep disturbance and suicidal ideas. The patient is nervous/anxious.     OBJECTIVE:      Vitals:    11/22/22 0826   BP: 110/70   BP Location: Left arm   Patient Position: Sitting   BP Method: Medium (Manual)   Pulse: 94   Resp: 18   Temp: 98.4 °F (36.9 °C)   TempSrc: Oral   SpO2: 100%   Weight: 90.5 kg (199 lb 9.6 oz)   Height: 5' 8" (1.727 m)     Physical Exam  Vitals and nursing note reviewed.   Constitutional:       General: She is not in acute distress.     Appearance: Normal appearance. She is well-developed. She is " obese. She is not ill-appearing.   HENT:      Head: Normocephalic and atraumatic.   Eyes:      General: No scleral icterus.     Conjunctiva/sclera: Conjunctivae normal.      Pupils: Pupils are equal, round, and reactive to light.   Neck:      Thyroid: No thyroid mass or thyromegaly.      Trachea: Trachea normal.   Cardiovascular:      Rate and Rhythm: Normal rate and regular rhythm.      Heart sounds: Normal heart sounds.   Pulmonary:      Effort: Pulmonary effort is normal. No respiratory distress.      Breath sounds: Normal breath sounds. No wheezing or rales.   Abdominal:      General: Bowel sounds are normal.      Palpations: Abdomen is soft.   Musculoskeletal:         General: Normal range of motion.      Cervical back: Normal range of motion and neck supple.      Right lower leg: No edema.      Left lower leg: No edema.   Lymphadenopathy:      Cervical: No cervical adenopathy.   Skin:     General: Skin is warm and dry.      Coloration: Skin is not jaundiced or pale.   Neurological:      Mental Status: She is alert and oriented to person, place, and time.   Psychiatric:         Attention and Perception: Attention normal.         Mood and Affect: Mood is anxious. Mood is not depressed.         Speech: Speech is rapid and pressured.         Behavior: Behavior normal.         Thought Content: Thought content normal.         Judgment: Judgment normal.      Assessment:       1. Type 2 diabetes mellitus with hyperglycemia, with long-term current use of insulin    2. Mixed hyperlipidemia    3. Microalbuminuria    4. Class 1 obesity due to excess calories without serious comorbidity with body mass index (BMI) of 30.0 to 30.9 in adult          Plan:       Type 2 diabetes mellitus with hyperglycemia, with long-term current use of insulin  -     Hemoglobin A1C, POCT (7.4 -improving)   -     dulaglutide (TRULICITY) 1.5 mg/0.5 mL pen injector; Inject 1.5 mg into the skin every 7 days.  Dispense: 4 pen; Refill: 5  -      Comprehensive Metabolic Panel; Future; Expected date: 02/24/2023  -     Lipid Panel; Future; Expected date: 02/24/2023  -     Urinalysis; Future; Expected date: 02/24/2023  -     Microalbumin/Creatinine Ratio, Urine; Future; Expected date: 02/24/2023  -     Hemoglobin A1C; Future; Expected date: 02/24/2023  -     blood-glucose meter kit; To check BG two  times daily, to use with insurance preferred meter  Dispense: 1 each; Refill: 0  -     blood sugar diagnostic Strp; To check BG two times daily, to use with insurance preferred meter  Dispense: 50 strip; Refill: 5  -     lancets Misc; To check BG two times daily, to use with insurance preferred meter  Dispense: 50 each; Refill: 5  -     insulin (LANTUS SOLOSTAR U-100 INSULIN) glargine 100 units/mL SubQ pen; Inject 25 Units into the skin every evening.  Dispense: 9 mL; Refill: 3  -     Ambulatory referral/consult to Ophthalmology; Future; Expected date: 11/29/2022   -increase Trulciity to 1.5 weekly; NEED eye exam- pt to schedule; recheck labs in 3 mo; come in to clinic for FBS < 120 for insulin adjustment    Mixed hyperlipidemia  -     Comprehensive Metabolic Panel; Future; Expected date: 02/24/2023  -     Lipid Panel; Future; Expected date: 02/24/2023  -     atorvastatin (LIPITOR) 40 MG tablet; Take 1 tablet (40 mg total) by mouth every evening.  Dispense: 90 tablet; Refill: 1    Microalbuminuria  -     Urinalysis; Future; Expected date: 02/24/2023  -     Microalbumin/Creatinine Ratio, Urine; Future; Expected date: 02/24/2023    Class 1 obesity due to excess calories without serious comorbidity with body mass index (BMI) of 30.0 to 30.9 in adult      Follow up in about 3 months (around 2/22/2023) for f/u DM, HLD .      11/22/2022 Tracey Dobson, APRN, FNP    This note was created using [employer] voice recognition software that occasionally misinterprets phrases or words.

## 2023-01-10 ENCOUNTER — TELEPHONE (OUTPATIENT)
Dept: HEMATOLOGY/ONCOLOGY | Facility: CLINIC | Age: 38
End: 2023-01-10

## 2023-01-12 ENCOUNTER — OFFICE VISIT (OUTPATIENT)
Dept: HEMATOLOGY/ONCOLOGY | Facility: CLINIC | Age: 38
End: 2023-01-12
Payer: MEDICAID

## 2023-01-12 VITALS
RESPIRATION RATE: 18 BRPM | HEART RATE: 88 BPM | DIASTOLIC BLOOD PRESSURE: 85 MMHG | HEIGHT: 67 IN | BODY MASS INDEX: 30.71 KG/M2 | TEMPERATURE: 97 F | SYSTOLIC BLOOD PRESSURE: 125 MMHG | WEIGHT: 195.69 LBS

## 2023-01-12 DIAGNOSIS — D75.839 THROMBOCYTOSIS: Primary | ICD-10-CM

## 2023-01-12 DIAGNOSIS — D75.839 THROMBOCYTOSIS, UNSPECIFIED: ICD-10-CM

## 2023-01-12 PROCEDURE — 99214 OFFICE O/P EST MOD 30 MIN: CPT | Mod: S$GLB,,, | Performed by: INTERNAL MEDICINE

## 2023-01-12 PROCEDURE — 3074F PR MOST RECENT SYSTOLIC BLOOD PRESSURE < 130 MM HG: ICD-10-PCS | Mod: CPTII,S$GLB,, | Performed by: INTERNAL MEDICINE

## 2023-01-12 PROCEDURE — 1159F PR MEDICATION LIST DOCUMENTED IN MEDICAL RECORD: ICD-10-PCS | Mod: CPTII,S$GLB,, | Performed by: INTERNAL MEDICINE

## 2023-01-12 PROCEDURE — 3079F PR MOST RECENT DIASTOLIC BLOOD PRESSURE 80-89 MM HG: ICD-10-PCS | Mod: CPTII,S$GLB,, | Performed by: INTERNAL MEDICINE

## 2023-01-12 PROCEDURE — 99214 PR OFFICE/OUTPT VISIT, EST, LEVL IV, 30-39 MIN: ICD-10-PCS | Mod: S$GLB,,, | Performed by: INTERNAL MEDICINE

## 2023-01-12 PROCEDURE — 3008F BODY MASS INDEX DOCD: CPT | Mod: CPTII,S$GLB,, | Performed by: INTERNAL MEDICINE

## 2023-01-12 PROCEDURE — 3008F PR BODY MASS INDEX (BMI) DOCUMENTED: ICD-10-PCS | Mod: CPTII,S$GLB,, | Performed by: INTERNAL MEDICINE

## 2023-01-12 PROCEDURE — 1159F MED LIST DOCD IN RCRD: CPT | Mod: CPTII,S$GLB,, | Performed by: INTERNAL MEDICINE

## 2023-01-12 PROCEDURE — 3074F SYST BP LT 130 MM HG: CPT | Mod: CPTII,S$GLB,, | Performed by: INTERNAL MEDICINE

## 2023-01-12 PROCEDURE — 3079F DIAST BP 80-89 MM HG: CPT | Mod: CPTII,S$GLB,, | Performed by: INTERNAL MEDICINE

## 2023-01-12 RX ORDER — ASPIRIN 81 MG/1
81 TABLET ORAL DAILY
Qty: 30 TABLET | Refills: 12 | Status: ON HOLD | OUTPATIENT
Start: 2023-01-12 | End: 2023-05-11 | Stop reason: HOSPADM

## 2023-01-12 RX ORDER — FOLIC ACID 1 MG/1
1 TABLET ORAL DAILY
Qty: 100 TABLET | Refills: 12 | Status: SHIPPED | OUTPATIENT
Start: 2023-01-12 | End: 2023-02-23

## 2023-01-15 LAB
ANALYSIS AND GATING STRATEGY: NORMAL
ANNOTATION COMMENT IMP: NORMAL
ASSESSMENT OF LEUKOCYTES: NORMAL
BASOPHILS # BLD AUTO: 0.1 X10E3/UL (ref 0–0.2)
BASOPHILS NFR BLD AUTO: 1 %
CLINICAL INFO: NORMAL
EOSINOPHIL # BLD AUTO: 0.2 X10E3/UL (ref 0–0.4)
EOSINOPHIL NFR BLD AUTO: 2 %
EPO SERPL-ACNC: 5.6 MIU/ML (ref 2.6–18.5)
ERYTHROCYTE [DISTWIDTH] IN BLOOD BY AUTOMATED COUNT: 12.6 % (ref 11.7–15.4)
FERRITIN SERPL-MCNC: 42 NG/ML (ref 15–150)
FOLATE SERPL-MCNC: 4.4 NG/ML
HCT VFR BLD AUTO: 44.6 % (ref 34–46.6)
HGB BLD-MCNC: 14.5 G/DL (ref 11.1–15.9)
IMM GRANULOCYTES # BLD AUTO: 0 X10E3/UL (ref 0–0.1)
IMM GRANULOCYTES NFR BLD AUTO: 0 %
IMMUNOPHENOTYPING STUDY: NORMAL
LABORATORY COMMENT REPORT: NORMAL
LYMPHOCYTES # BLD AUTO: 2 X10E3/UL (ref 0.7–3.1)
LYMPHOCYTES NFR BLD AUTO: 20 %
MCH RBC QN AUTO: 28 PG (ref 26.6–33)
MCHC RBC AUTO-ENTMCNC: 32.5 G/DL (ref 31.5–35.7)
MCV RBC AUTO: 86 FL (ref 79–97)
MONOCYTES # BLD AUTO: 0.7 X10E3/UL (ref 0.1–0.9)
MONOCYTES NFR BLD AUTO: 7 %
NEUTROPHILS # BLD AUTO: 6.7 X10E3/UL (ref 1.4–7)
NEUTROPHILS NFR BLD AUTO: 70 %
PATH INTERP SPEC-IMP: NORMAL
PATHOLOGIST NAME: NORMAL
PLATELET # BLD AUTO: 579 X10E3/UL (ref 150–450)
PYRIDOXAL PHOS SERPL-MCNC: 11.8 UG/L (ref 3.4–65.2)
RBC # BLD AUTO: 5.18 X10E6/UL (ref 3.77–5.28)
RETICS/RBC NFR AUTO: 1.2 % (ref 0.6–2.6)
SPECIMEN SOURCE: NORMAL
TSH SERPL DL<=0.005 MIU/L-ACNC: 1.01 UIU/ML (ref 0.45–4.5)
VIABLE CELLS NFR SPEC: NORMAL %
VIT B12 SERPL-MCNC: 526 PG/ML (ref 232–1245)
WBC # BLD AUTO: 9.7 X10E3/UL (ref 3.4–10.8)

## 2023-01-15 NOTE — PROGRESS NOTES
[facility] hematology Oncology in office Subsequent encounter note     23  Subjective:      Patient ID:   Lizzie Saunders  37 y.o. female  1985  Tracey Dobson NP  She uses [facility] for her Lab work.      Chief Complaint:   High platelet count    HPI:  37 y.o. female referred for evaluation of increased platelet count, presently at 553,000.  She denies bruising or bleeding history.  Platelet count has been increased for many years.    She has history of bipolar affective disorder with depression, hyperlipidemia, polycystic ovarian syndrome, type 2 diabetes x's 15 years, on insulin, obesity.  She has chronic migraine headaches, peripheral neuropathy, low back pain, and difficulty with gait with frequent falls.    She had an abscess drained from the soft tissue of her back overlying the spine in .  She had back surgery x1 in .    She previously smoked 1-1-1/2 packs per day and quit in 2021, she smoked for approximately 20 years.  She told me that she was a heavy drinker in the past but now drinks only occasionally of hard liquor.    She has allergy to Rosemary with swelling of the tongue and shortness of breath.    Pecans  give her mucositis and sulfa drugs give her itching, Darvocet gives her inching.    She is a  1 para 0 miscarriage 1 individual.  She previously weighed approximately 400 lb but has now come down to 201#.    Her dad was an IV drug abuser, she does not know much of her mother's history, she has 2 half sisters alive and well.      ROS:   GEN: normal without any fever, night sweats or weight loss  HEENT: See HPI  CV: normal with no CP, SOB, PND, ALVARADO or orthopnea  PULM: normal with no SOB, cough, hemoptysis, sputum or pleuritic pain  GI: normal with no abdominal pain, nausea, vomiting, constipation, diarrhea, melanotic stools, BRBPR, or hematemesis  : normal with no hematuria, dysuria  BREAST: normal with no mass, discharge, pain  SKIN: See HPI     Past Medical  History:   Diagnosis Date    Bipolar disorder     per patient report    Depression     Diabetes mellitus     Diabetes mellitus, type 2     Hx of psychiatric care     Neuropathy     per patient report    Psychiatric problem     Therapy      Past Surgical History:   Procedure Laterality Date    BACK SURGERY      EYE SURGERY         Review of patient's allergies indicates:   Allergen Reactions    Rosemary Anaphylaxis    Pecan nut Dermatitis    Sulfa (sulfonamide antibiotics) Itching     Social History     Socioeconomic History    Marital status:      Spouse name: Sonja Saunders    Number of children: 0   Occupational History     Comment: currently unemployed   Tobacco Use    Smoking status: Former     Packs/day: 0.25     Types: Cigarettes     Quit date: 2020     Years since quittin.3    Smokeless tobacco: Never   Substance and Sexual Activity    Alcohol use: Yes     Comment: rare    Drug use: Yes     Types: Marijuana    Sexual activity: Yes     Comment:    Social History Narrative    Patient tearful and anxious on admit; unable to answer all admit questions at this time. 2/3/17 @ 21:45         Current Outpatient Medications:     AJOVY AUTOINJECTOR 225 mg/1.5 mL autoinjector, INJECT UNDER THE SKIN EVERY MONTH, Disp: , Rfl:     atorvastatin (LIPITOR) 40 MG tablet, Take 1 tablet (40 mg total) by mouth every evening., Disp: 90 tablet, Rfl: 1    blood sugar diagnostic Strp, To check BG two times daily, to use with insurance preferred meter, Disp: 50 strip, Rfl: 5    blood-glucose meter kit, To check BG two  times daily, to use with insurance preferred meter, Disp: 1 each, Rfl: 0    dulaglutide (TRULICITY) 1.5 mg/0.5 mL pen injector, Inject 1.5 mg into the skin every 7 days., Disp: 4 pen, Rfl: 5    ibuprofen (ADVIL,MOTRIN) 600 MG tablet, Take 1 tablet (600 mg total) by mouth every 6 (six) hours as needed for Pain., Disp: 60 tablet, Rfl: 0    insulin (LANTUS SOLOSTAR U-100 INSULIN) glargine 100 units/mL  "SubQ pen, Inject 25 Units into the skin every evening., Disp: 9 mL, Rfl: 3    lancets Misc, To check BG two times daily, to use with insurance preferred meter, Disp: 50 each, Rfl: 5    LIDOcaine (LIDODERM) 5 %, 1 patch daily as needed., Disp: , Rfl:     metFORMIN (GLUCOPHAGE) 500 MG tablet, Take 1 tablet (500 mg total) by mouth 2 (two) times daily with meals., Disp: 180 tablet, Rfl: 1    rizatriptan (MAXALT) 10 MG tablet, Take by mouth., Disp: , Rfl:     aspirin (ECOTRIN) 81 MG EC tablet, Take 1 tablet (81 mg total) by mouth once daily., Disp: 30 tablet, Rfl: 12    folic acid (FOLVITE) 1 MG tablet, Take 1 tablet (1 mg total) by mouth once daily., Disp: 100 tablet, Rfl: 12          Objective:   Vitals:  Blood pressure 125/85, pulse 88, temperature 97.4 °F (36.3 °C), resp. rate 18, height 5' 7" (1.702 m), weight 88.8 kg (195 lb 11.2 oz).    Physical Examination:   GEN: no apparent distress, comfortable  HEAD: atraumatic and normocephalic  EYES: no pallor, no icterus  ENT: no pharyngeal erythema, external ears WNL; no nasal discharge  NECK: no masses, thyroid normal, trachea midline, no LAD/LN's, supple  CV: RRR with no murmur; normal pulse; normal S1 and S2; no pedal edema  CHEST: Normal respiratory effort; CTAB; normal breath sounds; no wheeze or crackles  ABDOM: nontender and nondistended; soft; no rebound/guarding, L/S NP  MUSC/Skeletal: ROM normal; no crepitus; extensive post op change at lumbar area  EXTREM: no clubbing, cyanosis, inflammation or swelling  SKIN: no rashes, lesions, ulcers, petechiae   : no cvat  NEURO: grossly intact; motor/sensory WNL; no tremors  PSYCH: normal mood, affect and behavior  LYMPH: normal cervical, supraclavicular, axillary and groin LN's  BREASTS: L & R breast NT, no D/C, no palpable mass      Labs:   Lab Results   Component Value Date    WBC 9.7 01/11/2023    HGB 14.5 01/11/2023    HCT 44.6 01/11/2023    MCV 86 01/11/2023     (H) 01/11/2023    CMP  Sodium   Date Value Ref " Range Status   08/10/2022 139 134 - 144 mmol/L Final     Potassium   Date Value Ref Range Status   08/10/2022 4.9 3.5 - 5.2 mmol/L Final     Chloride   Date Value Ref Range Status   08/10/2022 100 96 - 106 mmol/L Final     CO2   Date Value Ref Range Status   08/10/2022 26 20 - 29 mmol/L Final     Glucose   Date Value Ref Range Status   08/10/2022 241 (H) 65 - 99 mg/dL Final     BUN   Date Value Ref Range Status   08/10/2022 10 6 - 20 mg/dL Final     Creatinine   Date Value Ref Range Status   08/10/2022 0.56 (L) 0.57 - 1.00 mg/dL Final     Calcium   Date Value Ref Range Status   08/10/2022 10.1 8.7 - 10.2 mg/dL Final     Total Protein   Date Value Ref Range Status   07/14/2022 7.1 6.0 - 8.4 g/dL Final     Albumin   Date Value Ref Range Status   08/10/2022 4.4 3.8 - 4.8 g/dL Final   07/14/2022 3.9 3.5 - 5.2 g/dL Final     Total Bilirubin   Date Value Ref Range Status   08/10/2022 0.4 0.0 - 1.2 mg/dL Final     Alkaline Phosphatase   Date Value Ref Range Status   07/14/2022 49 (L) 55 - 135 U/L Final     AST   Date Value Ref Range Status   08/10/2022 10 0 - 40 IU/L Final     ALT   Date Value Ref Range Status   08/10/2022 16 0 - 32 IU/L Final     Anion Gap   Date Value Ref Range Status   07/14/2022 11 8 - 16 mmol/L Final     eGFR if    Date Value Ref Range Status   07/14/2022 >60.0 >60 mL/min/1.73 m^2 Final     eGFR if non    Date Value Ref Range Status   07/14/2022 >60.0 >60 mL/min/1.73 m^2 Final     Comment:     Calculation used to obtain the estimated glomerular filtration  rate (eGFR) is the CKD-EPI equation.        Plt cnt 579,000, folate 4,4, Ferritin 42  Assessment:   (1) 37 y.o. female with chronic thrombocytosis at 550,000 to 600,000 range for several years.    (2)Differential Dx includes Myeloproliferative Syndrome, including Essential Thrombocytosis.    (3)The differential Dx of Secondary Thrombocytosis is broad and includes anxiety,  Medication effect.    (4)We discussed BM exam  vs observation for now,  Plt cnt is stable.  She wants to go forward with the BM exam,  schedule at [facility]..    Therapeutic options include observation, hydrea, agrylin, ASA.    RTC 8 weeks.

## 2023-01-23 ENCOUNTER — TELEPHONE (OUTPATIENT)
Dept: RADIOLOGY | Facility: HOSPITAL | Age: 38
End: 2023-01-23

## 2023-01-24 ENCOUNTER — TELEPHONE (OUTPATIENT)
Dept: RADIOLOGY | Facility: HOSPITAL | Age: 38
End: 2023-01-24

## 2023-01-24 NOTE — NURSING
Spoke with pt. Pt wanting to have procedure done @ [facility] for transportation reasons. Order transferred to  [facility].

## 2023-02-02 ENCOUNTER — TELEPHONE (OUTPATIENT)
Dept: HEMATOLOGY/ONCOLOGY | Facility: CLINIC | Age: 38
End: 2023-02-02

## 2023-02-02 NOTE — TELEPHONE ENCOUNTER
Patient called and requested that the location of her bone marrow biopsy be changed to [facility] on [facility]. Location changed per patient's request, phone number for [facility] passed on to patient as well so she can call and get scheduled. Patient verbalized understanding of above.

## 2023-02-23 ENCOUNTER — OFFICE VISIT (OUTPATIENT)
Dept: FAMILY MEDICINE | Facility: CLINIC | Age: 38
End: 2023-02-23
Payer: MEDICAID

## 2023-02-23 VITALS
TEMPERATURE: 98 F | HEIGHT: 67 IN | OXYGEN SATURATION: 100 % | HEART RATE: 79 BPM | BODY MASS INDEX: 30.66 KG/M2 | RESPIRATION RATE: 20 BRPM | WEIGHT: 195.38 LBS | DIASTOLIC BLOOD PRESSURE: 82 MMHG | SYSTOLIC BLOOD PRESSURE: 122 MMHG

## 2023-02-23 DIAGNOSIS — E78.2 MIXED HYPERLIPIDEMIA: ICD-10-CM

## 2023-02-23 DIAGNOSIS — E11.65 TYPE 2 DIABETES MELLITUS WITH HYPERGLYCEMIA, WITH LONG-TERM CURRENT USE OF INSULIN: Primary | ICD-10-CM

## 2023-02-23 DIAGNOSIS — Z79.4 TYPE 2 DIABETES MELLITUS WITH HYPERGLYCEMIA, WITH LONG-TERM CURRENT USE OF INSULIN: Primary | ICD-10-CM

## 2023-02-23 DIAGNOSIS — R80.9 MICROALBUMINURIA: ICD-10-CM

## 2023-02-23 PROCEDURE — 1159F PR MEDICATION LIST DOCUMENTED IN MEDICAL RECORD: ICD-10-PCS | Mod: CPTII,,, | Performed by: NURSE PRACTITIONER

## 2023-02-23 PROCEDURE — 99213 PR OFFICE/OUTPT VISIT, EST, LEVL III, 20-29 MIN: ICD-10-PCS | Mod: S$PBB,,, | Performed by: NURSE PRACTITIONER

## 2023-02-23 PROCEDURE — 1160F PR REVIEW ALL MEDS BY PRESCRIBER/CLIN PHARMACIST DOCUMENTED: ICD-10-PCS | Mod: CPTII,,, | Performed by: NURSE PRACTITIONER

## 2023-02-23 PROCEDURE — 1160F RVW MEDS BY RX/DR IN RCRD: CPT | Mod: CPTII,,, | Performed by: NURSE PRACTITIONER

## 2023-02-23 PROCEDURE — 1159F MED LIST DOCD IN RCRD: CPT | Mod: CPTII,,, | Performed by: NURSE PRACTITIONER

## 2023-02-23 PROCEDURE — 99215 OFFICE O/P EST HI 40 MIN: CPT | Performed by: NURSE PRACTITIONER

## 2023-02-23 PROCEDURE — 3074F SYST BP LT 130 MM HG: CPT | Mod: CPTII,,, | Performed by: NURSE PRACTITIONER

## 2023-02-23 PROCEDURE — 3008F PR BODY MASS INDEX (BMI) DOCUMENTED: ICD-10-PCS | Mod: CPTII,,, | Performed by: NURSE PRACTITIONER

## 2023-02-23 PROCEDURE — 3008F BODY MASS INDEX DOCD: CPT | Mod: CPTII,,, | Performed by: NURSE PRACTITIONER

## 2023-02-23 PROCEDURE — 3079F DIAST BP 80-89 MM HG: CPT | Mod: CPTII,,, | Performed by: NURSE PRACTITIONER

## 2023-02-23 PROCEDURE — 99213 OFFICE O/P EST LOW 20 MIN: CPT | Mod: S$PBB,,, | Performed by: NURSE PRACTITIONER

## 2023-02-23 PROCEDURE — 3079F PR MOST RECENT DIASTOLIC BLOOD PRESSURE 80-89 MM HG: ICD-10-PCS | Mod: CPTII,,, | Performed by: NURSE PRACTITIONER

## 2023-02-23 PROCEDURE — 3074F PR MOST RECENT SYSTOLIC BLOOD PRESSURE < 130 MM HG: ICD-10-PCS | Mod: CPTII,,, | Performed by: NURSE PRACTITIONER

## 2023-02-23 RX ORDER — METFORMIN HYDROCHLORIDE 500 MG/1
1000 TABLET, EXTENDED RELEASE ORAL
Qty: 180 TABLET | Refills: 1 | Status: SHIPPED | OUTPATIENT
Start: 2023-02-23 | End: 2023-05-06 | Stop reason: SDUPTHER

## 2023-02-23 NOTE — PROGRESS NOTES
SUBJECTIVE:      Patient ID: Lizzie Saunders is a 37 y.o. female.    Chief Complaint: Diabetes    Lizzie is here today for follow-up on diabetes and hyperlipidemia.  She is taking all of her medications as prescribed daily without side effects or complaints.  Admits she does have some trouble remembering to take 2 Times a day metformin, but otherwise compliant. Patient had labs pending but did not complete them before appointment-reminded to please get them done.  Weight is down 4 lb since her last visit with me and her Trulicity increase. She is also in the process of workup with Hematology for thrombocytosis and is awaiting a bone marrow biopsy.       Family History   Problem Relation Age of Onset    Bipolar disorder Mother     Bipolar disorder Father     Cancer Paternal Grandfather       Social History     Socioeconomic History    Marital status:      Spouse name: Sonja Saunders    Number of children: 0   Occupational History     Comment: currently unemployed   Tobacco Use    Smoking status: Former     Packs/day: 0.25     Types: Cigarettes     Quit date: 2020     Years since quittin.4    Smokeless tobacco: Never   Substance and Sexual Activity    Alcohol use: Yes     Comment: rare    Drug use: Yes     Types: Marijuana    Sexual activity: Yes     Comment:    Social History Narrative    Patient tearful and anxious on admit; unable to answer all admit questions at this time. 2/3/17 @ 21:45     Current Outpatient Medications   Medication Sig Dispense Refill    AJOVY AUTOINJECTOR 225 mg/1.5 mL autoinjector INJECT UNDER THE SKIN EVERY MONTH      aspirin (ECOTRIN) 81 MG EC tablet Take 1 tablet (81 mg total) by mouth once daily. 30 tablet 12    atorvastatin (LIPITOR) 40 MG tablet Take 1 tablet (40 mg total) by mouth every evening. 90 tablet 1    dulaglutide (TRULICITY) 1.5 mg/0.5 mL pen injector Inject 1.5 mg into the skin every 7 days. 4 pen 5    ibuprofen (ADVIL,MOTRIN) 600 MG tablet Take 1  tablet (600 mg total) by mouth every 6 (six) hours as needed for Pain. 60 tablet 0    insulin (LANTUS SOLOSTAR U-100 INSULIN) glargine 100 units/mL SubQ pen Inject 25 Units into the skin every evening. 9 mL 3    LIDOcaine (LIDODERM) 5 % 1 patch daily as needed.      rizatriptan (MAXALT) 10 MG tablet Take by mouth.      blood sugar diagnostic Strp To check BG two times daily, to use with insurance preferred meter 50 strip 5    blood-glucose meter kit To check BG two  times daily, to use with insurance preferred meter 1 each 0    lancets Misc To check BG two times daily, to use with insurance preferred meter 50 each 5    metFORMIN (GLUCOPHAGE-XR) 500 MG ER 24hr tablet Take 2 tablets (1,000 mg total) by mouth daily with breakfast. 180 tablet 1     No current facility-administered medications for this visit.     Review of patient's allergies indicates:   Allergen Reactions    Rosemary Anaphylaxis    Pecan nut Dermatitis    Sulfa (sulfonamide antibiotics) Itching      Past Medical History:   Diagnosis Date    Bipolar disorder     per patient report    Depression     Diabetes mellitus     Diabetes mellitus, type 2     Hx of psychiatric care     Neuropathy     per patient report    Psychiatric problem     Therapy      Past Surgical History:   Procedure Laterality Date    BACK SURGERY      EYE SURGERY         Review of Systems   Constitutional:  Negative for activity change, appetite change, chills, fatigue, fever and unexpected weight change.   HENT:  Negative for congestion, hearing loss, rhinorrhea and trouble swallowing.    Eyes:  Negative for discharge and visual disturbance.   Respiratory:  Negative for cough, chest tightness, shortness of breath and wheezing.    Cardiovascular:  Negative for chest pain, palpitations and leg swelling.   Gastrointestinal:  Negative for abdominal pain, blood in stool, constipation, diarrhea, nausea and vomiting.   Endocrine: Negative for cold intolerance, heat intolerance, polydipsia,  "polyphagia and polyuria.   Genitourinary:  Negative for difficulty urinating, dysuria, frequency, hematuria, menstrual problem, pelvic pain and vaginal discharge.   Musculoskeletal:  Positive for back pain (chronic). Negative for arthralgias, joint swelling, myalgias and neck pain.   Skin:  Negative for color change, pallor, rash and wound.   Allergic/Immunologic: Positive for environmental allergies.   Neurological:  Negative for dizziness, tremors, seizures, speech difficulty, weakness and headaches.   Hematological:  Negative for adenopathy. Does not bruise/bleed easily.   Psychiatric/Behavioral:  Negative for agitation, confusion, dysphoric mood, sleep disturbance and suicidal ideas. The patient is nervous/anxious.     OBJECTIVE:      Vitals:    02/23/23 0925   BP: 122/82   BP Location: Right arm   Patient Position: Sitting   BP Method: Medium (Manual)   Pulse: 79   Resp: 20   Temp: 97.8 °F (36.6 °C)   TempSrc: Oral   SpO2: 100%   Weight: 88.6 kg (195 lb 6.4 oz)   Height: 5' 7" (1.702 m)     Physical Exam  Vitals and nursing note reviewed.   Constitutional:       General: She is not in acute distress.     Appearance: Normal appearance. She is well-developed. She is obese. She is not ill-appearing.   HENT:      Head: Normocephalic and atraumatic.      Mouth/Throat:      Mouth: Mucous membranes are moist.   Eyes:      General: No scleral icterus.     Conjunctiva/sclera: Conjunctivae normal.      Pupils: Pupils are equal, round, and reactive to light.   Neck:      Thyroid: No thyroid mass or thyromegaly.      Trachea: Trachea normal.   Cardiovascular:      Rate and Rhythm: Normal rate and regular rhythm.      Heart sounds: Normal heart sounds.   Pulmonary:      Effort: Pulmonary effort is normal. No respiratory distress.      Breath sounds: Normal breath sounds. No wheezing or rales.   Musculoskeletal:         General: Normal range of motion.      Cervical back: Normal range of motion and neck supple.      Right " lower leg: No edema.      Left lower leg: No edema.   Lymphadenopathy:      Cervical: No cervical adenopathy.   Skin:     General: Skin is warm and dry.      Coloration: Skin is not jaundiced or pale.   Neurological:      Mental Status: She is alert and oriented to person, place, and time.   Psychiatric:         Attention and Perception: Attention normal.         Mood and Affect: Mood normal. Mood is not anxious or depressed.         Speech: Speech is not rapid and pressured.         Behavior: Behavior normal.         Thought Content: Thought content normal.         Judgment: Judgment normal.      Assessment:       1. Type 2 diabetes mellitus with hyperglycemia, with long-term current use of insulin    2. Mixed hyperlipidemia    3. Microalbuminuria          Plan:       Type 2 diabetes mellitus with hyperglycemia, with long-term current use of insulin  -     metFORMIN (GLUCOPHAGE-XR) 500 MG ER 24hr tablet; Take 2 tablets (1,000 mg total) by mouth daily with breakfast.  Dispense: 180 tablet; Refill: 1  -change to once daily dosing to help with compliance; reminded about eye exam and patient needs to schedule this as soon as possible; understands the importance of yearly retinopathy screening; goal of A1c below 7.0 reviewed; GET LABS     Mixed hyperlipidemia    Microalbuminuria        Follow up in about 3 months (around 5/23/2023) for f/u DM .      2/23/2023 RIYA Noriega, TERRIEP    This note was created using [employer] voice recognition software that occasionally misinterprets phrases or words.

## 2023-02-24 LAB
ALBUMIN SERPL-MCNC: 4.4 G/DL (ref 3.8–4.8)
ALBUMIN/GLOB SERPL: 1.5 {RATIO} (ref 1.2–2.2)
ALP SERPL-CCNC: 64 IU/L (ref 44–121)
ALT SERPL-CCNC: 14 IU/L (ref 0–32)
AST SERPL-CCNC: 13 IU/L (ref 0–40)
BILIRUB SERPL-MCNC: 0.5 MG/DL (ref 0–1.2)
BUN SERPL-MCNC: 11 MG/DL (ref 6–20)
BUN/CREAT SERPL: 19 (ref 9–23)
CALCIUM SERPL-MCNC: 10.5 MG/DL (ref 8.7–10.2)
CHLORIDE SERPL-SCNC: 100 MMOL/L (ref 96–106)
CHOLEST SERPL-MCNC: 257 MG/DL (ref 100–199)
CO2 SERPL-SCNC: 24 MMOL/L (ref 20–29)
CREAT SERPL-MCNC: 0.58 MG/DL (ref 0.57–1)
EST. GFR  (NO RACE VARIABLE): 119 ML/MIN/1.73
GLOBULIN SER CALC-MCNC: 2.9 G/DL (ref 1.5–4.5)
GLUCOSE SERPL-MCNC: 111 MG/DL (ref 70–99)
HBA1C MFR BLD: 7 % (ref 4.8–5.6)
HDLC SERPL-MCNC: 47 MG/DL
LDLC SERPL CALC-MCNC: 180 MG/DL (ref 0–99)
POTASSIUM SERPL-SCNC: 4.9 MMOL/L (ref 3.5–5.2)
PROT SERPL-MCNC: 7.3 G/DL (ref 6–8.5)
SODIUM SERPL-SCNC: 138 MMOL/L (ref 134–144)
TRIGL SERPL-MCNC: 161 MG/DL (ref 0–149)
VLDLC SERPL CALC-MCNC: 30 MG/DL (ref 5–40)

## 2023-02-27 NOTE — PROGRESS NOTES
A1c and glucose have improved tremendously, A1c is down to 7.0 which is almost below goal!  Cholesterol is elevated but since she has not been taking her medication regularly she has should start doing this in the morning as we discussed at her last appointment to lower her levels of cholesterol and reduce her heart attack and stroke risk; otherwise blood work is okay and follow-up as planned

## 2023-02-28 ENCOUNTER — TELEPHONE (OUTPATIENT)
Dept: FAMILY MEDICINE | Facility: CLINIC | Age: 38
End: 2023-02-28

## 2023-02-28 NOTE — TELEPHONE ENCOUNTER
----- Message from JAIRO Wilburn sent at 2/27/2023 12:57 PM CST -----    A1c and glucose have improved tremendously, A1c is down to 7.0 which is almost below goal!  Cholesterol is elevated but since she has not been taking her medication regularly she has should start doing this in the morning as we discussed at her last appointment to lower her levels of cholesterol and reduce her heart attack and stroke risk; otherwise blood work is okay and follow-up as planned

## 2023-03-17 ENCOUNTER — PATIENT MESSAGE (OUTPATIENT)
Dept: RESEARCH | Facility: HOSPITAL | Age: 38
End: 2023-03-17
Payer: MEDICAID

## 2023-05-04 ENCOUNTER — HOSPITAL ENCOUNTER (EMERGENCY)
Facility: HOSPITAL | Age: 38
Discharge: HOME | End: 2023-05-04
Attending: EMERGENCY MEDICINE
Payer: MEDICAID

## 2023-05-04 VITALS
WEIGHT: 195 LBS | TEMPERATURE: 99 F | OXYGEN SATURATION: 99 % | HEART RATE: 82 BPM | DIASTOLIC BLOOD PRESSURE: 81 MMHG | RESPIRATION RATE: 18 BRPM | HEIGHT: 65 IN | SYSTOLIC BLOOD PRESSURE: 141 MMHG | BODY MASS INDEX: 32.49 KG/M2

## 2023-05-04 DIAGNOSIS — L03.115 CELLULITIS OF RIGHT THIGH: ICD-10-CM

## 2023-05-04 DIAGNOSIS — L02.91 PHLEGMON: Primary | ICD-10-CM

## 2023-05-04 DIAGNOSIS — R73.9 HYPERGLYCEMIA: ICD-10-CM

## 2023-05-04 LAB
ALBUMIN SERPL BCP-MCNC: 3.4 G/DL (ref 3.5–5.2)
ALP SERPL-CCNC: 83 U/L (ref 55–135)
ALT SERPL W/O P-5'-P-CCNC: 11 U/L (ref 10–44)
ANION GAP SERPL CALC-SCNC: 6 MMOL/L (ref 8–16)
AST SERPL-CCNC: 9 U/L (ref 10–40)
B-HCG UR QL: NEGATIVE
BACTERIA #/AREA URNS AUTO: NORMAL /HPF
BASOPHILS # BLD AUTO: 0.09 K/UL (ref 0–0.2)
BASOPHILS NFR BLD: 0.6 % (ref 0–1.9)
BILIRUB SERPL-MCNC: 0.3 MG/DL (ref 0.1–1)
BILIRUB UR QL STRIP: NEGATIVE
BUN SERPL-MCNC: 15 MG/DL (ref 6–20)
CALCIUM SERPL-MCNC: 9.6 MG/DL (ref 8.7–10.5)
CHLORIDE SERPL-SCNC: 102 MMOL/L (ref 95–110)
CK SERPL-CCNC: 47 U/L (ref 20–180)
CLARITY UR REFRACT.AUTO: CLEAR
CO2 SERPL-SCNC: 25 MMOL/L (ref 23–29)
COLOR UR AUTO: YELLOW
CREAT SERPL-MCNC: 0.7 MG/DL (ref 0.5–1.4)
CRP SERPL-MCNC: 83.9 MG/L (ref 0–8.2)
CTP QC/QA: YES
DIFFERENTIAL METHOD: ABNORMAL
EOSINOPHIL # BLD AUTO: 0.1 K/UL (ref 0–0.5)
EOSINOPHIL NFR BLD: 0.8 % (ref 0–8)
ERYTHROCYTE [DISTWIDTH] IN BLOOD BY AUTOMATED COUNT: 13 % (ref 11.5–14.5)
ERYTHROCYTE [SEDIMENTATION RATE] IN BLOOD BY PHOTOMETRIC METHOD: 62 MM/HR (ref 0–36)
EST. GFR  (NO RACE VARIABLE): >60 ML/MIN/1.73 M^2
GLUCOSE SERPL-MCNC: 305 MG/DL (ref 70–110)
GLUCOSE UR QL STRIP: ABNORMAL
HCT VFR BLD AUTO: 38.3 % (ref 37–48.5)
HGB BLD-MCNC: 12.2 G/DL (ref 12–16)
HGB UR QL STRIP: ABNORMAL
HYALINE CASTS UR QL AUTO: 0 /LPF
IMM GRANULOCYTES # BLD AUTO: 0.1 K/UL (ref 0–0.04)
IMM GRANULOCYTES NFR BLD AUTO: 0.7 % (ref 0–0.5)
KETONES UR QL STRIP: NEGATIVE
LEUKOCYTE ESTERASE UR QL STRIP: NEGATIVE
LYMPHOCYTES # BLD AUTO: 1.7 K/UL (ref 1–4.8)
LYMPHOCYTES NFR BLD: 11.7 % (ref 18–48)
MCH RBC QN AUTO: 28 PG (ref 27–31)
MCHC RBC AUTO-ENTMCNC: 31.9 G/DL (ref 32–36)
MCV RBC AUTO: 88 FL (ref 82–98)
MICROSCOPIC COMMENT: NORMAL
MONOCYTES # BLD AUTO: 1 K/UL (ref 0.3–1)
MONOCYTES NFR BLD: 6.7 % (ref 4–15)
NEUTROPHILS # BLD AUTO: 11.4 K/UL (ref 1.8–7.7)
NEUTROPHILS NFR BLD: 79.5 % (ref 38–73)
NITRITE UR QL STRIP: NEGATIVE
NRBC BLD-RTO: 0 /100 WBC
PH UR STRIP: 5 [PH] (ref 5–8)
PLATELET # BLD AUTO: 591 K/UL (ref 150–450)
PMV BLD AUTO: 9.3 FL (ref 9.2–12.9)
POCT GLUCOSE: 245 MG/DL (ref 70–110)
POCT GLUCOSE: 295 MG/DL (ref 70–110)
POTASSIUM SERPL-SCNC: 4.6 MMOL/L (ref 3.5–5.1)
PROT SERPL-MCNC: 7.6 G/DL (ref 6–8.4)
PROT UR QL STRIP: ABNORMAL
RBC # BLD AUTO: 4.36 M/UL (ref 4–5.4)
RBC #/AREA URNS AUTO: 2 /HPF (ref 0–4)
SODIUM SERPL-SCNC: 133 MMOL/L (ref 136–145)
SP GR UR STRIP: >=1.03 (ref 1–1.03)
SQUAMOUS #/AREA URNS AUTO: 0 /HPF
URN SPEC COLLECT METH UR: ABNORMAL
WBC # BLD AUTO: 14.41 K/UL (ref 3.9–12.7)
WBC #/AREA URNS AUTO: 1 /HPF (ref 0–5)
YEAST UR QL AUTO: NORMAL

## 2023-05-04 PROCEDURE — 86618 LYME DISEASE ANTIBODY: CPT | Performed by: PHYSICIAN ASSISTANT

## 2023-05-04 PROCEDURE — 86140 C-REACTIVE PROTEIN: CPT | Performed by: PHYSICIAN ASSISTANT

## 2023-05-04 PROCEDURE — 81025 URINE PREGNANCY TEST: CPT | Performed by: PHYSICIAN ASSISTANT

## 2023-05-04 PROCEDURE — 99285 EMERGENCY DEPT VISIT HI MDM: CPT | Mod: 25

## 2023-05-04 PROCEDURE — 81001 URINALYSIS AUTO W/SCOPE: CPT | Performed by: PHYSICIAN ASSISTANT

## 2023-05-04 PROCEDURE — 82550 ASSAY OF CK (CPK): CPT | Performed by: PHYSICIAN ASSISTANT

## 2023-05-04 PROCEDURE — 96361 HYDRATE IV INFUSION ADD-ON: CPT

## 2023-05-04 PROCEDURE — 80053 COMPREHEN METABOLIC PANEL: CPT | Performed by: PHYSICIAN ASSISTANT

## 2023-05-04 PROCEDURE — 25500020 PHARM REV CODE 255: Performed by: EMERGENCY MEDICINE

## 2023-05-04 PROCEDURE — 99284 PR EMERGENCY DEPT VISIT,LEVEL IV: ICD-10-PCS | Mod: ,,, | Performed by: EMERGENCY MEDICINE

## 2023-05-04 PROCEDURE — 85652 RBC SED RATE AUTOMATED: CPT | Performed by: PHYSICIAN ASSISTANT

## 2023-05-04 PROCEDURE — 82962 GLUCOSE BLOOD TEST: CPT

## 2023-05-04 PROCEDURE — 85025 COMPLETE CBC W/AUTO DIFF WBC: CPT | Performed by: PHYSICIAN ASSISTANT

## 2023-05-04 PROCEDURE — 96365 THER/PROPH/DIAG IV INF INIT: CPT

## 2023-05-04 PROCEDURE — 25000003 PHARM REV CODE 250: Performed by: PHYSICIAN ASSISTANT

## 2023-05-04 PROCEDURE — 63600175 PHARM REV CODE 636 W HCPCS: Performed by: PHYSICIAN ASSISTANT

## 2023-05-04 PROCEDURE — 99284 EMERGENCY DEPT VISIT MOD MDM: CPT | Mod: ,,, | Performed by: EMERGENCY MEDICINE

## 2023-05-04 PROCEDURE — 96375 TX/PRO/DX INJ NEW DRUG ADDON: CPT | Mod: 59

## 2023-05-04 RX ORDER — DOXYCYCLINE HYCLATE 100 MG
100 TABLET ORAL
Status: COMPLETED | OUTPATIENT
Start: 2023-05-04 | End: 2023-05-04

## 2023-05-04 RX ORDER — HYDROCODONE BITARTRATE AND ACETAMINOPHEN 7.5; 325 MG/1; MG/1
1 TABLET ORAL EVERY 6 HOURS PRN
Qty: 12 TABLET | Refills: 0 | Status: ON HOLD | OUTPATIENT
Start: 2023-05-04 | End: 2023-05-11 | Stop reason: HOSPADM

## 2023-05-04 RX ORDER — KETOROLAC TROMETHAMINE 30 MG/ML
10 INJECTION, SOLUTION INTRAMUSCULAR; INTRAVENOUS
Status: COMPLETED | OUTPATIENT
Start: 2023-05-04 | End: 2023-05-04

## 2023-05-04 RX ORDER — DOXYCYCLINE 100 MG/1
100 CAPSULE ORAL 2 TIMES DAILY
Qty: 20 CAPSULE | Refills: 0 | Status: ON HOLD | OUTPATIENT
Start: 2023-05-04 | End: 2023-05-11 | Stop reason: HOSPADM

## 2023-05-04 RX ORDER — NAPROXEN 500 MG/1
500 TABLET ORAL 2 TIMES DAILY PRN
Qty: 30 TABLET | Refills: 0 | Status: SHIPPED | OUTPATIENT
Start: 2023-05-04 | End: 2023-05-31

## 2023-05-04 RX ORDER — ONDANSETRON 4 MG/1
4 TABLET, ORALLY DISINTEGRATING ORAL EVERY 6 HOURS PRN
Qty: 15 TABLET | Refills: 0 | Status: SHIPPED | OUTPATIENT
Start: 2023-05-04 | End: 2023-05-31

## 2023-05-04 RX ORDER — HYDROCODONE BITARTRATE AND ACETAMINOPHEN 5; 325 MG/1; MG/1
1 TABLET ORAL
Status: COMPLETED | OUTPATIENT
Start: 2023-05-04 | End: 2023-05-04

## 2023-05-04 RX ORDER — DOCUSATE SODIUM 100 MG/1
100 CAPSULE, LIQUID FILLED ORAL 2 TIMES DAILY
Qty: 60 CAPSULE | Refills: 0 | Status: ON HOLD | OUTPATIENT
Start: 2023-05-04 | End: 2023-05-11 | Stop reason: CLARIF

## 2023-05-04 RX ADMIN — DOXYCYCLINE HYCLATE 100 MG: 100 TABLET, COATED ORAL at 11:05

## 2023-05-04 RX ADMIN — INSULIN HUMAN 4 UNITS: 100 INJECTION, SOLUTION PARENTERAL at 11:05

## 2023-05-04 RX ADMIN — KETOROLAC TROMETHAMINE 10 MG: 30 INJECTION, SOLUTION INTRAMUSCULAR; INTRAVENOUS at 09:05

## 2023-05-04 RX ADMIN — IOHEXOL 100 ML: 350 INJECTION, SOLUTION INTRAVENOUS at 09:05

## 2023-05-04 RX ADMIN — SODIUM CHLORIDE 1000 ML: 9 INJECTION, SOLUTION INTRAVENOUS at 09:05

## 2023-05-04 RX ADMIN — HYDROCODONE BITARTRATE AND ACETAMINOPHEN 1 TABLET: 5; 325 TABLET ORAL at 11:05

## 2023-05-04 RX ADMIN — DALBAVANCIN 1500 MG: 500 INJECTION, POWDER, FOR SOLUTION INTRAVENOUS at 12:05

## 2023-05-04 NOTE — ED PROVIDER NOTES
"Encounter Date: 2023       History     Chief Complaint   Patient presents with    Abscess     To back of right thigh--redness, swelling noted     37-year-old female with DM2, bipolar disorder, PCOS presents for concerns for an abscess on her outer right thigh.  She first noticed what appeared to be a "pimple" 2 days ago which she popped with a small amount of brownish discharge.  She notes that this was not foul-smelling.  However, after that the area grew significantly and now has aching pain throughout the thigh.  She reports associated feeling hot and increased thirst and is concerned about her blood sugar.  She has a history of recurrent skin infections.  She notes that [place] and went into the water but only up to the level of her knee.  She washed her legs afterwards.  She does not believe that she was bitten by any insects.  She denies polyuria, fever, chills abdominal pain or vomiting.  Denies IVDU.  She also reports that she has been out of when her diabetic medications.    Review of patient's allergies indicates:   Allergen Reactions    Rosemary Anaphylaxis    Pecan nut Dermatitis    Sulfa (sulfonamide antibiotics) Itching     Past Medical History:   Diagnosis Date    Bipolar disorder     per patient report    Depression     Diabetes mellitus     Diabetes mellitus, type 2     Hx of psychiatric care     Neuropathy     per patient report    Psychiatric problem     Therapy      Past Surgical History:   Procedure Laterality Date    BACK SURGERY      EYE SURGERY       Family History   Problem Relation Age of Onset    Bipolar disorder Mother     Bipolar disorder Father     Cancer Paternal Grandfather      Social History     Tobacco Use    Smoking status: Former     Packs/day: 0.25     Types: Cigarettes     Quit date: 2020     Years since quittin.6    Smokeless tobacco: Never   Substance Use Topics    Alcohol use: Yes     Comment: rare    Drug use: Yes     Types: " Marijuana     Review of Systems   Constitutional:  Negative for chills and fever.   Respiratory:  Negative for shortness of breath.    Cardiovascular:  Negative for chest pain.   Gastrointestinal:  Positive for nausea. Negative for abdominal pain.   Endocrine: Positive for polydipsia. Negative for polyphagia and polyuria.   Genitourinary:  Negative for dysuria and hematuria.   Musculoskeletal:  Positive for myalgias.   Skin:  Positive for color change. Negative for wound.   Allergic/Immunologic: Negative for immunocompromised state.     Physical Exam     Initial Vitals [05/04/23 0836]   BP Pulse Resp Temp SpO2   122/78 89 18 99 °F (37.2 °C) 99 %      MAP       --         Physical Exam    Nursing note and vitals reviewed.  Constitutional: She appears well-developed and well-nourished.   HENT:   Head: Normocephalic and atraumatic.   Eyes: EOM are normal. Pupils are equal, round, and reactive to light.   Neck: Neck supple.   Normal range of motion.  Cardiovascular:  Normal rate, regular rhythm, normal heart sounds and intact distal pulses.     Exam reveals no gallop and no friction rub.       No murmur heard.  Pulmonary/Chest: Breath sounds normal. No respiratory distress. She has no wheezes. She has no rhonchi. She has no rales. She exhibits no tenderness.   Musculoskeletal:         General: Normal range of motion.      Cervical back: Normal range of motion and neck supple.     Neurological: She is alert and oriented to person, place, and time.   Skin: Skin is warm and dry. No abscess noted. There is erythema.   Approximately 7x7cm area of erythema on the lateral thigh, indurated, erythematous and tender to palpation.  No fluctuance.  Please see photo below.   Psychiatric: She has a normal mood and affect.         ED Course   Procedures  Labs Reviewed   CBC W/ AUTO DIFFERENTIAL - Abnormal; Notable for the following components:       Result Value    WBC 14.41 (*)     MCHC 31.9 (*)     Platelets 591 (*)     Immature  Granulocytes 0.7 (*)     Gran # (ANC) 11.4 (*)     Immature Grans (Abs) 0.10 (*)     Gran % 79.5 (*)     Lymph % 11.7 (*)     All other components within normal limits   COMPREHENSIVE METABOLIC PANEL - Abnormal; Notable for the following components:    Sodium 133 (*)     Glucose 305 (*)     Albumin 3.4 (*)     AST 9 (*)     Anion Gap 6 (*)     All other components within normal limits   SEDIMENTATION RATE - Abnormal; Notable for the following components:    Sed Rate 62 (*)     All other components within normal limits   C-REACTIVE PROTEIN - Abnormal; Notable for the following components:    CRP 83.9 (*)     All other components within normal limits   URINALYSIS, REFLEX TO URINE CULTURE - Abnormal; Notable for the following components:    Specific Gravity, UA >=1.030 (*)     Protein, UA 2+ (*)     Glucose, UA 3+ (*)     Occult Blood UA 1+ (*)     All other components within normal limits    Narrative:     Specimen Source->Urine   POCT GLUCOSE - Abnormal; Notable for the following components:    POCT Glucose 295 (*)     All other components within normal limits   POCT GLUCOSE - Abnormal; Notable for the following components:    POCT Glucose 245 (*)     All other components within normal limits   CK   URINALYSIS MICROSCOPIC    Narrative:     Specimen Source->Urine   B. BURGDORFERI ABS (LYME DISEASE)   POCT URINE PREGNANCY          Imaging Results              CT Thigh With Contrast Right (Final result)  Result time 05/04/23 11:16:39      Final result by Emiliano Cochran MD (05/04/23 11:16:39)                   Impression:      1. Cellulitis and phlegmonous change along the posterolateral aspect of the right thigh with extension along the lateral intermuscular septum.  No drainable collection.  No superimposed gas to suggest necrotizing fascitis.  No CT findings of osteomyelitis    Electronically signed by resident: Jagdish Molina MD  Date:    05/04/2023  Time:    10:28    Electronically signed by: Emiliano Cochran  MD  Date:    05/04/2023  Time:    11:16               Narrative:    EXAMINATION:  CT THIGH WITH CONTRAST RIGHT    CLINICAL HISTORY:  Soft tissue infection suspected, thigh, xray done;    TECHNIQUE:  1.25 mm axial images of the right thigh were obtained after the administration of 100 cc Omnipaque 350 intravenous contrast.  Sagittal and coronal reconstructions were provided.    COMPARISON:  Knee radiograph 08/10/2022    FINDINGS:  Bones: No acute fracture or malalignment.  Cortical margins are preserved.    Joints: No significant degenerative change of the right hip or knee.  No joint effusions.  No popliteal cyst.    Soft tissues: Pelvic and thigh muscular bulk is maintained.  Prominent edema and heterogeneous enhancement within the subcutaneous soft tissues along the posterolateral aspect of the mid to distal thigh with extension along the lateral intermuscular septum.  No intramuscular edema.  No discrete abscess.  No superimposed gas foci.    Miscellaneous: Visualized intrapelvic viscera are unremarkable.  No pelvic free fluid or lymphadenopathy.  Right thigh arteries are patent.                                       Medications   sodium chloride 0.9% bolus 1,000 mL 1,000 mL (0 mLs Intravenous Stopped 5/4/23 1037)   ketorolac injection 9.999 mg (9.999 mg Intravenous Given 5/4/23 0937)   iohexoL (OMNIPAQUE 350) injection 100 mL (100 mLs Intravenous Given 5/4/23 0958)   insulin regular injection 4 Units 0.04 mL (4 Units Intravenous Given 5/4/23 1158)   doxycycline tablet 100 mg (100 mg Oral Given 5/4/23 1158)   dalbavancin (DALVANCE) 1,500 mg in dextrose 5 % (D5W) 325 mL infusion (0 mg Intravenous Stopped 5/4/23 1254)   HYDROcodone-acetaminophen 5-325 mg per tablet 1 tablet (1 tablet Oral Given 5/4/23 1158)     Medical Decision Making:   History:   Old Medical Records: I decided to obtain old medical records.  Old Records Summarized: records from clinic visits.       <> Summary of Records: No recent ED visits or  admissions  Initial Assessment:   37-year-old female presenting for concern for skin infection on her thigh.  Her vitals are normal, she appears uncomfortable but nontoxic.  Differential Diagnosis:   Concern for cellulitis versus abscess.  I think myositis is less likely  Think insect bite is less likely  Low incidence of Lyme in [place]  Not clinically septic  Hyperglycemia   DKA    Clinical Tests:   Lab Tests: Ordered and Reviewed  Radiological Study: Ordered and Reviewed  ED Management:  Bedside ultrasound shows cobblestoning but no obvious fluid collection.  Will check labs, fluids, analgesics and do CT to evaluate for deep space abscess.    Workup is notable for hyperglycemia without DKA, leukocytosis and elevated inflammatory markers.  CT shows a large area of phlegmon is change/cellulitis without drainable collection.  I discussed this finding with the patient.  It is likely that this will turn into a drainable abscess, however no such collection today.  Will treat with dalbavancin in the ED, doxycycline and discharge with instructions to return to the ED in 2 days for wound recheck.  Patient instructed to return to the ED earlier if any worsening symptoms. Stressed the importance of follow-up, strict ED return precautions given.  Patient voiced understanding and is comfortable with discharge.            ED Course as of 05/04/23 1548   Thu May 04, 2023   0909 Bedside ultrasound without evidence of fluid collection. [CC]   0930 POCT Glucose(!): 295 [CC]   0945 Sed Rate(!): 62 [CC]   0957 WBC(!): 14.41  Leukocytosis [CC]   0957 Platelets(!): 591  Thrombocytosis [CC]   1003 CRP(!): 83.9 [CC]   1003 CO2: 25 [CC]   1003 Anion Gap(!): 6 [CC]   1142 CT Thigh With Contrast Right  CT shows phlegmon with cellulitis.  I discussed this finding with the patient.  I think that unfortunately it is too early to drain but may coalesce into an abscess at some point which I discussed with her.  No drainable collection  currently.  Will treat with dalbavancin, doxycycline and plan for discharge with wound recheck in 2 days [CC]   1258 POCT Glucose(!): 245 [CC]      ED Course User Index  [CC] Manuela Tyson PA-C                 Clinical Impression:   Final diagnoses:  [L02.91] Phlegmon (Primary)  [L03.115] Cellulitis of right thigh  [R73.9] Hyperglycemia        ED Disposition Condition    Discharge Stable          ED Prescriptions       Medication Sig Dispense Start Date End Date Auth. Provider    HYDROcodone-acetaminophen (NORCO) 7.5-325 mg per tablet Take 1 tablet by mouth every 6 (six) hours as needed for Pain. 12 tablet 5/4/2023 -- Manuela Tyson PA-C    ondansetron (ZOFRAN-ODT) 4 MG TbDL Take 1 tablet (4 mg total) by mouth every 6 (six) hours as needed. 15 tablet 5/4/2023 -- Manuela Tyson PA-C    docusate sodium (COLACE) 100 MG capsule Take 1 capsule (100 mg total) by mouth 2 (two) times daily. 60 capsule 5/4/2023 -- Manuela Tyson PA-C    doxycycline (VIBRAMYCIN) 100 MG Cap Take 1 capsule (100 mg total) by mouth 2 (two) times daily. for 10 days 20 capsule 5/4/2023 5/14/2023 Manuela Tyson PA-C    naproxen (NAPROSYN) 500 MG tablet Take 1 tablet (500 mg total) by mouth 2 (two) times daily as needed (pain). 30 tablet 5/4/2023 -- Manuela Tyson PA-C          Follow-up Information       Follow up With Specialties Details Why Contact Info    [facility] Emergency Dept Emergency Medicine Go in 2 days For wound re-check [address] [facility] [address] [phone]    [facility] Emergency Dept Emergency Medicine Go to  If symptoms worsen [address] [facility] [address] [phone]             Manuela Tyson PA-C  05/04/23 1940

## 2023-05-04 NOTE — ED TRIAGE NOTES
To the ED with concerns of abscess on her right upper leg.  Noticed 2 days ago, thought it was an ingrown hair and poped it.

## 2023-05-04 NOTE — DISCHARGE INSTRUCTIONS
Diagnosis:  Phlegmon with cellulitis of the thigh, high blood sugar    Your CT scan showed a disorganized infection of your leg with skin infection called cellulitis.  There was not clearly drainable abscess.  However, I am concerned that this will develop into an abscess.  You were given long lasting IV antibiotics in the emergency department.  I am prescribing a course of antibiotics to help clear this infection.  I am also prescribing medicine for pain that you can take as needed.  Your blood sugar was elevated but not consistent with DKA.  Make sure to keep an eye on your sugar at home and stay hydrated.    Please return to the emergency department in 2 days to have your wound re-evaluated.  If you start to have any worsening symptoms, or new symptoms like fever, weakness or worsening pain please return to the emergency department immediately.

## 2023-05-04 NOTE — Clinical Note
"Lizzie Kruegernifer"Chip was seen and treated in our emergency department on 5/4/2023.  She may return to work on 05/10/2023.       If you have any questions or concerns, please don't hesitate to call.      Manuela Tyson PA-C"

## 2023-05-06 ENCOUNTER — HOSPITAL ENCOUNTER (EMERGENCY)
Facility: HOSPITAL | Age: 38
Discharge: HOME | End: 2023-05-06
Attending: EMERGENCY MEDICINE
Payer: MEDICAID

## 2023-05-06 VITALS
RESPIRATION RATE: 16 BRPM | TEMPERATURE: 98 F | DIASTOLIC BLOOD PRESSURE: 64 MMHG | HEART RATE: 85 BPM | SYSTOLIC BLOOD PRESSURE: 101 MMHG | HEIGHT: 66 IN | BODY MASS INDEX: 28.93 KG/M2 | WEIGHT: 180 LBS | OXYGEN SATURATION: 96 %

## 2023-05-06 DIAGNOSIS — L03.90 CELLULITIS, UNSPECIFIED CELLULITIS SITE: ICD-10-CM

## 2023-05-06 DIAGNOSIS — Z79.4 TYPE 2 DIABETES MELLITUS WITH HYPERGLYCEMIA, WITH LONG-TERM CURRENT USE OF INSULIN: ICD-10-CM

## 2023-05-06 DIAGNOSIS — E11.65 TYPE 2 DIABETES MELLITUS WITH HYPERGLYCEMIA, WITH LONG-TERM CURRENT USE OF INSULIN: ICD-10-CM

## 2023-05-06 DIAGNOSIS — L02.419 LEG ABSCESS: Primary | ICD-10-CM

## 2023-05-06 LAB — B BURGDOR AB SER IA-ACNC: 0.12 INDEX VALUE

## 2023-05-06 PROCEDURE — 25000003 PHARM REV CODE 250: Performed by: PHYSICIAN ASSISTANT

## 2023-05-06 PROCEDURE — 99284 EMERGENCY DEPT VISIT MOD MDM: CPT

## 2023-05-06 PROCEDURE — 99284 EMERGENCY DEPT VISIT MOD MDM: CPT | Mod: FS,,, | Performed by: EMERGENCY MEDICINE

## 2023-05-06 PROCEDURE — 99284 PR EMERGENCY DEPT VISIT,LEVEL IV: ICD-10-PCS | Mod: FS,,, | Performed by: EMERGENCY MEDICINE

## 2023-05-06 RX ORDER — METFORMIN HYDROCHLORIDE 500 MG/1
1000 TABLET ORAL
Status: COMPLETED | OUTPATIENT
Start: 2023-05-06 | End: 2023-05-06

## 2023-05-06 RX ORDER — METFORMIN HYDROCHLORIDE 500 MG/1
1000 TABLET, EXTENDED RELEASE ORAL
Qty: 60 TABLET | Refills: 0 | Status: SHIPPED | OUTPATIENT
Start: 2023-05-06 | End: 2023-06-13 | Stop reason: SDUPTHER

## 2023-05-06 RX ADMIN — METFORMIN HYDROCHLORIDE 1000 MG: 500 TABLET ORAL at 10:05

## 2023-05-06 NOTE — ED NOTES
Lizzie Saunders, a 37 y.o. female presents to the ED w/ complaint of right thigh redness     Triage note:  Chief Complaint   Patient presents with    Leg Pain     Pain redness and swelling to r post thigh , on antibiotics     Review of patient's allergies indicates:   Allergen Reactions    Rosemary Anaphylaxis    Pecan nut Dermatitis    Sulfa (sulfonamide antibiotics) Itching     Past Medical History:   Diagnosis Date    Bipolar disorder     per patient report    Depression     Diabetes mellitus     Diabetes mellitus, type 2     Hx of psychiatric care     Neuropathy     per patient report    Psychiatric problem     Therapy      LOC: Patient name and date of birth verified. The patient is awake, alert and aware of environment with an appropriate affect, the patient is oriented x 3 and speaking appropriately.   APPEARANCE: Patient resting comfortably, patient is clean and well groomed, patient's clothing is properly fastened.  SKIN: The skin is warm and dry, color consistent with ethnicity, patient has normal skin turgor and moist mucus membranes, skin intact, no breakdown or bruising noted.  MUSCULOSKELETAL: Patient moving all extremities well, no obvious swelling or deformities noted.   RESPIRATORY: Respirations are spontaneous, patient has a normal effort and rate, no accessory muscle use noted.  CARDIAC: Patient has a normal rate and rhythm, no periphreal edema noted, capillary refill < 3 seconds.  ABDOMEN: Soft and non tender to palpation, no distention noted. Bowel sounds present in all four quadrants.  NEUROLOGIC: Eyes open spontaneously, behavior appropriate to situation, follows commands, facial expression symmetrical, bilateral hand grasp equal and even, purposeful motor response noted, normal sensation in all extremities when touched with a finger.

## 2023-05-06 NOTE — DISCHARGE INSTRUCTIONS
If you develop any new or worsening symptoms please return to ED sooner.  I do recommend 1 week follow-up in Infectious Disease Clinic.  You may return in 2 days for wound check to see if your leg needs drainage.  Continue antibiotics as prescribed.

## 2023-05-06 NOTE — ED PROVIDER NOTES
Encounter Date: 2023       History     Chief Complaint   Patient presents with    Leg Pain     Pain redness and swelling to r post thigh , on antibiotics     37-year-old female with past medical history of DM T2, neuropathy, depression, bipolar disorder who presents to the ED for wound check.  Was seen 2 days ago and had cellulitis and phlegmon to the right thigh.  Was given doubt vanc and and discharge with doxy and advised to come to the ED for wound check as they suspected that this would develop into an abscess.  Patient states that she did squeeze the bottom portion and was able to drain a significant amount home.  Denies any fevers/chills.    The history is provided by the patient.   Review of patient's allergies indicates:   Allergen Reactions    Rosemary Anaphylaxis    Pecan nut Dermatitis    Sulfa (sulfonamide antibiotics) Itching     Past Medical History:   Diagnosis Date    Bipolar disorder     per patient report    Depression     Diabetes mellitus     Diabetes mellitus, type 2     Hx of psychiatric care     Neuropathy     per patient report    Psychiatric problem     Therapy      Past Surgical History:   Procedure Laterality Date    BACK SURGERY      EYE SURGERY       Family History   Problem Relation Age of Onset    Bipolar disorder Mother     Bipolar disorder Father     Cancer Paternal Grandfather      Social History     Tobacco Use    Smoking status: Former     Packs/day: 0.25     Types: Cigarettes     Quit date: 2020     Years since quittin.6    Smokeless tobacco: Never   Substance Use Topics    Alcohol use: Yes     Comment: rare    Drug use: Yes     Types: Marijuana     Review of Systems   Constitutional:  Negative for chills and fever.   HENT:  Negative for sore throat.    Respiratory:  Negative for cough and shortness of breath.    Cardiovascular:  Negative for chest pain.   Gastrointestinal:  Negative for abdominal pain.   Genitourinary:  Negative for difficulty urinating and dysuria.    Skin: Negative.    Neurological:  Negative for weakness.   Psychiatric/Behavioral:  Negative for confusion.      Physical Exam     Initial Vitals [05/06/23 0850]   BP Pulse Resp Temp SpO2   110/61 76 18 98.4 °F (36.9 °C) 96 %      MAP       --         Physical Exam    Nursing note and vitals reviewed.  Constitutional: She appears well-developed and well-nourished.   HENT:   Head: Normocephalic and atraumatic.   Eyes: Conjunctivae are normal. Pupils are equal, round, and reactive to light.   Neck: Neck supple.   Normal range of motion.  Cardiovascular:  Normal rate, regular rhythm, normal heart sounds and intact distal pulses.           Pulmonary/Chest: Breath sounds normal.   Abdominal: Abdomen is soft. There is no abdominal tenderness.   Musculoskeletal:         General: Normal range of motion.      Cervical back: Normal range of motion and neck supple.     Neurological: She is alert and oriented to person, place, and time. GCS score is 15. GCS eye subscore is 4. GCS verbal subscore is 5. GCS motor subscore is 6.   Skin: Skin is warm and dry. Capillary refill takes less than 2 seconds.   Large indurated lesion to the bilateral thigh with mild overlying erythema.  There is a small head at the bottom of the lesion with no drainage.  No fluctuance on exam.   Psychiatric: She has a normal mood and affect.       ED Course   Procedures    Labs Reviewed   HIV 1 / 2 ANTIBODY   HEPATITIS C ANTIBODY          Imaging Results    None          Medications   metFORMIN tablet 1,000 mg (has no administration in time range)     Medical Decision Making:   History:   Old Medical Records: I decided to obtain old medical records.  Initial Assessment:   37-year-old female presents to the ED for for wound check of her abscess cellulitis of her right thigh.  Differential Diagnosis:   Abscess, cellulitis sepsis  ED Management:  Vital signs are reassuring and no evidence of sepsis.  On exam large indurated lesion.  Patient notes she did  self drain the abscess yesterday.   did a bedside ultrasound which showed cobblestoning and no fluid collection at this time.  Discussed I&D however patient prefers to wait 2 days to let it develop more.  She has received 1 in done doubt vanc and protocol and was discharged on doxycycline.  Will place the 1 week id follow-up as recommended for the 1 and on protocol.  Patient states she will return on Monday for another wound check.  Discussed return ED precautions for any new or worsening symptoms.                        Clinical Impression:   Final diagnoses:  [L02.419] Leg abscess (Primary)  [L03.90] Cellulitis, unspecified cellulitis site        ED Disposition Condition    Discharge Stable          ED Prescriptions       Medication Sig Dispense Start Date End Date Auth. Provider    metFORMIN (GLUCOPHAGE-XR) 500 MG ER 24hr tablet Take 2 tablets (1,000 mg total) by mouth daily with breakfast. 60 tablet 5/6/2023 6/5/2023 Iveth Nolen PA-C          Follow-up Information       Follow up With Specialties Details Why Contact Info Additional Information    [facility]ious [facility] Infectious Diseases Schedule an appointment as soon as possible for a visit   [address] [facility] [address] [phone] Main Building, 1st floor near [facility] entrance Please park in [facility]             Iveth Nolen PA-C  05/06/23 2402

## 2023-05-08 ENCOUNTER — HOSPITAL ENCOUNTER (OUTPATIENT)
Facility: HOSPITAL | Age: 38
Discharge: HOME | End: 2023-05-11
Attending: EMERGENCY MEDICINE | Admitting: HOSPITALIST
Payer: MEDICAID

## 2023-05-08 DIAGNOSIS — L03.90 CELLULITIS: ICD-10-CM

## 2023-05-08 DIAGNOSIS — L03.115 CELLULITIS OF RIGHT LOWER EXTREMITY: Primary | ICD-10-CM

## 2023-05-08 DIAGNOSIS — E66.9 OBESITY, UNSPECIFIED CLASSIFICATION, UNSPECIFIED OBESITY TYPE, UNSPECIFIED WHETHER SERIOUS COMORBIDITY PRESENT: ICD-10-CM

## 2023-05-08 LAB
ALBUMIN SERPL BCP-MCNC: 3.5 G/DL (ref 3.5–5.2)
ALP SERPL-CCNC: 79 U/L (ref 55–135)
ALT SERPL W/O P-5'-P-CCNC: 17 U/L (ref 10–44)
ANION GAP SERPL CALC-SCNC: 11 MMOL/L (ref 8–16)
AST SERPL-CCNC: 20 U/L (ref 10–40)
BASOPHILS # BLD AUTO: 0.06 K/UL (ref 0–0.2)
BASOPHILS NFR BLD: 0.7 % (ref 0–1.9)
BILIRUB SERPL-MCNC: 0.3 MG/DL (ref 0.1–1)
BUN SERPL-MCNC: 10 MG/DL (ref 6–20)
CALCIUM SERPL-MCNC: 9.7 MG/DL (ref 8.7–10.5)
CHLORIDE SERPL-SCNC: 101 MMOL/L (ref 95–110)
CO2 SERPL-SCNC: 22 MMOL/L (ref 23–29)
CREAT SERPL-MCNC: 0.6 MG/DL (ref 0.5–1.4)
CRP SERPL-MCNC: 13.5 MG/L (ref 0–8.2)
DIFFERENTIAL METHOD: ABNORMAL
EOSINOPHIL # BLD AUTO: 0.2 K/UL (ref 0–0.5)
EOSINOPHIL NFR BLD: 2.1 % (ref 0–8)
ERYTHROCYTE [DISTWIDTH] IN BLOOD BY AUTOMATED COUNT: 13.1 % (ref 11.5–14.5)
EST. GFR  (NO RACE VARIABLE): >60 ML/MIN/1.73 M^2
GLUCOSE SERPL-MCNC: 107 MG/DL (ref 70–110)
HCT VFR BLD AUTO: 36.9 % (ref 37–48.5)
HGB BLD-MCNC: 12 G/DL (ref 12–16)
IMM GRANULOCYTES # BLD AUTO: 0.06 K/UL (ref 0–0.04)
IMM GRANULOCYTES NFR BLD AUTO: 0.7 % (ref 0–0.5)
LACTATE SERPL-SCNC: 0.9 MMOL/L (ref 0.5–2.2)
LYMPHOCYTES # BLD AUTO: 2.2 K/UL (ref 1–4.8)
LYMPHOCYTES NFR BLD: 26.1 % (ref 18–48)
MCH RBC QN AUTO: 28 PG (ref 27–31)
MCHC RBC AUTO-ENTMCNC: 32.5 G/DL (ref 32–36)
MCV RBC AUTO: 86 FL (ref 82–98)
MONOCYTES # BLD AUTO: 0.5 K/UL (ref 0.3–1)
MONOCYTES NFR BLD: 5.6 % (ref 4–15)
NEUTROPHILS # BLD AUTO: 5.5 K/UL (ref 1.8–7.7)
NEUTROPHILS NFR BLD: 64.8 % (ref 38–73)
NRBC BLD-RTO: 0 /100 WBC
PLATELET # BLD AUTO: 773 K/UL (ref 150–450)
PMV BLD AUTO: 8.7 FL (ref 9.2–12.9)
POTASSIUM SERPL-SCNC: 4.6 MMOL/L (ref 3.5–5.1)
PROT SERPL-MCNC: 7.7 G/DL (ref 6–8.4)
RBC # BLD AUTO: 4.28 M/UL (ref 4–5.4)
SODIUM SERPL-SCNC: 134 MMOL/L (ref 136–145)
WBC # BLD AUTO: 8.42 K/UL (ref 3.9–12.7)

## 2023-05-08 PROCEDURE — 99284 EMERGENCY DEPT VISIT MOD MDM: CPT | Mod: ,,, | Performed by: EMERGENCY MEDICINE

## 2023-05-08 PROCEDURE — 83605 ASSAY OF LACTIC ACID: CPT | Performed by: EMERGENCY MEDICINE

## 2023-05-08 PROCEDURE — 83036 HEMOGLOBIN GLYCOSYLATED A1C: CPT | Performed by: EMERGENCY MEDICINE

## 2023-05-08 PROCEDURE — 99285 EMERGENCY DEPT VISIT HI MDM: CPT | Mod: 25

## 2023-05-08 PROCEDURE — 87040 BLOOD CULTURE FOR BACTERIA: CPT | Mod: 59 | Performed by: EMERGENCY MEDICINE

## 2023-05-08 PROCEDURE — 85025 COMPLETE CBC W/AUTO DIFF WBC: CPT | Performed by: EMERGENCY MEDICINE

## 2023-05-08 PROCEDURE — 99284 PR EMERGENCY DEPT VISIT,LEVEL IV: ICD-10-PCS | Mod: ,,, | Performed by: EMERGENCY MEDICINE

## 2023-05-08 PROCEDURE — 96365 THER/PROPH/DIAG IV INF INIT: CPT | Mod: 59

## 2023-05-08 PROCEDURE — 86140 C-REACTIVE PROTEIN: CPT | Performed by: EMERGENCY MEDICINE

## 2023-05-08 PROCEDURE — 80053 COMPREHEN METABOLIC PANEL: CPT | Performed by: EMERGENCY MEDICINE

## 2023-05-08 RX ORDER — GADOBUTROL 604.72 MG/ML
10 INJECTION INTRAVENOUS
Status: COMPLETED | OUTPATIENT
Start: 2023-05-09 | End: 2023-05-08

## 2023-05-08 RX ADMIN — GADOBUTROL 10 ML: 604.72 INJECTION INTRAVENOUS at 11:05

## 2023-05-08 NOTE — LETTER
May 11, 2023         [address]  [facility] [address]  Phone: [phone]  Fax: [phone]       Patient: Lizzie Saunders   YOB: 1985  Date of Visit: 05/11/2023    To Whom It May Concern:    Chuck Saunders  was at [facility] on 05/08/2023 05/11/2023. The patient may return to work/school on 5/13/2023. with restrictions. If you have any questions or concerns, or if I can be of further assistance, please do not hesitate to contact me.    Sincerely,    Morgan Moreno, DO

## 2023-05-08 NOTE — FIRST PROVIDER EVALUATION
"Medical screening examination initiated.  I have conducted a focused provider triage encounter, findings are as follows:    Brief history of present illness: right lateral thigh rash  Has been seen twice for this  The area of redness and swelling has increased in size  nausea  On doxycycline    Vitals:    05/08/23 1900   BP: (!) 168/92   Pulse: 107   Resp: 18   Temp: 98.9 °F (37.2 °C)   TempSrc: Oral   SpO2: 96%   Weight: 86.2 kg (190 lb)   Height: 5' 7" (1.702 m)       Pertinent physical exam:  right bilateral thigh area of erythema and swelling. Tearful in triage    Brief workup plan:  cbc/cmp/lactate.cultures    Preliminary workup initiated; this workup will be continued and followed by the physician or advanced practice provider that is assigned to the patient when roomed.  "

## 2023-05-09 PROBLEM — Z87.891 HISTORY OF TOBACCO USE: Status: ACTIVE | Noted: 2017-02-03

## 2023-05-09 PROBLEM — E78.5 HYPERLIPIDEMIA: Chronic | Status: ACTIVE | Noted: 2017-02-03

## 2023-05-09 PROBLEM — L29.9 ITCHING: Status: ACTIVE | Noted: 2023-05-09

## 2023-05-09 PROBLEM — E11.9 LONG-TERM INSULIN USE IN TYPE 2 DIABETES: Chronic | Status: ACTIVE | Noted: 2017-02-03

## 2023-05-09 PROBLEM — Z79.4 LONG-TERM INSULIN USE IN TYPE 2 DIABETES: Chronic | Status: ACTIVE | Noted: 2017-02-03

## 2023-05-09 PROBLEM — L03.115 CELLULITIS OF RIGHT LOWER EXTREMITY: Status: ACTIVE | Noted: 2023-05-09

## 2023-05-09 PROBLEM — D75.839 THROMBOCYTOSIS, UNSPECIFIED: Chronic | Status: ACTIVE | Noted: 2022-09-09

## 2023-05-09 LAB
ALBUMIN SERPL BCP-MCNC: 3.2 G/DL (ref 3.5–5.2)
ALP SERPL-CCNC: 72 U/L (ref 55–135)
ALT SERPL W/O P-5'-P-CCNC: 16 U/L (ref 10–44)
ANION GAP SERPL CALC-SCNC: 7 MMOL/L (ref 8–16)
AST SERPL-CCNC: 17 U/L (ref 10–40)
BASOPHILS # BLD AUTO: 0.07 K/UL (ref 0–0.2)
BASOPHILS NFR BLD: 0.9 % (ref 0–1.9)
BILIRUB SERPL-MCNC: 0.3 MG/DL (ref 0.1–1)
BUN SERPL-MCNC: 8 MG/DL (ref 6–20)
CALCIUM SERPL-MCNC: 9.6 MG/DL (ref 8.7–10.5)
CHLORIDE SERPL-SCNC: 104 MMOL/L (ref 95–110)
CO2 SERPL-SCNC: 23 MMOL/L (ref 23–29)
CREAT SERPL-MCNC: 0.6 MG/DL (ref 0.5–1.4)
DIFFERENTIAL METHOD: ABNORMAL
EOSINOPHIL # BLD AUTO: 0.3 K/UL (ref 0–0.5)
EOSINOPHIL NFR BLD: 3.2 % (ref 0–8)
ERYTHROCYTE [DISTWIDTH] IN BLOOD BY AUTOMATED COUNT: 13 % (ref 11.5–14.5)
EST. GFR  (NO RACE VARIABLE): >60 ML/MIN/1.73 M^2
ESTIMATED AVG GLUCOSE: 169 MG/DL (ref 68–131)
GLUCOSE SERPL-MCNC: 147 MG/DL (ref 70–110)
HBA1C MFR BLD: 7.5 % (ref 4–5.6)
HCT VFR BLD AUTO: 36.1 % (ref 37–48.5)
HGB BLD-MCNC: 11.7 G/DL (ref 12–16)
IMM GRANULOCYTES # BLD AUTO: 0.11 K/UL (ref 0–0.04)
IMM GRANULOCYTES NFR BLD AUTO: 1.4 % (ref 0–0.5)
LYMPHOCYTES # BLD AUTO: 2.3 K/UL (ref 1–4.8)
LYMPHOCYTES NFR BLD: 29.6 % (ref 18–48)
MAGNESIUM SERPL-MCNC: 2 MG/DL (ref 1.6–2.6)
MCH RBC QN AUTO: 27.9 PG (ref 27–31)
MCHC RBC AUTO-ENTMCNC: 32.4 G/DL (ref 32–36)
MCV RBC AUTO: 86 FL (ref 82–98)
MONOCYTES # BLD AUTO: 0.6 K/UL (ref 0.3–1)
MONOCYTES NFR BLD: 7.1 % (ref 4–15)
NEUTROPHILS # BLD AUTO: 4.5 K/UL (ref 1.8–7.7)
NEUTROPHILS NFR BLD: 57.8 % (ref 38–73)
NRBC BLD-RTO: 0 /100 WBC
PLATELET # BLD AUTO: 758 K/UL (ref 150–450)
PMV BLD AUTO: 8.6 FL (ref 9.2–12.9)
POCT GLUCOSE: 100 MG/DL (ref 70–110)
POCT GLUCOSE: 121 MG/DL (ref 70–110)
POTASSIUM SERPL-SCNC: 4.3 MMOL/L (ref 3.5–5.1)
PROT SERPL-MCNC: 6.9 G/DL (ref 6–8.4)
RBC # BLD AUTO: 4.19 M/UL (ref 4–5.4)
SODIUM SERPL-SCNC: 134 MMOL/L (ref 136–145)
WBC # BLD AUTO: 7.71 K/UL (ref 3.9–12.7)

## 2023-05-09 PROCEDURE — G0378 HOSPITAL OBSERVATION PER HR: HCPCS

## 2023-05-09 PROCEDURE — 80053 COMPREHEN METABOLIC PANEL: CPT

## 2023-05-09 PROCEDURE — A9585 GADOBUTROL INJECTION: HCPCS | Performed by: EMERGENCY MEDICINE

## 2023-05-09 PROCEDURE — 85025 COMPLETE CBC W/AUTO DIFF WBC: CPT

## 2023-05-09 PROCEDURE — 94761 N-INVAS EAR/PLS OXIMETRY MLT: CPT

## 2023-05-09 PROCEDURE — 25000003 PHARM REV CODE 250: Performed by: EMERGENCY MEDICINE

## 2023-05-09 PROCEDURE — 96372 THER/PROPH/DIAG INJ SC/IM: CPT

## 2023-05-09 PROCEDURE — 25500020 PHARM REV CODE 255: Performed by: EMERGENCY MEDICINE

## 2023-05-09 PROCEDURE — 25000003 PHARM REV CODE 250

## 2023-05-09 PROCEDURE — 96375 TX/PRO/DX INJ NEW DRUG ADDON: CPT

## 2023-05-09 PROCEDURE — 36415 COLL VENOUS BLD VENIPUNCTURE: CPT

## 2023-05-09 PROCEDURE — 99223 1ST HOSP IP/OBS HIGH 75: CPT | Mod: ,,, | Performed by: HOSPITALIST

## 2023-05-09 PROCEDURE — 96367 TX/PROPH/DG ADDL SEQ IV INF: CPT

## 2023-05-09 PROCEDURE — 99223 PR INITIAL HOSPITAL CARE,LEVL III: ICD-10-PCS | Mod: ,,, | Performed by: HOSPITALIST

## 2023-05-09 PROCEDURE — 96366 THER/PROPH/DIAG IV INF ADDON: CPT

## 2023-05-09 PROCEDURE — 83735 ASSAY OF MAGNESIUM: CPT

## 2023-05-09 PROCEDURE — 63600175 PHARM REV CODE 636 W HCPCS

## 2023-05-09 RX ORDER — SENNOSIDES 8.6 MG/1
8.6 TABLET ORAL 2 TIMES DAILY
Status: DISCONTINUED | OUTPATIENT
Start: 2023-05-09 | End: 2023-05-11 | Stop reason: HOSPADM

## 2023-05-09 RX ORDER — ENOXAPARIN SODIUM 100 MG/ML
40 INJECTION SUBCUTANEOUS EVERY 24 HOURS
Status: DISCONTINUED | OUTPATIENT
Start: 2023-05-09 | End: 2023-05-11 | Stop reason: HOSPADM

## 2023-05-09 RX ORDER — INSULIN ASPART 100 [IU]/ML
0-5 INJECTION, SOLUTION INTRAVENOUS; SUBCUTANEOUS
Status: DISCONTINUED | OUTPATIENT
Start: 2023-05-09 | End: 2023-05-11 | Stop reason: HOSPADM

## 2023-05-09 RX ORDER — ONDANSETRON 2 MG/ML
4 INJECTION INTRAMUSCULAR; INTRAVENOUS EVERY 8 HOURS PRN
Status: DISCONTINUED | OUTPATIENT
Start: 2023-05-09 | End: 2023-05-11 | Stop reason: HOSPADM

## 2023-05-09 RX ORDER — TALC
6 POWDER (GRAM) TOPICAL NIGHTLY PRN
Status: DISCONTINUED | OUTPATIENT
Start: 2023-05-09 | End: 2023-05-11 | Stop reason: HOSPADM

## 2023-05-09 RX ORDER — ACETAMINOPHEN 325 MG/1
650 TABLET ORAL EVERY 4 HOURS PRN
Status: DISCONTINUED | OUTPATIENT
Start: 2023-05-09 | End: 2023-05-11 | Stop reason: HOSPADM

## 2023-05-09 RX ORDER — HYDROXYZINE HYDROCHLORIDE 25 MG/1
25 TABLET, FILM COATED ORAL 3 TIMES DAILY PRN
Status: DISCONTINUED | OUTPATIENT
Start: 2023-05-09 | End: 2023-05-11 | Stop reason: HOSPADM

## 2023-05-09 RX ORDER — DEXTROSE 40 %
15 GEL (GRAM) ORAL
Status: DISCONTINUED | OUTPATIENT
Start: 2023-05-09 | End: 2023-05-11 | Stop reason: HOSPADM

## 2023-05-09 RX ORDER — SODIUM CHLORIDE 0.9 % (FLUSH) 0.9 %
10 SYRINGE (ML) INJECTION EVERY 12 HOURS PRN
Status: DISCONTINUED | OUTPATIENT
Start: 2023-05-09 | End: 2023-05-11 | Stop reason: HOSPADM

## 2023-05-09 RX ORDER — DEXTROSE 40 %
30 GEL (GRAM) ORAL
Status: DISCONTINUED | OUTPATIENT
Start: 2023-05-09 | End: 2023-05-11 | Stop reason: HOSPADM

## 2023-05-09 RX ORDER — ASPIRIN 81 MG/1
81 TABLET ORAL DAILY
Status: DISCONTINUED | OUTPATIENT
Start: 2023-05-09 | End: 2023-05-09

## 2023-05-09 RX ORDER — SENNOSIDES 8.6 MG/1
8.6 TABLET ORAL DAILY PRN
Status: DISCONTINUED | OUTPATIENT
Start: 2023-05-09 | End: 2023-05-09

## 2023-05-09 RX ORDER — ATORVASTATIN CALCIUM 40 MG/1
40 TABLET, FILM COATED ORAL NIGHTLY
Status: DISCONTINUED | OUTPATIENT
Start: 2023-05-09 | End: 2023-05-11 | Stop reason: HOSPADM

## 2023-05-09 RX ORDER — CLINDAMYCIN PHOSPHATE 600 MG/50ML
600 INJECTION, SOLUTION INTRAVENOUS
Status: COMPLETED | OUTPATIENT
Start: 2023-05-09 | End: 2023-05-09

## 2023-05-09 RX ORDER — CLINDAMYCIN PHOSPHATE 600 MG/50ML
600 INJECTION, SOLUTION INTRAVENOUS
Status: DISCONTINUED | OUTPATIENT
Start: 2023-05-09 | End: 2023-05-10

## 2023-05-09 RX ORDER — GLUCAGON 1 MG
1 KIT INJECTION
Status: DISCONTINUED | OUTPATIENT
Start: 2023-05-09 | End: 2023-05-11 | Stop reason: HOSPADM

## 2023-05-09 RX ADMIN — SENNOSIDES 8.6 MG: 8.6 TABLET, FILM COATED ORAL at 10:05

## 2023-05-09 RX ADMIN — CEFEPIME 2 G: 2 INJECTION, POWDER, FOR SOLUTION INTRAVENOUS at 06:05

## 2023-05-09 RX ADMIN — CEFEPIME 2 G: 2 INJECTION, POWDER, FOR SOLUTION INTRAVENOUS at 10:05

## 2023-05-09 RX ADMIN — CEFEPIME 2 G: 2 INJECTION, POWDER, FOR SOLUTION INTRAVENOUS at 02:05

## 2023-05-09 RX ADMIN — ONDANSETRON 4 MG: 2 INJECTION INTRAMUSCULAR; INTRAVENOUS at 02:05

## 2023-05-09 RX ADMIN — CLINDAMYCIN IN 5 PERCENT DEXTROSE 600 MG: 12 INJECTION, SOLUTION INTRAVENOUS at 01:05

## 2023-05-09 RX ADMIN — SENNOSIDES 8.6 MG: 8.6 TABLET, FILM COATED ORAL at 09:05

## 2023-05-09 RX ADMIN — CLINDAMYCIN PHOSPHATE 600 MG: 600 INJECTION, SOLUTION INTRAVENOUS at 09:05

## 2023-05-09 RX ADMIN — CLINDAMYCIN PHOSPHATE 600 MG: 600 INJECTION, SOLUTION INTRAVENOUS at 05:05

## 2023-05-09 RX ADMIN — ACETAMINOPHEN 650 MG: 325 TABLET ORAL at 02:05

## 2023-05-09 RX ADMIN — ATORVASTATIN CALCIUM 40 MG: 40 TABLET, FILM COATED ORAL at 04:05

## 2023-05-09 RX ADMIN — ATORVASTATIN CALCIUM 40 MG: 40 TABLET, FILM COATED ORAL at 10:05

## 2023-05-09 RX ADMIN — ENOXAPARIN SODIUM 40 MG: 40 INJECTION SUBCUTANEOUS at 05:05

## 2023-05-09 NOTE — ASSESSMENT & PLAN NOTE
Patient's FSGs are controlled on current medication regimen. Takes metformin, trulicity and insulin glargine 25 U qhs  Last A1c reviewed-   Lab Results   Component Value Date    HGBA1C 7.5 (H) 05/08/2023     Most recent fingerstick glucose reviewed-   Recent Labs   Lab 05/09/23  0445   POCTGLUCOSE 121*     Current correctional scale  Low  Maintain anti-hyperglycemic dose as follows-   Antihyperglycemics (From admission, onward)    Start     Stop Route Frequency Ordered    05/10/23 2100  insulin detemir U-100 (Levemir) pen 15 Units         -- SubQ Nightly 05/09/23 0208    05/09/23 0306  insulin aspart U-100 pen 0-5 Units         -- SubQ Before meals & nightly PRN 05/09/23 0206        Hold Oral hypoglycemics while patient is in the hospital.

## 2023-05-09 NOTE — NURSING
Nurses Note -- 4 Eyes      5/9/2023   6:54 AM      Skin assessed during: Admit      [] No Altered Skin Integrity Present    []Prevention Measures Documented      [x] Yes- Altered Skin Integrity Present or Discovered   [] LDA Added if Not in Epic (Describe Wound)   [] New Altered Skin Integrity was Present on Admit and Documented in LDA   [] Wound Image Taken    Wound Care Consulted? Yes    Attending Nurse:  Abby Auguste RN     Second RN/Staff Member:  Jhon

## 2023-05-09 NOTE — CHAPLAIN
"REASON FOR VISIT:  SC Consult placed per patient's request    Present during Interview:  Pt was alone initially; father and gf showed up 15 minutes into  visit.     Observations:  Pt was visibly tearful and communicated that she is "just so stressed. It's one thing after another."        Feelings (Emotions/Fears/Experiences/Coping):      Pt expressed frustration with not being able to work and fearful at the impending medical expenses she will incur. Pt is planning on moving from [facility] to live with partner and has been saving up to do so. Additionally, she was supposed to return to work on Thursday and is frustrated that she will no longer be able to do so.     Family/Friends (Support, Community, Culture):     Pt spoke about large young community she is a part of and detailed a few short stories about her community and life. Pt is also very close to father and relies on him for emotional support.     Susan (Beliefs/Meaning/Philosophy):  Pt is of the Young tradition and relies on her belief system to inform her world view. Patient shared a little about her beliefs, mostly in the context of community.     Future (Spiritual Care Plan of Action):   will return tomorrow per patient's request.      "

## 2023-05-09 NOTE — ASSESSMENT & PLAN NOTE
Undergoing essential thrombocytosis workup with hematologist Dr. Trujillo. Has had trouble scheduling bone marrow biopsy to the point where she reports she has given up from scheduling frustrations.   Plt count usually in the 500-600,000. Now elevated to 773,000 likely 2/2 to reactive thrombocytosis during acute infection.     - daily CBC  - Ask SW to set up appointment for bone marrow

## 2023-05-09 NOTE — SUBJECTIVE & OBJECTIVE
Past Medical History:   Diagnosis Date    Bipolar disorder     per patient report    Depression     Diabetes mellitus     Diabetes mellitus, type 2     Hx of psychiatric care     Neuropathy     per patient report    Psychiatric problem     Therapy        Past Surgical History:   Procedure Laterality Date    BACK SURGERY      EYE SURGERY         Review of patient's allergies indicates:   Allergen Reactions    Rosemary Anaphylaxis    Pecan nut Dermatitis    Sulfa (sulfonamide antibiotics) Itching       No current facility-administered medications on file prior to encounter.     Current Outpatient Medications on File Prior to Encounter   Medication Sig    doxycycline (VIBRAMYCIN) 100 MG Cap Take 1 capsule (100 mg total) by mouth 2 (two) times daily. for 10 days    dulaglutide (TRULICITY) 1.5 mg/0.5 mL pen injector Inject 1.5 mg into the skin every 7 days.    HYDROcodone-acetaminophen (NORCO) 7.5-325 mg per tablet Take 1 tablet by mouth every 6 (six) hours as needed for Pain.    insulin (LANTUS SOLOSTAR U-100 INSULIN) glargine 100 units/mL SubQ pen Inject 25 Units into the skin every evening.    metFORMIN (GLUCOPHAGE-XR) 500 MG ER 24hr tablet Take 2 tablets (1,000 mg total) by mouth daily with breakfast.    ondansetron (ZOFRAN-ODT) 4 MG TbDL Take 1 tablet (4 mg total) by mouth every 6 (six) hours as needed.    AJOVY AUTOINJECTOR 225 mg/1.5 mL autoinjector INJECT UNDER THE SKIN EVERY MONTH    aspirin (ECOTRIN) 81 MG EC tablet Take 1 tablet (81 mg total) by mouth once daily.    atorvastatin (LIPITOR) 40 MG tablet Take 1 tablet (40 mg total) by mouth every evening.    blood sugar diagnostic Strp To check BG two times daily, to use with insurance preferred meter    blood-glucose meter kit To check BG two  times daily, to use with insurance preferred meter    docusate sodium (COLACE) 100 MG capsule Take 1 capsule (100 mg total) by mouth 2 (two) times daily.    ibuprofen (ADVIL,MOTRIN) 600 MG tablet Take 1 tablet (600 mg  total) by mouth every 6 (six) hours as needed for Pain.    lancets Misc To check BG two times daily, to use with insurance preferred meter    LIDOcaine (LIDODERM) 5 % 1 patch daily as needed.    naproxen (NAPROSYN) 500 MG tablet Take 1 tablet (500 mg total) by mouth 2 (two) times daily as needed (pain).    rizatriptan (MAXALT) 10 MG tablet Take by mouth.     Family History       Problem Relation (Age of Onset)    Bipolar disorder Mother, Father    Cancer Paternal Grandfather          Tobacco Use    Smoking status: Former     Packs/day: 0.25     Types: Cigarettes     Quit date: 2020     Years since quittin.6    Smokeless tobacco: Never   Substance and Sexual Activity    Alcohol use: Yes     Comment: rare    Drug use: Yes     Types: Marijuana     Comment: Daily    Sexual activity: Yes     Comment:      Review of Systems   Constitutional:  Positive for activity change, appetite change, chills and fatigue. Negative for fever.   Respiratory:  Negative for shortness of breath.    Cardiovascular:  Positive for palpitations and leg swelling. Negative for chest pain.   Gastrointestinal:  Positive for nausea and vomiting. Negative for abdominal pain, blood in stool, constipation and diarrhea.   Genitourinary:  Negative for difficulty urinating and hematuria.   Skin:  Positive for rash and wound.   Neurological:  Positive for weakness.   Psychiatric/Behavioral:  Negative for agitation and behavioral problems. The patient is nervous/anxious.    Objective:     Vital Signs (Most Recent):  Temp: 98.8 °F (37.1 °C) (23 0300)  Pulse: 95 (23 0300)  Resp: 18 (23 0300)  BP: 136/80 (23 0300)  SpO2: 99 % (23 0300) Vital Signs (24h Range):  Temp:  [98.8 °F (37.1 °C)-98.9 °F (37.2 °C)] 98.8 °F (37.1 °C)  Pulse:  [] 95  Resp:  [18] 18  SpO2:  [96 %-99 %] 99 %  BP: (136-168)/(80-92) 136/80     Weight: 86.2 kg (190 lb)  Body mass index is 29.76 kg/m².     Physical Exam  Vitals and nursing note  reviewed.   Constitutional:       Appearance: Normal appearance. She is obese.   HENT:      Head: Normocephalic and atraumatic.   Eyes:      General: No scleral icterus.  Cardiovascular:      Rate and Rhythm: Normal rate and regular rhythm.      Pulses: Normal pulses.      Heart sounds: Normal heart sounds.   Pulmonary:      Effort: Pulmonary effort is normal. No respiratory distress.      Breath sounds: Normal breath sounds. No rales.   Abdominal:      General: Abdomen is flat. Bowel sounds are normal. There is no distension.      Palpations: Abdomen is soft.      Tenderness: There is no abdominal tenderness. There is no guarding.   Skin:     General: Skin is warm and dry.      Coloration: Skin is not jaundiced.      Comments: Tender, indurated, erythematous lesion on right lateral thigh with small head at bottom of lesion with mild fluctuance   Neurological:      General: No focal deficit present.      Mental Status: She is alert and oriented to person, place, and time.   Psychiatric:         Mood and Affect: Mood normal.         Behavior: Behavior normal.              Significant Labs: All pertinent labs within the past 24 hours have been reviewed.  A1C:   Recent Labs   Lab 02/23/23  0903 05/08/23 2104   HGBA1C 7.0* 7.5*     CBC:   Recent Labs   Lab 05/08/23 2104   WBC 8.42   HGB 12.0   HCT 36.9*   *     CMP:   Recent Labs   Lab 05/08/23 2104   *   K 4.6      CO2 22*      BUN 10   CREATININE 0.6   CALCIUM 9.7   PROT 7.7   ALBUMIN 3.5   BILITOT 0.3   ALKPHOS 79   AST 20   ALT 17   ANIONGAP 11       Lactic Acid:   Recent Labs   Lab 05/08/23 2104   LACTATE 0.9     POCT Glucose:   Recent Labs   Lab 05/09/23  0445   POCTGLUCOSE 121*       Significant Imaging: I have reviewed all pertinent imaging results/findings within the past 24 hours.

## 2023-05-09 NOTE — PROGRESS NOTES
23 1000   WOCN Assessment   WOCN Total Time (mins) 20   Visit Date 23   Visit Time 1000   Consult Type New   WOCN Speciality Wound   Intervention assessed;changed;applied;chart review;coordination of care;orders        Altered Skin Integrity 23 Right posterior Greater trochanter #1 Other (comment)   Date First Assessed/Time First Assessed: 23   Altered Skin Integrity Present on Admission - Did Patient arrive to the hospital with altered skin?: yes  Side: Right  Orientation: posterior  Location: Greater trochanter  Wound Number: (c) #1 ...   Wound Image    Description of Altered Skin Integrity Intact skin with non-blanchable redness of localized area   Drainage Amount None   Red (%), Wound Tissue Color 100 %   Periwound Area Intact   Wound Edges Defined   Wound Length (cm) 8 cm   Wound Width (cm) 12 cm   Wound Depth (cm) 0.1 cm   Wound Volume (cm^3) 9.6 cm^3   Wound Surface Area (cm^2) 96 cm^2     [facility] Surg  Wound Care    Patient Name:  Lizzie Saunders   MRN:  [id number]  Date: 2023  Diagnosis: Cellulitis of right lower extremity    History:     Past Medical History:   Diagnosis Date    Bipolar disorder     per patient report    Depression     Diabetes mellitus     Diabetes mellitus, type 2     Hx of psychiatric care     Neuropathy     per patient report    Psychiatric problem     Therapy        Social History     Socioeconomic History    Marital status:      Spouse name: Sonja Saunders    Number of children: 0   Occupational History     Comment: currently unemployed   Tobacco Use    Smoking status: Former     Packs/day: 0.25     Types: Cigarettes     Quit date: 2020     Years since quittin.6    Smokeless tobacco: Never   Substance and Sexual Activity    Alcohol use: Yes     Comment: rare    Drug use: Yes     Types: Marijuana     Comment: Daily    Sexual activity: Yes     Comment:    Social History Narrative    Patient tearful and anxious on  admit; unable to answer all admit questions at this time. 2/3/17 @ 21:45       Precautions:     Allergies as of 05/08/2023 - Reviewed 05/08/2023   Allergen Reaction Noted    Rosemary Anaphylaxis 02/03/2017    Pecan nut Dermatitis 02/04/2017    Sulfa (sulfonamide antibiotics) Itching 06/07/2021       WOC Assessment Details/Treatment   Patient consult for wound care to right hip. The patient states that she has an abscess. She currently on antibiotics. Patient is able to self turn herself. The patient also states that she is able to visible see the difference in the size of the abscess since she been on the antibiotic. This right hip site do not have any skin opening no drainage.    Recommendations made to primary team for  the above  Orders placed.     05/09/2023

## 2023-05-09 NOTE — ED NOTES
Patient states seen in ED Thursday for redness, had IV antibiotics, seen the next day for same, States now she is much worse with nausea, itching arms, Doxycycline,

## 2023-05-09 NOTE — H&P
[facility]  History & Physical    Patient Name: Lizzie Saunders  MRN: [id number]  Patient Class: OP- Observation  Admission Date: 5/8/2023  Attending Physician: Rosanna Ace MD   Primary Care Provider: JAIRO Noriega         Patient information was obtained from patient, past medical records and ER records.     Subjective:     Principal Problem:Cellulitis of right lower extremity    Chief Complaint:   Chief Complaint   Patient presents with    Leg Pain     Swelling and redness to posterior R thigh, dx 2 days ago with cellulitis. Told to come back in symptoms worsens, states swelling and redness has worsened and now having nausea. On doxycycline         HPI: 36 yo F with PMHx of DM, HLD, bipolar disorder, PCOS, hx of tobacco use presents for the third time since 5/4/23 for right lateral thigh cellulitis. She was seen on 5/4 and 5/6 in [place] [facility] [place] rash. [place] had [place] before this rash started, stating that she had waded into water up to her knee. Denies presence of open wounds prior to this. On 5/4 she received a single dose of dalbavancin 1.5 g, toradol, Norco 5mg and was discharged with 10 days of doxycycline which she states she has taken twice/day since being prescribed it. Her CT thigh from 5/4 showed phlegmon with cellulitis, was told to come back in 2 days to check for larger abscess formation. On 5/6, she returned after the phlegmon drained on its own. She was able to squeeze the bottom and drain a significant amount of fluid. Bedside US showed cobblestoning but no fluid collection at the time. I&D was discussed, but patient had opted to wait 2 days to let it develop more. Today, she returns stating that there is worsening redness and swelling and that she is now having nausea with 2 episodes of vomiting. Reports fatigue, generalized itchiness especially of her RUE and right thigh, chills, no measured fever. She also notes  swelling on the dorsum of her right hand compared to her left and swelling near her right temple.    Significant labs include improved leukocytosis with WBC count decreasing from 14 to 8 since 5/4. Thrombocytosis is worse increase from 591 to 773. CRP is 13.5, decreased from 83.9. ESR was elevated on 5/4 at 62. She received one dose IV Clindamycin 600 mg in the ED. She was afebrile, tachycardic to 107, hypertensive to 160s/90s, and on room air.       Past Medical History:   Diagnosis Date    Bipolar disorder     per patient report    Depression     Diabetes mellitus     Diabetes mellitus, type 2     Hx of psychiatric care     Neuropathy     per patient report    Psychiatric problem     Therapy        Past Surgical History:   Procedure Laterality Date    BACK SURGERY      EYE SURGERY         Review of patient's allergies indicates:   Allergen Reactions    Rosemary Anaphylaxis    Pecan nut Dermatitis    Sulfa (sulfonamide antibiotics) Itching       No current facility-administered medications on file prior to encounter.     Current Outpatient Medications on File Prior to Encounter   Medication Sig    doxycycline (VIBRAMYCIN) 100 MG Cap Take 1 capsule (100 mg total) by mouth 2 (two) times daily. for 10 days    dulaglutide (TRULICITY) 1.5 mg/0.5 mL pen injector Inject 1.5 mg into the skin every 7 days.    HYDROcodone-acetaminophen (NORCO) 7.5-325 mg per tablet Take 1 tablet by mouth every 6 (six) hours as needed for Pain.    insulin (LANTUS SOLOSTAR U-100 INSULIN) glargine 100 units/mL SubQ pen Inject 25 Units into the skin every evening.    metFORMIN (GLUCOPHAGE-XR) 500 MG ER 24hr tablet Take 2 tablets (1,000 mg total) by mouth daily with breakfast.    ondansetron (ZOFRAN-ODT) 4 MG TbDL Take 1 tablet (4 mg total) by mouth every 6 (six) hours as needed.    AJOVY AUTOINJECTOR 225 mg/1.5 mL autoinjector INJECT UNDER THE SKIN EVERY MONTH    aspirin (ECOTRIN) 81 MG EC tablet Take 1 tablet (81 mg total) by mouth once daily.     atorvastatin (LIPITOR) 40 MG tablet Take 1 tablet (40 mg total) by mouth every evening.    blood sugar diagnostic Strp To check BG two times daily, to use with insurance preferred meter    blood-glucose meter kit To check BG two  times daily, to use with insurance preferred meter    docusate sodium (COLACE) 100 MG capsule Take 1 capsule (100 mg total) by mouth 2 (two) times daily.    ibuprofen (ADVIL,MOTRIN) 600 MG tablet Take 1 tablet (600 mg total) by mouth every 6 (six) hours as needed for Pain.    lancets Misc To check BG two times daily, to use with insurance preferred meter    LIDOcaine (LIDODERM) 5 % 1 patch daily as needed.    naproxen (NAPROSYN) 500 MG tablet Take 1 tablet (500 mg total) by mouth 2 (two) times daily as needed (pain).    rizatriptan (MAXALT) 10 MG tablet Take by mouth.     Family History       Problem Relation (Age of Onset)    Bipolar disorder Mother, Father    Cancer Paternal Grandfather          Tobacco Use    Smoking status: Former     Packs/day: 0.25     Types: Cigarettes     Quit date: 2020     Years since quittin.6    Smokeless tobacco: Never   Substance and Sexual Activity    Alcohol use: Yes     Comment: rare    Drug use: Yes     Types: Marijuana     Comment: Daily    Sexual activity: Yes     Comment:      Review of Systems   Constitutional:  Positive for activity change, appetite change, chills and fatigue. Negative for fever.   Respiratory:  Negative for shortness of breath.    Cardiovascular:  Positive for palpitations and leg swelling. Negative for chest pain.   Gastrointestinal:  Positive for nausea and vomiting. Negative for abdominal pain, blood in stool, constipation and diarrhea.   Genitourinary:  Negative for difficulty urinating and hematuria.   Skin:  Positive for rash and wound.   Neurological:  Positive for weakness.   Psychiatric/Behavioral:  Negative for agitation and behavioral problems. The patient is nervous/anxious.    Objective:     Vital Signs  (Most Recent):  Temp: 98.8 °F (37.1 °C) (05/09/23 0300)  Pulse: 95 (05/09/23 0300)  Resp: 18 (05/09/23 0300)  BP: 136/80 (05/09/23 0300)  SpO2: 99 % (05/09/23 0300) Vital Signs (24h Range):  Temp:  [98.8 °F (37.1 °C)-98.9 °F (37.2 °C)] 98.8 °F (37.1 °C)  Pulse:  [] 95  Resp:  [18] 18  SpO2:  [96 %-99 %] 99 %  BP: (136-168)/(80-92) 136/80     Weight: 86.2 kg (190 lb)  Body mass index is 29.76 kg/m².     Physical Exam  Vitals and nursing note reviewed.   Constitutional:       Appearance: Normal appearance. She is obese.   HENT:      Head: Normocephalic and atraumatic.   Eyes:      General: No scleral icterus.  Cardiovascular:      Rate and Rhythm: Normal rate and regular rhythm.      Pulses: Normal pulses.      Heart sounds: Normal heart sounds.   Pulmonary:      Effort: Pulmonary effort is normal. No respiratory distress.      Breath sounds: Normal breath sounds. No rales.   Abdominal:      General: Abdomen is flat. Bowel sounds are normal. There is no distension.      Palpations: Abdomen is soft.      Tenderness: There is no abdominal tenderness. There is no guarding.   Skin:     General: Skin is warm and dry.      Coloration: Skin is not jaundiced.      Comments: Tender, indurated, erythematous lesion on right lateral thigh with small head at bottom of lesion with mild fluctuance   Neurological:      General: No focal deficit present.      Mental Status: She is alert and oriented to person, place, and time.   Psychiatric:         Mood and Affect: Mood normal.         Behavior: Behavior normal.              Significant Labs: All pertinent labs within the past 24 hours have been reviewed.  A1C:   Recent Labs   Lab 02/23/23  0903 05/08/23 2104   HGBA1C 7.0* 7.5*     CBC:   Recent Labs   Lab 05/08/23 2104   WBC 8.42   HGB 12.0   HCT 36.9*   *     CMP:   Recent Labs   Lab 05/08/23 2104   *   K 4.6      CO2 22*      BUN 10   CREATININE 0.6   CALCIUM 9.7   PROT 7.7   ALBUMIN 3.5    BILITOT 0.3   ALKPHOS 79   AST 20   ALT 17   ANIONGAP 11       Lactic Acid:   Recent Labs   Lab 05/08/23  2104   LACTATE 0.9     POCT Glucose:   Recent Labs   Lab 05/09/23  0445   POCTGLUCOSE 121*       Significant Imaging: I have reviewed all pertinent imaging results/findings within the past 24 hours.    Assessment/Plan:     * Cellulitis of right lower extremity  Patient presents for the 3rd time as follow up for erythema, swelling, and pain of an indurated right lateral thigh lesion that has drained fluid. She is quite convinced that there is an area of fluctuance that can be further expressed. Subjectively worsened despite being on doxy with associated fatigue, low appetite, chills, and nausea/vomiting.   Leukocytosis has improved from 5 days ago. Afebrile, HR >90, not hypotensive.     - Consider I&D and obtaining deep wound culture for antibiotic narrowing.   - f/u blood culture  - clindamycin and cefepime for empiric broad coverage given worsening despite taking antibiotics with MRSA coverage. Opted to hold vanc for now given that patient received dalbavancin on 5/4 and it should still be in her system for 10 days.       Itching  Reporting swelling and itching of right arm and continued itching of indurated area of cellulitis. Reports that benadryl at home did not help.     - hydroxyzine prn.   - monitor swelling of RUE, consider RUE US to r/o DVT      Thrombocytosis, unspecified  Undergoing essential thrombocytosis workup with hematologist Dr. Trujillo. Has had trouble scheduling bone marrow biopsy to the point where she reports she has given up from scheduling frustrations.   Plt count usually in the 500-600,000. Now elevated to 773,000 likely 2/2 to reactive thrombocytosis during acute infection.     - daily CBC  - Ask SW to set up appointment for bone marrow       Hyperlipidemia  Continue home atorvastatin 40 mg daily    History of tobacco use  Reported quit smoking in 2020.       Long-term insulin use in  type 2 diabetes  Patient's FSGs are controlled on current medication regimen. Takes metformin, trulicity and insulin glargine 25 U qhs  Last A1c reviewed-   Lab Results   Component Value Date    HGBA1C 7.5 (H) 05/08/2023     Most recent fingerstick glucose reviewed-   Recent Labs   Lab 05/09/23  0445   POCTGLUCOSE 121*     Current correctional scale  Low  Maintain anti-hyperglycemic dose as follows-   Antihyperglycemics (From admission, onward)      Start     Stop Route Frequency Ordered    05/10/23 2100  insulin detemir U-100 (Levemir) pen 15 Units         -- SubQ Nightly 05/09/23 0208    05/09/23 0306  insulin aspart U-100 pen 0-5 Units         -- SubQ Before meals & nightly PRN 05/09/23 0206          Hold Oral hypoglycemics while patient is in the hospital.      VTE Risk Mitigation (From admission, onward)           Ordered     enoxaparin injection 40 mg  Daily         05/09/23 0205     IP VTE HIGH RISK PATIENT  Once         05/09/23 0205     Place sequential compression device  Until discontinued         05/09/23 0205                       Thor Nevarez MD  Department of Hospital Medicine  [facility] [place]

## 2023-05-09 NOTE — ED PROVIDER NOTES
Encounter Date: 2023       History     Chief Complaint   Patient presents with    Leg Pain     Swelling and redness to posterior R thigh, dx 2 days ago with cellulitis. Told to come back in symptoms worsens, states swelling and redness has worsened and now having nausea. On doxycycline      Pt is a 36 yo F with PMH of bipolar do, DM2, depression who presents with persistence of a R lateral thigh wound. She reports she has been to the ED twice and prescribed antibiotic doxycycline. She did receive the dalbavance one and done 4 days ago and reports compliance with doxycycline.  She states prior to the wound forming [place] She does have a history of abscesses in the past.    The history is provided by the patient.   Review of patient's allergies indicates:   Allergen Reactions    Rosemary Anaphylaxis    Pecan nut Dermatitis    Sulfa (sulfonamide antibiotics) Itching     Past Medical History:   Diagnosis Date    Bipolar disorder     per patient report    Depression     Diabetes mellitus     Diabetes mellitus, type 2     Hx of psychiatric care     Neuropathy     per patient report    Psychiatric problem     Therapy      Past Surgical History:   Procedure Laterality Date    BACK SURGERY      EYE SURGERY       Family History   Problem Relation Age of Onset    Bipolar disorder Mother     Bipolar disorder Father     Cancer Paternal Grandfather      Social History     Tobacco Use    Smoking status: Former     Packs/day: 0.25     Types: Cigarettes     Quit date: 2020     Years since quittin.6    Smokeless tobacco: Never   Substance Use Topics    Alcohol use: Yes     Comment: rare    Drug use: Yes     Types: Marijuana     Comment: Daily         Physical Exam     Initial Vitals [23 1900]   BP Pulse Resp Temp SpO2   (!) 168/92 107 18 98.9 °F (37.2 °C) 96 %      MAP       --         Physical Exam    Nursing note and vitals reviewed.  Constitutional: She appears  well-developed and well-nourished. She is not diaphoretic. No distress.   HENT:   Head: Normocephalic and atraumatic.   Eyes: Conjunctivae and EOM are normal.   Neck: Neck supple.   Normal range of motion.  Cardiovascular:  Normal rate and regular rhythm.           Pulmonary/Chest: No respiratory distress.   Abdominal: Abdomen is soft. There is no abdominal tenderness.   Musculoskeletal:         General: No edema. Normal range of motion.      Cervical back: Normal range of motion and neck supple.     Neurological: She is alert and oriented to person, place, and time. GCS score is 15. GCS eye subscore is 4. GCS verbal subscore is 5. GCS motor subscore is 6.   Skin: Skin is warm and dry.   3 x 4 xm ndurated area in R thigh laterally  No fluctuance, no drainage    Psychiatric: She has a normal mood and affect. Her behavior is normal. Judgment and thought content normal.             ED Course   Procedures  Labs Reviewed   CBC W/ AUTO DIFFERENTIAL - Abnormal; Notable for the following components:       Result Value    Hematocrit 36.9 (*)     Platelets 773 (*)     MPV 8.7 (*)     Immature Granulocytes 0.7 (*)     Immature Grans (Abs) 0.06 (*)     All other components within normal limits   COMPREHENSIVE METABOLIC PANEL - Abnormal; Notable for the following components:    Sodium 134 (*)     CO2 22 (*)     All other components within normal limits   C-REACTIVE PROTEIN - Abnormal; Notable for the following components:    CRP 13.5 (*)     All other components within normal limits    Narrative:     add opn crp per dr shannan riggins/order#[id number] @21:55   05/08/2023   LACTIC ACID, PLASMA   C-REACTIVE PROTEIN   HEMOGLOBIN A1C          Imaging Results              MRI Femur W WO Contrast Right (Final result)  Result time 05/09/23 00:43:41   Procedure changed from MRI Femur With Contrast Right     Final result by Mikey Frye DO (05/09/23 00:43:41)                   Impression:      1. Circumferential subcutaneous soft  tissue edema and heterogeneous enhancement of the right thigh compatible with cellulitis.  No focal fluid collection or abscess, no myositis or deep fasciitis, no acute osteomyelitis.  2. Nonspecific small simple cystic fluid within the posterior aspect of the vastus lateralis muscle as above.  No evidence of surrounding edema or enhancement.      Electronically signed by: Mikey Frye  Date:    05/09/2023  Time:    00:43               Narrative:    EXAMINATION:  MRI FEMUR W WO CONTRAST RIGHT    CLINICAL HISTORY:  Soft tissue infection suspected, thigh, xray done;    TECHNIQUE:  Multisequence, multi planar magnetic resonance imaging of the right femur was performed before and after the intravenous administration of 10 mL Gadavist.    COMPARISON:  CT of the right thigh from 05/04/2023.    FINDINGS:  Bone: Normal marrow signal.  There is no evidence of acute osteomyelitis.  There is no aggressive osseous lesion.  There is no acute fracture or dislocation.    Soft tissues: There is circumferential soft tissue edema and heterogeneous enhancement of the right thigh extending from the lateral aspect the hip to the knee.  There is no evidence of a rim enhancing fluid collection to suggest abscess.  There is no evidence of deep soft tissue infection to suggest myositis or deep fasciitis.  There is a small lobular nonspecific cystic lesion within the posterior aspect of the vastus lateralis muscle (series 12, image 16), measuring approximately 3.4 x 0.8 cm, without evidence of significant internal or surrounding enhancement or evidence of surrounding edema.    Articulations: The right hip joint and the right knee joint are grossly unremarkable, though incompletely evaluated on this study.    Miscellaneous: Partially imaged portions of the internal pelvis are grossly unremarkable, though poorly evaluated.  Neurovascular structures are grossly intact.                                       Medications   gadobutroL (GADAVIST)  injection 10 mL (10 mLs Intravenous Given 5/8/23 9605)   clindamycin in D5W 600 mg/50 mL IVPB 600 mg (0 mg Intravenous Stopped 5/9/23 1141)     Medical Decision Making:   Differential Diagnosis:   Cellulitis, abscess, hematoma  Clinical Tests:   Lab Tests: Reviewed and Ordered  Radiological Study: Reviewed and Ordered  ED Management:  Started on iv clindamycin   Has had dalbavance and been on doxycycline  MRI neg for abscess or pyomyositis  Pt admitted to                         Clinical Impression:   Final diagnoses:  [L03.90] Cellulitis        ED Disposition Condition    Observation                 Sonal Montana MD  05/11/23 9028

## 2023-05-09 NOTE — ED NOTES
Patient identifiers verified and correct for  Ms Saunders  C/C:  Itching, fatigue, SEE NN  APPEARANCE: awake and alert in NAD. PAIN  9/10  SKIN: warm, round indurated area to right outer mid thigh with min surrounding redness or swelling area 5x7cm, semi circular  MUSCULOSKELETAL: Patient moving all extremities spontaneously, no obvious swelling or deformities noted. Ambulates independently.  RESPIRATORY: Denies shortness of breath.Respirations unlabored. Denies fevers  CARDIAC: Denies CP, 2+ distal pulses; no peripheral edema  ABDOMEN: S/ND/NT, Denies nausea  : voids spontaneously, denies difficulty  Neurologic: AAO x 4; follows commands equal strength in all extremities; denies numbness/tingling. Denies dizziness  Denies new wekaness, reports fatigue, itching to daniel arms

## 2023-05-09 NOTE — ASSESSMENT & PLAN NOTE
Patient presents for the 3rd time as follow up for erythema, swelling, and pain of an indurated right lateral thigh lesion that has drained fluid. She is quite convinced that there is an area of fluctuance that can be further expressed. Subjectively worsened despite being on doxy with associated fatigue, low appetite, chills, and nausea/vomiting.   Leukocytosis has improved from 5 days ago. Afebrile, HR >90, not hypotensive.     - Consider I&D and obtaining deep wound culture for antibiotic narrowing.   - f/u blood culture  - clindamycin and cefepime for empiric coverage. Opted to hold vanc for now given that patient received dalbavancin on 5/4 and it should still be in her system for 10 days.

## 2023-05-09 NOTE — ASSESSMENT & PLAN NOTE
Reporting swelling and itching of right arm and continued itching of indurated area of cellulitis. Reports that benadryl at home did not help.     - hydroxyzine prn.   - monitor swelling of RUE, consider RUE US to r/o DVT

## 2023-05-09 NOTE — HPI
38 yo F with PMHx of DM, HLD, bipolar disorder, PCOS, hx of tobacco use presents for the third time since 5/4/23 for right lateral thigh cellulitis. She was seen on 5/4 and 5/6 in [place] [facility] [place] rash. She had [place] a [place] in [place] before this rash started, stating that she had waded into water up to her knee. Denies presence of open wounds prior to this. On 5/4 she received a single dose of dalbavancin 1.5 g, toradol, Norco 5mg and was discharged with 10 days of doxycycline which she states she has taken twice/day since being prescribed it. Her CT thigh from 5/4 showed phlegmon with cellulitis, was told to come back in 2 days to check for larger abscess formation. On 5/6, she returned after the phlegmon drained on its own. She was able to squeeze the bottom and drain a significant amount of fluid. Bedside US showed cobblestoning but no fluid collection at the time. I&D was discussed, but patient had opted to wait 2 days to let it develop more. Today, she returns stating that there is worsening redness and swelling and that she is now having nausea with 2 episodes of vomiting. Reports fatigue, generalized itchiness especially of her RUE and right thigh, chills, no measured fever. She also notes swelling on the dorsum of her right hand compared to her left and swelling near her right temple.    Significant labs include improved leukocytosis with WBC count decreasing from 14 to 8 since 5/4. Thrombocytosis is worse increase from 591 to 773. CRP is 13.5, decreased from 83.9. ESR was elevated on 5/4 at 62. She received one dose IV Clindamycin 600 mg in the ED. She was afebrile, tachycardic to 107, hypertensive to 160s/90s, and on room air.

## 2023-05-10 ENCOUNTER — TELEPHONE (OUTPATIENT)
Dept: HEMATOLOGY/ONCOLOGY | Facility: CLINIC | Age: 38
End: 2023-05-10

## 2023-05-10 LAB
ALBUMIN SERPL BCP-MCNC: 3.4 G/DL (ref 3.5–5.2)
ALP SERPL-CCNC: 73 U/L (ref 55–135)
ALT SERPL W/O P-5'-P-CCNC: 16 U/L (ref 10–44)
ANION GAP SERPL CALC-SCNC: 9 MMOL/L (ref 8–16)
AST SERPL-CCNC: 16 U/L (ref 10–40)
BASOPHILS # BLD AUTO: 0.11 K/UL (ref 0–0.2)
BASOPHILS NFR BLD: 1.2 % (ref 0–1.9)
BILIRUB SERPL-MCNC: 0.4 MG/DL (ref 0.1–1)
BUN SERPL-MCNC: 9 MG/DL (ref 6–20)
CALCIUM SERPL-MCNC: 9.9 MG/DL (ref 8.7–10.5)
CHLORIDE SERPL-SCNC: 103 MMOL/L (ref 95–110)
CO2 SERPL-SCNC: 24 MMOL/L (ref 23–29)
CREAT SERPL-MCNC: 0.8 MG/DL (ref 0.5–1.4)
DIFFERENTIAL METHOD: ABNORMAL
EOSINOPHIL # BLD AUTO: 0.3 K/UL (ref 0–0.5)
EOSINOPHIL NFR BLD: 2.8 % (ref 0–8)
ERYTHROCYTE [DISTWIDTH] IN BLOOD BY AUTOMATED COUNT: 12.9 % (ref 11.5–14.5)
EST. GFR  (NO RACE VARIABLE): >60 ML/MIN/1.73 M^2
GLUCOSE SERPL-MCNC: 135 MG/DL (ref 70–110)
HCT VFR BLD AUTO: 38.7 % (ref 37–48.5)
HGB BLD-MCNC: 12.3 G/DL (ref 12–16)
IMM GRANULOCYTES # BLD AUTO: 0.14 K/UL (ref 0–0.04)
IMM GRANULOCYTES NFR BLD AUTO: 1.6 % (ref 0–0.5)
LYMPHOCYTES # BLD AUTO: 2.3 K/UL (ref 1–4.8)
LYMPHOCYTES NFR BLD: 26.1 % (ref 18–48)
MAGNESIUM SERPL-MCNC: 1.8 MG/DL (ref 1.6–2.6)
MCH RBC QN AUTO: 27.3 PG (ref 27–31)
MCHC RBC AUTO-ENTMCNC: 31.8 G/DL (ref 32–36)
MCV RBC AUTO: 86 FL (ref 82–98)
MONOCYTES # BLD AUTO: 0.7 K/UL (ref 0.3–1)
MONOCYTES NFR BLD: 8.3 % (ref 4–15)
NEUTROPHILS # BLD AUTO: 5.3 K/UL (ref 1.8–7.7)
NEUTROPHILS NFR BLD: 60 % (ref 38–73)
NRBC BLD-RTO: 0 /100 WBC
PLATELET # BLD AUTO: 806 K/UL (ref 150–450)
PMV BLD AUTO: 8.3 FL (ref 9.2–12.9)
POCT GLUCOSE: 113 MG/DL (ref 70–110)
POCT GLUCOSE: 118 MG/DL (ref 70–110)
POCT GLUCOSE: 130 MG/DL (ref 70–110)
POCT GLUCOSE: 99 MG/DL (ref 70–110)
POTASSIUM SERPL-SCNC: 4.1 MMOL/L (ref 3.5–5.1)
PROT SERPL-MCNC: 7.3 G/DL (ref 6–8.4)
RBC # BLD AUTO: 4.51 M/UL (ref 4–5.4)
SODIUM SERPL-SCNC: 136 MMOL/L (ref 136–145)
WBC # BLD AUTO: 8.81 K/UL (ref 3.9–12.7)

## 2023-05-10 PROCEDURE — 96366 THER/PROPH/DIAG IV INF ADDON: CPT

## 2023-05-10 PROCEDURE — G0378 HOSPITAL OBSERVATION PER HR: HCPCS

## 2023-05-10 PROCEDURE — 85025 COMPLETE CBC W/AUTO DIFF WBC: CPT

## 2023-05-10 PROCEDURE — 96372 THER/PROPH/DIAG INJ SC/IM: CPT

## 2023-05-10 PROCEDURE — 25000003 PHARM REV CODE 250: Performed by: HOSPITALIST

## 2023-05-10 PROCEDURE — 99232 SBSQ HOSP IP/OBS MODERATE 35: CPT | Mod: ,,, | Performed by: HOSPITALIST

## 2023-05-10 PROCEDURE — 99232 PR SUBSEQUENT HOSPITAL CARE,LEVL II: ICD-10-PCS | Mod: ,,, | Performed by: HOSPITALIST

## 2023-05-10 PROCEDURE — 80053 COMPREHEN METABOLIC PANEL: CPT

## 2023-05-10 PROCEDURE — 36415 COLL VENOUS BLD VENIPUNCTURE: CPT

## 2023-05-10 PROCEDURE — 96376 TX/PRO/DX INJ SAME DRUG ADON: CPT

## 2023-05-10 PROCEDURE — 25000003 PHARM REV CODE 250

## 2023-05-10 PROCEDURE — 63600175 PHARM REV CODE 636 W HCPCS

## 2023-05-10 PROCEDURE — 83735 ASSAY OF MAGNESIUM: CPT

## 2023-05-10 RX ORDER — CLINDAMYCIN HYDROCHLORIDE 150 MG/1
450 CAPSULE ORAL EVERY 8 HOURS
Status: DISCONTINUED | OUTPATIENT
Start: 2023-05-10 | End: 2023-05-11 | Stop reason: HOSPADM

## 2023-05-10 RX ADMIN — SENNOSIDES 8.6 MG: 8.6 TABLET, FILM COATED ORAL at 09:05

## 2023-05-10 RX ADMIN — ATORVASTATIN CALCIUM 40 MG: 40 TABLET, FILM COATED ORAL at 09:05

## 2023-05-10 RX ADMIN — CEFEPIME 2 G: 2 INJECTION, POWDER, FOR SOLUTION INTRAVENOUS at 05:05

## 2023-05-10 RX ADMIN — ENOXAPARIN SODIUM 40 MG: 40 INJECTION SUBCUTANEOUS at 05:05

## 2023-05-10 RX ADMIN — CLINDAMYCIN PHOSPHATE 600 MG: 600 INJECTION, SOLUTION INTRAVENOUS at 08:05

## 2023-05-10 RX ADMIN — ONDANSETRON 4 MG: 2 INJECTION INTRAMUSCULAR; INTRAVENOUS at 01:05

## 2023-05-10 RX ADMIN — CLINDAMYCIN HYDROCHLORIDE 450 MG: 150 CAPSULE ORAL at 01:05

## 2023-05-10 RX ADMIN — CEFEPIME 2 G: 2 INJECTION, POWDER, FOR SOLUTION INTRAVENOUS at 01:05

## 2023-05-10 RX ADMIN — ACETAMINOPHEN 650 MG: 325 TABLET ORAL at 05:05

## 2023-05-10 RX ADMIN — ACETAMINOPHEN 650 MG: 325 TABLET ORAL at 10:05

## 2023-05-10 RX ADMIN — CLINDAMYCIN PHOSPHATE 600 MG: 600 INJECTION, SOLUTION INTRAVENOUS at 01:05

## 2023-05-10 RX ADMIN — ONDANSETRON 4 MG: 2 INJECTION INTRAMUSCULAR; INTRAVENOUS at 06:05

## 2023-05-10 RX ADMIN — CLINDAMYCIN HYDROCHLORIDE 450 MG: 150 CAPSULE ORAL at 09:05

## 2023-05-10 RX ADMIN — ACETAMINOPHEN 650 MG: 325 TABLET ORAL at 01:05

## 2023-05-10 RX ADMIN — CEFEPIME 2 G: 2 INJECTION, POWDER, FOR SOLUTION INTRAVENOUS at 10:05

## 2023-05-10 RX ADMIN — SENNOSIDES 8.6 MG: 8.6 TABLET, FILM COATED ORAL at 08:05

## 2023-05-10 NOTE — PLAN OF CARE
APPOINTMENT:    Necessary Specialty Appointments: Bone Marrow Biopsy    Provider requires schedule review by Office Nurse - Office to call patient with date and time.

## 2023-05-10 NOTE — ASSESSMENT & PLAN NOTE
Patient presents for the 3rd time as follow up for erythema, swelling, and pain of an indurated right lateral thigh lesion that has drained fluid. She is quite convinced that there is an area of fluctuance that can be further expressed. Subjectively worsened despite being on doxy with associated fatigue, low appetite, chills, and nausea/vomiting.   Leukocytosis has improved from 5 days ago. Afebrile, HR >90, not hypotensive.     - PO Clindamycin and IV cefepime with plans to transition to PO abx tomorrow.   - f/u blood culture  - Opted to hold vanc for now given that patient received dalbavancin on 5/4 and it should still be in her system for 10 days.   - No fluid collection seen on MRI. Will hold off on I&D

## 2023-05-10 NOTE — PROGRESS NOTES
[facility] Medicine  Progress Note    Patient Name: Lizzie Saunders  MRN: [id number]  Patient Class: OP- Observation   Admission Date: 5/8/2023  Length of Stay: 0 days  Attending Physician: Rosanna Ace MD  Primary Care Provider: JAIRO Noriega        Subjective:     Principal Problem:Cellulitis of right lower extremity        HPI:  36 yo F with PMHx of DM, HLD, bipolar disorder, PCOS, hx of tobacco use presents for the third time since 5/4/23 for right lateral thigh cellulitis. She was seen on 5/4 and 5/[facility] for lateral thigh rash. [place] had [place] before this rash started, stating that she had waded into water up to her knee. Denies presence of open wounds prior to this. On 5/4 she received a single dose of dalbavancin 1.5 g, toradol, Norco 5mg and was discharged with 10 days of doxycycline which she states she has taken twice/day since being prescribed it. Her CT thigh from 5/4 showed phlegmon with cellulitis, was told to come back in 2 days to check for larger abscess formation. On 5/6, she returned after the phlegmon drained on its own. She was able to squeeze the bottom and drain a significant amount of fluid. Bedside US showed cobblestoning but no fluid collection at the time. I&D was discussed, but patient had opted to wait 2 days to let it develop more. Today, she returns stating that there is worsening redness and swelling and that she is now having nausea with 2 episodes of vomiting. Reports fatigue, generalized itchiness especially of her RUE and right thigh, chills, no measured fever. She also notes swelling on the dorsum of her right hand compared to her left and swelling near her right temple.    Significant labs include improved leukocytosis with WBC count decreasing from 14 to 8 since 5/4. Thrombocytosis is worse increase from 591 to 773. CRP is 13.5, decreased from 83.9. ESR was elevated on 5/4 at 62. She received one dose IV  Clindamycin 600 mg in the ED. She was afebrile, tachycardic to 107, hypertensive to 160s/90s, and on room air.       Overview/Hospital Course:  36 yo F admitted for R leg cellulitis. Started on cefepime and clindamycin. Blood cultures negative. MRI shows no fluid collection for I & D. Cellulitis improving. Clindamycin transitioned to PO.       Interval History: NAEON. AF and vitals stable. Cellulitis less swollen, erythematous, and warm. Patient frustrated and anxious today regarding repeat infections. Discussed plan and likely discharge today.     Review of Systems   Constitutional:  Positive for activity change, appetite change and fatigue. Negative for chills and fever.   Respiratory:  Negative for shortness of breath.    Cardiovascular:  Positive for palpitations and leg swelling. Negative for chest pain.   Gastrointestinal:  Positive for nausea. Negative for abdominal pain, blood in stool, constipation, diarrhea and vomiting.   Genitourinary:  Negative for difficulty urinating and hematuria.   Skin:  Positive for rash and wound.   Neurological:  Positive for weakness.   Psychiatric/Behavioral:  Negative for agitation and behavioral problems. The patient is nervous/anxious.    Objective:     Vital Signs (Most Recent):  Temp: 98.3 °F (36.8 °C) (05/10/23 1512)  Pulse: 75 (05/10/23 1512)  Resp: 18 (05/10/23 1512)  BP: 138/82 (05/10/23 1512)  SpO2: 96 % (05/10/23 1512) Vital Signs (24h Range):  Temp:  [97.8 °F (36.6 °C)-98.3 °F (36.8 °C)] 98.3 °F (36.8 °C)  Pulse:  [75-83] 75  Resp:  [17-20] 18  SpO2:  [94 %-100 %] 96 %  BP: (132-161)/(67-95) 138/82     Weight: 86.2 kg (190 lb)  Body mass index is 29.76 kg/m².  No intake or output data in the 24 hours ending 05/10/23 1617      Physical Exam  Vitals and nursing note reviewed.   Constitutional:       Appearance: Normal appearance. She is obese.   HENT:      Head: Normocephalic and atraumatic.   Eyes:      General: No scleral icterus.  Cardiovascular:      Rate and  Rhythm: Normal rate and regular rhythm.      Pulses: Normal pulses.      Heart sounds: Normal heart sounds.   Pulmonary:      Effort: Pulmonary effort is normal. No respiratory distress.      Breath sounds: Normal breath sounds. No rales.   Abdominal:      General: Abdomen is flat. Bowel sounds are normal. There is no distension.      Palpations: Abdomen is soft.      Tenderness: There is no abdominal tenderness. There is no guarding.   Skin:     General: Skin is warm and dry.      Coloration: Skin is not jaundiced.      Comments: Lesion less tender, swollen, and erythematous on right lateral thigh with small head at bottom of lesion without fluctuance.   Neurological:      General: No focal deficit present.      Mental Status: She is alert and oriented to person, place, and time.   Psychiatric:         Mood and Affect: Mood normal.         Behavior: Behavior normal.           Significant Labs: All pertinent labs within the past 24 hours have been reviewed.  Blood Culture:   Recent Labs   Lab 05/08/23 2104 05/08/23 2105   LABBLOO No Growth to date  No Growth to date  No growth to date No Growth to date  No Growth to date  No growth to date     CBC:   Recent Labs   Lab 05/08/23 2104 05/09/23  0638 05/10/23  0622   WBC 8.42 7.71 8.81   HGB 12.0 11.7* 12.3   HCT 36.9* 36.1* 38.7   * 758* 806*     CMP:   Recent Labs   Lab 05/08/23 2104 05/09/23 0638 05/10/23  0622   * 134* 136   K 4.6 4.3 4.1    104 103   CO2 22* 23 24    147* 135*   BUN 10 8 9   CREATININE 0.6 0.6 0.8   CALCIUM 9.7 9.6 9.9   PROT 7.7 6.9 7.3   ALBUMIN 3.5 3.2* 3.4*   BILITOT 0.3 0.3 0.4   ALKPHOS 79 72 73   AST 20 17 16   ALT 17 16 16   ANIONGAP 11 7* 9       Significant Imaging: I have reviewed all pertinent imaging results/findings within the past 24 hours.  I have reviewed and interpreted all pertinent imaging results/findings within the past 24 hours.      Assessment/Plan:      * Cellulitis of right lower  extremity  Patient presents for the 3rd time as follow up for erythema, swelling, and pain of an indurated right lateral thigh lesion that has drained fluid. She is quite convinced that there is an area of fluctuance that can be further expressed. Subjectively worsened despite being on doxy with associated fatigue, low appetite, chills, and nausea/vomiting.   Leukocytosis has improved from 5 days ago. Afebrile, HR >90, not hypotensive.     - PO Clindamycin and IV cefepime with plans to transition to PO abx tomorrow.   - f/u blood culture  - Opted to hold vanc for now given that patient received dalbavancin on 5/4 and it should still be in her system for 10 days.   - No fluid collection seen on MRI. Will hold off on I&D      Itching  Reporting swelling and itching of right arm and continued itching of indurated area of cellulitis. Reports that benadryl at home did not help.     - hydroxyzine prn.   - monitor swelling of RUE, consider RUE US to r/o DVT  - PRN benadryl cream       Thrombocytosis, unspecified  Undergoing essential thrombocytosis workup with hematologist Dr. Trujillo. Has had trouble scheduling bone marrow biopsy to the point where she reports she has given up from scheduling frustrations.   Plt count usually in the 500-600,000. Now elevated to 773,000 likely 2/2 to reactive thrombocytosis during acute infection.     - daily CBC  - Ask SW to set up appointment for bone marrow       Hyperlipidemia  Continue home atorvastatin 40 mg daily    History of tobacco use  Reported quit smoking in 2020.       Long-term insulin use in type 2 diabetes  Patient's FSGs are controlled on current medication regimen. Takes metformin, trulicity and insulin glargine 25 U qhs  Last A1c reviewed-   Lab Results   Component Value Date    HGBA1C 7.5 (H) 05/08/2023     Most recent fingerstick glucose reviewed-   Recent Labs   Lab 05/09/23  2208 05/10/23  0711 05/10/23  1110 05/10/23  1615   POCTGLUCOSE 100 130* 118* 113*      Current correctional scale  Low  Maintain anti-hyperglycemic dose as follows-   Antihyperglycemics (From admission, onward)    Start     Stop Route Frequency Ordered    05/10/23 2100  insulin detemir U-100 (Levemir) pen 15 Units         -- SubQ Nightly 05/09/23 0208    05/09/23 0306  insulin aspart U-100 pen 0-5 Units         -- SubQ Before meals & nightly PRN 05/09/23 0206        Hold Oral hypoglycemics while patient is in the hospital.      VTE Risk Mitigation (From admission, onward)         Ordered     enoxaparin injection 40 mg  Daily         05/09/23 0205     IP VTE HIGH RISK PATIENT  Once         05/09/23 0205     Place sequential compression device  Until discontinued         05/09/23 0205                Discharge Planning   TETE: 5/11/2023     Code Status: Full Code   Is the patient medically ready for discharge?: No    Reason for patient still in hospital (select all that apply): Patient trending condition  Discharge Plan A: Home                  Morgan Moreno DO  Department of Hospital Medicine   [facility] - Med Surg

## 2023-05-10 NOTE — ASSESSMENT & PLAN NOTE
Patient's FSGs are controlled on current medication regimen. Takes metformin, trulicity and insulin glargine 25 U qhs  Last A1c reviewed-   Lab Results   Component Value Date    HGBA1C 7.5 (H) 05/08/2023     Most recent fingerstick glucose reviewed-   Recent Labs   Lab 05/09/23  2208 05/10/23  0711 05/10/23  1110 05/10/23  1615   POCTGLUCOSE 100 130* 118* 113*     Current correctional scale  Low  Maintain anti-hyperglycemic dose as follows-   Antihyperglycemics (From admission, onward)    Start     Stop Route Frequency Ordered    05/10/23 2100  insulin detemir U-100 (Levemir) pen 15 Units         -- SubQ Nightly 05/09/23 0208    05/09/23 0306  insulin aspart U-100 pen 0-5 Units         -- SubQ Before meals & nightly PRN 05/09/23 0206        Hold Oral hypoglycemics while patient is in the hospital.

## 2023-05-10 NOTE — HOSPITAL COURSE
36 yo F admitted for R leg cellulitis. Started on cefepime and clindamycin. Blood cultures negative. MRI shows no fluid collection for I & D. Cellulitis improving. Clindamycin transitioned to PO. Remained afebrile, negative blood cultures, and without leukocytosis for past 48 hours. Medically stable for discharge home on PO Clindamycin and PO Cefdinir for 11 more days.

## 2023-05-10 NOTE — TELEPHONE ENCOUNTER
----- Message from Roma Ramirez sent at 5/10/2023  1:50 PM CDT -----  Pt requesting to schedule for the following bone marrow test.... Please call and adv @ [phone]

## 2023-05-10 NOTE — PLAN OF CARE
Patient is AAOx4, VSS. Ambulates well. Complained of headache and nausea relieved by PRN medications.
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
## 2023-05-10 NOTE — ASSESSMENT & PLAN NOTE
Reporting swelling and itching of right arm and continued itching of indurated area of cellulitis. Reports that benadryl at home did not help.     - hydroxyzine prn.   - monitor swelling of RUE, consider RUE US to r/o DVT  - PRN benadryl cream

## 2023-05-10 NOTE — SUBJECTIVE & OBJECTIVE
Interval History: NAEON. AF and vitals stable. Cellulitis less swollen, erythematous, and warm. Patient frustrated and anxious today regarding repeat infections. Discussed plan and likely discharge today.     Review of Systems   Constitutional:  Positive for activity change, appetite change and fatigue. Negative for chills and fever.   Respiratory:  Negative for shortness of breath.    Cardiovascular:  Positive for palpitations and leg swelling. Negative for chest pain.   Gastrointestinal:  Positive for nausea. Negative for abdominal pain, blood in stool, constipation, diarrhea and vomiting.   Genitourinary:  Negative for difficulty urinating and hematuria.   Skin:  Positive for rash and wound.   Neurological:  Positive for weakness.   Psychiatric/Behavioral:  Negative for agitation and behavioral problems. The patient is nervous/anxious.    Objective:     Vital Signs (Most Recent):  Temp: 98.3 °F (36.8 °C) (05/10/23 1512)  Pulse: 75 (05/10/23 1512)  Resp: 18 (05/10/23 1512)  BP: 138/82 (05/10/23 1512)  SpO2: 96 % (05/10/23 1512) Vital Signs (24h Range):  Temp:  [97.8 °F (36.6 °C)-98.3 °F (36.8 °C)] 98.3 °F (36.8 °C)  Pulse:  [75-83] 75  Resp:  [17-20] 18  SpO2:  [94 %-100 %] 96 %  BP: (132-161)/(67-95) 138/82     Weight: 86.2 kg (190 lb)  Body mass index is 29.76 kg/m².  No intake or output data in the 24 hours ending 05/10/23 1617      Physical Exam  Vitals and nursing note reviewed.   Constitutional:       Appearance: Normal appearance. She is obese.   HENT:      Head: Normocephalic and atraumatic.   Eyes:      General: No scleral icterus.  Cardiovascular:      Rate and Rhythm: Normal rate and regular rhythm.      Pulses: Normal pulses.      Heart sounds: Normal heart sounds.   Pulmonary:      Effort: Pulmonary effort is normal. No respiratory distress.      Breath sounds: Normal breath sounds. No rales.   Abdominal:      General: Abdomen is flat. Bowel sounds are normal. There is no distension.      Palpations:  Abdomen is soft.      Tenderness: There is no abdominal tenderness. There is no guarding.   Skin:     General: Skin is warm and dry.      Coloration: Skin is not jaundiced.      Comments: Lesion less tender, swollen, and erythematous on right lateral thigh with small head at bottom of lesion without fluctuance.   Neurological:      General: No focal deficit present.      Mental Status: She is alert and oriented to person, place, and time.   Psychiatric:         Mood and Affect: Mood normal.         Behavior: Behavior normal.           Significant Labs: All pertinent labs within the past 24 hours have been reviewed.  Blood Culture:   Recent Labs   Lab 05/08/23 2104 05/08/23 2105   LABBLOO No Growth to date  No Growth to date  No growth to date No Growth to date  No Growth to date  No growth to date     CBC:   Recent Labs   Lab 05/08/23  2104 05/09/23  0638 05/10/23  0622   WBC 8.42 7.71 8.81   HGB 12.0 11.7* 12.3   HCT 36.9* 36.1* 38.7   * 758* 806*     CMP:   Recent Labs   Lab 05/08/23 2104 05/09/23 0638 05/10/23  0622   * 134* 136   K 4.6 4.3 4.1    104 103   CO2 22* 23 24    147* 135*   BUN 10 8 9   CREATININE 0.6 0.6 0.8   CALCIUM 9.7 9.6 9.9   PROT 7.7 6.9 7.3   ALBUMIN 3.5 3.2* 3.4*   BILITOT 0.3 0.3 0.4   ALKPHOS 79 72 73   AST 20 17 16   ALT 17 16 16   ANIONGAP 11 7* 9       Significant Imaging: I have reviewed all pertinent imaging results/findings within the past 24 hours.  I have reviewed and interpreted all pertinent imaging results/findings within the past 24 hours.

## 2023-05-10 NOTE — NURSING
Pt is AAOx4, VSS, on RA. Has c/o pain and nausea, medicated per MAR. Pt stated it helped.    No other significant changes.

## 2023-05-11 VITALS
BODY MASS INDEX: 29.82 KG/M2 | HEIGHT: 67 IN | HEART RATE: 96 BPM | RESPIRATION RATE: 18 BRPM | WEIGHT: 190 LBS | OXYGEN SATURATION: 99 % | SYSTOLIC BLOOD PRESSURE: 135 MMHG | TEMPERATURE: 99 F | DIASTOLIC BLOOD PRESSURE: 94 MMHG

## 2023-05-11 LAB
ALBUMIN SERPL BCP-MCNC: 3.7 G/DL (ref 3.5–5.2)
ALP SERPL-CCNC: 73 U/L (ref 55–135)
ALT SERPL W/O P-5'-P-CCNC: 20 U/L (ref 10–44)
ANION GAP SERPL CALC-SCNC: 11 MMOL/L (ref 8–16)
AST SERPL-CCNC: 23 U/L (ref 10–40)
BASOPHILS # BLD AUTO: 0.1 K/UL (ref 0–0.2)
BASOPHILS NFR BLD: 1 % (ref 0–1.9)
BILIRUB SERPL-MCNC: 0.5 MG/DL (ref 0.1–1)
BUN SERPL-MCNC: 10 MG/DL (ref 6–20)
CALCIUM SERPL-MCNC: 10.1 MG/DL (ref 8.7–10.5)
CHLORIDE SERPL-SCNC: 100 MMOL/L (ref 95–110)
CO2 SERPL-SCNC: 25 MMOL/L (ref 23–29)
CREAT SERPL-MCNC: 0.8 MG/DL (ref 0.5–1.4)
DIFFERENTIAL METHOD: ABNORMAL
EOSINOPHIL # BLD AUTO: 0.2 K/UL (ref 0–0.5)
EOSINOPHIL NFR BLD: 2.3 % (ref 0–8)
ERYTHROCYTE [DISTWIDTH] IN BLOOD BY AUTOMATED COUNT: 12.9 % (ref 11.5–14.5)
EST. GFR  (NO RACE VARIABLE): >60 ML/MIN/1.73 M^2
GLUCOSE SERPL-MCNC: 114 MG/DL (ref 70–110)
HCT VFR BLD AUTO: 40.3 % (ref 37–48.5)
HGB BLD-MCNC: 13 G/DL (ref 12–16)
IMM GRANULOCYTES # BLD AUTO: 0.15 K/UL (ref 0–0.04)
IMM GRANULOCYTES NFR BLD AUTO: 1.5 % (ref 0–0.5)
LYMPHOCYTES # BLD AUTO: 2 K/UL (ref 1–4.8)
LYMPHOCYTES NFR BLD: 20.7 % (ref 18–48)
MAGNESIUM SERPL-MCNC: 1.9 MG/DL (ref 1.6–2.6)
MCH RBC QN AUTO: 27.5 PG (ref 27–31)
MCHC RBC AUTO-ENTMCNC: 32.3 G/DL (ref 32–36)
MCV RBC AUTO: 85 FL (ref 82–98)
MONOCYTES # BLD AUTO: 0.7 K/UL (ref 0.3–1)
MONOCYTES NFR BLD: 7.5 % (ref 4–15)
NEUTROPHILS # BLD AUTO: 6.5 K/UL (ref 1.8–7.7)
NEUTROPHILS NFR BLD: 67 % (ref 38–73)
NRBC BLD-RTO: 0 /100 WBC
PLATELET # BLD AUTO: 825 K/UL (ref 150–450)
PMV BLD AUTO: 8.2 FL (ref 9.2–12.9)
POCT GLUCOSE: 137 MG/DL (ref 70–110)
POCT GLUCOSE: 140 MG/DL (ref 70–110)
POTASSIUM SERPL-SCNC: 4.4 MMOL/L (ref 3.5–5.1)
PROT SERPL-MCNC: 7.7 G/DL (ref 6–8.4)
RBC # BLD AUTO: 4.72 M/UL (ref 4–5.4)
SODIUM SERPL-SCNC: 136 MMOL/L (ref 136–145)
WBC # BLD AUTO: 9.74 K/UL (ref 3.9–12.7)

## 2023-05-11 PROCEDURE — 96366 THER/PROPH/DIAG IV INF ADDON: CPT

## 2023-05-11 PROCEDURE — 85025 COMPLETE CBC W/AUTO DIFF WBC: CPT

## 2023-05-11 PROCEDURE — G0378 HOSPITAL OBSERVATION PER HR: HCPCS

## 2023-05-11 PROCEDURE — 83735 ASSAY OF MAGNESIUM: CPT

## 2023-05-11 PROCEDURE — 36415 COLL VENOUS BLD VENIPUNCTURE: CPT

## 2023-05-11 PROCEDURE — 63600175 PHARM REV CODE 636 W HCPCS

## 2023-05-11 PROCEDURE — 80053 COMPREHEN METABOLIC PANEL: CPT

## 2023-05-11 PROCEDURE — 25000003 PHARM REV CODE 250: Performed by: STUDENT IN AN ORGANIZED HEALTH CARE EDUCATION/TRAINING PROGRAM

## 2023-05-11 PROCEDURE — 99238 PR HOSPITAL DISCHARGE DAY,<30 MIN: ICD-10-PCS | Mod: ,,, | Performed by: HOSPITALIST

## 2023-05-11 PROCEDURE — 99238 HOSP IP/OBS DSCHRG MGMT 30/<: CPT | Mod: ,,, | Performed by: HOSPITALIST

## 2023-05-11 PROCEDURE — 25000003 PHARM REV CODE 250: Performed by: HOSPITALIST

## 2023-05-11 PROCEDURE — 25000003 PHARM REV CODE 250

## 2023-05-11 RX ORDER — CEFDINIR 300 MG/1
300 CAPSULE ORAL 2 TIMES DAILY
Qty: 22 CAPSULE | Refills: 0 | Status: SHIPPED | OUTPATIENT
Start: 2023-05-11 | End: 2023-05-31

## 2023-05-11 RX ORDER — CLINDAMYCIN HYDROCHLORIDE 150 MG/1
450 CAPSULE ORAL EVERY 8 HOURS
Qty: 99 CAPSULE | Refills: 0 | Status: SHIPPED | OUTPATIENT
Start: 2023-05-11 | End: 2023-05-11 | Stop reason: SDUPTHER

## 2023-05-11 RX ORDER — CEFDINIR 300 MG/1
300 CAPSULE ORAL 2 TIMES DAILY
Qty: 22 CAPSULE | Refills: 0 | Status: SHIPPED | OUTPATIENT
Start: 2023-05-11 | End: 2023-05-11 | Stop reason: SDUPTHER

## 2023-05-11 RX ORDER — CLINDAMYCIN HYDROCHLORIDE 150 MG/1
450 CAPSULE ORAL EVERY 8 HOURS
Qty: 99 CAPSULE | Refills: 0 | Status: SHIPPED | OUTPATIENT
Start: 2023-05-11 | End: 2023-05-31

## 2023-05-11 RX ORDER — POLYETHYLENE GLYCOL 3350 17 G/17G
17 POWDER, FOR SOLUTION ORAL 2 TIMES DAILY
Status: DISCONTINUED | OUTPATIENT
Start: 2023-05-11 | End: 2023-05-11 | Stop reason: HOSPADM

## 2023-05-11 RX ORDER — HYDROXYZINE HYDROCHLORIDE 25 MG/1
25 TABLET, FILM COATED ORAL 3 TIMES DAILY PRN
Qty: 60 TABLET | Refills: 1 | Status: SHIPPED | OUTPATIENT
Start: 2023-05-11 | End: 2023-05-31

## 2023-05-11 RX ORDER — CEFPODOXIME PROXETIL 200 MG/1
400 TABLET, FILM COATED ORAL EVERY 12 HOURS
Status: DISCONTINUED | OUTPATIENT
Start: 2023-05-11 | End: 2023-05-11

## 2023-05-11 RX ADMIN — POLYETHYLENE GLYCOL 3350 17 G: 17 POWDER, FOR SOLUTION ORAL at 09:05

## 2023-05-11 RX ADMIN — SENNOSIDES 8.6 MG: 8.6 TABLET, FILM COATED ORAL at 08:05

## 2023-05-11 RX ADMIN — CLINDAMYCIN HYDROCHLORIDE 450 MG: 150 CAPSULE ORAL at 05:05

## 2023-05-11 RX ADMIN — CEFEPIME 2 G: 2 INJECTION, POWDER, FOR SOLUTION INTRAVENOUS at 06:05

## 2023-05-11 NOTE — PLAN OF CARE
[facility] - Med Surg  Discharge Final Note    Primary Care Provider: JAIRO Noriega    Expected Discharge Date: 5/11/2023    No other needs identified.  Pt received DME-rollator at bedside.  Pt's family provided transportation.      Final Discharge Note (most recent)       Final Note - 05/11/23 1432          Final Note    Assessment Type Final Discharge Note     Anticipated Discharge Disposition Home or Self Care   Pt received rollator for d/c.    Hospital Resources/Appts/Education Provided Appointments scheduled and added to AVS        Post-Acute Status    Post-Acute Authorization HME     HME Status Set-up Complete/Auth obtained   Pt received rollator to bedside.    Coverage [employer] Medicaid     Discharge Delays None known at this time                     Important Message from Medicare             Contact Info       JAIRO Noriega   Specialty: Family Medicine   Relationship: PCP - General    [address] [place] [address]  [address] [place] [id number]   Phone: [phone]       Next Steps: Follow up          Josefa Harris LMSW  PRN-  [facility]  Ext. [phone]

## 2023-05-11 NOTE — PHARMACY MED REC
"Admission Medication History     The home medication history was taken by Jonna Avalos.    You may go to "Admission" then "Reconcile Home Medications" tabs to review and/or act upon these items.     The home medication list has been updated by the Pharmacy department.   Please read ALL comments highlighted in yellow.   Please address this information as you see fit.    Feel free to contact us if you have any questions or require assistance.        Medications listed below were obtained from: Patient/family  PTA Medications   Medication Sig    AJOVY AUTOINJECTOR 225 mg/1.5 mL autoinjector   INJECT UNDER THE SKIN EVERY MONTH    doxycycline (VIBRAMYCIN) 100 MG Cap   Take 1 capsule (100 mg total) by mouth 2 (two) times daily. for 10 days    dulaglutide (TRULICITY) 1.5 mg/0.5 mL pen injector   Inject 1.5 mg into the skin every 7 days.    ibuprofen (ADVIL,MOTRIN) 600 MG tablet   Take 1 tablet (600 mg total) by mouth every 6 (six) hours as needed for Pain.    LIDOcaine (LIDODERM) 5 %   1 patch daily as needed.    metFORMIN (GLUCOPHAGE-XR) 500 MG ER 24hr tablet   Take 2 tablets (1,000 mg total) by mouth daily with breakfast.    naproxen (NAPROSYN) 500 MG tablet   Take 1 tablet (500 mg total) by mouth 2 (two) times daily as needed (pain).    ondansetron (ZOFRAN-ODT) 4 MG TbDL   Take 1 tablet (4 mg total) by mouth every 6 (six) hours as needed.    aspirin (ECOTRIN) 81 MG EC tablet   Take 1 tablet (81 mg total) by mouth once daily. (Patient not taking: Reported on 5/10/2023)    atorvastatin (LIPITOR) 40 MG tablet   Take 1 tablet (40 mg total) by mouth every evening. (Patient not taking: Reported on 5/10/2023)    blood sugar diagnostic Strp     To check BG two times daily, to use with insurance preferred meter    blood-glucose meter kit     To check BG two  times daily, to use with insurance preferred meter    docusate sodium (COLACE) 100 MG capsule     Take 1 capsule (100 mg total) by mouth 2 (two) times daily. (Patient not " taking: Reported on 5/10/2023)    HYDROcodone-acetaminophen (NORCO) 7.5-325 mg per tablet   Take 1 tablet by mouth every 6 (six) hours as needed for Pain. (Patient not taking: Reported on 5/10/2023)    insulin (LANTUS SOLOSTAR U-100 INSULIN) glargine 100 units/mL SubQ pen   Inject 25 Units into the skin every evening. (Patient not taking: Reported on 5/10/2023)    lancets Misc     To check BG two times daily, to use with insurance preferred meter    rizatriptan (MAXALT) 10 MG tablet Take one tablet by mouth once daily as needed.  (Patient not taking: Reported on 5/10/2023)       Potential issues to be addressed PRIOR TO DISCHARGE  Please discuss with the patient barriers to adherence with medication treatment plans  Patient requires education regarding drug therapies     Jonna Avalos  [phone] [id number]                  .

## 2023-05-11 NOTE — DISCHARGE SUMMARY
[facility] Medicine  Discharge Summary      Patient Name: Lizzie Saunders  MRN: [id number]  PAYAL: [id number]  Patient Class: OP- Observation  Admission Date: 5/8/2023  Hospital Length of Stay: 0 days  Discharge Date and Time:  05/11/2023 10:58 AM  Attending Physician: Rosanna Ace MD   Discharging Provider: Morgan Moreno DO  Primary Care Provider: JAIRO Noriega  [place] [facility] [place] [facility] [place] Morgan Moreno DO  [place] [facility] [place] [facility] [place]    HPI:   38 yo F with PMHx of DM, HLD, bipolar disorder, PCOS, hx of tobacco use presents for the third time since 5/4/23 for right lateral thigh cellulitis. She was [place] on 5[place]4 [place] 5[place] [facility] [place]. She had [place] in [place] before this rash started, stating that she had waded into water up to her knee. Denies presence of open wounds prior to this. On 5/4 she received a single dose of dalbavancin 1.5 g, toradol, Norco 5mg and was discharged with 10 days of doxycycline which she states she has taken twice/day since being prescribed it. Her CT thigh from 5/4 showed phlegmon with cellulitis, was told to come back in 2 days to check for larger abscess formation. On 5/6, she returned after the phlegmon drained on its own. She was able to squeeze the bottom and drain a significant amount of fluid. Bedside US showed cobblestoning but no fluid collection at the time. I&D was discussed, but patient had opted to wait 2 days to let it develop more. Today, she returns stating that there is worsening redness and swelling and that she is now having nausea with 2 episodes of vomiting. Reports fatigue, generalized itchiness especially of her RUE and right thigh, chills, no measured fever. She also notes swelling on the dorsum of her right hand compared to her left and swelling near her right temple.    Significant labs include improved leukocytosis with WBC count decreasing from 14 to 8 since 5/4. Thrombocytosis is  "worse increase from 591 to 773. CRP is 13.5, decreased from 83.9. ESR was elevated on 5/4 at 62. She received one dose IV Clindamycin 600 mg in the ED. She was afebrile, tachycardic to 107, hypertensive to 160s/90s, and on room air.       * No surgery found *      Hospital Course:   38 yo F admitted for R leg cellulitis. Started on cefepime and clindamycin. Blood cultures negative. MRI shows no fluid collection for I & D. Cellulitis improving. Clindamycin transitioned to PO. Remained afebrile, negative blood cultures, and without leukocytosis for past 48 hours. Medically stable for discharge home on PO Clindamycin and PO Cefdinir for 11 more days.      BP (!) 158/89   Pulse 86   Temp 98 °F (36.7 °C)   Resp 16   Ht 5' 7" (1.702 m)   Wt 86.2 kg (190 lb)   LMP 05/08/2023   SpO2 99%   Breastfeeding No   BMI 29.76 kg/m²      Physical Exam  Vitals and nursing note reviewed.   Constitutional:       Appearance: Normal appearance. She is obese.   HENT:      Head: Normocephalic and atraumatic.   Eyes:      General: No scleral icterus.  Cardiovascular:      Rate and Rhythm: Normal rate and regular rhythm.      Pulses: Normal pulses.      Heart sounds: Normal heart sounds.   Pulmonary:      Effort: Pulmonary effort is normal. No respiratory distress.      Breath sounds: Normal breath sounds. No rales.   Abdominal:      General: Abdomen is flat. Bowel sounds are normal. There is no distension.      Palpations: Abdomen is soft.      Tenderness: There is no abdominal tenderness. There is no guarding.   Skin:     General: Skin is warm and dry.      Coloration: Skin is not jaundiced.      Comments: Lesion less tender, swollen, and erythematous on right lateral thigh with small head at bottom of lesion without fluctuance.   Neurological:      General: No focal deficit present.      Mental Status: She is alert and oriented to person, place, and time.   Psychiatric:         Mood and Affect: Mood normal.         Behavior: " "Behavior normal.     Goals of Care Treatment Preferences:  Code Status: Full Code      Consults:   Consults (From admission, onward)        Status Ordering Provider     Inpatient consult to [facility]  Once        Provider:  (Not yet assigned)    WOODY Queen          No new Assessment & Plan notes have been filed under this hospital service since the last note was generated.  Service: Hospital Medicine    Final Active Diagnoses:    Diagnosis Date Noted POA    PRINCIPAL PROBLEM:  Cellulitis of right lower extremity [L03.115] 05/09/2023 Yes    Itching [L29.9] 05/09/2023 Unknown    Thrombocytosis, unspecified [D75.839] 09/09/2022 Yes     Chronic    Hyperlipidemia [E78.5] 02/03/2017 Yes     Chronic    Long-term insulin use in type 2 diabetes [E11.9, Z79.4] 02/03/2017 Not Applicable     Chronic    History of tobacco use [Z87.891] 02/03/2017 Not Applicable      Problems Resolved During this Admission:       Discharged Condition: good    Disposition:     Follow Up:   Follow-up Information     JAIRO Noriega Follow up.    Specialty: Family Medicine  Contact information:  [address] [place] [address]  [phone]                       Patient Instructions:      WALKER FOR HOME USE     Order Specific Question Answer Comments   Type of Walker: Adult (5'4"-6'6")    With wheels? Yes    Height: 5' 7" (1.702 m)    Weight: 86.2 kg (190 lb)    Length of need (1-99 months): 99    Does patient have medical equipment at home? cane, straight    Does patient have medical equipment at home? rollator    Please check all that apply: Altered sensory perception.        Significant Diagnostic Studies: Labs:   CMP   Recent Labs   Lab 05/10/23  0622 05/11/23  0543    136   K 4.1 4.4    100   CO2 24 25   * 114*   BUN 9 10   CREATININE 0.8 0.8   CALCIUM 9.9 10.1   PROT 7.3 7.7   ALBUMIN 3.4* 3.7   BILITOT 0.4 0.5   ALKPHOS 73 73   AST 16 23   ALT 16 20   ANIONGAP 9 11    and CBC "   Recent Labs   Lab 05/10/23  0622 05/11/23  0543   WBC 8.81 9.74   HGB 12.3 13.0   HCT 38.7 40.3   * 825*       Pending Diagnostic Studies:     None         Medications:  Reconciled Home Medications:      Medication List      START taking these medications    cefdinir 300 MG capsule  Commonly known as: OMNICEF  Take 1 capsule (300 mg total) by mouth 2 (two) times daily. for 11 days     clindamycin 150 MG capsule  Commonly known as: CLEOCIN  Take 3 capsules (450 mg total) by mouth every 8 (eight) hours. for 11 days     diphenhydrAMINE-zinc acetate 2-0.1% cream  Commonly known as: Benadryl  Apply topically 3 (three) times daily as needed for Itching.     hydrOXYzine HCL 25 MG tablet  Commonly known as: ATARAX  Take 1 tablet (25 mg total) by mouth 3 (three) times daily as needed for Itching.        CONTINUE taking these medications    AJOVY AUTOINJECTOR 225 mg/1.5 mL autoinjector  Generic drug: fremanezumab-vfrm  INJECT UNDER THE SKIN EVERY MONTH     blood sugar diagnostic Strp  To check BG two times daily, to use with insurance preferred meter     blood-glucose meter kit  To check BG two  times daily, to use with insurance preferred meter     lancets Misc  To check BG two times daily, to use with insurance preferred meter     LIDOcaine 5 %  Commonly known as: LIDODERM  1 patch daily as needed.     metFORMIN 500 MG ER 24hr tablet  Commonly known as: GLUCOPHAGE-XR  Take 2 tablets (1,000 mg total) by mouth daily with breakfast.     naproxen 500 MG tablet  Commonly known as: NAPROSYN  Take 1 tablet (500 mg total) by mouth 2 (two) times daily as needed (pain).     ondansetron 4 MG Tbdl  Commonly known as: ZOFRAN-ODT  Take 1 tablet (4 mg total) by mouth every 6 (six) hours as needed.     rizatriptan 10 MG tablet  Commonly known as: MAXALT  Take 10 mg by mouth once as needed for Migraine.     TRULICITY 1.5 mg/0.5 mL pen injector  Generic drug: dulaglutide  Inject 1.5 mg into the skin every 7 days.        STOP taking  these medications    aspirin 81 MG EC tablet  Commonly known as: ECOTRIN     doxycycline 100 MG Cap  Commonly known as: VIBRAMYCIN     HYDROcodone-acetaminophen 7.5-325 mg per tablet  Commonly known as: NORCO     ibuprofen 600 MG tablet  Commonly known as: ADVIL,MOTRIN            Indwelling Lines/Drains at time of discharge:   Lines/Drains/Airways     None                 Time spent on the discharge of patient: 35 minutes         Morgan Moreno DO  Department of Hospital Medicine  [facility]

## 2023-05-11 NOTE — PLAN OF CARE
Problem: Adult Inpatient Plan of Care  Goal: Plan of Care Review  Outcome: Ongoing, Progressing  Goal: Patient-Specific Goal (Individualized)  Outcome: Ongoing, Progressing  Goal: Absence of Hospital-Acquired Illness or Injury  Outcome: Ongoing, Progressing  Goal: Optimal Comfort and Wellbeing  Outcome: Ongoing, Progressing  Goal: Readiness for Transition of Care  Outcome: Ongoing, Progressing     Problem: Adult Inpatient Plan of Care  Goal: Plan of Care Review  Outcome: Ongoing, Progressing     Problem: Adult Inpatient Plan of Care  Goal: Patient-Specific Goal (Individualized)  Outcome: Ongoing, Progressing

## 2023-05-11 NOTE — NURSING
Discharge instructions reviewed and given to patient, pt voiced understanding, currently waiting for Rolator to arrive to bedside.

## 2023-05-12 ENCOUNTER — TELEPHONE (OUTPATIENT)
Dept: FAMILY MEDICINE | Facility: CLINIC | Age: 38
End: 2023-05-12

## 2023-05-12 NOTE — TELEPHONE ENCOUNTER
Patient left message regarding needing a return to work note before Tuesday or she may get fired.    Spoke with patient, patient says she went on a trip to [place] and while there she hurt her leg somehow. Patient says her leg got infected and she ended up going to ER. Patient went back and forth to ER for some time. One of those times that patient was admitted to [facility] 5/8/23 and was discharged on 5/11/23. Patient was given work notes from hospital visits. Patient is wanting MELODY Dobson to give her a return to work note stating her abilities regarding mobility. Patient says she is not able to stand for long periods of time. Patient advised that she will need to see to discuss this, but it is not a promise that MELODY Dobson will write this note. Patient verbalized understanding.

## 2023-05-13 LAB
BACTERIA BLD CULT: NORMAL
BACTERIA BLD CULT: NORMAL

## 2023-05-15 ENCOUNTER — OFFICE VISIT (OUTPATIENT)
Dept: FAMILY MEDICINE | Facility: CLINIC | Age: 38
End: 2023-05-15
Payer: MEDICAID

## 2023-05-15 VITALS
RESPIRATION RATE: 20 BRPM | HEART RATE: 89 BPM | DIASTOLIC BLOOD PRESSURE: 85 MMHG | SYSTOLIC BLOOD PRESSURE: 127 MMHG | OXYGEN SATURATION: 100 % | TEMPERATURE: 98 F | BODY MASS INDEX: 31.94 KG/M2 | HEIGHT: 67 IN | WEIGHT: 203.5 LBS

## 2023-05-15 DIAGNOSIS — L03.115 CELLULITIS OF RIGHT LOWER EXTREMITY: Primary | ICD-10-CM

## 2023-05-15 PROCEDURE — 99213 PR OFFICE/OUTPT VISIT, EST, LEVL III, 20-29 MIN: ICD-10-PCS | Mod: S$PBB,,, | Performed by: NURSE PRACTITIONER

## 2023-05-15 PROCEDURE — 3079F DIAST BP 80-89 MM HG: CPT | Mod: CPTII,,, | Performed by: NURSE PRACTITIONER

## 2023-05-15 PROCEDURE — 3051F PR MOST RECENT HEMOGLOBIN A1C LEVEL 7.0 - < 8.0%: ICD-10-PCS | Mod: CPTII,,, | Performed by: NURSE PRACTITIONER

## 2023-05-15 PROCEDURE — 1159F PR MEDICATION LIST DOCUMENTED IN MEDICAL RECORD: ICD-10-PCS | Mod: CPTII,,, | Performed by: NURSE PRACTITIONER

## 2023-05-15 PROCEDURE — 3074F PR MOST RECENT SYSTOLIC BLOOD PRESSURE < 130 MM HG: ICD-10-PCS | Mod: CPTII,,, | Performed by: NURSE PRACTITIONER

## 2023-05-15 PROCEDURE — 99213 OFFICE O/P EST LOW 20 MIN: CPT | Mod: S$PBB,,, | Performed by: NURSE PRACTITIONER

## 2023-05-15 PROCEDURE — 3051F HG A1C>EQUAL 7.0%<8.0%: CPT | Mod: CPTII,,, | Performed by: NURSE PRACTITIONER

## 2023-05-15 PROCEDURE — 3074F SYST BP LT 130 MM HG: CPT | Mod: CPTII,,, | Performed by: NURSE PRACTITIONER

## 2023-05-15 PROCEDURE — 1159F MED LIST DOCD IN RCRD: CPT | Mod: CPTII,,, | Performed by: NURSE PRACTITIONER

## 2023-05-15 PROCEDURE — 99215 OFFICE O/P EST HI 40 MIN: CPT | Performed by: NURSE PRACTITIONER

## 2023-05-15 PROCEDURE — 1160F RVW MEDS BY RX/DR IN RCRD: CPT | Mod: CPTII,,, | Performed by: NURSE PRACTITIONER

## 2023-05-15 PROCEDURE — 3008F BODY MASS INDEX DOCD: CPT | Mod: CPTII,,, | Performed by: NURSE PRACTITIONER

## 2023-05-15 PROCEDURE — 3008F PR BODY MASS INDEX (BMI) DOCUMENTED: ICD-10-PCS | Mod: CPTII,,, | Performed by: NURSE PRACTITIONER

## 2023-05-15 PROCEDURE — 3079F PR MOST RECENT DIASTOLIC BLOOD PRESSURE 80-89 MM HG: ICD-10-PCS | Mod: CPTII,,, | Performed by: NURSE PRACTITIONER

## 2023-05-15 PROCEDURE — 1160F PR REVIEW ALL MEDS BY PRESCRIBER/CLIN PHARMACIST DOCUMENTED: ICD-10-PCS | Mod: CPTII,,, | Performed by: NURSE PRACTITIONER

## 2023-05-15 NOTE — LETTER
May 15, 2023      [facility]  [address]  [facility] [address]  Phone: [phone]  Fax: [phone]       Patient: Lizzie Saunders   YOB: 1985  Date of Visit: 05/15/2023    To Whom It May Concern:    Chuck Saunders  was at [facility] on 05/15/2023. The patient may return to work/school on 5/15/23 as per hospital stated restrictions. Please allow her to sit or take breaks as needed. Thank you for your assistance in this matter.    Sincerely,    JAIRO Wilburn

## 2023-05-16 NOTE — PROGRESS NOTES
SUBJECTIVE:   HPI:  Letter for School/Work    Lizzie is here today to discuss work note she was given by hospital.  Patient recently was treated at [facility] for complaints of right thigh cellulitis-she is currently taking oral antibiotics and infection is resolving.  Denies any new symptoms such as redness, drainage, fever or chills.  Patient states her boss will not allow her to come back to work with the current note she was given and wants to discuss further today.  Denies new complaints at this time.    (Not in a hospital admission)    Review of patient's allergies indicates:   Allergen Reactions    Rosemary Anaphylaxis    Pecan nut Dermatitis    Sulfa (sulfonamide antibiotics) Itching       Current Outpatient Medications on File Prior to Visit   Medication Sig Dispense Refill    AJOVY AUTOINJECTOR 225 mg/1.5 mL autoinjector INJECT UNDER THE SKIN EVERY MONTH      cefdinir (OMNICEF) 300 MG capsule Take 1 capsule (300 mg total) by mouth 2 (two) times daily. for 11 days 22 capsule 0    clindamycin (CLEOCIN) 150 MG capsule Take 3 capsules (450 mg total) by mouth every 8 (eight) hours. for 11 days 99 capsule 0    diphenhydrAMINE-zinc acetate 2-0.1% (BENADRYL) cream Apply topically 3 (three) times daily as needed for Itching. 15 g 0    dulaglutide (TRULICITY) 1.5 mg/0.5 mL pen injector Inject 1.5 mg into the skin every 7 days. 4 pen 5    hydrOXYzine HCL (ATARAX) 25 MG tablet Take 1 tablet (25 mg total) by mouth 3 (three) times daily as needed for Itching. 60 tablet 1    LIDOcaine (LIDODERM) 5 % 1 patch daily as needed.      metFORMIN (GLUCOPHAGE-XR) 500 MG ER 24hr tablet Take 2 tablets (1,000 mg total) by mouth daily with breakfast. 60 tablet 0    naproxen (NAPROSYN) 500 MG tablet Take 1 tablet (500 mg total) by mouth 2 (two) times daily as needed (pain). 30 tablet 0    ondansetron (ZOFRAN-ODT) 4 MG TbDL Take 1 tablet (4 mg total) by mouth every 6 (six) hours as needed. 15 tablet 0    rizatriptan  "(MAXALT) 10 MG tablet Take 10 mg by mouth once as needed for Migraine.      blood sugar diagnostic Strp To check BG two times daily, to use with insurance preferred meter 50 strip 5    blood-glucose meter kit To check BG two  times daily, to use with insurance preferred meter 1 each 0    lancets Misc To check BG two times daily, to use with insurance preferred meter 50 each 5     No current facility-administered medications on file prior to visit.       Past Medical History:   Diagnosis Date    Bipolar disorder     per patient report    Depression     Diabetes mellitus     Diabetes mellitus, type 2     Hx of psychiatric care     Neuropathy     per patient report    Psychiatric problem     Therapy      Past Surgical History:   Procedure Laterality Date    BACK SURGERY      EYE SURGERY       Family History   Problem Relation Age of Onset    Bipolar disorder Mother     Bipolar disorder Father     Cancer Paternal Grandfather      Social History     Tobacco Use    Smoking status: Former     Packs/day: 0.25     Types: Cigarettes     Quit date: 2020     Years since quittin.7    Smokeless tobacco: Never   Substance Use Topics    Alcohol use: Yes     Comment: rare    Drug use: Yes     Types: Marijuana     Comment: Daily        Review of Systems   Constitutional:  Negative for chills and fever.   Respiratory:  Negative for shortness of breath.    Cardiovascular:  Negative for chest pain.   Gastrointestinal:  Negative for abdominal pain, diarrhea, nausea and vomiting.   Skin:  Negative for color change, rash and wound.   Hematological:  Negative for adenopathy.   All other systems reviewed and are negative.   OBJECTIVE:      Vitals:    05/15/23 1118   BP: 127/85   BP Location: Right arm   Patient Position: Sitting   BP Method: Medium (Automatic)   Pulse: 89   Resp: 20   Temp: 98.3 °F (36.8 °C)   TempSrc: Oral   SpO2: 100%   Weight: 92.3 kg (203 lb 8 oz)   Height: 5' 7" (1.702 m)     Physical Exam  Vitals and nursing " note reviewed.   Constitutional:       General: She is not in acute distress.     Appearance: Normal appearance. She is obese. She is not ill-appearing or diaphoretic.   Eyes:      Pupils: Pupils are equal, round, and reactive to light.   Cardiovascular:      Rate and Rhythm: Normal rate and regular rhythm.   Pulmonary:      Effort: Pulmonary effort is normal.      Breath sounds: Normal breath sounds.   Musculoskeletal:      Cervical back: Normal range of motion.   Skin:     Coloration: Skin is not jaundiced or pale.      Findings: No erythema or rash.      Comments: Right upper thigh resolving cellulitis- no erythema, no warmth or swelling/tenderness    Neurological:      Mental Status: She is alert and oriented to person, place, and time.   Psychiatric:         Mood and Affect: Mood normal.         Behavior: Behavior normal.      Assessment:       ICD-10-CM ICD-9-CM    1. Cellulitis of right lower extremity  L03.115 682.6            Plan:   Lizzie was seen today for letter for school/work.    Diagnoses and all orders for this visit:    Cellulitis of right lower extremity   -note provided      Follow up if symptoms worsen or fail to improve.      5/16/2023 RIYA Noriega, FNP      This note was created using [employer] voice recognition software that occasionally misinterprets phrases or words.

## 2023-05-19 ENCOUNTER — TELEPHONE (OUTPATIENT)
Dept: HEMATOLOGY/ONCOLOGY | Facility: CLINIC | Age: 38
End: 2023-05-19

## 2023-05-19 DIAGNOSIS — D75.839 THROMBOCYTOSIS: Primary | ICD-10-CM

## 2023-05-19 NOTE — TELEPHONE ENCOUNTER
Patient called in and reported that she was ready to get her BMB done at [facility]. Orders placed per Dr Marquez's verbal order. Attempted to call patient with above, no answer at number listed. Voicemail left with above and instructions to call back with any questions.

## 2023-05-30 DIAGNOSIS — Z79.4 TYPE 2 DIABETES MELLITUS WITH HYPERGLYCEMIA, WITH LONG-TERM CURRENT USE OF INSULIN: ICD-10-CM

## 2023-05-30 DIAGNOSIS — E11.65 TYPE 2 DIABETES MELLITUS WITH HYPERGLYCEMIA, WITH LONG-TERM CURRENT USE OF INSULIN: ICD-10-CM

## 2023-05-30 RX ORDER — DULAGLUTIDE 1.5 MG/.5ML
INJECTION, SOLUTION SUBCUTANEOUS
Qty: 4 PEN | Refills: 5 | Status: SHIPPED | OUTPATIENT
Start: 2023-05-30

## 2023-05-31 ENCOUNTER — OFFICE VISIT (OUTPATIENT)
Dept: FAMILY MEDICINE | Facility: CLINIC | Age: 38
End: 2023-05-31
Payer: MEDICAID

## 2023-05-31 VITALS
SYSTOLIC BLOOD PRESSURE: 106 MMHG | HEIGHT: 67 IN | HEART RATE: 86 BPM | TEMPERATURE: 99 F | BODY MASS INDEX: 32.2 KG/M2 | WEIGHT: 205.13 LBS | RESPIRATION RATE: 20 BRPM | OXYGEN SATURATION: 98 % | DIASTOLIC BLOOD PRESSURE: 69 MMHG

## 2023-05-31 DIAGNOSIS — E11.65 TYPE 2 DIABETES MELLITUS WITH HYPERGLYCEMIA, WITH LONG-TERM CURRENT USE OF INSULIN: Primary | ICD-10-CM

## 2023-05-31 DIAGNOSIS — E78.2 MIXED HYPERLIPIDEMIA: ICD-10-CM

## 2023-05-31 DIAGNOSIS — R80.9 MICROALBUMINURIA: ICD-10-CM

## 2023-05-31 DIAGNOSIS — Z79.4 TYPE 2 DIABETES MELLITUS WITH HYPERGLYCEMIA, WITH LONG-TERM CURRENT USE OF INSULIN: Primary | ICD-10-CM

## 2023-05-31 DIAGNOSIS — E66.09 CLASS 1 OBESITY DUE TO EXCESS CALORIES WITH SERIOUS COMORBIDITY AND BODY MASS INDEX (BMI) OF 32.0 TO 32.9 IN ADULT: ICD-10-CM

## 2023-05-31 PROCEDURE — 3051F HG A1C>EQUAL 7.0%<8.0%: CPT | Mod: CPTII,,, | Performed by: NURSE PRACTITIONER

## 2023-05-31 PROCEDURE — 3008F PR BODY MASS INDEX (BMI) DOCUMENTED: ICD-10-PCS | Mod: CPTII,,, | Performed by: NURSE PRACTITIONER

## 2023-05-31 PROCEDURE — 3051F PR MOST RECENT HEMOGLOBIN A1C LEVEL 7.0 - < 8.0%: ICD-10-PCS | Mod: CPTII,,, | Performed by: NURSE PRACTITIONER

## 2023-05-31 PROCEDURE — 99214 PR OFFICE/OUTPT VISIT, EST, LEVL IV, 30-39 MIN: ICD-10-PCS | Mod: S$PBB,,, | Performed by: NURSE PRACTITIONER

## 2023-05-31 PROCEDURE — 99214 OFFICE O/P EST MOD 30 MIN: CPT | Performed by: NURSE PRACTITIONER

## 2023-05-31 PROCEDURE — 3008F BODY MASS INDEX DOCD: CPT | Mod: CPTII,,, | Performed by: NURSE PRACTITIONER

## 2023-05-31 PROCEDURE — 3078F DIAST BP <80 MM HG: CPT | Mod: CPTII,,, | Performed by: NURSE PRACTITIONER

## 2023-05-31 PROCEDURE — 3078F PR MOST RECENT DIASTOLIC BLOOD PRESSURE < 80 MM HG: ICD-10-PCS | Mod: CPTII,,, | Performed by: NURSE PRACTITIONER

## 2023-05-31 PROCEDURE — 1160F RVW MEDS BY RX/DR IN RCRD: CPT | Mod: CPTII,,, | Performed by: NURSE PRACTITIONER

## 2023-05-31 PROCEDURE — 1160F PR REVIEW ALL MEDS BY PRESCRIBER/CLIN PHARMACIST DOCUMENTED: ICD-10-PCS | Mod: CPTII,,, | Performed by: NURSE PRACTITIONER

## 2023-05-31 PROCEDURE — 3074F SYST BP LT 130 MM HG: CPT | Mod: CPTII,,, | Performed by: NURSE PRACTITIONER

## 2023-05-31 PROCEDURE — 99214 OFFICE O/P EST MOD 30 MIN: CPT | Mod: S$PBB,,, | Performed by: NURSE PRACTITIONER

## 2023-05-31 PROCEDURE — 3074F PR MOST RECENT SYSTOLIC BLOOD PRESSURE < 130 MM HG: ICD-10-PCS | Mod: CPTII,,, | Performed by: NURSE PRACTITIONER

## 2023-05-31 PROCEDURE — 1159F PR MEDICATION LIST DOCUMENTED IN MEDICAL RECORD: ICD-10-PCS | Mod: CPTII,,, | Performed by: NURSE PRACTITIONER

## 2023-05-31 PROCEDURE — 1159F MED LIST DOCD IN RCRD: CPT | Mod: CPTII,,, | Performed by: NURSE PRACTITIONER

## 2023-05-31 RX ORDER — ATORVASTATIN CALCIUM 40 MG/1
40 TABLET, FILM COATED ORAL NIGHTLY
Qty: 90 TABLET | Refills: 1 | Status: SHIPPED | OUTPATIENT
Start: 2023-05-31 | End: 2023-12-11

## 2023-05-31 RX ORDER — INSULIN GLARGINE 100 [IU]/ML
20 INJECTION, SOLUTION SUBCUTANEOUS NIGHTLY
Qty: 6 ML | Refills: 5 | Status: SHIPPED | OUTPATIENT
Start: 2023-05-31

## 2023-05-31 NOTE — PROGRESS NOTES
SUBJECTIVE:      Patient ID: Lizzie Saunders is a 37 y.o. female.    Chief Complaint: Diabetes    Lizzie is here for follow up on diabetes and HLD.  Taking her medications as prescribed daily without side effects or complaints. Atorvastatin was removed from her med list- pt has not been taking. A1c check in hospital in May which was 7.5.  She is checking her blood sugar intermittently- FBS between 110-120.  Currently using Lantus 20 units nightly and wants to talk about a decrease in her dose.  She has been compliant with metformin in a.m. daily. She denies any hypoglycemia episodes.  Due for a foot exam today.    Diabetes  She presents for her follow-up diabetic visit. She has type 2 diabetes mellitus. No MedicAlert identification noted. The initial diagnosis of diabetes was made 15 years ago. Her disease course has been fluctuating. Hypoglycemia symptoms include nervousness/anxiousness. Pertinent negatives for hypoglycemia include no confusion, dizziness, headaches, hunger, mood changes, pallor, seizures, speech difficulty or tremors. Associated symptoms include foot paresthesias. Pertinent negatives for diabetes include no blurred vision, no chest pain, no fatigue, no foot ulcerations, no polydipsia, no polyphagia, no polyuria, no visual change, no weakness and no weight loss. Pertinent negatives for hypoglycemia complications include no blackouts, no hospitalization, no nocturnal hypoglycemia, no required assistance and no required glucagon injection. Diabetic complications include peripheral neuropathy and retinopathy (mild- reviewed eye exam ). Pertinent negatives for diabetic complications include no autonomic neuropathy, CVA, impotence or nephropathy. Risk factors for coronary artery disease include dyslipidemia, family history, obesity, sedentary lifestyle, stress, tobacco exposure and diabetes mellitus. Current diabetic treatment includes diet, insulin injections and oral agent (monotherapy)  (Trulicity). She is compliant with treatment most of the time. She is currently taking insulin pre-breakfast and at bedtime. Insulin injections are given by patient. Rotation sites for injection include the abdominal wall. Her weight is stable. She is following a generally healthy diet. When asked about meal planning, she reported none. She has not had a previous visit with a dietitian. She participates in exercise intermittently. There is no compliance with monitoring of blood glucose. Her home blood glucose trend is decreasing steadily. Her breakfast blood glucose range is generally 110-130 mg/dl. An ACE inhibitor/angiotensin II receptor blocker is not being taken (BP too low). She does not see a podiatrist.Eye exam is current.   Hyperlipidemia  This is a chronic problem. The current episode started more than 1 year ago. Exacerbating diseases include diabetes and obesity. She has no history of chronic renal disease, hypothyroidism or liver disease. Factors aggravating her hyperlipidemia include smoking. Pertinent negatives include no chest pain, leg pain, myalgias or shortness of breath. Current antihyperlipidemic treatment includes diet change. The current treatment provides moderate improvement of lipids. Compliance problems include adherence to exercise and adherence to diet.  Risk factors for coronary artery disease include diabetes mellitus, dyslipidemia, obesity and a sedentary lifestyle.     Family History   Problem Relation Age of Onset    Bipolar disorder Mother     Bipolar disorder Father     Cancer Paternal Grandfather       Social History     Socioeconomic History    Marital status:      Spouse name: Sonja Saunders    Number of children: 0   Occupational History     Comment: currently unemployed   Tobacco Use    Smoking status: Former     Packs/day: 0.25     Types: Cigarettes     Quit date: 2020     Years since quittin.7    Smokeless tobacco: Never   Substance and Sexual Activity     Alcohol use: Yes     Comment: rare    Drug use: Yes     Types: Marijuana     Comment: Daily    Sexual activity: Yes     Comment:    Social History Narrative    Patient tearful and anxious on admit; unable to answer all admit questions at this time. 2/3/17 @ 21:45     Social Determinants of Health     Financial Resource Strain: High Risk    Difficulty of Paying Living Expenses: Hard   Food Insecurity: No Food Insecurity    Worried About Running Out of Food in the Last Year: Never true    Ran Out of Food in the Last Year: Never true   Transportation Needs: No Transportation Needs    Lack of Transportation (Medical): No    Lack of Transportation (Non-Medical): No   Physical Activity: Insufficiently Active    Days of Exercise per Week: 1 day    Minutes of Exercise per Session: 30 min   Stress: Stress Concern Present    Feeling of Stress : To some extent   Social Connections: Socially Isolated    Frequency of Communication with Friends and Family: Twice a week    Frequency of Social Gatherings with Friends and Family: Twice a week    Attends Shinto Services: Never    Active Member of Clubs or Organizations: No    Attends Club or Organization Meetings: Never    Marital Status:    Housing Stability: High Risk    Unable to Pay for Housing in the Last Year: Yes    Unstable Housing in the Last Year: No     Current Outpatient Medications   Medication Sig Dispense Refill    AJOVY AUTOINJECTOR 225 mg/1.5 mL autoinjector INJECT UNDER THE SKIN EVERY MONTH      dulaglutide (TRULICITY) 1.5 mg/0.5 mL pen injector INJECT 1.5 MILLIGRAMS INTO THE SKIN EVERY 7 DAYS 4 pen 5    LIDOcaine (LIDODERM) 5 % 1 patch daily as needed.      metFORMIN (GLUCOPHAGE-XR) 500 MG ER 24hr tablet Take 2 tablets (1,000 mg total) by mouth daily with breakfast. 60 tablet 0    rizatriptan (MAXALT) 10 MG tablet Take 10 mg by mouth once as needed for Migraine.      atorvastatin (LIPITOR) 40 MG tablet Take 1 tablet (40 mg total) by mouth every  evening. 90 tablet 1    blood sugar diagnostic Strp To check BG two times daily, to use with insurance preferred meter 50 strip 5    blood-glucose meter kit To check BG two  times daily, to use with insurance preferred meter 1 each 0    insulin (LANTUS SOLOSTAR U-100 INSULIN) glargine 100 units/mL SubQ pen Inject 20 Units into the skin every evening. 6 mL 5    lancets Misc To check BG two times daily, to use with insurance preferred meter 50 each 5     No current facility-administered medications for this visit.     Review of patient's allergies indicates:   Allergen Reactions    Rosemary Anaphylaxis    Pecan nut Dermatitis    Sulfa (sulfonamide antibiotics) Itching      Past Medical History:   Diagnosis Date    Bipolar disorder     per patient report    Depression     Diabetes mellitus     Diabetes mellitus, type 2     Hx of psychiatric care     Neuropathy     per patient report    Psychiatric problem     Therapy      Past Surgical History:   Procedure Laterality Date    BACK SURGERY      EYE SURGERY         Review of Systems   Constitutional:  Negative for activity change, appetite change, chills, fatigue, fever, unexpected weight change and weight loss.   HENT:  Negative for congestion, hearing loss, rhinorrhea and trouble swallowing.    Eyes:  Negative for blurred vision, discharge and visual disturbance.   Respiratory:  Negative for cough, chest tightness, shortness of breath and wheezing.    Cardiovascular:  Negative for chest pain, palpitations and leg swelling.   Gastrointestinal:  Negative for abdominal pain, blood in stool, constipation, diarrhea, nausea and vomiting.   Endocrine: Negative for cold intolerance, heat intolerance, polydipsia, polyphagia and polyuria.   Genitourinary:  Negative for difficulty urinating, dysuria, frequency, hematuria, impotence, menstrual problem, pelvic pain and vaginal discharge.   Musculoskeletal:  Negative for arthralgias, back pain, joint swelling, myalgias and neck pain.  "  Skin:  Negative for color change, pallor, rash and wound.   Allergic/Immunologic: Positive for environmental allergies.   Neurological:  Negative for dizziness, tremors, seizures, speech difficulty, weakness and headaches.   Hematological:  Negative for adenopathy. Does not bruise/bleed easily.   Psychiatric/Behavioral:  Negative for agitation, confusion, dysphoric mood, sleep disturbance and suicidal ideas. The patient is nervous/anxious.     OBJECTIVE:      Vitals:    05/31/23 1258   BP: 106/69   BP Location: Left arm   Patient Position: Sitting   BP Method: Large (Automatic)   Pulse: 86   Resp: 20   Temp: 98.8 °F (37.1 °C)   TempSrc: Oral   SpO2: 98%   Weight: 93 kg (205 lb 1.6 oz)   Height: 5' 7" (1.702 m)     Physical Exam  Vitals and nursing note reviewed.   Constitutional:       General: She is not in acute distress.     Appearance: Normal appearance. She is well-developed. She is obese. She is not ill-appearing.   HENT:      Head: Normocephalic and atraumatic.      Mouth/Throat:      Mouth: Mucous membranes are moist.   Eyes:      General: No scleral icterus.     Pupils: Pupils are equal, round, and reactive to light.   Neck:      Thyroid: No thyroid mass or thyromegaly.      Trachea: Trachea normal.   Cardiovascular:      Rate and Rhythm: Normal rate and regular rhythm.      Pulses:           Dorsalis pedis pulses are 1+ on the right side and 1+ on the left side.        Posterior tibial pulses are 1+ on the right side and 1+ on the left side.      Heart sounds: Normal heart sounds.   Pulmonary:      Effort: Pulmonary effort is normal. No respiratory distress.      Breath sounds: Normal breath sounds. No wheezing or rales.   Musculoskeletal:         General: Normal range of motion.      Cervical back: Normal range of motion and neck supple.      Right lower leg: No edema.      Left lower leg: No edema.      Right foot: Normal range of motion. No deformity.      Left foot: Normal range of motion. No " deformity.   Feet:      Right foot:      Protective Sensation: 10 sites tested.  7 sites sensed.      Skin integrity: Skin integrity normal. No ulcer, blister, skin breakdown, erythema, warmth, callus or dry skin.      Toenail Condition: Right toenails are normal.      Left foot:      Protective Sensation: 10 sites tested.  7 sites sensed.      Skin integrity: Skin integrity normal. No ulcer, blister, skin breakdown, erythema, warmth, callus or dry skin.      Toenail Condition: Left toenails are normal.   Lymphadenopathy:      Cervical: No cervical adenopathy.   Skin:     General: Skin is warm and dry.      Coloration: Skin is not jaundiced or pale.   Neurological:      Mental Status: She is alert and oriented to person, place, and time.   Psychiatric:         Attention and Perception: Attention normal.         Mood and Affect: Mood normal. Mood is not anxious or depressed.         Speech: Speech is not rapid and pressured.         Behavior: Behavior normal.         Thought Content: Thought content normal.         Judgment: Judgment normal.      Assessment:       1. Type 2 diabetes mellitus with hyperglycemia, with long-term current use of insulin    2. Mixed hyperlipidemia    3. Microalbuminuria    4. Class 1 obesity due to excess calories with serious comorbidity and body mass index (BMI) of 32.0 to 32.9 in adult        Plan:       Type 2 diabetes mellitus with hyperglycemia, with long-term current use of insulin  -     Lipid Panel; Future; Expected date: 05/31/2023  -     Comprehensive Metabolic Panel; Future; Expected date: 05/31/2023  -     Microalbumin/Creatinine Ratio, Urine; Future; Expected date: 05/31/2023  -     Urinalysis; Future; Expected date: 05/31/2023  -     insulin (LANTUS SOLOSTAR U-100 INSULIN) glargine 100 units/mL SubQ pen; Inject 20 Units into the skin every evening.  Dispense: 6 mL; Refill: 5  -fluctuating; A1C up d/t infection this past month; recheck in 3 mo- may need increase Trulicity;  decrease Lantus to 18 units nightly and monitor closely; foot exam done      Mixed hyperlipidemia  -     Lipid Panel; Future; Expected date: 05/31/2023  -     Comprehensive Metabolic Panel; Future; Expected date: 05/31/2023  -     atorvastatin (LIPITOR) 40 MG tablet; Take 1 tablet (40 mg total) by mouth every evening.  Dispense: 90 tablet; Refill: 1  -restart statin daily; check labs     Microalbuminuria  -     Microalbumin/Creatinine Ratio, Urine; Future; Expected date: 05/31/2023    Class 1 obesity due to excess calories with serious comorbidity and body mass index (BMI) of 32.0 to 32.9 in adult      I spent a total of 23 minutes on the day of the visit.This includes face to face time and non-face to face time preparing to see the patient (eg, review of tests), obtaining and/or reviewing separately obtained history, documenting clinical information in the electronic or other health record, independently interpreting results and communicating results to the patient/family/caregiver, or care coordinator.     Follow up in about 3 months (around 8/31/2023) for F/U DM- A1C in office.      5/31/2023 RIYA Noriega, FNP    This note was created using [employer] voice recognition software that occasionally misinterprets phrases or words.

## 2023-06-13 DIAGNOSIS — E11.65 TYPE 2 DIABETES MELLITUS WITH HYPERGLYCEMIA, WITH LONG-TERM CURRENT USE OF INSULIN: ICD-10-CM

## 2023-06-13 DIAGNOSIS — Z79.4 TYPE 2 DIABETES MELLITUS WITH HYPERGLYCEMIA, WITH LONG-TERM CURRENT USE OF INSULIN: ICD-10-CM

## 2023-06-13 RX ORDER — METFORMIN HYDROCHLORIDE 500 MG/1
1000 TABLET, EXTENDED RELEASE ORAL
Qty: 60 TABLET | Refills: 5 | Status: SHIPPED | OUTPATIENT
Start: 2023-06-13

## 2023-06-13 NOTE — TELEPHONE ENCOUNTER
Patient called stating she needs a refill on Metformin. The pharmacy told her it was discontinued but I see she last received from ER as emergency dose she stated.   Please advise.

## 2023-07-26 ENCOUNTER — PATIENT OUTREACH (OUTPATIENT)
Dept: ADMINISTRATIVE | Facility: HOSPITAL | Age: 38
End: 2023-07-26
Payer: MEDICAID

## 2023-07-26 NOTE — PROGRESS NOTES
Population Health Chart Review & Patient Outreach Details:     Reason for Outreach Encounter:     []  Non-Compliant Report   []  Payor Report ([employer], [employer], BCBS, MSSP, MCIP, [employer], etc.)   []  Pre-Visit Chart Review     Updates Requested / Reviewed:     [x]  Care Everywhere    [x]     []  External Sources ([facility], [facility], [facility], etc.)   [x]  Care Team Updated    Patient Outreach Method:    []  Telephone Outreach Completed   [] Successful   [] Left Voicemail   [] Unable to Contact (wrong number, no voicemail)  []  [facility] Outreach Sent  []  Letter Outreach Mailed  [x]  Fax Sent for External Records  []  External Records Upload    Health Maintenance Topics Addressed and Outreach Outcomes / Actions Taken:        []      Breast Cancer Screening []  Mammo Scheduled      []  External Records Requested     []  Added Reminder to Complete to Upcoming Primary Care Appt Notes     []  Patient Declined     []  Patient Will Call Back to Schedule     []  Patient Will Schedule with External Provider / Order Routed if Applicable             []       Cervical Cancer Screening []  Pap Scheduled      []  External Records Requested     []  Added Reminder to Complete to Upcoming Primary Care Appt Notes     []  Patient Declined     []  Patient Will Call Back to Schedule     []  Patient Will Schedule with External Provider               []          Colorectal Cancer Screening []  Colonoscopy Case Request or Referral Placed     []  External Records Requested     []  Added Reminder to Complete to Upcoming Primary Care Appt Notes     []  Patient Declined     []  Patient Will Call Back to Schedule     []  Patient Will Schedule with External Provider     []  Fit Kit Mailed (add the SmartPhrase under additional notes)     []  Reminded Patient to Complete Home Test             [x]      Diabetic Eye Exam []  Eye Camera Scheduled or Optometry Referral Placed     [x]  External Records Requested     []  Added Reminder to Complete to  Upcoming Primary Care Appt Notes     []  Patient Declined     []  Patient Will Call Back to Schedule     []  Patient Will Schedule with External Provider             []      Blood Pressure Control []  Primary Care Follow Up Visit Scheduled     []  Remote Blood Pressure Reading Captured     []  Added Reminder to Complete to Upcoming Primary Care Appt Notes     []  Patient Declined     []  Patient Will Call Back / Patient Will Send Portal Message with Reading     []  Patient Will Call Back to Schedule Provider Visit             []       HbA1c & Other Labs []  Lab Appt Scheduled for Due Labs     []  Primary Care Follow Up Visit Scheduled      []  Reminded Patient to Complete Home Test     []  Added Reminder to Complete to Upcoming Primary Care Appt Notes     []  Patient Declined     []  Patient Will Call Back to Schedule     []  Patient Will Schedule with External Provider / Order Routed if Applicable           []    Schedule Primary Care Appt []  Primary Care Appt Scheduled     []  Patient Declined     []  Patient Will Call Back to Schedule     []  Pt Established with External Provider & Updated Care Team             []      Medication Adherence []  Primary Care Appointment Scheduled     []  Added Reminder to Upcoming Primary Care Appt Notes     []  Patient Reminded to  Prescription     []  Patient Declined, Provider Notified if Needed     []  Sent Provider Message to Review and/or Add Exclusion to Problem List             []      Osteoporosis Screening []  DXA Appointment Scheduled     []  External Records Requested     []  Added Reminder to Complete to Upcoming Primary Care Appt Notes     []  Patient Declined     []  Patient Will Call Back to Schedule     []  Patient Will Schedule with External Provider / Order Routed if Applicable     Additional Care Coordinator Notes:         Further Action Needed If Patient Returns Outreach:

## 2023-07-26 NOTE — LETTER
AUTHORIZATION FOR RELEASE OF   CONFIDENTIAL INFORMATION    Dear [facility] ,    We are seeing Lizzie Saunders, date of birth 1985, in the clinic at [address] FAMILY / INTERNAL MEDICINE. JAIRO Noriega is the patient's PCP. Lizzie Saunders has an outstanding lab/procedure at the time we reviewed her chart. In order to help keep her health information updated, she has authorized us to request the following medical record(s):     ( x )  DILATED EYE EXAM -The most recent          Please fax records to [facility], Kimberly C Berel, FNP, [phone]     If you have any questions, please contact at       ThanksNelly  Nurse Clinical Care Coordinator  [facility]  Phone: [phone]  Fax: [phone]    Patient Name: Lizzie Saunders  : 1985  Patient Phone #: [phone]                Lizzie Saunders  MRN: [id number]  : 1985  Age: 37 y.o.  Sex: female       MEDICARE-PATIENTS CERTIFICATION, AUTHORIZATION TO RELEASE INFORMATION AND PAYMENT REQUEST:  I certify that the information given by me in applying under the Title XVII of Social Security Act is correct.  I authorize any parra of medical or other information about me to release to the Social Security Administration or its intermediaries or carriers any information needed for this or a related Medicare claim.  I request that payment of authorized benefits be made on my behalf to [facility] and [facility] ([facility]).  I also acknowledge upon admission, that I received the Important Message from Medicare.     AUTHORIZATION TO PAY INSURANCE BENEFITS:  For and in consideration of medical services rendered to the patient named herein, I hereby assign and transfer to [facility], including but not limited to hospital based physicians, attending physicians, consulting physicians, nurse practitioners and physicians assistants  the rights for the payment of medical benefits which I may have under the policy/policies identified by me during registration or any policy which may be determined hereafter to pay benefits otherwise payable to me or to a beneficiary designated in the policy.  By this assignment, I authorize payment directly to [facility], hospital based physicians, attending physicians and consulting physicians of all medical benefits payable under the aforesaid policy/policies, but not to exceed the hospitals and/or clinic regular charges.     GUARANTEE OF ACCOUNT:  I/We certify that the information given is true and correct to the best of my/our knowledge.  I/We understand that bills are payable within thirty (30) days of the date of service.  If it becomes necessary for the account to be referred to an  or collection agency, the undersigned agrees to pay the reasonable s fees or collection expenses. I/We sherry permission and consent to [facility], our assignees, and third party collection agents to contact myself/us by any telephone number associated with myself/us, including wireless numbers and to leave answering machine and voicemail messages and include in any such messages, information required by law (including debt collection laws) and/or messages regarding amounts owed; to send text messages or emails using any email addresses I/we provided; to use pre-recorded/artificial voice messages  and/or an automatic dialing device in connection with any communications.   I/We agree to be responsible for the payment of all charges of this medical service and hospital based physicians, attending physicians and consulting physicians services rendered to the above named patient     COMMUNICATION AUTHORIZATION:   I hereby authorize John [facility], to contact me on my cell phone and/or home phone using prerecorded messages, artificial voice messages, automatic telephone dialing devices or other computer  assisted technology, or by electronic mail, text messaging, or by any other form of electronic communication. This includes, but is not limited to, appointment reminders, yearly physical exam reminders, preventive care reminders, patient campaigns and welcome calls. I understand I have the right to opt out of these communications at any time.        Page 1 of 3                 CONSENT AND ACKNOWLEDGEMENT FORM CONTINUED     AUTHORIZATION TO RELEASE INFORMATION:  I hereby authorize [facility] and hospital based physicians to release the information for this occasion of service requested by my insurance company or third party payor for the purpose of obtaining payment for services rendered during this admission and/or to other healthcare providers for the purpose of follow-up care or evaluation of care.  This information may or may not include mental health and/or substance abuse information.     AUTHORIZATION FOR MEDICAL AND/OR SURGICAL TREATMENT:  I hereby authorize [facility] and its employees or agents to provide hospital care incident to this admission, including without limitations, consent to routine diagnostic procedures and medical treatment, which is to include whatever procedures that are deemed necessary by the admitting doctor and such other physicians or assistants as he may designate.     PERSONAL VALUABLES:      It is understood and agreed that the [facility] maintains a safe for the safekeeping of money and valuables and the hospital shall not be liable for the loss of damage to any money, jewelry, glasses, documents, dentures, hearing aids or other articles of unusual value, unless placed therein, and shall not be liable for loss or damage to any other personal property, unless deposited with the hospital for safekeeping.  VALUABLES ARE NOT TO BE LEFT IN THE PATIENTS ROOM.     ADVANCE DIRECTIVES:  I understand that I am not required to have Advance Directives in order to be treated.  I have  received written information about my rights to formulate Advance Directives.     NOTICE OF PRIVACY PRACTICES/PATIENT RIGHTS/ADMISSION PACKET:  I acknowledge that I have received copies of the [facility] Notice of Privacy Practices, Patient Rights, and the Admission packet, which contains Smoking Cessation information. I understand that weapons, illegal drugs, or any other items considered  contraband, are not allowed on the [facility] campus, and that I do not have such items in my possession.        CONSENT TO PHOTOGRAPH AND/OR VIDEO TAPE DOCUMENTATION OF CARE:  I understand that photographs, videotapes, digital, or other images may be recorded to document my care.  I acknowledge that [facility] will retain the ownership rights to these photographs, videotapes, digital, or other images, and that I will be allowed access to view or obtain copies of any photographs, videotapes, digital, or other images created as part of the documentation of my care.  I understand that these images will be stored in a secure manner that will protect my privacy and that they will be kept for the time period required by law or by policy at [facility]. Images that identify me will be released and/or used outside the institution only upon written authorization from me or my legal representative ([place], 2001).                                                                                                                                Page 2 of 3                    CONSENT AND ACKNOWLEDGEMENT FORM CONTINUED     [place] IMMUNIZATION NETWORK (LINKS) PARTICIPATION:  I acknowledge that I have been informed about [place] Immunization Network, or LINKS.  I understand that it is a means to keep track of my immunization records for myself, doctors offices, hospitals and other health care providers through secure, electronic means.     INSURANCE NETWORK ACKNOWLEDGEMENT:                                                                             I acknowledge that I have received notice, based on the information available at this time, regarding the status of my insurance plan as in or out of network at [facility]. I understand that a full listing of accepted insurance plans can be found at the [facility] website.     NOTICE     HEALTH CARE SERVICES MAY BE PROVIDED TO YOU AT A NETWORK HEALTH CARE FACILITY BY FACILITY-BASED PHYSICIANS WHO ARE NOT IN YOUR HEALTH PLAN.  YOU MAY BE RESPONSIBLE FOR PAYMENT OF ALL OR PART OF THE FEES FOR THOSE OUT-OF-NETWORK SERVICES, IN ADDITION TO APPLICABLE AMOUNTS DUE FOR CO-PAYMENTS, COINSURANCE, DEDUCTIBLES, AND NON-COVERED SERVICES.  SPECIFIC INFORMATION ABOUT IN-NETWORK AND OUT-OF NETWORK FACILITY-BASED PHYSICIANS CAN BE FOUND AT THE WEBSITE ADDRESS OF YOUR HEALTH PLAN OR BY CALLING THE [employer] TELEPHONE NUMBER OF YOUR HEALTH PLAN.        I/WE HAVE READ, UNDERSTAND AND AGREE TO THE ABOVE.        _______________________________   Patient/Legal Guardian Signature     This signature was collected at 05/15/2023     Time (if no electronic signature):____________     Lizzie       _______________________________   Printed Name/Relationship to Patient       Witness  Sign Here  _______________________________   Witness Signature     This signature was collected at 05/15/2023        _______________________________   Printed Name         Page 3 of 3

## 2023-08-21 ENCOUNTER — TELEPHONE (OUTPATIENT)
Dept: FAMILY MEDICINE | Facility: CLINIC | Age: 38
End: 2023-08-21

## 2023-09-15 ENCOUNTER — HOSPITAL ENCOUNTER (EMERGENCY)
Facility: HOSPITAL | Age: 38
Discharge: HOME | End: 2023-09-15
Attending: EMERGENCY MEDICINE
Payer: MEDICAID

## 2023-09-15 VITALS
HEIGHT: 67 IN | BODY MASS INDEX: 35.16 KG/M2 | DIASTOLIC BLOOD PRESSURE: 83 MMHG | TEMPERATURE: 99 F | OXYGEN SATURATION: 100 % | RESPIRATION RATE: 20 BRPM | SYSTOLIC BLOOD PRESSURE: 148 MMHG | WEIGHT: 224 LBS | HEART RATE: 102 BPM

## 2023-09-15 DIAGNOSIS — M54.32 LEFT SIDED SCIATICA: Primary | ICD-10-CM

## 2023-09-15 PROCEDURE — 99283 EMERGENCY DEPT VISIT LOW MDM: CPT

## 2023-09-15 RX ORDER — PREGABALIN 25 MG/1
25 CAPSULE ORAL
Status: DISCONTINUED | OUTPATIENT
Start: 2023-09-15 | End: 2023-09-15 | Stop reason: HOSPADM

## 2023-09-15 RX ORDER — LIDOCAINE 50 MG/G
1 PATCH TOPICAL
Status: DISCONTINUED | OUTPATIENT
Start: 2023-09-15 | End: 2023-09-15 | Stop reason: HOSPADM

## 2023-09-15 RX ORDER — METHYLPREDNISOLONE 4 MG/1
TABLET ORAL
Qty: 21 EACH | Refills: 0 | Status: SHIPPED | OUTPATIENT
Start: 2023-09-15 | End: 2023-09-15 | Stop reason: SDUPTHER

## 2023-09-15 RX ORDER — METHYLPREDNISOLONE 4 MG/1
TABLET ORAL
Qty: 21 EACH | Refills: 0 | OUTPATIENT
Start: 2023-09-15 | End: 2023-09-22

## 2023-09-15 NOTE — DISCHARGE INSTRUCTIONS
You were diagnosed with sciatica today  A referral was placed with spine clinic.  Please follow-up as discussed  Physical therapy as advised  You were prescribed a Medrol Dosepak today.  This is a steroid to reduce inflammation.  Take as directed.  Make sure to take with meals  Return to the ED immediately for new, concerning, worsening symptoms

## 2023-09-15 NOTE — ED PROVIDER NOTES
Encounter Date: 9/15/2023       History     Chief Complaint   Patient presents with    Sciatica     Left side, started Sunday. Has been evaluated in [place] and she stated they just keep giving her pain meds which she doesn't want.      37 y.o. female with chronic pain, bipolar disorder, T2DM presents to ED with left lower back pain x6 days.  Pain radiates down her left leg into her foot.  She denies falls or trauma.  She was seen in the ED 6x over the past week for left sided sciatica.  MRI performed significant for L2-L3 and L4-L5 disc protrusion.  Shes had a prior laminectomy at the L4-L5 level.  She is attending physical therapy 2 times a week.  She is taking pregabalin and Norco for pain without improvement.  She has also tried muscle relaxers without improvement. She denies urinary retention, bowel incontinence, saddle anesthesia, fever, IV drug use.     The history is provided by the patient.     Review of patient's allergies indicates:   Allergen Reactions    Rosemary Anaphylaxis    Pecan nut Dermatitis    Sulfa (sulfonamide antibiotics) Itching     Past Medical History:   Diagnosis Date    Bipolar disorder     per patient report    Chronic pain     Depression     Diabetes mellitus     Diabetes mellitus, type 2     Driving safety issue     DRIVING AFTER RECEIVING DILAUDID INJECTION    Hx of psychiatric care     Neuropathy     per patient report    Psychiatric problem     Therapy      Past Surgical History:   Procedure Laterality Date    BACK SURGERY      EYE SURGERY       Family History   Problem Relation Age of Onset    Bipolar disorder Mother     Bipolar disorder Father     Cancer Paternal Grandfather      Social History     Tobacco Use    Smoking status: Former     Current packs/day: 0.00     Types: Cigarettes     Quit date: 9/1/2020     Years since quitting: 3.0    Smokeless tobacco: Never   Substance Use Topics    Alcohol use: Yes     Comment: rare    Drug use: Yes     Types: Marijuana     Comment: Daily      Review of Systems   Constitutional:  Negative for fever.   Genitourinary:  Negative for difficulty urinating.   Musculoskeletal:  Positive for back pain and gait problem.   Neurological:  Positive for weakness and numbness.       Physical Exam     Initial Vitals [09/15/23 1457]   BP Pulse Resp Temp SpO2   (!) 148/83 102 20 98.8 °F (37.1 °C) 100 %      MAP       --         Physical Exam    Nursing note and vitals reviewed.  Constitutional: She appears well-developed and well-nourished. She is not diaphoretic.  Non-toxic appearance.   HENT:   Head: Normocephalic and atraumatic.   Nose: Nose normal.   Eyes: Conjunctivae and EOM are normal.   Neck: Neck supple.   Cardiovascular:  Normal rate.           Pulmonary/Chest: No respiratory distress.   Musculoskeletal:      Cervical back: Neck supple.      Lumbar back: No swelling or deformity. Decreased range of motion. Positive left straight leg raise test.      Comments: No C-spine, T-spine, L-spine midline TTP. Left buttocks tenderness to palpation     Neurological: She is alert and oriented to person, place, and time. She has normal strength. She exhibits normal muscle tone. Gait normal.   Subjective decreased sensation in left lower extremity.  Limping on left side  5/5 strength in left lower extremity   Skin: No rash noted.   Psychiatric: She has a normal mood and affect. Her behavior is normal. Judgment and thought content normal.         ED Course   Procedures  Labs Reviewed   HIV 1 / 2 ANTIBODY   HEPATITIS C ANTIBODY          Imaging Results    None          Medications   pregabalin capsule 25 mg (has no administration in time range)   LIDOcaine 5 % patch 1 patch (has no administration in time range)     Medical Decision Making  37 y.o. female with chronic pain, bipolar disorder, T2DM presents to ED with left lower back pain x6 days.  She appears in pain though she not toxic appearing.  Vitals with hypertension.  Exam as above.      Differential diagnosis  includes disc herniation, spinal stenosis, neuropathy,  I do not suspect cauda equina at this time.  MRI performed 4 days ago without abnormality of conus.  Patient denies urinary retention, bowel incontinence, saddle anesthesia.    Symptoms consistent with sciatica most likely secondary to disc herniation.  Patient given a prescription for Medrol Dosepak.  She has pregabalin, Norco, and anti-inflammatories at home.  Referral placed with spine clinic and pain management.  Advised to continue physical therapy at this time.  Strict ED precautions given to return immediately for new, concerning, worsening symptoms.    Amount and/or Complexity of Data Reviewed  External Data Reviewed: radiology.     Details: MRI Impression from 9/11/23:     1. At L2-3, left central disc protrusion with no spinal stenosis or foraminal stenosis.  2. At L4-5, left laminotomy.  Extradural soft tissue in the left paracentral region resulting in left lateral canal narrowing.  The extradural soft tissue is increased in size from prior examination.  This area previously demonstrated enhancement on prior study of 07/05/2022, consistent with epidural fibrosis.  Given the increase in size however, suspicion for recurrent disc protrusion/extrusion is suspected.  3. Multilevel facet arthrosis.    Risk  Prescription drug management.                               Clinical Impression:   Final diagnoses:  [M54.32] Left sided sciatica (Primary)        ED Disposition Condition    Discharge Stable          ED Prescriptions       Medication Sig Dispense Start Date End Date Auth. Provider    methylPREDNISolone (MEDROL DOSEPACK) 4 mg tablet  (Status: Discontinued) use as directed 21 each 9/15/2023 9/15/2023 Wendy Poole PA-C    methylPREDNISolone (MEDROL DOSEPACK) 4 mg tablet use as directed 21 each 9/15/2023 10/6/2023 Wendy Poole PA-C          Follow-up Information       Follow up With Specialties Details Why Contact Info Additional  Information    [facility] [address] [facility] Schedule an appointment as soon as possible for a visit   [address]  [facility] [address] [phone] [facility] - Main Building, 5th Floor Please park in [facility] and use [address] elevator             Wendy Poole PA-C  09/15/23 8461

## 2023-09-15 NOTE — ED TRIAGE NOTES
"Pt arrived to ED for "sciatica flare up in left leg since Sunday", w/o relief from muscle relaxers or meds from previous hospital visit. Pt states she hasn't been able to sleep due to pain. Pt reports pain only in left left, pt denies any other pain. Pt Aox4, ambulated to room without assist   "

## 2023-09-22 ENCOUNTER — HOSPITAL ENCOUNTER (EMERGENCY)
Facility: HOSPITAL | Age: 38
Discharge: HOME | End: 2023-09-22
Attending: STUDENT IN AN ORGANIZED HEALTH CARE EDUCATION/TRAINING PROGRAM
Payer: MEDICAID

## 2023-09-22 VITALS
HEIGHT: 66 IN | DIASTOLIC BLOOD PRESSURE: 91 MMHG | BODY MASS INDEX: 35.36 KG/M2 | SYSTOLIC BLOOD PRESSURE: 183 MMHG | WEIGHT: 220 LBS | HEART RATE: 96 BPM | TEMPERATURE: 98 F | RESPIRATION RATE: 18 BRPM | OXYGEN SATURATION: 99 %

## 2023-09-22 DIAGNOSIS — M51.26 LUMBAR DISC HERNIATION: Primary | ICD-10-CM

## 2023-09-22 DIAGNOSIS — E87.5 HYPERKALEMIA: ICD-10-CM

## 2023-09-22 DIAGNOSIS — M48.061 SPINAL STENOSIS OF LUMBAR REGION, UNSPECIFIED WHETHER NEUROGENIC CLAUDICATION PRESENT: ICD-10-CM

## 2023-09-22 LAB
ALBUMIN SERPL BCP-MCNC: 4.4 G/DL (ref 3.5–5.2)
ALP SERPL-CCNC: 74 U/L (ref 55–135)
ALT SERPL W/O P-5'-P-CCNC: 19 U/L (ref 10–44)
ANION GAP SERPL CALC-SCNC: 10 MMOL/L (ref 8–16)
ANION GAP SERPL CALC-SCNC: 10 MMOL/L (ref 8–16)
AST SERPL-CCNC: 13 U/L (ref 10–40)
B-HCG UR QL: NEGATIVE
BASOPHILS # BLD AUTO: 0.11 K/UL (ref 0–0.2)
BASOPHILS NFR BLD: 0.6 % (ref 0–1.9)
BILIRUB SERPL-MCNC: 0.5 MG/DL (ref 0.1–1)
BUN SERPL-MCNC: 17 MG/DL (ref 6–20)
BUN SERPL-MCNC: 17 MG/DL (ref 6–20)
CALCIUM SERPL-MCNC: 10.3 MG/DL (ref 8.7–10.5)
CALCIUM SERPL-MCNC: 9.2 MG/DL (ref 8.7–10.5)
CHLORIDE SERPL-SCNC: 99 MMOL/L (ref 95–110)
CHLORIDE SERPL-SCNC: 99 MMOL/L (ref 95–110)
CO2 SERPL-SCNC: 21 MMOL/L (ref 23–29)
CO2 SERPL-SCNC: 23 MMOL/L (ref 23–29)
CREAT SERPL-MCNC: 0.9 MG/DL (ref 0.5–1.4)
CREAT SERPL-MCNC: 0.9 MG/DL (ref 0.5–1.4)
CTP QC/QA: YES
DIFFERENTIAL METHOD: ABNORMAL
EOSINOPHIL # BLD AUTO: 0 K/UL (ref 0–0.5)
EOSINOPHIL NFR BLD: 0.1 % (ref 0–8)
ERYTHROCYTE [DISTWIDTH] IN BLOOD BY AUTOMATED COUNT: 12.5 % (ref 11.5–14.5)
EST. GFR  (NO RACE VARIABLE): >60 ML/MIN/1.73 M^2
EST. GFR  (NO RACE VARIABLE): >60 ML/MIN/1.73 M^2
GLUCOSE SERPL-MCNC: 337 MG/DL (ref 70–110)
GLUCOSE SERPL-MCNC: 392 MG/DL (ref 70–110)
HCT VFR BLD AUTO: 44.9 % (ref 37–48.5)
HGB BLD-MCNC: 14.5 G/DL (ref 12–16)
IMM GRANULOCYTES # BLD AUTO: 0.31 K/UL (ref 0–0.04)
IMM GRANULOCYTES NFR BLD AUTO: 1.8 % (ref 0–0.5)
LYMPHOCYTES # BLD AUTO: 0.9 K/UL (ref 1–4.8)
LYMPHOCYTES NFR BLD: 5.4 % (ref 18–48)
MCH RBC QN AUTO: 27.7 PG (ref 27–31)
MCHC RBC AUTO-ENTMCNC: 32.3 G/DL (ref 32–36)
MCV RBC AUTO: 86 FL (ref 82–98)
MONOCYTES # BLD AUTO: 0.2 K/UL (ref 0.3–1)
MONOCYTES NFR BLD: 1.2 % (ref 4–15)
NEUTROPHILS # BLD AUTO: 15.6 K/UL (ref 1.8–7.7)
NEUTROPHILS NFR BLD: 90.9 % (ref 38–73)
NRBC BLD-RTO: 0 /100 WBC
PLATELET # BLD AUTO: 712 K/UL (ref 150–450)
PMV BLD AUTO: 8.9 FL (ref 9.2–12.9)
POTASSIUM SERPL-SCNC: 4.7 MMOL/L (ref 3.5–5.1)
POTASSIUM SERPL-SCNC: 5.3 MMOL/L (ref 3.5–5.1)
PROT SERPL-MCNC: 8.3 G/DL (ref 6–8.4)
RBC # BLD AUTO: 5.24 M/UL (ref 4–5.4)
SODIUM SERPL-SCNC: 130 MMOL/L (ref 136–145)
SODIUM SERPL-SCNC: 132 MMOL/L (ref 136–145)
WBC # BLD AUTO: 17.14 K/UL (ref 3.9–12.7)

## 2023-09-22 PROCEDURE — 96375 TX/PRO/DX INJ NEW DRUG ADDON: CPT | Mod: 59

## 2023-09-22 PROCEDURE — 85025 COMPLETE CBC W/AUTO DIFF WBC: CPT | Performed by: PHYSICIAN ASSISTANT

## 2023-09-22 PROCEDURE — 96361 HYDRATE IV INFUSION ADD-ON: CPT

## 2023-09-22 PROCEDURE — 25000003 PHARM REV CODE 250: Performed by: PHYSICIAN ASSISTANT

## 2023-09-22 PROCEDURE — 80053 COMPREHEN METABOLIC PANEL: CPT | Performed by: PHYSICIAN ASSISTANT

## 2023-09-22 PROCEDURE — 80048 BASIC METABOLIC PNL TOTAL CA: CPT | Performed by: PHYSICIAN ASSISTANT

## 2023-09-22 PROCEDURE — A9585 GADOBUTROL INJECTION: HCPCS | Performed by: STUDENT IN AN ORGANIZED HEALTH CARE EDUCATION/TRAINING PROGRAM

## 2023-09-22 PROCEDURE — 63600175 PHARM REV CODE 636 W HCPCS: Performed by: PHYSICIAN ASSISTANT

## 2023-09-22 PROCEDURE — 25500020 PHARM REV CODE 255: Performed by: STUDENT IN AN ORGANIZED HEALTH CARE EDUCATION/TRAINING PROGRAM

## 2023-09-22 PROCEDURE — 96374 THER/PROPH/DIAG INJ IV PUSH: CPT | Mod: 59

## 2023-09-22 PROCEDURE — 93010 ELECTROCARDIOGRAM REPORT: CPT | Mod: ,,, | Performed by: INTERNAL MEDICINE

## 2023-09-22 PROCEDURE — 96376 TX/PRO/DX INJ SAME DRUG ADON: CPT

## 2023-09-22 PROCEDURE — 81025 URINE PREGNANCY TEST: CPT | Performed by: PHYSICIAN ASSISTANT

## 2023-09-22 PROCEDURE — 93005 ELECTROCARDIOGRAM TRACING: CPT

## 2023-09-22 PROCEDURE — 93010 EKG 12-LEAD: ICD-10-PCS | Mod: ,,, | Performed by: INTERNAL MEDICINE

## 2023-09-22 PROCEDURE — 99285 EMERGENCY DEPT VISIT HI MDM: CPT | Mod: 25

## 2023-09-22 RX ORDER — GABAPENTIN 300 MG/1
300 CAPSULE ORAL
Status: COMPLETED | OUTPATIENT
Start: 2023-09-22 | End: 2023-09-22

## 2023-09-22 RX ORDER — DEXTROMETHORPHAN HYDROBROMIDE, GUAIFENESIN 5; 100 MG/5ML; MG/5ML
650 LIQUID ORAL EVERY 6 HOURS PRN
Qty: 30 TABLET | Refills: 0 | Status: SHIPPED | OUTPATIENT
Start: 2023-09-22 | End: 2023-09-22 | Stop reason: SDUPTHER

## 2023-09-22 RX ORDER — DROPERIDOL 2.5 MG/ML
0.62 INJECTION, SOLUTION INTRAMUSCULAR; INTRAVENOUS ONCE
Status: COMPLETED | OUTPATIENT
Start: 2023-09-22 | End: 2023-09-22

## 2023-09-22 RX ORDER — HYDROMORPHONE HYDROCHLORIDE 1 MG/ML
1 INJECTION, SOLUTION INTRAMUSCULAR; INTRAVENOUS; SUBCUTANEOUS
Status: COMPLETED | OUTPATIENT
Start: 2023-09-22 | End: 2023-09-22

## 2023-09-22 RX ORDER — DEXTROMETHORPHAN HYDROBROMIDE, GUAIFENESIN 5; 100 MG/5ML; MG/5ML
650 LIQUID ORAL EVERY 6 HOURS PRN
Qty: 30 TABLET | Refills: 0 | Status: SHIPPED | OUTPATIENT
Start: 2023-09-22

## 2023-09-22 RX ORDER — GADOBUTROL 604.72 MG/ML
10 INJECTION INTRAVENOUS
Status: COMPLETED | OUTPATIENT
Start: 2023-09-22 | End: 2023-09-22

## 2023-09-22 RX ORDER — KETOROLAC TROMETHAMINE 30 MG/ML
10 INJECTION, SOLUTION INTRAMUSCULAR; INTRAVENOUS
Status: COMPLETED | OUTPATIENT
Start: 2023-09-22 | End: 2023-09-22

## 2023-09-22 RX ORDER — ETODOLAC 400 MG/1
400 TABLET, FILM COATED ORAL EVERY 12 HOURS PRN
Qty: 14 TABLET | Refills: 0 | Status: SHIPPED | OUTPATIENT
Start: 2023-09-22 | End: 2023-09-22 | Stop reason: SDUPTHER

## 2023-09-22 RX ORDER — ETODOLAC 400 MG/1
400 TABLET, FILM COATED ORAL EVERY 12 HOURS PRN
Qty: 14 TABLET | Refills: 0 | OUTPATIENT
Start: 2023-09-22 | End: 2023-09-23

## 2023-09-22 RX ADMIN — HYDROMORPHONE HYDROCHLORIDE 1 MG: 1 INJECTION, SOLUTION INTRAMUSCULAR; INTRAVENOUS; SUBCUTANEOUS at 03:09

## 2023-09-22 RX ADMIN — DROPERIDOL 0.62 MG: 2.5 INJECTION, SOLUTION INTRAMUSCULAR; INTRAVENOUS at 06:09

## 2023-09-22 RX ADMIN — HYDROMORPHONE HYDROCHLORIDE 1 MG: 1 INJECTION, SOLUTION INTRAMUSCULAR; INTRAVENOUS; SUBCUTANEOUS at 10:09

## 2023-09-22 RX ADMIN — KETOROLAC TROMETHAMINE 10 MG: 30 INJECTION, SOLUTION INTRAMUSCULAR; INTRAVENOUS at 10:09

## 2023-09-22 RX ADMIN — GABAPENTIN 300 MG: 300 CAPSULE ORAL at 01:09

## 2023-09-22 RX ADMIN — GADOBUTROL 10 ML: 604.72 INJECTION INTRAVENOUS at 04:09

## 2023-09-22 NOTE — PROGRESS NOTES
ED Physician Hand-off Note:    ED Course: I assumed care of patient from off-going ED primary team. Briefly, patient is a 38 yo female who presents to [facility] ED for chronic back pain and radiculopathy. Mild hyperK without ECG changes. She was given IV fluids. Also has leukocytosis but likely from recent steroid dose pack.    At the time of signout, the plan was pending MRI lumbar back with and without contrast.    Medications given in the ED:    Medications   droPERidol injection 0.625 mg (has no administration in time range)   HYDROmorphone injection 1 mg (1 mg Intravenous Given 9/22/23 1042)   ketorolac injection 9.999 mg (9.999 mg Intravenous Given 9/22/23 1041)   lactated ringers bolus 1,000 mL (0 mLs Intravenous Stopped 9/22/23 1255)   gabapentin capsule 300 mg (300 mg Oral Given 9/22/23 1321)   HYDROmorphone injection 1 mg (1 mg Intravenous Given 9/22/23 1525)   gadobutroL (GADAVIST) injection 10 mL (10 mLs Intravenous Given 9/22/23 1614)       ED course:  MRI shows signs with disc hernia and spinal canal stenosis on the L. No signs of infection or ANTONIO.    6:00 PM  Patient reassess. Reports feeling OK for now, but she is worried about when she tries to go to sleep at night. Will give a dose of IV droperidol for chronic pain which has helped her in the past.  Recommend finding a comfortable position to sleep in. Continue PT.  We discussed continuing current pain regimen with NSAIDs every 12 hours, acetaminophen every 6 hours as needed, lidocaine patches which she already has, muscle relaxers as needed which she has plenty at home, and close follow-up with neurosurgery as scheduled in 5 days.  Her father who lives nearby is bringing her home.  All of her questions were answered.  Patient comfortable with plan and stable for discharge.    Disposition: discharge    Impression:   Final diagnoses:  [E87.5] Hyperkalemia  [M51.26] Lumbar disc herniation (Primary)  [M48.061] Spinal stenosis of lumbar region, unspecified  whether neurogenic claudication present    Future Appointments   Date Time Provider Department Center   9/27/2023  9:00 AM Boom Pierre MD [facility]          Medication List        START taking these medications      acetaminophen 650 MG Tbsr  Commonly known as: TYLENOL  Take 1 tablet (650 mg total) by mouth every 6 (six) hours as needed.     etodolac 400 MG tablet  Commonly known as: LODINE  Take 1 tablet (400 mg total) by mouth every 12 (twelve) hours as needed.            STOP taking these medications      methylPREDNISolone 4 mg tablet  Commonly known as: MEDROL DOSEPACK            ASK your doctor about these medications      AJOVY AUTOINJECTOR 225 mg/1.5 mL autoinjector  Generic drug: fremanezumab-vfrm     atorvastatin 40 MG tablet  Commonly known as: LIPITOR  Take 1 tablet (40 mg total) by mouth every evening.     blood sugar diagnostic Strp  To check BG two times daily, to use with insurance preferred meter     blood-glucose meter kit  To check BG two  times daily, to use with insurance preferred meter     famotidine 20 MG tablet  Commonly known as: PEPCID  Take 1 tablet (20 mg total) by mouth 2 (two) times daily. for 7 days     HYDROcodone-acetaminophen 7.5-325 mg per tablet  Commonly known as: NORCO  Take 1 tablet by mouth every 6 (six) hours as needed for Pain.     insulin glargine 100 units/mL SubQ pen  Commonly known as: LANTUS SOLOSTAR U-100 INSULIN  Inject 20 Units into the skin every evening.     lancets Misc  To check BG two times daily, to use with insurance preferred meter     * LIDOcaine 5 %  Commonly known as: LIDODERM     * LIDOcaine 5 %  Commonly known as: LIDODERM  Place 1 patch onto the skin once daily. Remove & Discard patch within 12 hours or as directed by MD     metFORMIN 500 MG ER 24hr tablet  Commonly known as: GLUCOPHAGE-XR  Take 2 tablets (1,000 mg total) by mouth daily with breakfast.     methocarbamoL 750 MG Tab  Commonly known as: ROBAXIN  Take 1 tablet (750 mg  total) by mouth 4 (four) times daily as needed (As needed for back pain every 8 hours.).  Ask about: Should I take this medication?     metoclopramide HCl 10 MG tablet  Commonly known as: REGLAN  Take 1 tablet (10 mg total) by mouth 3 (three) times daily as needed (nausea/vomiting, abdominal pain).     ondansetron 4 MG Tbdl  Commonly known as: ZOFRAN-ODT  Take 1 tablet (4 mg total) by mouth every 6 (six) hours as needed (nausea).     predniSONE 20 MG tablet  Commonly known as: DELTASONE  Take 2 tablets (40 mg total) by mouth once daily. for 5 days     rizatriptan 10 MG tablet  Commonly known as: MAXALT     tiZANidine 4 MG tablet  Commonly known as: ZANAFLEX  Take 1 tablet (4 mg total) by mouth every 8 (eight) hours. Take 1 po q 8 hrs prn muscle spasm     TRULICITY 1.5 mg/0.5 mL pen injector  Generic drug: dulaglutide  INJECT 1.5 MILLIGRAMS INTO THE SKIN EVERY 7 DAYS           * This list has 2 medication(s) that are the same as other medications prescribed for you. Read the directions carefully, and ask your doctor or other care provider to review them with you.                   Where to Get Your Medications        These medications were sent to [facility]/pharmacy #[id number] - [place], [place] - [address] [place] [address]      Phone: [phone]   acetaminophen 650 MG Tbsr  etodolac 400 MG tablet         6:02 PM  Tawanna Brice PA-C  Emergent Department  [facility]  [facility]ralink #[id number] or #[phone]

## 2023-09-22 NOTE — DISCHARGE INSTRUCTIONS
Take continue your NSAID as needed with food for anti-inflammatory relief.  You can take acetaminophen/tylenol 650 mg every 6 hours or 1000 mg every 8 hours for added relief.  Continue mm relaxers as needed for tension and/or spasms only.  Continue lidocaine patches every 12 hours.   Apply ice to the area for 10-20 minutes every 4 hours. You can apply heat 2 days after for the same duration and frequency.  Follow up with Neurosurgery as scheduled.  Return to the ER for new or worsening symptoms.  Future Appointments   Date Time Provider Department Center   9/27/2023  9:00 AM Boom Pierre MD [facility]       Future Appointments   Date Time Provider Department Center   9/27/2023  9:00 AM Boom Pierre MD [facility]     Imaging Results              MRI Lumbar Spine W WO Cont (Final result)  Result time 09/22/23 16:51:05      Final result by Emmanuel Shankar MD (09/22/23 16:51:05)                   Impression:      1. Degenerative changes of the lower lumbar spine detailed above.  Disc extrusion at L4-L5 results in moderate stenosis of the left lateral recess and contacts the descending left L5 nerve root.      Electronically signed by: Emmanuel Shankar MD  Date:    09/22/2023  Time:    16:51               Narrative:    EXAMINATION:  MRI LUMBAR SPINE W WO CONTRAST    CLINICAL HISTORY:  Low back pain, progressive neurologic deficit;    TECHNIQUE:  Multiplanar, multisequence MR images were acquired from the thoracolumbar junction to the sacrum without and with 10 mL Gadavist intravenous contrast.    COMPARISON:  09/11/2023    FINDINGS:  Alignment: Normal.    Vertebrae: No fracture or marrow infiltrative process.    Discs: Moderate disc height loss at L4-S1, mild at L3-L4.  No evidence for discitis.    Cord: Conus terminates at L1 and appears unremarkable.  Cauda equina appears unremarkable.    Enhancement: No abnormal contrast enhancement.    Degenerative findings:    T12-L1: No spinal  canal stenosis or neural foraminal narrowing.    L1-L2: No spinal canal stenosis or neural foraminal narrowing.    L2-L3: No spinal canal stenosis or neural foraminal narrowing.    L3-L4: Circumferential disc bulge and mild facet arthropathy result in mild effacement of the thecal sac and mild right neural foraminal narrowing.    L4-L5: Suspected prior left laminectomy.  Circumferential disc bulge with left paracentral disc extrusion result in moderate stenosis of the left lateral recess and mild left neural foraminal narrowing.  Disc material contacts the descending left L5 nerve root.    L5-S1: Circumferential disc bulge with central protrusion.  No spinal canal stenosis or neural foraminal narrowing.    Paraspinal muscles & soft tissues: Moderate paraspinal muscle atrophy.  Subcentimeter right renal cyst.
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
## 2023-09-24 ENCOUNTER — HOSPITAL ENCOUNTER (EMERGENCY)
Facility: HOSPITAL | Age: 38
Discharge: HOME | End: 2023-09-25
Attending: EMERGENCY MEDICINE
Payer: MEDICAID

## 2023-09-24 DIAGNOSIS — R00.0 TACHYCARDIA: ICD-10-CM

## 2023-09-24 DIAGNOSIS — M25.552 PAIN OF LEFT HIP: Primary | ICD-10-CM

## 2023-09-24 LAB
ALBUMIN SERPL BCP-MCNC: 3.1 G/DL (ref 3.5–5.2)
ALP SERPL-CCNC: 45 U/L (ref 55–135)
ALT SERPL W/O P-5'-P-CCNC: 18 U/L (ref 10–44)
ANION GAP SERPL CALC-SCNC: 9 MMOL/L (ref 8–16)
AST SERPL-CCNC: 21 U/L (ref 10–40)
BASOPHILS # BLD AUTO: 0.06 K/UL (ref 0–0.2)
BASOPHILS NFR BLD: 0.3 % (ref 0–1.9)
BILIRUB SERPL-MCNC: 1 MG/DL (ref 0.1–1)
BUN SERPL-MCNC: 14 MG/DL (ref 6–20)
CALCIUM SERPL-MCNC: 7 MG/DL (ref 8.7–10.5)
CHLORIDE SERPL-SCNC: 110 MMOL/L (ref 95–110)
CO2 SERPL-SCNC: 17 MMOL/L (ref 23–29)
CREAT SERPL-MCNC: 0.5 MG/DL (ref 0.5–1.4)
DIFFERENTIAL METHOD: ABNORMAL
EOSINOPHIL # BLD AUTO: 0 K/UL (ref 0–0.5)
EOSINOPHIL NFR BLD: 0 % (ref 0–8)
ERYTHROCYTE [DISTWIDTH] IN BLOOD BY AUTOMATED COUNT: 12.8 % (ref 11.5–14.5)
EST. GFR  (NO RACE VARIABLE): >60 ML/MIN/1.73 M^2
GLUCOSE SERPL-MCNC: 209 MG/DL (ref 70–110)
HCT VFR BLD AUTO: 46.6 % (ref 37–48.5)
HCV AB SERPL QL IA: NORMAL
HGB BLD-MCNC: 15.1 G/DL (ref 12–16)
HIV 1+2 AB+HIV1 P24 AG SERPL QL IA: NORMAL
IMM GRANULOCYTES # BLD AUTO: 0.18 K/UL (ref 0–0.04)
IMM GRANULOCYTES NFR BLD AUTO: 0.9 % (ref 0–0.5)
LYMPHOCYTES # BLD AUTO: 1.1 K/UL (ref 1–4.8)
LYMPHOCYTES NFR BLD: 5.3 % (ref 18–48)
MCH RBC QN AUTO: 27.8 PG (ref 27–31)
MCHC RBC AUTO-ENTMCNC: 32.4 G/DL (ref 32–36)
MCV RBC AUTO: 86 FL (ref 82–98)
MONOCYTES # BLD AUTO: 0.7 K/UL (ref 0.3–1)
MONOCYTES NFR BLD: 3.3 % (ref 4–15)
NEUTROPHILS # BLD AUTO: 18.7 K/UL (ref 1.8–7.7)
NEUTROPHILS NFR BLD: 90.2 % (ref 38–73)
NRBC BLD-RTO: 0 /100 WBC
PLATELET # BLD AUTO: 789 K/UL (ref 150–450)
PMV BLD AUTO: 9.3 FL (ref 9.2–12.9)
POTASSIUM SERPL-SCNC: 4.2 MMOL/L (ref 3.5–5.1)
PROT SERPL-MCNC: 5.5 G/DL (ref 6–8.4)
RBC # BLD AUTO: 5.43 M/UL (ref 4–5.4)
SODIUM SERPL-SCNC: 136 MMOL/L (ref 136–145)
WBC # BLD AUTO: 20.67 K/UL (ref 3.9–12.7)

## 2023-09-24 PROCEDURE — 86803 HEPATITIS C AB TEST: CPT | Performed by: PHYSICIAN ASSISTANT

## 2023-09-24 PROCEDURE — 96376 TX/PRO/DX INJ SAME DRUG ADON: CPT

## 2023-09-24 PROCEDURE — 93005 ELECTROCARDIOGRAM TRACING: CPT

## 2023-09-24 PROCEDURE — 85025 COMPLETE CBC W/AUTO DIFF WBC: CPT

## 2023-09-24 PROCEDURE — 63600175 PHARM REV CODE 636 W HCPCS: Performed by: EMERGENCY MEDICINE

## 2023-09-24 PROCEDURE — 93010 EKG 12-LEAD: ICD-10-PCS | Mod: ,,, | Performed by: INTERNAL MEDICINE

## 2023-09-24 PROCEDURE — 63600175 PHARM REV CODE 636 W HCPCS

## 2023-09-24 PROCEDURE — 93010 ELECTROCARDIOGRAM REPORT: CPT | Mod: ,,, | Performed by: INTERNAL MEDICINE

## 2023-09-24 PROCEDURE — 96374 THER/PROPH/DIAG INJ IV PUSH: CPT

## 2023-09-24 PROCEDURE — 87389 HIV-1 AG W/HIV-1&-2 AB AG IA: CPT | Performed by: PHYSICIAN ASSISTANT

## 2023-09-24 PROCEDURE — 80053 COMPREHEN METABOLIC PANEL: CPT | Performed by: EMERGENCY MEDICINE

## 2023-09-24 PROCEDURE — 99285 EMERGENCY DEPT VISIT HI MDM: CPT | Mod: 25

## 2023-09-24 RX ORDER — HYDROMORPHONE HYDROCHLORIDE 1 MG/ML
0.5 INJECTION, SOLUTION INTRAMUSCULAR; INTRAVENOUS; SUBCUTANEOUS
Status: COMPLETED | OUTPATIENT
Start: 2023-09-24 | End: 2023-09-24

## 2023-09-24 RX ORDER — HYDROMORPHONE HYDROCHLORIDE 1 MG/ML
1 INJECTION, SOLUTION INTRAMUSCULAR; INTRAVENOUS; SUBCUTANEOUS
Status: COMPLETED | OUTPATIENT
Start: 2023-09-24 | End: 2023-09-24

## 2023-09-24 RX ORDER — OXYCODONE AND ACETAMINOPHEN 5; 325 MG/1; MG/1
1 TABLET ORAL
Status: COMPLETED | OUTPATIENT
Start: 2023-09-24 | End: 2023-09-25

## 2023-09-24 RX ADMIN — HYDROMORPHONE HYDROCHLORIDE 1 MG: 1 INJECTION, SOLUTION INTRAMUSCULAR; INTRAVENOUS; SUBCUTANEOUS at 05:09

## 2023-09-24 RX ADMIN — HYDROMORPHONE HYDROCHLORIDE 1 MG: 1 INJECTION, SOLUTION INTRAMUSCULAR; INTRAVENOUS; SUBCUTANEOUS at 07:09

## 2023-09-24 RX ADMIN — HYDROMORPHONE HYDROCHLORIDE 0.5 MG: 1 INJECTION, SOLUTION INTRAMUSCULAR; INTRAVENOUS; SUBCUTANEOUS at 08:09

## 2023-09-24 NOTE — DISCHARGE INSTRUCTIONS
Thank you for coming to our Emergency Department today! It is important to remember that some problems or medical conditions are difficult to diagnose and may not be found or addressed during your Emergency Department visit.     Be sure to follow up with your primary care doctor and review all labs/imaging/tests that were performed during your ER visit with them. Some labs/imaging/tests may be outside of the normal range, and require non-emergent follow-up and/or further investigation/treatment/procedures/testing to help diagnose/exclude/prevent complications or other potentially serious medical conditions that were not discussed or addressed during your ER visit.    Please take all medications as directed. All medications may potentially have side-effects and it is impossible to predict which medications may give you side-effects or what side-effects (if any) they will give you.. If you feel that you are having a negative effect or side-effect of any medication you should immediately stop taking them and seek medical attention. If you feel that you are having a life-threatening reaction call 911.    Return to the ER with any questions/concerns, new/concerning symptoms, worsening or failure to improve.     Do not drive, swim, climb to height, take a bath, operate heavy machinery, drink alcohol or take potentially sedating medications, sign any legal documents or make any important decisions for 24 hours if you have received any pain medications, sedatives or mood altering drugs during your ER visit or within 24 hours of taking them if they have been prescribed to you.
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
## 2023-09-24 NOTE — ED TRIAGE NOTES
Patient is a 37 year old female presents to the ED via personal vehicle with c/o 10/10 hip pain. Patient states that she slipped and fell in the bathroom. Patient is anxious, restless, crying upon assessment. Left leg appears rotated. Bilateral pedal pulses noted via doppler.

## 2023-09-24 NOTE — PROVIDER PROGRESS NOTES - EMERGENCY DEPT.
Encounter Date: 9/24/2023    ED Physician Progress Notes          Physician Attestation Statement for Resident:  As the supervising MD   Physician Attestation Statement: I have personally seen and examined this patient.       I agree with the history unless otherwise noted.     As the supervising MD I agree with the PE unless otherwise noted.      I have reviewed and agree with the residents interpretation of the following unless otherwise noted:   I have personally reviewed and interpreted the patients laboratory studies: + leukocytosis    I have personally reviewed and interpreted the patient's EKG:  Sinus tachycardia without evidence of ischemia, no dysrhythmia      I have also reviewed the following:    external records:  Patient received 12 tabs of hydrocodone on 9/11  As the supervising MD I agree with the treatment, course, plan, and disposition unless otherwise noted.    CT pelvis without evidence of fracture/dislocation    After multiple rounds of opioid pain medication, patient states she has too much pain to walk

## 2023-09-25 ENCOUNTER — HOSPITAL ENCOUNTER (OUTPATIENT)
Facility: HOSPITAL | Age: 38
Discharge: HOME | End: 2023-09-26
Attending: EMERGENCY MEDICINE | Admitting: EMERGENCY MEDICINE
Payer: MEDICAID

## 2023-09-25 VITALS
OXYGEN SATURATION: 97 % | WEIGHT: 220 LBS | DIASTOLIC BLOOD PRESSURE: 88 MMHG | HEIGHT: 66 IN | BODY MASS INDEX: 35.36 KG/M2 | SYSTOLIC BLOOD PRESSURE: 168 MMHG | HEART RATE: 99 BPM | TEMPERATURE: 99 F | RESPIRATION RATE: 14 BRPM

## 2023-09-25 DIAGNOSIS — M54.42 LEFT-SIDED LOW BACK PAIN WITH SCIATICA, SCIATICA LATERALITY UNSPECIFIED, UNSPECIFIED CHRONICITY: Primary | ICD-10-CM

## 2023-09-25 DIAGNOSIS — M51.36 L4-L5 DISC BULGE: ICD-10-CM

## 2023-09-25 DIAGNOSIS — M51.26 LUMBAR DISC HERNIATION: ICD-10-CM

## 2023-09-25 PROBLEM — M54.16 LEFT LUMBAR RADICULOPATHY: Status: ACTIVE | Noted: 2023-09-25

## 2023-09-25 PROBLEM — M50.20 CERVICAL DISC HERNIATION: Status: RESOLVED | Noted: 2023-09-25 | Resolved: 2023-09-25

## 2023-09-25 PROBLEM — M50.20 CERVICAL DISC HERNIATION: Status: ACTIVE | Noted: 2023-09-25

## 2023-09-25 LAB
ALBUMIN SERPL BCP-MCNC: 4.4 G/DL (ref 3.5–5.2)
ALP SERPL-CCNC: 67 U/L (ref 55–135)
ALT SERPL W/O P-5'-P-CCNC: 33 U/L (ref 10–44)
ANION GAP SERPL CALC-SCNC: 14 MMOL/L (ref 8–16)
AST SERPL-CCNC: 22 U/L (ref 10–40)
B-HCG UR QL: NEGATIVE
BACTERIA #/AREA URNS AUTO: ABNORMAL /HPF
BASOPHILS # BLD AUTO: 0.1 K/UL (ref 0–0.2)
BASOPHILS NFR BLD: 0.5 % (ref 0–1.9)
BILIRUB SERPL-MCNC: 1.9 MG/DL (ref 0.1–1)
BILIRUB UR QL STRIP: NEGATIVE
BUN SERPL-MCNC: 21 MG/DL (ref 6–20)
CALCIUM SERPL-MCNC: 9.4 MG/DL (ref 8.7–10.5)
CHLORIDE SERPL-SCNC: 95 MMOL/L (ref 95–110)
CLARITY UR REFRACT.AUTO: CLEAR
CO2 SERPL-SCNC: 21 MMOL/L (ref 23–29)
COLOR UR AUTO: YELLOW
CREAT SERPL-MCNC: 0.8 MG/DL (ref 0.5–1.4)
CTP QC/QA: YES
DIFFERENTIAL METHOD: ABNORMAL
EOSINOPHIL # BLD AUTO: 0 K/UL (ref 0–0.5)
EOSINOPHIL NFR BLD: 0 % (ref 0–8)
EPITH CASTS #/AREA UR COMP ASSIST: 0 /LPF
ERYTHROCYTE [DISTWIDTH] IN BLOOD BY AUTOMATED COUNT: 12.7 % (ref 11.5–14.5)
EST. GFR  (NO RACE VARIABLE): >60 ML/MIN/1.73 M^2
GLUCOSE SERPL-MCNC: 317 MG/DL (ref 70–110)
GLUCOSE UR QL STRIP: ABNORMAL
HCT VFR BLD AUTO: 45.6 % (ref 37–48.5)
HGB BLD-MCNC: 15.1 G/DL (ref 12–16)
HGB UR QL STRIP: ABNORMAL
HYALINE CASTS UR QL AUTO: 0 /LPF
IMM GRANULOCYTES # BLD AUTO: 0.25 K/UL (ref 0–0.04)
IMM GRANULOCYTES NFR BLD AUTO: 1.2 % (ref 0–0.5)
KETONES UR QL STRIP: ABNORMAL
LEUKOCYTE ESTERASE UR QL STRIP: NEGATIVE
LYMPHOCYTES # BLD AUTO: 0.9 K/UL (ref 1–4.8)
LYMPHOCYTES NFR BLD: 4.5 % (ref 18–48)
MCH RBC QN AUTO: 28.1 PG (ref 27–31)
MCHC RBC AUTO-ENTMCNC: 33.1 G/DL (ref 32–36)
MCV RBC AUTO: 85 FL (ref 82–98)
MICROSCOPIC COMMENT: ABNORMAL
MONOCYTES # BLD AUTO: 0.2 K/UL (ref 0.3–1)
MONOCYTES NFR BLD: 1.2 % (ref 4–15)
NEUTROPHILS # BLD AUTO: 19 K/UL (ref 1.8–7.7)
NEUTROPHILS NFR BLD: 92.6 % (ref 38–73)
NITRITE UR QL STRIP: NEGATIVE
NRBC BLD-RTO: 0 /100 WBC
PH UR STRIP: 5 [PH] (ref 5–8)
PLATELET # BLD AUTO: 780 K/UL (ref 150–450)
PMV BLD AUTO: 9 FL (ref 9.2–12.9)
POTASSIUM SERPL-SCNC: 4.2 MMOL/L (ref 3.5–5.1)
PROT SERPL-MCNC: 8 G/DL (ref 6–8.4)
PROT UR QL STRIP: ABNORMAL
RBC # BLD AUTO: 5.38 M/UL (ref 4–5.4)
RBC #/AREA URNS AUTO: 17 /HPF (ref 0–4)
SODIUM SERPL-SCNC: 130 MMOL/L (ref 136–145)
SP GR UR STRIP: 1.02 (ref 1–1.03)
SQUAMOUS #/AREA URNS AUTO: 6 /HPF
URN SPEC COLLECT METH UR: ABNORMAL
WBC # BLD AUTO: 20.5 K/UL (ref 3.9–12.7)
WBC #/AREA URNS AUTO: 13 /HPF (ref 0–5)
YEAST UR QL AUTO: ABNORMAL

## 2023-09-25 PROCEDURE — 81001 URINALYSIS AUTO W/SCOPE: CPT

## 2023-09-25 PROCEDURE — 63600175 PHARM REV CODE 636 W HCPCS

## 2023-09-25 PROCEDURE — G0378 HOSPITAL OBSERVATION PER HR: HCPCS

## 2023-09-25 PROCEDURE — 87086 URINE CULTURE/COLONY COUNT: CPT

## 2023-09-25 PROCEDURE — 99214 OFFICE O/P EST MOD 30 MIN: CPT | Mod: ,,, | Performed by: PHYSICIAN ASSISTANT

## 2023-09-25 PROCEDURE — 96375 TX/PRO/DX INJ NEW DRUG ADDON: CPT

## 2023-09-25 PROCEDURE — 25000003 PHARM REV CODE 250: Performed by: EMERGENCY MEDICINE

## 2023-09-25 PROCEDURE — 85025 COMPLETE CBC W/AUTO DIFF WBC: CPT

## 2023-09-25 PROCEDURE — 96376 TX/PRO/DX INJ SAME DRUG ADON: CPT

## 2023-09-25 PROCEDURE — 80053 COMPREHEN METABOLIC PANEL: CPT

## 2023-09-25 PROCEDURE — 63600175 PHARM REV CODE 636 W HCPCS: Performed by: PHYSICIAN ASSISTANT

## 2023-09-25 PROCEDURE — 81025 URINE PREGNANCY TEST: CPT | Performed by: EMERGENCY MEDICINE

## 2023-09-25 PROCEDURE — 96374 THER/PROPH/DIAG INJ IV PUSH: CPT

## 2023-09-25 PROCEDURE — 99214 PR OFFICE/OUTPT VISIT, EST, LEVL IV, 30-39 MIN: ICD-10-PCS | Mod: ,,, | Performed by: PHYSICIAN ASSISTANT

## 2023-09-25 PROCEDURE — 25000003 PHARM REV CODE 250: Performed by: PHYSICIAN ASSISTANT

## 2023-09-25 PROCEDURE — 99285 EMERGENCY DEPT VISIT HI MDM: CPT | Mod: 25

## 2023-09-25 PROCEDURE — 83036 HEMOGLOBIN GLYCOSYLATED A1C: CPT

## 2023-09-25 RX ORDER — PREGABALIN 25 MG/1
25 CAPSULE ORAL 2 TIMES DAILY
Status: DISCONTINUED | OUTPATIENT
Start: 2023-09-25 | End: 2023-09-26 | Stop reason: HOSPADM

## 2023-09-25 RX ORDER — IBUPROFEN 200 MG
24 TABLET ORAL
Status: DISCONTINUED | OUTPATIENT
Start: 2023-09-25 | End: 2023-09-26 | Stop reason: HOSPADM

## 2023-09-25 RX ORDER — INSULIN ASPART 100 [IU]/ML
0-5 INJECTION, SOLUTION INTRAVENOUS; SUBCUTANEOUS
Status: DISCONTINUED | OUTPATIENT
Start: 2023-09-25 | End: 2023-09-26 | Stop reason: HOSPADM

## 2023-09-25 RX ORDER — DEXAMETHASONE SODIUM PHOSPHATE 4 MG/ML
4 INJECTION, SOLUTION INTRA-ARTICULAR; INTRALESIONAL; INTRAMUSCULAR; INTRAVENOUS; SOFT TISSUE
Status: COMPLETED | OUTPATIENT
Start: 2023-09-25 | End: 2023-09-25

## 2023-09-25 RX ORDER — DEXAMETHASONE SODIUM PHOSPHATE 4 MG/ML
4 INJECTION, SOLUTION INTRA-ARTICULAR; INTRALESIONAL; INTRAMUSCULAR; INTRAVENOUS; SOFT TISSUE
Status: DISCONTINUED | OUTPATIENT
Start: 2023-09-25 | End: 2023-09-25

## 2023-09-25 RX ORDER — IBUPROFEN 200 MG
16 TABLET ORAL
Status: DISCONTINUED | OUTPATIENT
Start: 2023-09-25 | End: 2023-09-26 | Stop reason: HOSPADM

## 2023-09-25 RX ORDER — GLUCAGON 1 MG
1 KIT INJECTION
Status: DISCONTINUED | OUTPATIENT
Start: 2023-09-25 | End: 2023-09-26 | Stop reason: HOSPADM

## 2023-09-25 RX ORDER — KETOROLAC TROMETHAMINE 30 MG/ML
10 INJECTION, SOLUTION INTRAMUSCULAR; INTRAVENOUS EVERY 6 HOURS
Status: DISCONTINUED | OUTPATIENT
Start: 2023-09-26 | End: 2023-09-25

## 2023-09-25 RX ORDER — HYDROMORPHONE HYDROCHLORIDE 1 MG/ML
1 INJECTION, SOLUTION INTRAMUSCULAR; INTRAVENOUS; SUBCUTANEOUS
Status: COMPLETED | OUTPATIENT
Start: 2023-09-25 | End: 2023-09-25

## 2023-09-25 RX ORDER — METHOCARBAMOL 750 MG/1
1500 TABLET, FILM COATED ORAL EVERY 8 HOURS PRN
Status: DISCONTINUED | OUTPATIENT
Start: 2023-09-25 | End: 2023-09-26 | Stop reason: HOSPADM

## 2023-09-25 RX ORDER — ACETAMINOPHEN 325 MG/1
650 TABLET ORAL EVERY 6 HOURS PRN
Status: DISCONTINUED | OUTPATIENT
Start: 2023-09-25 | End: 2023-09-26 | Stop reason: HOSPADM

## 2023-09-25 RX ORDER — KETOROLAC TROMETHAMINE 30 MG/ML
15 INJECTION, SOLUTION INTRAMUSCULAR; INTRAVENOUS
Status: COMPLETED | OUTPATIENT
Start: 2023-09-25 | End: 2023-09-25

## 2023-09-25 RX ORDER — DEXAMETHASONE SODIUM PHOSPHATE 4 MG/ML
10 INJECTION, SOLUTION INTRA-ARTICULAR; INTRALESIONAL; INTRAMUSCULAR; INTRAVENOUS; SOFT TISSUE ONCE
Status: COMPLETED | OUTPATIENT
Start: 2023-09-25 | End: 2023-09-25

## 2023-09-25 RX ORDER — KETOROLAC TROMETHAMINE 30 MG/ML
10 INJECTION, SOLUTION INTRAMUSCULAR; INTRAVENOUS EVERY 6 HOURS
Status: DISCONTINUED | OUTPATIENT
Start: 2023-09-25 | End: 2023-09-26 | Stop reason: HOSPADM

## 2023-09-25 RX ORDER — LIDOCAINE 50 MG/G
1 PATCH TOPICAL
Status: DISCONTINUED | OUTPATIENT
Start: 2023-09-25 | End: 2023-09-26 | Stop reason: HOSPADM

## 2023-09-25 RX ADMIN — METHOCARBAMOL 1500 MG: 750 TABLET ORAL at 07:09

## 2023-09-25 RX ADMIN — DEXAMETHASONE SODIUM PHOSPHATE 4 MG: 4 INJECTION INTRA-ARTICULAR; INTRALESIONAL; INTRAMUSCULAR; INTRAVENOUS; SOFT TISSUE at 12:09

## 2023-09-25 RX ADMIN — METHOCARBAMOL 1500 MG: 750 TABLET ORAL at 09:09

## 2023-09-25 RX ADMIN — HYDROMORPHONE HYDROCHLORIDE 1 MG: 1 INJECTION, SOLUTION INTRAMUSCULAR; INTRAVENOUS; SUBCUTANEOUS at 02:09

## 2023-09-25 RX ADMIN — OXYCODONE HYDROCHLORIDE AND ACETAMINOPHEN 1 TABLET: 5; 325 TABLET ORAL at 12:09

## 2023-09-25 RX ADMIN — DEXAMETHASONE SODIUM PHOSPHATE 10 MG: 4 INJECTION INTRA-ARTICULAR; INTRALESIONAL; INTRAMUSCULAR; INTRAVENOUS; SOFT TISSUE at 02:09

## 2023-09-25 RX ADMIN — HYDROMORPHONE HYDROCHLORIDE 1 MG: 1 INJECTION, SOLUTION INTRAMUSCULAR; INTRAVENOUS; SUBCUTANEOUS at 12:09

## 2023-09-25 RX ADMIN — KETOROLAC TROMETHAMINE 15 MG: 30 INJECTION, SOLUTION INTRAMUSCULAR; INTRAVENOUS at 12:09

## 2023-09-25 RX ADMIN — LIDOCAINE 1 PATCH: 50 PATCH CUTANEOUS at 07:09

## 2023-09-25 RX ADMIN — PREGABALIN 25 MG: 25 CAPSULE ORAL at 08:09

## 2023-09-25 RX ADMIN — ACETAMINOPHEN 650 MG: 325 TABLET ORAL at 09:09

## 2023-09-25 RX ADMIN — KETOROLAC TROMETHAMINE 10 MG: 30 INJECTION, SOLUTION INTRAMUSCULAR; INTRAVENOUS at 09:09

## 2023-09-25 NOTE — ED PROVIDER NOTES
Encounter Date: 9/25/2023       History     Chief Complaint   Patient presents with    Back Pain     Seen and d/c yesterday for same complaint. Pt. Loudly crying in EMS reporting 10/10 back pain.      HPI  Review of patient's allergies indicates:   Allergen Reactions    Rosemary Anaphylaxis    Pecan nut Dermatitis    Sulfa (sulfonamide antibiotics) Itching     Past Medical History:   Diagnosis Date    Bipolar disorder     per patient report    Chronic pain     Depression     Diabetes mellitus     Diabetes mellitus, type 2     Driving safety issue     DRIVING AFTER RECEIVING DILAUDID INJECTION    Hx of psychiatric care     Neuropathy     per patient report    Psychiatric problem     Therapy      Past Surgical History:   Procedure Laterality Date    BACK SURGERY      EYE SURGERY       Family History   Problem Relation Age of Onset    Bipolar disorder Mother     Bipolar disorder Father     Cancer Paternal Grandfather      Social History     Tobacco Use    Smoking status: Former     Current packs/day: 0.00     Types: Cigarettes     Quit date: 9/1/2020     Years since quitting: 3.0    Smokeless tobacco: Never   Substance Use Topics    Alcohol use: Yes     Comment: rare    Drug use: Yes     Types: Marijuana     Comment: Daily     Review of Systems    Physical Exam     Initial Vitals [09/25/23 1056]   BP Pulse Resp Temp SpO2   (!) 149/89 88 16 98.6 °F (37 °C) 99 %      MAP       --         Physical Exam    ED Course   Procedures  Labs Reviewed - No data to display       Imaging Results    None          Medications - No data to display  Medical Decision Making            Attending Attestation:   Physician Attestation Statement for Resident:  As the supervising MD   Physician Attestation Statement: I have personally seen and examined this patient.   I agree with the above history.  -:   As the supervising MD I agree with the above PE.      I have reviewed and agree with the residents interpretation of the following: CT scans  "and lab data.  I have reviewed the following: old records at this facility.                                Clinical Impression:    ***Please document a Clinical Impression and click the "Refresh" button to refresh your note and automatically pull in before signing.***         "

## 2023-09-25 NOTE — ED PROVIDER NOTES
Encounter Date: 9/25/2023       History     Chief Complaint   Patient presents with    Back Pain     Seen and d/c yesterday for same complaint. Pt. Loudly crying in EMS reporting 10/10 back pain.      HPI    Patient is a 36yo female with hx of T2DM, herniated L4-L5, sciatica, and neuropathy presenting with low back pain and l leg pain. Patient describes pain as spasms that come out of nowhere and feel like they start at her foot and move upward. She states this is different than her normal pain, but that she has been in pain for two weeks. She stated the pain occurred this morning as she was aboutto get out of her car at 0930. She states she had not stepped down and denies trauma, suggests maybe twisting the wrong way. This morning, she took her prednisone, diclofenac, and pregabalin for the pain.    Review of patient's allergies indicates:   Allergen Reactions    Rosemary Anaphylaxis    Pecan nut Dermatitis    Sulfa (sulfonamide antibiotics) Itching     Past Medical History:   Diagnosis Date    Bipolar disorder     per patient report    Chronic pain     Depression     Diabetes mellitus     Diabetes mellitus, type 2     Driving safety issue     DRIVING AFTER RECEIVING DILAUDID INJECTION    Hx of psychiatric care     Neuropathy     per patient report    Psychiatric problem     Therapy      Past Surgical History:   Procedure Laterality Date    BACK SURGERY      EYE SURGERY       Family History   Problem Relation Age of Onset    Bipolar disorder Mother     Bipolar disorder Father     Cancer Paternal Grandfather      Social History     Tobacco Use    Smoking status: Former     Current packs/day: 0.00     Types: Cigarettes     Quit date: 9/1/2020     Years since quitting: 3.0    Smokeless tobacco: Never   Substance Use Topics    Alcohol use: Yes     Comment: rare    Drug use: Yes     Types: Marijuana     Comment: Daily     Review of Systems  Please see HPI.  Physical Exam     Initial Vitals [09/25/23 1056]   BP Pulse  Resp Temp SpO2   (!) 149/89 88 16 98.6 °F (37 °C) 99 %      MAP       --         Physical Exam    Constitutional: She appears well-developed and well-nourished. She appears distressed (intermittently).   HENT:   Head: Normocephalic and atraumatic.   Cardiovascular:  Normal rate, regular rhythm and normal heart sounds.     Exam reveals no gallop and no friction rub.       No murmur heard.  Pulmonary/Chest: Breath sounds normal. No respiratory distress. She has no rhonchi.   Musculoskeletal:         General: No edema.      Comments: Unable to provoke tenderness with palpation; patient with active pain episodes during exam, no hypertonicity of L leg or musculature appreciated during attack     Neurological: She is alert.   Per  Sessions: straight leg test positive on L   Skin: Skin is warm and dry. No rash noted.         ED Course   Procedures  Labs Reviewed   CBC W/ AUTO DIFFERENTIAL - Abnormal; Notable for the following components:       Result Value    WBC 20.50 (*)     Platelets 780 (*)     MPV 9.0 (*)     Immature Granulocytes 1.2 (*)     Gran # (ANC) 19.0 (*)     Immature Grans (Abs) 0.25 (*)     Lymph # 0.9 (*)     Mono # 0.2 (*)     Gran % 92.6 (*)     Lymph % 4.5 (*)     Mono % 1.2 (*)     All other components within normal limits   COMPREHENSIVE METABOLIC PANEL - Abnormal; Notable for the following components:    Sodium 130 (*)     CO2 21 (*)     Glucose 317 (*)     BUN 21 (*)     Total Bilirubin 1.9 (*)     All other components within normal limits   URINALYSIS, REFLEX TO URINE CULTURE   PREGNANCY TEST, URINE RAPID          Imaging Results    None          Medications   HYDROmorphone injection 1 mg (1 mg Intravenous Given 9/25/23 1259)   ketorolac injection 15 mg (15 mg Intravenous Given 9/25/23 1258)   dexAMETHasone injection 4 mg (4 mg Intravenous Given 9/25/23 1259)   HYDROmorphone injection 1 mg (1 mg Intravenous Given 9/25/23 1431)   dexAMETHasone injection 10 mg (10 mg Intravenous Given 9/25/23  1433)     Medical Decision Making  Patient is a 38yo female with hx of T2DM, herniated L4-L5, sciatica, and neuropathy presenting with low back pain and l leg pain. Patient reports pain over the last two weeks, this pain being different. Pain attempted to be controlled with dilaudid, total of 2mg given in ED. Patient also given decadron. Neurosurgery consulted for eval. Patient endorsing urinary incontinence earlier this morning, and suggesting urinary retention. Post-void residual ordered in addition to MRI lumbar spine for eval. Patient signed out to oncoming team pending MRI and neurosurgery eval. If cleared by neurosurgery, plan is to admit the patient to medicine due to intractable pain.    Amount and/or Complexity of Data Reviewed  Labs: ordered.  Radiology: ordered.    Risk  Prescription drug management.                               Clinical Impression:   Final diagnoses:  [M54.42] Left-sided low back pain with sciatica, sciatica laterality unspecified, unspecified chronicity (Primary)               Lor Wiggins DO  Resident  09/25/23 2100

## 2023-09-25 NOTE — ED NOTES
Pt connected to cardiac monitor, continuous pulse ox, and bp cuff cycling q30 minutes. Bed low and locked, side rails up x2, call light in reach.

## 2023-09-25 NOTE — ED NOTES
Assumed care and received report from MARIANNE Murrieta.    The patient is resting quietly in ED stretcher, and is AAOx4 at this time. Respirations are even and unlabored, with no distress noted. The patient remains on continuous cardiac monitor, automated BP cuff cycling Q30 minutes, and continuous pulse oximeter. The patient is aware of POC and all questions and concerns addressed at this time. The patient was offered restroom assistance and denies need to void. The patient denies further needs and has no complaints at this time. SR raised x2, bed locked and in low position with brake engaged. Call bell within reach and the patient verbalized she would call for assistance if needed. Personal belongings are at bedside within reach.

## 2023-09-25 NOTE — Clinical Note
Diagnosis: Left-sided low back pain with sciatica, sciatica laterality unspecified, unspecified chronicity [[id number]]   Future Attending Provider: GLORIA DANGELO [[id number]]   Is the patient being sent to ED Observation?: Yes   Admitting Provider:: GLORIA DANGELO [[id number]]   Special Needs:: Fall Risk [15]

## 2023-09-25 NOTE — CONSULTS
[facility] - Emergency Dept  Neurosurgery  Consult Note    Inpatient consult to Neurosurgery  Consult performed by: Domenica Jefferson PA-C  Consult ordered by: Lor Wiggins DO        Subjective:     Chief Complaint/Reason for Admission: lumbar radiculopathy    History of Present Illness: Lizzie Saunders is a 37 y.o. female with h/o previous L L4-5 microdiscectomy (2017 in [place]), T2 DM, PCOS, Bipolar, obesity who presents with low back pain and left lumbar radiculopathy. Patient reports 2 weeks history of worsening back and leg pain. She has had multiple ED visits in the past month for this issue. She reports pain is progressively getting worse and she cannot function. She reports when the pain first started, she was given a medrol dose pack which helped the pain but it returned when course was completed. She fall on left hip yesterday evening and came to our ED where imaging was negative for acute fracture. She reports an episode of urinary incontinence yesterday in the ED and but now has having issues emptying her bladder. Denies saddle anesthesia. Ambulates with rolling walker since microdiscectomy in 2017 due to back pain.             (Not in a hospital admission)      Review of patient's allergies indicates:   Allergen Reactions    Beatriz Anaphylaxis    Pecan nut Dermatitis    Sulfa (sulfonamide antibiotics) Itching       Past Medical History:   Diagnosis Date    Bipolar disorder     per patient report    Chronic pain     Depression     Diabetes mellitus     Diabetes mellitus, type 2     Driving safety issue     DRIVING AFTER RECEIVING DILAUDID INJECTION    Hx of psychiatric care     Neuropathy     per patient report    Psychiatric problem     Therapy      Past Surgical History:   Procedure Laterality Date    BACK SURGERY      EYE SURGERY       Family History       Problem Relation (Age of Onset)    Bipolar disorder Mother, Father    Cancer Paternal Grandfather          Tobacco Use    Smoking  status: Former     Current packs/day: 0.00     Types: Cigarettes     Quit date: 9/1/2020     Years since quitting: 3.0    Smokeless tobacco: Never   Substance and Sexual Activity    Alcohol use: Yes     Comment: rare    Drug use: Yes     Types: Marijuana     Comment: Daily    Sexual activity: Yes     Comment:      Review of Systems   Constitutional:  Negative for chills, fatigue and fever.   Eyes:  Negative for photophobia and visual disturbance.   Respiratory:  Negative for cough and shortness of breath.    Cardiovascular:  Negative for chest pain, palpitations and leg swelling.   Gastrointestinal:  Negative for constipation, diarrhea, nausea and vomiting.   Genitourinary:  Negative for difficulty urinating, dysuria, frequency and urgency.   Musculoskeletal:  Positive for back pain. Negative for gait problem and neck pain.   Neurological:  Positive for weakness and numbness. Negative for dizziness, seizures, speech difficulty and headaches.   Psychiatric/Behavioral:  Negative for confusion. The patient is not nervous/anxious.      Objective:     Weight: 99.8 kg (220 lb)  Body mass index is 35.51 kg/m².  Vital Signs (Most Recent):  Temp: 98.6 °F (37 °C) (09/25/23 1513)  Pulse: 105 (09/25/23 1513)  Resp: 17 (09/25/23 1513)  BP: 134/70 (09/25/23 1513)  SpO2: 100 % (09/25/23 1513) Vital Signs (24h Range):  Temp:  [98.2 °F (36.8 °C)-99.2 °F (37.3 °C)] 98.6 °F (37 °C)  Pulse:  [] 105  Resp:  [13-25] 17  SpO2:  [96 %-100 %] 100 %  BP: (134-201)/() 134/70                         Female External Urinary Catheter 09/25/23 1511 (Active)         Neurosurgery Physical Exam  General: well developed, well nourished, distressed 2/2 pain   Head: normocephalic, atraumatic  Neurologic: Alert and oriented. Thought content appropriate.  GCS: Motor: 6/Verbal: 5/Eyes: 4 GCS Total: 15  Mental Status: Awake, Alert, Oriented x 4  Language: No aphasia  Speech: No dysarthria  Cranial nerves: face symmetric, tongue  "midline, CN II-XII grossly intact.   Eyes: pupils equal, round, reactive to light with accommodation, EOMI.   Pulmonary: normal respirations, no signs of respiratory distress  Abdomen: soft, non-distended, not tender to palpation  Skin: Skin is warm, dry and intact.  Sensory: intact to light touch throughout  Motor Strength: entire exam is limited 2/2 severe pain and crying       Iliopsoas Quadriceps Knee  Flexion Tibialis  anterior Gastro- cnemius EHL   Lower: R 5/5 5/5 5/5 5/5 5/5 5/5    L 4/5 4/5 4/5 4/5 4/5 4/5               Significant Labs:  Recent Labs   Lab 09/24/23  1854 09/25/23  1300   * 317*    130*   K 4.2 4.2    95   CO2 17* 21*   BUN 14 21*   CREATININE 0.5 0.8   CALCIUM 7.0* 9.4     Recent Labs   Lab 09/24/23  1705 09/25/23  1301   WBC 20.67* 20.50*   HGB 15.1 15.1   HCT 46.6 45.6   * 780*     No results for input(s): "LABPT", "INR", "APTT" in the last 48 hours.  Microbiology Results (last 7 days)       ** No results found for the last 168 hours. **          All pertinent labs from the last 24 hours have been reviewed.    Significant Diagnostics:  I have reviewed all pertinent imaging results/findings within the past 24 hours.    Assessment/Plan:     Left lumbar radiculopathy  37 y.o. female with h/o previous L L4-5 microdiscectomy (2017 in [place]), T2 DM, PCOS, Bipolar, obesity who presents with low back pain and left lumbar radiculopathy.     --MRI lumbar spine (9/22): Disc extrusion at L4-L5 results in moderate stenosis of the left lateral recess and contacts the descending left L5 nerve root  --Recommend repeat MRI scan given fall and new urinary retention  --PVR  --Decadron 10 mg   --Pain control-- may require admit for pain control to hospital medicine  --Further recs pending MRI completion    Discussed with Dr. Heaton            Thank you for your consult. I will follow-up with patient. Please contact us if you have any additional questions.    Domenica Jefferson, " ESTER  Neurosurgery  [facility] - Emergency Dept

## 2023-09-25 NOTE — ED PROVIDER NOTES
Encounter Date: 9/25/2023       History     Chief Complaint   Patient presents with    Back Pain     Seen and d/c yesterday for same complaint. Pt. Loudly crying in EMS reporting 10/10 back pain.      37-year-old female with past medical history of T2DM, bipolar disorder, and left-sided sciatica with multiple previous ED visits thing the last few weeks for lower back pain (with multiple MRI/CTs with grossly benign workup) now presenting with acute left hip pain following a fall.  Patient reports that she slipped on the floor and landed on her left hip with immediate pain and decreased sensation over her left leg.  Patient drove herself to the emergency department but has had difficulty ambulating due to left hip pain.  Patient denies any bleeding or wounds she has noted of her left leg.  Additionally patient denies any other associated injuries including head injuries or other extremity pain.    Patient reports loss of bladder function since the fall and reports that she may have urinated on herself.      Review of patient's allergies indicates:   Allergen Reactions    Rosemary Anaphylaxis    Pecan nut Dermatitis    Sulfa (sulfonamide antibiotics) Itching     Past Medical History:   Diagnosis Date    Bipolar disorder     per patient report    Chronic pain     Depression     Diabetes mellitus     Diabetes mellitus, type 2     Driving safety issue     DRIVING AFTER RECEIVING DILAUDID INJECTION    Hx of psychiatric care     Neuropathy     per patient report    Psychiatric problem     Therapy      Past Surgical History:   Procedure Laterality Date    BACK SURGERY      EYE SURGERY       Family History   Problem Relation Age of Onset    Bipolar disorder Mother     Bipolar disorder Father     Cancer Paternal Grandfather      Social History     Tobacco Use    Smoking status: Former     Current packs/day: 0.00     Types: Cigarettes     Quit date: 9/1/2020     Years since quitting: 3.0    Smokeless tobacco: Never   Substance  Use Topics    Alcohol use: Yes     Comment: rare    Drug use: Yes     Types: Marijuana     Comment: Daily     Review of Systems  See HPI    Physical Exam     Initial Vitals [09/25/23 1056]   BP Pulse Resp Temp SpO2   (!) 149/89 88 16 98.6 °F (37 °C) 99 %      MAP       --         Physical Exam    Nursing note and vitals reviewed.      ABC intact  Disability: Awake, alert, oriented, NONI, decreased movement of left leg  Secondary survey:  Patient exposed with no breaks in skin but shortening and external rotation of left leg.     Trauma survey negative for tenderness to palpation over skull, spine, ribcage    Gen: AxOx3, well nourished, appears stated age, no pallor, no jaundice, appears well hydrated  Eye: EOMI, no scleral icterus, no periorbital edema or ecchymosis  Head: Normocephalic, atraumatic, no lesions, scalp appears normal  ENT: Neck supple, no stridor, no masses, no drooling or voice changes  CVS: All distal pulses intact with normal rate and rhythm, no JVD, normal S1/S2, no murmur  Pulm: Normal breath sounds, no wheezes, rales or rhonchi, no increased work of breathing  Abd:  Nondistended, soft, nontender, no organomegaly, no CVAT  Ext:   Negative straight leg raise test bilaterally  LLE:  - Skin intact throughout, no scars present  - mild swelling of left hip  - extremity shortened and externally rotated  - No ecchymosis, erythema, or signs of cellulitis  - tenderness to palpation of proximal femur  - No tenderness to palpation of middle, or distal femur  - No tenderness to palpation of proximal, middle, or distal aspects of tibia or fibula  - No tenderness to palpation of foot  - 2+ DP  - Brisk capillary refill   - Pain with any ROM at hip  - Full painless ROM of hip, knee, and ankle  - Compartments soft  - decreased sensation over entire extremity  - Motor intact EHL/FHL/TA/Gastroc  Neuro: GCS15, moving all extremities, gait intact, face grossly symmetric  Psych: normal affect, cooperative, well  groomed, makes good eye contact      ED Course   Procedures  Labs Reviewed - No data to display         Imaging Results    None          Medications - No data to display    Medical Decision Making  Initial assessment  37-year-old female presenting with left leg pain after a fall. Patient is able to vocalise, breathing spontaneously, hemodynamically stable, oriented, moving all 4 limbs spontaneously.  Examination consistent with shortened and externally rotated left extremity with tenderness to palpation and decreased sensation over left leg.      Differential diagnosis  Femur fracture  Pelvic fracture  Lumbar fracture  Cauda equina syndrome  Malingering      ED management  Initial high suspicion for fracture of left hip given patient's intense discomfort and crying in addition to the position of the leg including shortening and external rotation.  Patient was given Dilaudid for pain.  Out of anticipation of admission I decided to obtain CBC and CMP which was consistent with leukocytosis in setting of steroid use and normal CMP.    On re-evaluation patient endorses 8/10 pain and decided to give additional Dilaudid.    CT pelvis without demonstration of fracture of left femoral neck.  I started to ambulate patient and patient was unable to ambulate and endorsed ongoing pain and requested more pain medication.  Decided to give additional Dilaudid and admit patient to Hospital Medicine for pain control.  Concern for treatment seeking given multiple previous ED visits with similar presentations.  Patient remained stable in the emergency department and was signed out to oncoming team pending admission to hospital medicine.        Amount and/or Complexity of Data Reviewed  Labs: ordered. Decision-making details documented in ED Course.  Radiology: ordered. Decision-making details documented in ED Course.    Risk  Prescription drug management.    Physician Attestation Statement for Resident:  As the supervising MD    Physician Attestation Statement: I have personally seen and examined this patient.        I agree with the history unless otherwise noted.      As the supervising MD I agree with the PE unless otherwise noted.      I have reviewed and agree with the residents interpretation of the following unless otherwise noted:   I have personally reviewed and interpreted the patients laboratory studies: + leukocytosis     I have personally reviewed and interpreted the patient's EKG:  Sinus tachycardia without evidence of ischemia, no dysrhythmia        I have also reviewed the following:    external records:  Patient received 12 tabs of hydrocodone on 9/11  As the supervising MD I agree with the treatment, course, plan, and disposition unless otherwise noted.     CT pelvis without evidence of fracture/dislocation     After multiple rounds of opioid pain medication, patient states she has too much pain to walk                               Clinical Impression:      Sciatica            Amanuel Cho MD  Resident  09/24/23 7765       Perlita Reese MD  09/25/23 2575

## 2023-09-25 NOTE — ASSESSMENT & PLAN NOTE
37 y.o. female with h/o previous L L4-5 microdiscectomy (2017 in [place]), T2 DM, PCOS, Bipolar, obesity who presents with low back pain and left lumbar radiculopathy.     --MRI lumbar spine (9/22): Disc extrusion at L4-L5 results in moderate stenosis of the left lateral recess and contacts the descending left L5 nerve root  --Recommend repeat MRI scan given fall and new urinary retention  --PVR  --Decadron 10 mg   --Pain control-- may require admit for pain control to hospital medicine  --Further recs pending MRI completion    Discussed with Dr. Heaton

## 2023-09-25 NOTE — ED NOTES
Lizzie Saunders, a 37 y.o. female presents to the ED via EMS w/ complaint of seen and d/c yesterday via POV for same complaint. Pt loudly crying in EMS reporting 10/10 back pain.AAOx4 and on RA at baseline.    Triage note:  Chief Complaint   Patient presents with    Back Pain     Seen and d/c yesterday for same complaint. Pt. Loudly crying in EMS reporting 10/10 back pain.      Review of patient's allergies indicates:   Allergen Reactions    Rosemary Anaphylaxis    Pecan nut Dermatitis    Sulfa (sulfonamide antibiotics) Itching     Past Medical History:   Diagnosis Date    Bipolar disorder     per patient report    Chronic pain     Depression     Diabetes mellitus     Diabetes mellitus, type 2     Driving safety issue     DRIVING AFTER RECEIVING DILAUDID INJECTION    Hx of psychiatric care     Neuropathy     per patient report    Psychiatric problem     Therapy

## 2023-09-25 NOTE — PROVIDER PROGRESS NOTES - EMERGENCY DEPT.
Encounter Date: 9/25/2023    ED Physician Progress Notes        Physician Note:   Signout Note  I received signout from the previous providers.     Chief complaint:  Back Pain (Seen and d/c yesterday for same complaint. Pt. Loudly crying in EMS reporting 10/10 back pain. )      Per sign out and chart review: Lizzie Saunders is a 37 y.o. female presented with left leg pain with urinary retention    During ED stay patient had decreased sensation of left leg.    Pt signed out to me pending: neurosurgery eval and MRI    Update/ Disposition:  MRI consistent with previous MRIs displaying disc extrusion and now cord edema demonstrated.  Decided to admit patient to EDS you for pain control and recommended neurosurgery evaluation while in ED OU.  Patient remained stable and was admitted to EDOU    Patient, caregiver and/or family understands the plan and verbalized agreement. All questions answered.     Diagnostic Impression:    No diagnosis found.

## 2023-09-25 NOTE — HPI
Lizzie Saunders is a 37 y.o. female with h/o previous L L4-5 microdiscectomy (2017 in [place]), T2 DM, PCOS, Bipolar, obesity who presents with low back pain and left lumbar radiculopathy. Patient reports 2 weeks history of worsening back and leg pain. She has had multiple ED visits in the past month for this issue. She reports pain is progressively getting worse and she cannot function. She reports when the pain first started, she was given a medrol dose pack which helped the pain but it returned when course was completed. She fall on left hip yesterday evening and came to our ED where imaging was negative for acute fracture. She reports an episode of urinary incontinence yesterday in the ED and but now has having issues emptying her bladder. Denies saddle anesthesia. Ambulates with rolling walker since microdiscectomy in 2017 due to back pain.

## 2023-09-25 NOTE — ED NOTES
Attempted to get pt up and walk a few steps. Pt could stand up but would not put pressure on the left leg and complaining of 5/10 pain. ED provider notified.

## 2023-09-25 NOTE — SUBJECTIVE & OBJECTIVE
(Not in a hospital admission)      Review of patient's allergies indicates:   Allergen Reactions    Rosemary Anaphylaxis    Pecan nut Dermatitis    Sulfa (sulfonamide antibiotics) Itching       Past Medical History:   Diagnosis Date    Bipolar disorder     per patient report    Chronic pain     Depression     Diabetes mellitus     Diabetes mellitus, type 2     Driving safety issue     DRIVING AFTER RECEIVING DILAUDID INJECTION    Hx of psychiatric care     Neuropathy     per patient report    Psychiatric problem     Therapy      Past Surgical History:   Procedure Laterality Date    BACK SURGERY      EYE SURGERY       Family History       Problem Relation (Age of Onset)    Bipolar disorder Mother, Father    Cancer Paternal Grandfather          Tobacco Use    Smoking status: Former     Current packs/day: 0.00     Types: Cigarettes     Quit date: 9/1/2020     Years since quitting: 3.0    Smokeless tobacco: Never   Substance and Sexual Activity    Alcohol use: Yes     Comment: rare    Drug use: Yes     Types: Marijuana     Comment: Daily    Sexual activity: Yes     Comment:      Review of Systems   Constitutional:  Negative for chills, fatigue and fever.   Eyes:  Negative for photophobia and visual disturbance.   Respiratory:  Negative for cough and shortness of breath.    Cardiovascular:  Negative for chest pain, palpitations and leg swelling.   Gastrointestinal:  Negative for constipation, diarrhea, nausea and vomiting.   Genitourinary:  Negative for difficulty urinating, dysuria, frequency and urgency.   Musculoskeletal:  Positive for back pain. Negative for gait problem and neck pain.   Neurological:  Positive for weakness and numbness. Negative for dizziness, seizures, speech difficulty and headaches.   Psychiatric/Behavioral:  Negative for confusion. The patient is not nervous/anxious.      Objective:     Weight: 99.8 kg (220 lb)  Body mass index is 35.51 kg/m².  Vital Signs (Most Recent):  Temp: 98.6 °F  "(37 °C) (09/25/23 1513)  Pulse: 105 (09/25/23 1513)  Resp: 17 (09/25/23 1513)  BP: 134/70 (09/25/23 1513)  SpO2: 100 % (09/25/23 1513) Vital Signs (24h Range):  Temp:  [98.2 °F (36.8 °C)-99.2 °F (37.3 °C)] 98.6 °F (37 °C)  Pulse:  [] 105  Resp:  [13-25] 17  SpO2:  [96 %-100 %] 100 %  BP: (134-201)/() 134/70                         Female External Urinary Catheter 09/25/23 1511 (Active)         Neurosurgery Physical Exam  General: well developed, well nourished, distressed 2/2 pain   Head: normocephalic, atraumatic  Neurologic: Alert and oriented. Thought content appropriate.  GCS: Motor: 6/Verbal: 5/Eyes: 4 GCS Total: 15  Mental Status: Awake, Alert, Oriented x 4  Language: No aphasia  Speech: No dysarthria  Cranial nerves: face symmetric, tongue midline, CN II-XII grossly intact.   Eyes: pupils equal, round, reactive to light with accommodation, EOMI.   Pulmonary: normal respirations, no signs of respiratory distress  Abdomen: soft, non-distended, not tender to palpation  Skin: Skin is warm, dry and intact.  Sensory: intact to light touch throughout  Motor Strength: entire exam is limited 2/2 severe pain and crying       Iliopsoas Quadriceps Knee  Flexion Tibialis  anterior Gastro- cnemius EHL   Lower: R 5/5 5/5 5/5 5/5 5/5 5/5    L 4/5 4/5 4/5 4/5 4/5 4/5               Significant Labs:  Recent Labs   Lab 09/24/23  1854 09/25/23  1300   * 317*    130*   K 4.2 4.2    95   CO2 17* 21*   BUN 14 21*   CREATININE 0.5 0.8   CALCIUM 7.0* 9.4     Recent Labs   Lab 09/24/23  1705 09/25/23  1301   WBC 20.67* 20.50*   HGB 15.1 15.1   HCT 46.6 45.6   * 780*     No results for input(s): "LABPT", "INR", "APTT" in the last 48 hours.  Microbiology Results (last 7 days)       ** No results found for the last 168 hours. **          All pertinent labs from the last 24 hours have been reviewed.    Significant Diagnostics:  I have reviewed all pertinent imaging results/findings within the past 24 " hours.

## 2023-09-26 ENCOUNTER — HOSPITAL ENCOUNTER (EMERGENCY)
Facility: HOSPITAL | Age: 38
Discharge: HOME | End: 2023-09-27
Attending: EMERGENCY MEDICINE
Payer: MEDICAID

## 2023-09-26 ENCOUNTER — TELEPHONE (OUTPATIENT)
Dept: NEUROSURGERY | Facility: CLINIC | Age: 38
End: 2023-09-26
Payer: MEDICAID

## 2023-09-26 VITALS
HEIGHT: 66 IN | SYSTOLIC BLOOD PRESSURE: 155 MMHG | BODY MASS INDEX: 32.14 KG/M2 | TEMPERATURE: 100 F | BODY MASS INDEX: 35.51 KG/M2 | OXYGEN SATURATION: 98 % | HEART RATE: 104 BPM | RESPIRATION RATE: 18 BRPM | WEIGHT: 200 LBS | DIASTOLIC BLOOD PRESSURE: 85 MMHG | RESPIRATION RATE: 18 BRPM | WEIGHT: 220 LBS | OXYGEN SATURATION: 99 % | DIASTOLIC BLOOD PRESSURE: 64 MMHG | HEART RATE: 91 BPM | TEMPERATURE: 99 F | SYSTOLIC BLOOD PRESSURE: 119 MMHG

## 2023-09-26 DIAGNOSIS — M54.16 LUMBAR RADICULOPATHY: ICD-10-CM

## 2023-09-26 DIAGNOSIS — R73.9 HYPERGLYCEMIA: ICD-10-CM

## 2023-09-26 DIAGNOSIS — M51.26 LUMBAR DISC HERNIATION: Primary | ICD-10-CM

## 2023-09-26 LAB
ALBUMIN SERPL BCP-MCNC: 3.7 G/DL (ref 3.5–5.2)
ALLENS TEST: ABNORMAL
ALP SERPL-CCNC: 56 U/L (ref 55–135)
ALT SERPL W/O P-5'-P-CCNC: 25 U/L (ref 10–44)
ANION GAP SERPL CALC-SCNC: 12 MMOL/L (ref 8–16)
AST SERPL-CCNC: 13 U/L (ref 10–40)
B-OH-BUTYR BLD STRIP-SCNC: 0.1 MMOL/L (ref 0–0.5)
BACTERIA #/AREA URNS AUTO: ABNORMAL /HPF
BACTERIA UR CULT: NORMAL
BACTERIA UR CULT: NORMAL
BASOPHILS # BLD AUTO: 0.02 K/UL (ref 0–0.2)
BASOPHILS NFR BLD: 0.1 % (ref 0–1.9)
BILIRUB SERPL-MCNC: 0.7 MG/DL (ref 0.1–1)
BILIRUB UR QL STRIP: NEGATIVE
BUN SERPL-MCNC: 35 MG/DL (ref 6–20)
BUN SERPL-MCNC: 35 MG/DL (ref 6–30)
CALCIUM SERPL-MCNC: 9.5 MG/DL (ref 8.7–10.5)
CHLORIDE SERPL-SCNC: 95 MMOL/L (ref 95–110)
CHLORIDE SERPL-SCNC: 97 MMOL/L (ref 95–110)
CLARITY UR REFRACT.AUTO: CLEAR
CO2 SERPL-SCNC: 19 MMOL/L (ref 23–29)
COLOR UR AUTO: COLORLESS
CREAT SERPL-MCNC: 0.8 MG/DL (ref 0.5–1.4)
CREAT SERPL-MCNC: 1.2 MG/DL (ref 0.5–1.4)
DIFFERENTIAL METHOD: ABNORMAL
EOSINOPHIL # BLD AUTO: 0 K/UL (ref 0–0.5)
EOSINOPHIL NFR BLD: 0 % (ref 0–8)
ERYTHROCYTE [DISTWIDTH] IN BLOOD BY AUTOMATED COUNT: 12.7 % (ref 11.5–14.5)
EST. GFR  (NO RACE VARIABLE): 59.4 ML/MIN/1.73 M^2
ESTIMATED AVG GLUCOSE: 206 MG/DL (ref 68–131)
GLUCOSE SERPL-MCNC: 533 MG/DL (ref 70–110)
GLUCOSE SERPL-MCNC: 569 MG/DL (ref 70–110)
GLUCOSE UR QL STRIP: ABNORMAL
HBA1C MFR BLD: 8.8 % (ref 4–5.6)
HCO3 UR-SCNC: 20.6 MMOL/L
HCT VFR BLD AUTO: 39.6 % (ref 37–48.5)
HCT VFR BLD CALC: 42 %PCV (ref 36–54)
HGB BLD-MCNC: 13 G/DL (ref 12–16)
HGB UR QL STRIP: ABNORMAL
IMM GRANULOCYTES # BLD AUTO: 0.2 K/UL (ref 0–0.04)
IMM GRANULOCYTES NFR BLD AUTO: 1 % (ref 0–0.5)
KETONES UR QL STRIP: NEGATIVE
LEUKOCYTE ESTERASE UR QL STRIP: ABNORMAL
LYMPHOCYTES # BLD AUTO: 0.8 K/UL (ref 1–4.8)
LYMPHOCYTES NFR BLD: 3.8 % (ref 18–48)
MCH RBC QN AUTO: 28 PG (ref 27–31)
MCHC RBC AUTO-ENTMCNC: 32.8 G/DL (ref 32–36)
MCV RBC AUTO: 85 FL (ref 82–98)
MICROSCOPIC COMMENT: ABNORMAL
MONOCYTES # BLD AUTO: 0.6 K/UL (ref 0.3–1)
MONOCYTES NFR BLD: 3.1 % (ref 4–15)
NEUTROPHILS # BLD AUTO: 18.8 K/UL (ref 1.8–7.7)
NEUTROPHILS NFR BLD: 92 % (ref 38–73)
NITRITE UR QL STRIP: NEGATIVE
NRBC BLD-RTO: 0 /100 WBC
OSMOLALITY SERPL: 263 MOSM/KG (ref 275–295)
PCO2 BLDA: 31.5 MMHG (ref 35–45)
PH SMN: 7.42 [PH] (ref 7.35–7.45)
PH UR STRIP: 5 [PH] (ref 5–8)
PLATELET # BLD AUTO: 658 K/UL (ref 150–450)
PMV BLD AUTO: 9.6 FL (ref 9.2–12.9)
PO2 BLDA: 57 MMHG (ref 40–60)
POC BE: -4 MMOL/L
POC IONIZED CALCIUM: 1.15 MMOL/L (ref 1.06–1.42)
POC SATURATED O2: 90 % (ref 95–100)
POC TCO2 (MEASURED): 20 MMOL/L (ref 23–29)
POC TCO2: 22 MMOL/L (ref 24–29)
POCT GLUCOSE: 384 MG/DL (ref 70–110)
POTASSIUM BLD-SCNC: 5 MMOL/L (ref 3.5–5.1)
POTASSIUM SERPL-SCNC: 5.1 MMOL/L (ref 3.5–5.1)
PROT SERPL-MCNC: 6.7 G/DL (ref 6–8.4)
PROT UR QL STRIP: NEGATIVE
RBC # BLD AUTO: 4.65 M/UL (ref 4–5.4)
RBC #/AREA URNS AUTO: 20 /HPF (ref 0–4)
SAMPLE: ABNORMAL
SAMPLE: ABNORMAL
SITE: ABNORMAL
SODIUM BLD-SCNC: 127 MMOL/L (ref 136–145)
SODIUM SERPL-SCNC: 128 MMOL/L (ref 136–145)
SP GR UR STRIP: 1.03 (ref 1–1.03)
SQUAMOUS #/AREA URNS AUTO: 18 /HPF
URN SPEC COLLECT METH UR: ABNORMAL
WBC # BLD AUTO: 20.43 K/UL (ref 3.9–12.7)
WBC #/AREA URNS AUTO: 4 /HPF (ref 0–5)
YEAST UR QL AUTO: ABNORMAL

## 2023-09-26 PROCEDURE — 63600175 PHARM REV CODE 636 W HCPCS: Performed by: PHYSICIAN ASSISTANT

## 2023-09-26 PROCEDURE — 81001 URINALYSIS AUTO W/SCOPE: CPT | Performed by: PHYSICIAN ASSISTANT

## 2023-09-26 PROCEDURE — G0378 HOSPITAL OBSERVATION PER HR: HCPCS

## 2023-09-26 PROCEDURE — 83930 ASSAY OF BLOOD OSMOLALITY: CPT | Performed by: PHYSICIAN ASSISTANT

## 2023-09-26 PROCEDURE — 25000003 PHARM REV CODE 250: Performed by: PHYSICIAN ASSISTANT

## 2023-09-26 PROCEDURE — 85025 COMPLETE CBC W/AUTO DIFF WBC: CPT | Performed by: PHYSICIAN ASSISTANT

## 2023-09-26 PROCEDURE — 99214 OFFICE O/P EST MOD 30 MIN: CPT | Mod: ,,, | Performed by: PHYSICIAN ASSISTANT

## 2023-09-26 PROCEDURE — 93010 EKG 12-LEAD: ICD-10-PCS | Mod: ,,, | Performed by: INTERNAL MEDICINE

## 2023-09-26 PROCEDURE — 93005 ELECTROCARDIOGRAM TRACING: CPT

## 2023-09-26 PROCEDURE — 99900035 HC TECH TIME PER 15 MIN (STAT)

## 2023-09-26 PROCEDURE — 82962 GLUCOSE BLOOD TEST: CPT

## 2023-09-26 PROCEDURE — 96361 HYDRATE IV INFUSION ADD-ON: CPT

## 2023-09-26 PROCEDURE — 80048 BASIC METABOLIC PNL TOTAL CA: CPT | Mod: XB

## 2023-09-26 PROCEDURE — 99214 PR OFFICE/OUTPT VISIT, EST, LEVL IV, 30-39 MIN: ICD-10-PCS | Mod: ,,, | Performed by: PHYSICIAN ASSISTANT

## 2023-09-26 PROCEDURE — 96374 THER/PROPH/DIAG INJ IV PUSH: CPT

## 2023-09-26 PROCEDURE — 80053 COMPREHEN METABOLIC PANEL: CPT | Performed by: PHYSICIAN ASSISTANT

## 2023-09-26 PROCEDURE — 96375 TX/PRO/DX INJ NEW DRUG ADDON: CPT

## 2023-09-26 PROCEDURE — 96376 TX/PRO/DX INJ SAME DRUG ADON: CPT

## 2023-09-26 PROCEDURE — 99284 EMERGENCY DEPT VISIT MOD MDM: CPT | Mod: 25

## 2023-09-26 PROCEDURE — 93010 ELECTROCARDIOGRAM REPORT: CPT | Mod: ,,, | Performed by: INTERNAL MEDICINE

## 2023-09-26 PROCEDURE — 82803 BLOOD GASES ANY COMBINATION: CPT

## 2023-09-26 PROCEDURE — 82010 KETONE BODYS QUAN: CPT | Performed by: PHYSICIAN ASSISTANT

## 2023-09-26 RX ORDER — DROPERIDOL 2.5 MG/ML
1.25 INJECTION, SOLUTION INTRAMUSCULAR; INTRAVENOUS
Status: COMPLETED | OUTPATIENT
Start: 2023-09-26 | End: 2023-09-26

## 2023-09-26 RX ORDER — DIAZEPAM 10 MG/2ML
5 INJECTION INTRAMUSCULAR
Status: COMPLETED | OUTPATIENT
Start: 2023-09-26 | End: 2023-09-26

## 2023-09-26 RX ORDER — METHOCARBAMOL 750 MG/1
1500 TABLET, FILM COATED ORAL EVERY 8 HOURS PRN
Qty: 30 TABLET | Refills: 0 | Status: SHIPPED | OUTPATIENT
Start: 2023-09-26 | End: 2023-10-01

## 2023-09-26 RX ORDER — METHYLPREDNISOLONE 4 MG/1
TABLET ORAL
Qty: 21 TABLET | Refills: 0 | Status: ON HOLD | OUTPATIENT
Start: 2023-09-26 | End: 2023-10-13 | Stop reason: HOSPADM

## 2023-09-26 RX ADMIN — METHOCARBAMOL 1500 MG: 750 TABLET ORAL at 06:09

## 2023-09-26 RX ADMIN — PREGABALIN 25 MG: 25 CAPSULE ORAL at 09:09

## 2023-09-26 RX ADMIN — SODIUM CHLORIDE 1000 ML: 9 INJECTION, SOLUTION INTRAVENOUS at 08:09

## 2023-09-26 RX ADMIN — ACETAMINOPHEN 650 MG: 325 TABLET ORAL at 06:09

## 2023-09-26 RX ADMIN — DROPERIDOL 1.25 MG: 2.5 INJECTION, SOLUTION INTRAMUSCULAR; INTRAVENOUS at 08:09

## 2023-09-26 RX ADMIN — DIAZEPAM 5 MG: 10 INJECTION, SOLUTION INTRAMUSCULAR; INTRAVENOUS at 09:09

## 2023-09-26 RX ADMIN — KETOROLAC TROMETHAMINE 10 MG: 30 INJECTION, SOLUTION INTRAMUSCULAR; INTRAVENOUS at 06:09

## 2023-09-26 RX ADMIN — SODIUM CHLORIDE 1000 ML: 9 INJECTION, SOLUTION INTRAVENOUS at 11:09

## 2023-09-26 NOTE — HOSPITAL COURSE
9/26/23: Patient re-evaluated this morning, patient is much more comfortable compared to yesterday. Pain is controlled. Strength in LLE is now 5/5 throughout. PVR was 7 cc. Repeat MRI lumbar spine demonstrated stable L paracentral disc protrusion at L4-5. Patient has an appointment scheduled tomorrow morning with Dr. Pierre. Patient is medically stable to discharge home, recommend sending patient home on medrol dose pack and robaxin given relief she had with those meds overnight.

## 2023-09-26 NOTE — DISCHARGE INSTRUCTIONS
Labs Reviewed   CBC W/ AUTO DIFFERENTIAL - Abnormal; Notable for the following components:       Result Value    WBC 20.50 (*)     Platelets 780 (*)     MPV 9.0 (*)     Immature Granulocytes 1.2 (*)     Gran # (ANC) 19.0 (*)     Immature Grans (Abs) 0.25 (*)     Lymph # 0.9 (*)     Mono # 0.2 (*)     Gran % 92.6 (*)     Lymph % 4.5 (*)     Mono % 1.2 (*)     All other components within normal limits   COMPREHENSIVE METABOLIC PANEL - Abnormal; Notable for the following components:    Sodium 130 (*)     CO2 21 (*)     Glucose 317 (*)     BUN 21 (*)     Total Bilirubin 1.9 (*)     All other components within normal limits   URINALYSIS, REFLEX TO URINE CULTURE - Abnormal; Notable for the following components:    Protein, UA 1+ (*)     Glucose, UA 4+ (*)     Ketones, UA 2+ (*)     Occult Blood UA 3+ (*)     All other components within normal limits    Narrative:     Specimen Source->Urine   URINALYSIS MICROSCOPIC - Abnormal; Notable for the following components:    RBC, UA 17 (*)     WBC, UA 13 (*)     Bacteria Few (*)     All other components within normal limits    Narrative:     Specimen Source->Urine   HEMOGLOBIN A1C - Abnormal; Notable for the following components:    Hemoglobin A1C 8.8 (*)     Estimated Avg Glucose 206 (*)     All other components within normal limits    Narrative:     ADD ON [facility] AS PER ADAN BOOKER PA-C 09/26/2023  07:30    CULTURE, URINE   HEMOGLOBIN A1C   PREGNANCY TEST, URINE RAPID   POCT URINE PREGNANCY   POCT GLUCOSE MONITORING CONTINUOUS     Imaging Results              MRI Lumbar Spine Without Contrast (Final result)  Result time 09/25/23 18:49:54      Final result by Eros Mckeon MD (09/25/23 18:49:54)                   Impression:      Continued disc extrusion at L4-L5 narrows the left subarticular zone and abuts the descending left L5 nerve.  Configuration is unchanged compared to prior.    lumbar spondylosis as detailed above, not significantly changed compared to  09/22/2023.    Electronically signed by resident: Faizan Gonzalez  Date:    09/25/2023  Time:    17:35    Electronically signed by: Eros Mckeon MD  Date:    09/25/2023  Time:    18:49               Narrative:    EXAMINATION:  MRI LUMBAR SPINE WITHOUT CONTRAST    CLINICAL HISTORY:  Low back pain, cauda equina syndrome suspected;    TECHNIQUE:  Multiplanar, multisequence MR images were acquired from the thoracolumbar junction to the sacrum without the administration of contrast.    COMPARISON:  L-spine MRI 09/22/2023, 09/11/2023.  CT L-spine 09/11/2023.    FINDINGS:  Sagittal alignment is within normal limits. The vertebral body heights are well maintained, with no fracture.  No marrow signal abnormality suspicious for an infiltrative process.  S1 vertebral body hemangioma.    The conus is normal in appearance, and terminates at the L1 level.    Mild disc height loss and desiccation spanning L3-S1.There are findings of lumbar spondylosis, as below.    T12-L1: No spinal canal stenosis or neural foraminal narrowing.    L1-L2: No spinal canal stenosis or neural foraminal narrowing.    L2-L3:  No spinal canal stenosis or neural foraminal narrowing.    L3-L4: Disc bulge mildly effaces the ventral thecal sac.  Facet arthropathy.  Resultant mild right neural foraminal narrowing.    L4-L5: Disc bulge with superimposed left paracentral extrusion with inferior migration, with resultant left subarticular zone narrowing and abuts the descending left L5 nerve root.  Facet arthropathy.  Mild bilateral neural foraminal narrowing and mild spinal canal stenosis.Posterior annular fissure.    L5-S1: Disc bulge with central protrusion.  No spinal canal stenosis or neural foraminal narrowing.    The adjacent soft tissue structures show no significant abnormalities.  Sacroiliac joints are symmetric.  Normal-appearing left ovarian follicles.

## 2023-09-26 NOTE — ASSESSMENT & PLAN NOTE
37 y.o. female with h/o previous L L4-5 microdiscectomy (2017 in [place]), T2 DM, PCOS, Bipolar, obesity who presents with low back pain and left lumbar radiculopathy.     --MRI lumbar spine (9/22): Disc extrusion at L4-L5 results in moderate stenosis of the left lateral recess and contacts the descending left L5 nerve root  --Repeat MRI 9/25 stable   --PVR - 7 cc  --Decadron 10 mg - relief with decadron and strength is full ; recommend discharging on medrol dose pack and robaxin  --Patient has appt with Dr. Pierre tomorrow morning    Okay to d/c home, follow up as scheduled with Dr. Pierre    Discussed with Dr. Heaton

## 2023-09-26 NOTE — DISCHARGE SUMMARY
ED Observation Unit  Discharge Summary        History of Present Illness:    37-year-old female with past medical history of T2DM, bipolar disorder, lumbar disc herniation and radiculopathy, more than 10 ED visits in the past 1 month who presented to [facility] ED on 9/25/2023 for emergent evaluation of intractable pain.      Patient describes pain as spasms that come out of nowhere and feel like they start at her foot and move upward. She states this is different than her normal pain, but that she has been in pain for two weeks. She stated the pain occurred this morning as she was about to get out of her car at 0930. She states she had not stepped down and denies trauma, suggests maybe twisting the wrong way. This morning, she took her prednisone, diclofenac, and pregabalin for the pain. Patient endorsing urinary incontinence earlier this morning, and suggesting urinary retention.      In the ED, continued leukocytosis for the past 3 days. Likely in the setting of recent steroid use. UA without signs of infection. Na 130. Glucose 317. AG wnl at 14. NSGY consulted who recommended PVR bladder scan which was 7mL and repeat MRI. MRI shows continued disc extrusion at L4-L5 narrows the left subarticular zone and abuts the descending left L5 nerve, unchanged from prior. NGSY recommended decadron 10 mg IV and continuing pain control.      I reviewed the ED Provider Note dated 9/25/2023 prior to my evaluation of this patient.  I reviewed all labs and imaging performed in the Main ED, prior to patient being placed in Observation. Patient was placed in the ED Observation Unit for pain control and further NSGY recommendations.     Observation Course:    Uneventful EDOU course.  She reports improvement of her pain and improvement of her left lower extremity symptoms.  She is neurologically intact.  Normal postvoid residual.  She was seen by Neurosurgery who recommends that she follow up tomorrow in clinic as scheduled.  They also  recommend pain management for epidural steroid injections-referral has been placed.  They are recommending that she be discharged home with prescriptions for Robaxin and a Medrol Dosepak.  Patient is a type 2 diabetic.  We discussed close blood glucose monitoring while she is on steroids.    Consultants:    Neurosurgery    Final Diagnosis:  Lumbar disc hernia with radiculopathy    Discharge Condition: Stable    Disposition: Home or Self Care     Time spent on the discharge of the patient including review of hospital course with the patient. reviewing discharge medications and arranging follow-up care 35 minutes.  Patient was seen and examined on the date of discharge and determined to be suitable for discharge.    Follow Up:  Future Appointments   Date Time Provider Department Center   10/25/2023 10:00 AM Boom Pierre MD [facility]     Also follow up outpatient with pain management for MICHAEL per neurosurgery recommendation- referral ordered.

## 2023-09-26 NOTE — ED NOTES
Assumed care of patient. Pt resting comfortably in hospital bed with side rails up x2 and bed in lowest position. Pain 5/10, last received pain meds at 0600. Pt aware of POC.

## 2023-09-26 NOTE — PLAN OF CARE
[facility] - Emergency Dept  Initial Discharge Assessment       Primary Care Provider: Maxi Anna MD    Admission Diagnosis: Left-sided low back pain with sciatica, sciatica laterality unspecified, unspecified chronicity [M54.42]    Admission Date: 9/25/2023  Expected Discharge Date:     Transition of Care Barriers: (P) None    Payor: MEDICAID / Plan: [place] / Product Type: Managed Medicaid /     Extended Emergency Contact Information  Primary Emergency Contact: Otto Hooper  Mobile Phone: [phone]  Relation: Significant other  Preferred language: English   needed? No    Discharge Plan A: (P) Home with family, Home  Discharge Plan B: (P) Home, Home with family      [facility] - [place], [place] - [address]  [address]  [place] [address]  Phone: [phone] Fax: [phone]    [id number] - [place], [place] - [address]  [address]  [place] [address]  Phone: [phone] Fax: [phone]    [facility]  [address]  [facility] [address]  Phone: [phone] Fax: [phone]    [id number] - [place], [place] - [address]  [place] [address]  Phone: [phone] Fax: [phone]      Initial Assessment (most recent)       Adult Discharge Assessment - 09/25/23 6176          Discharge Assessment    Assessment Type Discharge Planning Assessment (P)      Confirmed/corrected address, phone number and insurance Yes (P)      Confirmed Demographics Correct on Facesheet (P)      Source of Information patient (P)      When was your last doctors appointment? -- (P)    last week    Does patient/caregiver understand observation status Yes (P)      Communicated TETE with patient/caregiver Yes (P)      Reason For Admission siatic eppisode (P)      People in Home friend(s) (P)      Facility Arrived From: home (P)      Do you expect to return to your current living situation? Yes (P)      Do you have help at  home or someone to help you manage your care at home? No (P)      Prior to hospitilization cognitive status: Alert/Oriented (P)      Current cognitive status: Alert/Oriented (P)      Home Accessibility wheelchair accessible (P)      Home Layout Able to live on 1st floor (P)      Equipment Currently Used at Home none (P)      Readmission within 30 days? No (P)      Patient currently being followed by outpatient case management? No (P)      Do you currently have service(s) that help you manage your care at home? No (P)      Do you take prescription medications? Yes (P)      Do you have prescription coverage? Yes (P)      Coverage medicaid (P)      Do you have any problems affording any of your prescribed medications? No (P)      Is the patient taking medications as prescribed? yes (P)      Who is going to help you get home at discharge? Friend (P)      How do you get to doctors appointments? car, drives self (P)      Are you on dialysis? No (P)      Do you take coumadin? No (P)      DME Needed Upon Discharge  none (P)      Discharge Plan discussed with: Patient (P)      Transition of Care Barriers None (P)      Discharge Plan A Home with family;Home (P)      Discharge Plan B Home;Home with family (P)         Physical Activity    On average, how many days per week do you engage in moderate to strenuous exercise (like a brisk walk)? 3 days (P)      On average, how many minutes do you engage in exercise at this level? 60 min (P)         Financial Resource Strain    How hard is it for you to pay for the very basics like food, housing, medical care, and heating? Not hard at all (P)         Housing Stability    In the last 12 months, was there a time when you were not able to pay the mortgage or rent on time? No (P)      In the last 12 months, how many places have you lived? 1 (P)      In the last 12 months, was there a time when you did not have a steady place to sleep or slept in a shelter (including now)? No (P)          Transportation Needs    In the past 12 months, has lack of transportation kept you from medical appointments or from getting medications? No (P)      In the past 12 months, has lack of transportation kept you from meetings, work, or from getting things needed for daily living? No (P)         Food Insecurity    Within the past 12 months, you worried that your food would run out before you got the money to buy more. Never true (P)      Within the past 12 months, the food you bought just didn't last and you didn't have money to get more. Never true (P)         Stress    Do you feel stress - tense, restless, nervous, or anxious, or unable to sleep at night because your mind is troubled all the time - these days? To some extent (P)         Social Connections    In a typical week, how many times do you talk on the phone with family, friends, or neighbors? Three times a week (P)      How often do you get together with friends or relatives? More than three times a week (P)      How often do you attend Jain or Alevism services? Never (P)      Do you belong to any clubs or organizations such as Jain groups, unions, fraternal or athletic groups, or school groups? No (P)      How often do you attend meetings of the clubs or organizations you belong to? Never (P)      Are you , , , , never , or living with a partner?  (P)         Alcohol Use    Q1: How often do you have a drink containing alcohol? Monthly or less (P)      Q2: How many drinks containing alcohol do you have on a typical day when you are drinking? 1 or 2 (P)      Q3: How often do you have six or more drinks on one occasion? Never (P)         OTHER    Name(s) of People in Home Room mate (P)

## 2023-09-26 NOTE — PROGRESS NOTES
ED Observation Unit  Progress Note      HPI   37-year-old female with past medical history of T2DM, bipolar disorder, lumbar disc herniation and radiculopathy, more than 10 ED visits in the past 1 month who [place] [facility]25/[place]23 for emergent evaluation of intractable pain.      Patient describes pain as spasms that come out of nowhere and feel like they start at her foot and move upward. She states this is different than her normal pain, but that she has been in pain for two weeks. She stated the pain occurred this morning as she was about to get out of her car at 0930. She states she had not stepped down and denies trauma, suggests maybe twisting the wrong way. This morning, she took her prednisone, diclofenac, and pregabalin for the pain. Patient endorsing urinary incontinence earlier this morning, and suggesting urinary retention.      In the ED, continued leukocytosis for the past 3 days. Likely in the setting of recent steroid use. UA without signs of infection. Na 130. Glucose 317. AG wnl at 14. NSGY consulted who recommended PVR bladder scan which was 7mL and repeat MRI. MRI shows continued disc extrusion at L4-L5 narrows the left subarticular zone and abuts the descending left L5 nerve, unchanged from prior. NGSY recommended decadron 10 mg IV and continuing pain control.      I reviewed the ED Provider Note dated 9/25/2023 prior to my evaluation of this patient.  I reviewed all labs and imaging performed in the Main ED, prior to patient being placed in Observation. Patient was placed in the ED Observation Unit for pain control and further NSGY recommendations.     Interval History   Patient is resting comfortably in EDOU.  She states her pain is much better and she has improved strength of the left lower extremity.  Normal postvoid residual.  Repeat MRI in the ED shows no significant change compared to her recent prior imaging studies.  Neurosurgery evaluated the patient in the EDOU and is  recommending discharge home with a Medrol Dosepak and prescription for Robaxin.  Pt is a diabetic and we discussed close monitoring of blood glucose. The patient is already scheduled to see neurosurgery tomorrow at the [facility].  Neurosurgery also recommended outpatient pain management appointment for epidural steroid injections.  She lives in the [place] area.  An ambulatory referral has been placed.    PMHx   Past Medical History:   Diagnosis Date    Bipolar disorder     per patient report    Chronic pain     Depression     Diabetes mellitus     Diabetes mellitus, type 2     Driving safety issue     DRIVING AFTER RECEIVING DILAUDID INJECTION    Hx of psychiatric care     Neuropathy     per patient report    Psychiatric problem     Therapy       Past Surgical History:   Procedure Laterality Date    BACK SURGERY      EYE SURGERY          Family Hx   Family History   Problem Relation Age of Onset    Bipolar disorder Mother     Bipolar disorder Father     Cancer Paternal Grandfather         Social Hx   Social History     Socioeconomic History    Marital status:      Spouse name: Sonja Saunders    Number of children: 0   Occupational History     Comment: currently unemployed   Tobacco Use    Smoking status: Former     Current packs/day: 0.00     Types: Cigarettes     Quit date: 9/1/2020     Years since quitting: 3.0    Smokeless tobacco: Never   Substance and Sexual Activity    Alcohol use: Yes     Comment: rare    Drug use: Yes     Types: Marijuana     Comment: Daily    Sexual activity: Yes     Comment:    Social History Narrative    Patient tearful and anxious on admit; unable to answer all admit questions at this time. 2/3/17 @ 21:45     Social Determinants of Health     Financial Resource Strain: Low Risk  (9/25/2023)    Overall Financial Resource Strain (CARDIA)     Difficulty of Paying Living Expenses: Not hard at all   Food Insecurity: No Food Insecurity (9/25/2023)    Hunger Vital Sign      Worried About Running Out of Food in the Last Year: Never true     Ran Out of Food in the Last Year: Never true   Transportation Needs: No Transportation Needs (9/25/2023)    PRAPARE - Transportation     Lack of Transportation (Medical): No     Lack of Transportation (Non-Medical): No   Physical Activity: Sufficiently Active (9/25/2023)    Exercise Vital Sign     Days of Exercise per Week: 3 days     Minutes of Exercise per Session: 60 min   Stress: Stress Concern Present (9/25/2023)    [place] [employer] - Occupational Stress Questionnaire     Feeling of Stress : To some extent   Social Connections: Socially Isolated (9/25/2023)    Social Connection and Isolation Panel [NHANES]     Frequency of Communication with Friends and Family: Three times a week     Frequency of Social Gatherings with Friends and Family: More than three times a week     Attends Episcopal Services: Never     Active Member of Clubs or Organizations: No     Attends Club or Organization Meetings: Never     Marital Status:    Housing Stability: Low Risk  (9/25/2023)    Housing Stability Vital Sign     Unable to Pay for Housing in the Last Year: No     Number of Places Lived in the Last Year: 1     Unstable Housing in the Last Year: No        Vital Signs   Vitals:    09/25/23 1915 09/25/23 1924 09/26/23 0040 09/26/23 0523   BP:  135/72 135/80 138/83   BP Location:  Right arm Right arm Left arm   Patient Position:  Sitting Lying Lying   Pulse: 102 102 98 98   Resp: 20 20 20 18   Temp: 97 °F (36.1 °C) 97 °F (36.1 °C) 97.9 °F (36.6 °C) 97.9 °F (36.6 °C)   TempSrc: Oral Oral Oral Oral   SpO2: 100% 100% 100% 96%   Weight:            Review of Systems  Constitutional:  Negative for chills, diaphoresis and fever.   Eyes:  Negative for pain, discharge and redness.   Respiratory:  Negative for cough and shortness of breath.    Cardiovascular:  Negative for chest pain.   Gastrointestinal:  Negative for abdominal pain, diarrhea  and vomiting.   Genitourinary:  Negative for dysuria, frequency, hematuria and urgency.   Musculoskeletal:  Positive for back pain and myalgias. Negative for falls and neck pain.   Skin:  Negative for rash.   Neurological:  Negative for dizziness, seizures, loss of consciousness and weakness.     Brief Physical Exam/Reassessment   Constitutional:       General: She is not in acute distress.     Appearance: She is not ill-appearing, toxic-appearing or diaphoretic.   HENT:      Head: Atraumatic.      Right Ear: External ear normal.      Left Ear: External ear normal.      Nose: Nose normal.   Eyes:      Extraocular Movements: Extraocular movements intact.   Pulmonary:      Effort: No respiratory distress.      Breath sounds: No wheezing.   Abdominal:      General: There is no distension.   Musculoskeletal:      Cervical back: Normal range of motion. No rigidity.   Skin:     Coloration: Skin is not jaundiced.      Findings: No rash.   Neurological:      Mental Status: She is alert.     Labs/Imaging   Labs Reviewed   CBC W/ AUTO DIFFERENTIAL - Abnormal; Notable for the following components:       Result Value    WBC 20.50 (*)     Platelets 780 (*)     MPV 9.0 (*)     Immature Granulocytes 1.2 (*)     Gran # (ANC) 19.0 (*)     Immature Grans (Abs) 0.25 (*)     Lymph # 0.9 (*)     Mono # 0.2 (*)     Gran % 92.6 (*)     Lymph % 4.5 (*)     Mono % 1.2 (*)     All other components within normal limits   COMPREHENSIVE METABOLIC PANEL - Abnormal; Notable for the following components:    Sodium 130 (*)     CO2 21 (*)     Glucose 317 (*)     BUN 21 (*)     Total Bilirubin 1.9 (*)     All other components within normal limits   URINALYSIS, REFLEX TO URINE CULTURE - Abnormal; Notable for the following components:    Protein, UA 1+ (*)     Glucose, UA 4+ (*)     Ketones, UA 2+ (*)     Occult Blood UA 3+ (*)     All other components within normal limits    Narrative:     Specimen Source->Urine   URINALYSIS MICROSCOPIC - Abnormal;  Notable for the following components:    RBC, UA 17 (*)     WBC, UA 13 (*)     Bacteria Few (*)     All other components within normal limits    Narrative:     Specimen Source->Urine   CULTURE, URINE   HEMOGLOBIN A1C   PREGNANCY TEST, URINE RAPID   HEMOGLOBIN A1C   POCT URINE PREGNANCY   POCT GLUCOSE MONITORING CONTINUOUS      Imaging Results              MRI Lumbar Spine Without Contrast (Final result)  Result time 09/25/23 18:49:54      Final result by Eros Mckeon MD (09/25/23 18:49:54)                   Impression:      Continued disc extrusion at L4-L5 narrows the left subarticular zone and abuts the descending left L5 nerve.  Configuration is unchanged compared to prior.    lumbar spondylosis as detailed above, not significantly changed compared to 09/22/2023.    Electronically signed by resident: Faizan Gonzalez  Date:    09/25/2023  Time:    17:35    Electronically signed by: Eros Mckeon MD  Date:    09/25/2023  Time:    18:49               Narrative:    EXAMINATION:  MRI LUMBAR SPINE WITHOUT CONTRAST    CLINICAL HISTORY:  Low back pain, cauda equina syndrome suspected;    TECHNIQUE:  Multiplanar, multisequence MR images were acquired from the thoracolumbar junction to the sacrum without the administration of contrast.    COMPARISON:  L-spine MRI 09/22/2023, 09/11/2023.  CT L-spine 09/11/2023.    FINDINGS:  Sagittal alignment is within normal limits. The vertebral body heights are well maintained, with no fracture.  No marrow signal abnormality suspicious for an infiltrative process.  S1 vertebral body hemangioma.    The conus is normal in appearance, and terminates at the L1 level.    Mild disc height loss and desiccation spanning L3-S1.There are findings of lumbar spondylosis, as below.    T12-L1: No spinal canal stenosis or neural foraminal narrowing.    L1-L2: No spinal canal stenosis or neural foraminal narrowing.    L2-L3:  No spinal canal stenosis or neural foraminal narrowing.    L3-L4: Disc bulge  mildly effaces the ventral thecal sac.  Facet arthropathy.  Resultant mild right neural foraminal narrowing.    L4-L5: Disc bulge with superimposed left paracentral extrusion with inferior migration, with resultant left subarticular zone narrowing and abuts the descending left L5 nerve root.  Facet arthropathy.  Mild bilateral neural foraminal narrowing and mild spinal canal stenosis.Posterior annular fissure.    L5-S1: Disc bulge with central protrusion.  No spinal canal stenosis or neural foraminal narrowing.    The adjacent soft tissue structures show no significant abnormalities.  Sacroiliac joints are symmetric.  Normal-appearing left ovarian follicles.                                       I reviewed all labs and imaging studies pertinent to this ED/EDOU visit.    Plan   Lumbar disc hernia with radiculopathy:  -Repeat MRI today shows MRI shows continued disc extrusion at L4-L5 narrows the left subarticular zone and abuts the descending left L5 nerve, unchanged from prior.   -Acetaminophen, toradol, lidocaine, robaxin, and pregabalin bid. Avoid opioids if possible. Consider droperidol since hx of bipolar and episodes of agitation while here.   -She reports improvement in her pain  -neurosurgery has re-evaluated her today.  They recommend that she go to her outpatient appointment tomorrow as scheduled.  They are also recommending pain management for epidural steroid injections.  A referral has been placed.  They are recommending that she be discharged home with prescriptions for a Medrol Dosepak and Robaxin.     DM:  -Hyperglycemia without complication. Exacerbated by recent and current steroid administration.   -Insulin for eating patients ordered.   -Diabetic diet ordered.   -discussed that she will need close monitoring of her blood glucose after discharge due to steroid use.    I have discussed this case with EDOU provider Tawanna Brice PA-C and Neurosurgery PA Domenica Jefferson.

## 2023-09-26 NOTE — H&P
ED Observation Unit  History and Physical      I assumed care of this patient from the Main ED at onset of observation time, 1921 on 09/25/2023.       History of Present Illness:    37-year-old female with past medical history of T2DM, bipolar disorder, lumbar disc herniation and radiculopathy, more than 10 ED visits in the past 1 month who [place] [facility] [place] for emergent evaluation of intractable pain.     Patient describes pain as spasms that come out of nowhere and feel like they start at her foot and move upward. She states this is different than her normal pain, but that she has been in pain for two weeks. She stated the pain occurred this morning as she was about to get out of her car at 0930. She states she had not stepped down and denies trauma, suggests maybe twisting the wrong way. This morning, she took her prednisone, diclofenac, and pregabalin for the pain. Patient endorsing urinary incontinence earlier this morning, and suggesting urinary retention.     In the ED, continued leukocytosis for the past 3 days. Likely in the setting of recent steroid use. UA without signs of infection. Na 130. Glucose 317. AG wnl at 14. NSGY consulted who recommended PVR bladder scan which was 7mL and repeat MRI. MRI shows continued disc extrusion at L4-L5 narrows the left subarticular zone and abuts the descending left L5 nerve, unchanged from prior. NGSY recommended decadron 10 mg IV and continuing pain control.     I reviewed the ED Provider Note dated 9/25/2023 prior to my evaluation of this patient.  I reviewed all labs and imaging performed in the Main ED, prior to patient being placed in Observation. Patient was placed in the ED Observation Unit for pain control and further NSGY recommendations.     PMHx   Past Medical History:   Diagnosis Date    Bipolar disorder     per patient report    Chronic pain     Depression     Diabetes mellitus     Diabetes mellitus, type 2     Driving safety issue      DRIVING AFTER RECEIVING DILAUDID INJECTION    Hx of psychiatric care     Neuropathy     per patient report    Psychiatric problem     Therapy       Past Surgical History:   Procedure Laterality Date    BACK SURGERY      EYE SURGERY          Family Hx   Family History   Problem Relation Age of Onset    Bipolar disorder Mother     Bipolar disorder Father     Cancer Paternal Grandfather         Social Hx   Social History     Socioeconomic History    Marital status:      Spouse name: Sonja Saunders    Number of children: 0   Occupational History     Comment: currently unemployed   Tobacco Use    Smoking status: Former     Current packs/day: 0.00     Types: Cigarettes     Quit date: 9/1/2020     Years since quitting: 3.0    Smokeless tobacco: Never   Substance and Sexual Activity    Alcohol use: Yes     Comment: rare    Drug use: Yes     Types: Marijuana     Comment: Daily    Sexual activity: Yes     Comment:    Social History Narrative    Patient tearful and anxious on admit; unable to answer all admit questions at this time. 2/3/17 @ 21:45     Social Determinants of Health     Financial Resource Strain: High Risk (5/10/2023)    Overall Financial Resource Strain (CARDIA)     Difficulty of Paying Living Expenses: Hard   Food Insecurity: No Food Insecurity (5/10/2023)    Hunger Vital Sign     Worried About Running Out of Food in the Last Year: Never true     Ran Out of Food in the Last Year: Never true   Transportation Needs: No Transportation Needs (5/10/2023)    PRAPARE - Transportation     Lack of Transportation (Medical): No     Lack of Transportation (Non-Medical): No   Physical Activity: Insufficiently Active (5/10/2023)    Exercise Vital Sign     Days of Exercise per Week: 1 day     Minutes of Exercise per Session: 30 min   Stress: Stress Concern Present (5/10/2023)    [place] [employer]ational Health - Occupational Stress Questionnaire     Feeling of Stress : To some extent   Social  Connections: Socially Isolated (5/10/2023)    Social Connection and Isolation Panel [NHANES]     Frequency of Communication with Friends and Family: Twice a week     Frequency of Social Gatherings with Friends and Family: Twice a week     Attends Christian Services: Never     Active Member of Clubs or Organizations: No     Attends Club or Organization Meetings: Never     Marital Status:    Housing Stability: High Risk (5/10/2023)    Housing Stability Vital Sign     Unable to Pay for Housing in the Last Year: Yes     Unstable Housing in the Last Year: No        Vital Signs   Vitals:    09/25/23 1450 09/25/23 1513 09/25/23 1915 09/25/23 1924   BP:  134/70  135/72   BP Location:  Right arm  Right arm   Patient Position:  Sitting  Sitting   Pulse: 104 105 102 102   Resp: 18 17 20 20   Temp:  98.6 °F (37 °C) 97 °F (36.1 °C) 97 °F (36.1 °C)   TempSrc:  Oral Oral Oral   SpO2: 100% 100% 100% 100%   Weight:            Review of Systems  Review of Systems   Constitutional:  Negative for chills, diaphoresis and fever.   Eyes:  Negative for pain, discharge and redness.   Respiratory:  Negative for cough and shortness of breath.    Cardiovascular:  Negative for chest pain.   Gastrointestinal:  Negative for abdominal pain, diarrhea and vomiting.   Genitourinary:  Negative for dysuria, frequency, hematuria and urgency.   Musculoskeletal:  Positive for back pain and myalgias. Negative for falls and neck pain.   Skin:  Negative for rash.   Neurological:  Negative for dizziness, seizures, loss of consciousness and weakness.       Physical Exam  Physical Exam  Constitutional:       General: She is not in acute distress.     Appearance: She is not ill-appearing, toxic-appearing or diaphoretic.   HENT:      Head: Atraumatic.      Right Ear: External ear normal.      Left Ear: External ear normal.      Nose: Nose normal.   Eyes:      Extraocular Movements: Extraocular movements intact.   Pulmonary:      Effort: No respiratory  distress.      Breath sounds: No wheezing.   Abdominal:      General: There is no distension.   Musculoskeletal:      Cervical back: Normal range of motion. No rigidity.      Comments: Sleeping comfortable on R sided, in fetal position.   Skin:     Coloration: Skin is not jaundiced.      Findings: No rash.   Neurological:      Mental Status: She is alert.         Medications:   Scheduled Meds:   ketorolac  9.999 mg Intravenous Q6H    LIDOcaine  1 patch Transdermal Q24H    pregabalin  25 mg Oral BID     Continuous Infusions:  PRN Meds:.acetaminophen, methocarbamoL      Assessment/Plan:    Lumbar disc hernia with radiculopathy:  -Repeat MRI today shows MRI shows continued disc extrusion at L4-L5 narrows the left subarticular zone and abuts the descending left L5 nerve, unchanged from prior.   -NGSY recommended decadron 10 mg IV and continuing pain control. They will follow.   -Acetaminophen, toradol, lidocaine, robaxin, and pregabalin bid. Avoid opioids if possible. Consider droperidol since hx of bipolar and episodes of agitation while here.   -She may benefit from inpatient procedure of added relief if above treatment does not help.    DM:  -Hyperglycemia without complication. Exacerbated by recent and current steroid administration.   -Insulin for eating patients ordered.   -Diabetic diet ordered.       Case was discussed with the ED provider, Amanuel Salinas who took sign out from primary ED team (Dr. Watson).

## 2023-09-26 NOTE — SUBJECTIVE & OBJECTIVE
Interval History: Patient re-evaluated this morning, patient is much more comfortable compared to yesterday. Pain is controlled. Strength in LLE is now 5/5 throughout. PVR was 7 cc. Repeat MRI lumbar spine demonstrated stable L paracentral disc protrusion at L4-5. Patient has an appointment scheduled tomorrow morning with Dr. Pierre. Patient is medically stable to discharge home, recommend sending patient home on medrol dose pack and robaxin given relief she had with those meds overnight.    Medications:  Continuous Infusions:  Scheduled Meds:   ketorolac  9.999 mg Intravenous Q6H    LIDOcaine  1 patch Transdermal Q24H    pregabalin  25 mg Oral BID     PRN Meds:acetaminophen, dextrose 10%, dextrose 10%, glucagon (human recombinant), glucose, glucose, insulin aspart U-100, methocarbamoL     Review of Systems  Objective:     Weight: 99.8 kg (220 lb)  Body mass index is 35.51 kg/m².  Vital Signs (Most Recent):  Temp: 98.7 °F (37.1 °C) (09/26/23 1058)  Pulse: 91 (09/26/23 1058)  Resp: 18 (09/26/23 1058)  BP: (!) 155/85 (09/26/23 1058)  SpO2: 99 % (09/26/23 1058) Vital Signs (24h Range):  Temp:  [97 °F (36.1 °C)-98.7 °F (37.1 °C)] 98.7 °F (37.1 °C)  Pulse:  [] 91  Resp:  [17-20] 18  SpO2:  [96 %-100 %] 99 %  BP: (134-155)/(70-85) 155/85         Neurosurgery Physical Exam  General: well developed, well nourished, NAD  Head: normocephalic, atraumatic  Neurologic: Alert and oriented. Thought content appropriate.  GCS: Motor: 6/Verbal: 5/Eyes: 4 GCS Total: 15  Mental Status: Awake, Alert, Oriented x 4  Language: No aphasia  Speech: No dysarthria  Cranial nerves: face symmetric, tongue midline, CN II-XII grossly intact.   Eyes: pupils equal, round, reactive to light with accommodation, EOMI.   Pulmonary: normal respirations, no signs of respiratory distress  Skin: Skin is warm, dry and intact.  Sensory: intact to light touch throughout  Motor Strength:          Iliopsoas Quadriceps Knee  Flexion Tibialis  anterior  "Gastro- cnemius EHL   Lower: R 5/5 5/5 5/5 5/5 5/5 5/5     L 5/5 5/5 5/5 5/5 5/5 5/5      Significant Labs:  Recent Labs   Lab 09/24/23  1854 09/25/23  1300   * 317*    130*   K 4.2 4.2    95   CO2 17* 21*   BUN 14 21*   CREATININE 0.5 0.8   CALCIUM 7.0* 9.4     Recent Labs   Lab 09/24/23  1705 09/25/23  1301   WBC 20.67* 20.50*   HGB 15.1 15.1   HCT 46.6 45.6   * 780*     No results for input(s): "LABPT", "INR", "APTT" in the last 48 hours.  Microbiology Results (last 7 days)       Procedure Component Value Units Date/Time    Urine culture [[id number]] Collected: 09/25/23 1749    Order Status: No result Specimen: Urine Updated: 09/25/23 1946          All pertinent labs from the last 24 hours have been reviewed.    Significant Diagnostics:  I have reviewed all pertinent imaging results/findings within the past 24 hours.  "

## 2023-09-26 NOTE — PROGRESS NOTES
[facility]  Neurosurgery  Progress Note    Subjective:     History of Present Illness: Lizzie Saunders is a 37 y.o. female with h/o previous L L4-5 microdiscectomy (2017 in [place]), T2 DM, PCOS, Bipolar, obesity who presents with low back pain and left lumbar radiculopathy. Patient reports 2 weeks history of worsening back and leg pain. She has had multiple ED visits in the past month for this issue. She reports pain is progressively getting worse and she cannot function. She reports when the pain first started, she was given a medrol dose pack which helped the pain but it returned when course was completed. She fall on left hip yesterday evening and came to our ED where imaging was negative for acute fracture. She reports an episode of urinary incontinence yesterday in the ED and but now has having issues emptying her bladder. Denies saddle anesthesia. Ambulates with rolling walker since microdiscectomy in 2017 due to back pain.             Post-Op Info:  * No surgery found *         Interval History: Patient re-evaluated this morning, patient is much more comfortable compared to yesterday. Pain is controlled. Strength in LLE is now 5/5 throughout. PVR was 7 cc. Repeat MRI lumbar spine demonstrated stable L paracentral disc protrusion at L4-5. Patient has an appointment scheduled tomorrow morning with Dr. Pierre. Patient is medically stable to discharge home, recommend sending patient home on medrol dose pack and robaxin given relief she had with those meds overnight.    Medications:  Continuous Infusions:  Scheduled Meds:   ketorolac  9.999 mg Intravenous Q6H    LIDOcaine  1 patch Transdermal Q24H    pregabalin  25 mg Oral BID     PRN Meds:acetaminophen, dextrose 10%, dextrose 10%, glucagon (human recombinant), glucose, glucose, insulin aspart U-100, methocarbamoL     Review of Systems  Objective:     Weight: 99.8 kg (220 lb)  Body mass index is 35.51 kg/m².  Vital Signs (Most Recent):  Temp: 98.7  "°F (37.1 °C) (09/26/23 1058)  Pulse: 91 (09/26/23 1058)  Resp: 18 (09/26/23 1058)  BP: (!) 155/85 (09/26/23 1058)  SpO2: 99 % (09/26/23 1058) Vital Signs (24h Range):  Temp:  [97 °F (36.1 °C)-98.7 °F (37.1 °C)] 98.7 °F (37.1 °C)  Pulse:  [] 91  Resp:  [17-20] 18  SpO2:  [96 %-100 %] 99 %  BP: (134-155)/(70-85) 155/85         Neurosurgery Physical Exam  General: well developed, well nourished, NAD  Head: normocephalic, atraumatic  Neurologic: Alert and oriented. Thought content appropriate.  GCS: Motor: 6/Verbal: 5/Eyes: 4 GCS Total: 15  Mental Status: Awake, Alert, Oriented x 4  Language: No aphasia  Speech: No dysarthria  Cranial nerves: face symmetric, tongue midline, CN II-XII grossly intact.   Eyes: pupils equal, round, reactive to light with accommodation, EOMI.   Pulmonary: normal respirations, no signs of respiratory distress  Skin: Skin is warm, dry and intact.  Sensory: intact to light touch throughout  Motor Strength:          Iliopsoas Quadriceps Knee  Flexion Tibialis  anterior Gastro- cnemius EHL   Lower: R 5/5 5/5 5/5 5/5 5/5 5/5     L 5/5 5/5 5/5 5/5 5/5 5/5      Significant Labs:  Recent Labs   Lab 09/24/23  1854 09/25/23  1300   * 317*    130*   K 4.2 4.2    95   CO2 17* 21*   BUN 14 21*   CREATININE 0.5 0.8   CALCIUM 7.0* 9.4     Recent Labs   Lab 09/24/23  1705 09/25/23  1301   WBC 20.67* 20.50*   HGB 15.1 15.1   HCT 46.6 45.6   * 780*     No results for input(s): "LABPT", "INR", "APTT" in the last 48 hours.  Microbiology Results (last 7 days)       Procedure Component Value Units Date/Time    Urine culture [[id number]] Collected: 09/25/23 1749    Order Status: No result Specimen: Urine Updated: 09/25/23 1946          All pertinent labs from the last 24 hours have been reviewed.    Significant Diagnostics:  I have reviewed all pertinent imaging results/findings within the past 24 hours.    Assessment/Plan:     Left lumbar radiculopathy  37 y.o. female with h/o " previous L L4-5 microdiscectomy (2017 in [place]), T2 DM, PCOS, Bipolar, obesity who presents with low back pain and left lumbar radiculopathy.     --MRI lumbar spine (9/22): Disc extrusion at L4-L5 results in moderate stenosis of the left lateral recess and contacts the descending left L5 nerve root  --Repeat MRI 9/25 stable   --PVR - 7 cc  --Decadron 10 mg - relief with decadron and strength is full ; recommend discharging on medrol dose pack and robaxin  --Patient has appt with Dr. Pierre tomorrow morning    Okay to d/c home, follow up as scheduled with Dr. Pierre    Discussed with Dr. Sudarshan Jefferson PA-C  Neurosurgery  [facility] - Emergency Dept

## 2023-09-27 ENCOUNTER — OFFICE VISIT (OUTPATIENT)
Dept: NEUROSURGERY | Facility: CLINIC | Age: 38
End: 2023-09-27
Payer: MEDICAID

## 2023-09-27 VITALS
HEIGHT: 66 IN | WEIGHT: 209 LBS | BODY MASS INDEX: 33.59 KG/M2 | OXYGEN SATURATION: 92 % | HEART RATE: 104 BPM | SYSTOLIC BLOOD PRESSURE: 128 MMHG | DIASTOLIC BLOOD PRESSURE: 85 MMHG

## 2023-09-27 DIAGNOSIS — M51.26 HERNIATION OF LEFT SIDE OF L4-L5 INTERVERTEBRAL DISC: Primary | ICD-10-CM

## 2023-09-27 LAB — POCT GLUCOSE: 298 MG/DL (ref 70–110)

## 2023-09-27 PROCEDURE — 3079F PR MOST RECENT DIASTOLIC BLOOD PRESSURE 80-89 MM HG: ICD-10-PCS | Mod: CPTII,S$GLB,, | Performed by: STUDENT IN AN ORGANIZED HEALTH CARE EDUCATION/TRAINING PROGRAM

## 2023-09-27 PROCEDURE — 99215 OFFICE O/P EST HI 40 MIN: CPT | Mod: S$GLB,,, | Performed by: STUDENT IN AN ORGANIZED HEALTH CARE EDUCATION/TRAINING PROGRAM

## 2023-09-27 PROCEDURE — 1159F PR MEDICATION LIST DOCUMENTED IN MEDICAL RECORD: ICD-10-PCS | Mod: CPTII,S$GLB,, | Performed by: STUDENT IN AN ORGANIZED HEALTH CARE EDUCATION/TRAINING PROGRAM

## 2023-09-27 PROCEDURE — 3052F PR MOST RECENT HEMOGLOBIN A1C LEVEL 8.0 - < 9.0%: ICD-10-PCS | Mod: CPTII,S$GLB,, | Performed by: STUDENT IN AN ORGANIZED HEALTH CARE EDUCATION/TRAINING PROGRAM

## 2023-09-27 PROCEDURE — 3079F DIAST BP 80-89 MM HG: CPT | Mod: CPTII,S$GLB,, | Performed by: STUDENT IN AN ORGANIZED HEALTH CARE EDUCATION/TRAINING PROGRAM

## 2023-09-27 PROCEDURE — 3052F HG A1C>EQUAL 8.0%<EQUAL 9.0%: CPT | Mod: CPTII,S$GLB,, | Performed by: STUDENT IN AN ORGANIZED HEALTH CARE EDUCATION/TRAINING PROGRAM

## 2023-09-27 PROCEDURE — 3008F PR BODY MASS INDEX (BMI) DOCUMENTED: ICD-10-PCS | Mod: CPTII,S$GLB,, | Performed by: STUDENT IN AN ORGANIZED HEALTH CARE EDUCATION/TRAINING PROGRAM

## 2023-09-27 PROCEDURE — 1159F MED LIST DOCD IN RCRD: CPT | Mod: CPTII,S$GLB,, | Performed by: STUDENT IN AN ORGANIZED HEALTH CARE EDUCATION/TRAINING PROGRAM

## 2023-09-27 PROCEDURE — 3008F BODY MASS INDEX DOCD: CPT | Mod: CPTII,S$GLB,, | Performed by: STUDENT IN AN ORGANIZED HEALTH CARE EDUCATION/TRAINING PROGRAM

## 2023-09-27 PROCEDURE — 3074F SYST BP LT 130 MM HG: CPT | Mod: CPTII,S$GLB,, | Performed by: STUDENT IN AN ORGANIZED HEALTH CARE EDUCATION/TRAINING PROGRAM

## 2023-09-27 PROCEDURE — 99215 PR OFFICE/OUTPT VISIT, EST, LEVL V, 40-54 MIN: ICD-10-PCS | Mod: S$GLB,,, | Performed by: STUDENT IN AN ORGANIZED HEALTH CARE EDUCATION/TRAINING PROGRAM

## 2023-09-27 PROCEDURE — 3074F PR MOST RECENT SYSTOLIC BLOOD PRESSURE < 130 MM HG: ICD-10-PCS | Mod: CPTII,S$GLB,, | Performed by: STUDENT IN AN ORGANIZED HEALTH CARE EDUCATION/TRAINING PROGRAM

## 2023-09-27 NOTE — DISCHARGE INSTRUCTIONS
Discontinue your medrol dose pack as it is causing your glucose to be elevated.    Follow-up with your Neurosurgery appointment tomorrow for further evaluation.    Return to the emergency room for new, worsening, or concerning symptoms.     Future Appointments   Date Time Provider Department Center   9/27/2023  9:00 AM Boom Pierre MD [facility] [place]
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
## 2023-09-27 NOTE — ED NOTES
I-STAT Chem-8+ Results:   Value Reference Range   Sodium 127 136-145 mmol/L   Potassium  5.0 3.5-5.1 mmol/L   Chloride 95  mmol/L   Ionized Calcium 1.15 1.06-1.42 mmol/L   CO2 (measured) 20 23-29 mmol/L   Glucose 569  mg/dL   BUN 35 6-30 mg/dL   Creatinine 0.8 0.5-1.4 mg/dL   Hematocrit 42 36-54%

## 2023-10-09 ENCOUNTER — HOSPITAL ENCOUNTER (INPATIENT)
Facility: HOSPITAL | Age: 38
LOS: 4 days | Discharge: HOME | DRG: 519 | End: 2023-10-13
Attending: EMERGENCY MEDICINE | Admitting: HOSPITALIST
Payer: MEDICAID

## 2023-10-09 DIAGNOSIS — M51.16 LUMBAR DISC HERNIATION WITH RADICULOPATHY: Primary | ICD-10-CM

## 2023-10-09 DIAGNOSIS — R07.9 CHEST PAIN: ICD-10-CM

## 2023-10-09 DIAGNOSIS — M51.36 BULGING OF INTERVERTEBRAL DISC BETWEEN L4 AND L5: ICD-10-CM

## 2023-10-09 DIAGNOSIS — M54.17 LUMBOSACRAL RADICULOPATHY AT L5: ICD-10-CM

## 2023-10-09 DIAGNOSIS — M54.16 LUMBAR RADICULOPATHY: ICD-10-CM

## 2023-10-09 PROBLEM — E11.49 TYPE 2 DIABETES MELLITUS WITH NEUROLOGIC COMPLICATION, WITH LONG-TERM CURRENT USE OF INSULIN: Status: ACTIVE | Noted: 2017-02-03

## 2023-10-09 PROBLEM — F17.200 TOBACCO DEPENDENCE: Status: ACTIVE | Noted: 2023-10-09

## 2023-10-09 LAB
ANION GAP SERPL CALC-SCNC: 12 MMOL/L (ref 8–16)
APTT PPP: 22.5 SEC (ref 21–32)
B-HCG UR QL: NEGATIVE
BASOPHILS # BLD AUTO: 0.07 K/UL (ref 0–0.2)
BASOPHILS NFR BLD: 0.6 % (ref 0–1.9)
BUN SERPL-MCNC: 17 MG/DL (ref 6–20)
CALCIUM SERPL-MCNC: 9.1 MG/DL (ref 8.7–10.5)
CHLORIDE SERPL-SCNC: 104 MMOL/L (ref 95–110)
CO2 SERPL-SCNC: 23 MMOL/L (ref 23–29)
CREAT SERPL-MCNC: 0.8 MG/DL (ref 0.5–1.4)
CTP QC/QA: YES
DIFFERENTIAL METHOD: ABNORMAL
EOSINOPHIL # BLD AUTO: 0.2 K/UL (ref 0–0.5)
EOSINOPHIL NFR BLD: 1.4 % (ref 0–8)
ERYTHROCYTE [DISTWIDTH] IN BLOOD BY AUTOMATED COUNT: 13.3 % (ref 11.5–14.5)
EST. GFR  (NO RACE VARIABLE): >60 ML/MIN/1.73 M^2
GLUCOSE SERPL-MCNC: 277 MG/DL (ref 70–110)
HCT VFR BLD AUTO: 41 % (ref 37–48.5)
HGB BLD-MCNC: 12.9 G/DL (ref 12–16)
IMM GRANULOCYTES # BLD AUTO: 0.1 K/UL (ref 0–0.04)
IMM GRANULOCYTES NFR BLD AUTO: 0.9 % (ref 0–0.5)
INR PPP: 0.9 (ref 0.8–1.2)
LYMPHOCYTES # BLD AUTO: 1.9 K/UL (ref 1–4.8)
LYMPHOCYTES NFR BLD: 17.1 % (ref 18–48)
MCH RBC QN AUTO: 27.8 PG (ref 27–31)
MCHC RBC AUTO-ENTMCNC: 31.5 G/DL (ref 32–36)
MCV RBC AUTO: 88 FL (ref 82–98)
MONOCYTES # BLD AUTO: 0.7 K/UL (ref 0.3–1)
MONOCYTES NFR BLD: 6.3 % (ref 4–15)
NEUTROPHILS # BLD AUTO: 8.2 K/UL (ref 1.8–7.7)
NEUTROPHILS NFR BLD: 73.7 % (ref 38–73)
NRBC BLD-RTO: 0 /100 WBC
PLATELET # BLD AUTO: 481 K/UL (ref 150–450)
PMV BLD AUTO: 9.1 FL (ref 9.2–12.9)
POCT GLUCOSE: 249 MG/DL (ref 70–110)
POTASSIUM SERPL-SCNC: 4.8 MMOL/L (ref 3.5–5.1)
PROTHROMBIN TIME: <9.2 SEC (ref 9–12.5)
RBC # BLD AUTO: 4.64 M/UL (ref 4–5.4)
SODIUM SERPL-SCNC: 139 MMOL/L (ref 136–145)
WBC # BLD AUTO: 11.13 K/UL (ref 3.9–12.7)

## 2023-10-09 PROCEDURE — 25000003 PHARM REV CODE 250: Performed by: NURSE PRACTITIONER

## 2023-10-09 PROCEDURE — 96372 THER/PROPH/DIAG INJ SC/IM: CPT

## 2023-10-09 PROCEDURE — 85610 PROTHROMBIN TIME: CPT | Performed by: STUDENT IN AN ORGANIZED HEALTH CARE EDUCATION/TRAINING PROGRAM

## 2023-10-09 PROCEDURE — 85730 THROMBOPLASTIN TIME PARTIAL: CPT | Performed by: STUDENT IN AN ORGANIZED HEALTH CARE EDUCATION/TRAINING PROGRAM

## 2023-10-09 PROCEDURE — 81025 URINE PREGNANCY TEST: CPT

## 2023-10-09 PROCEDURE — G0378 HOSPITAL OBSERVATION PER HR: HCPCS

## 2023-10-09 PROCEDURE — 63600175 PHARM REV CODE 636 W HCPCS: Performed by: NURSE PRACTITIONER

## 2023-10-09 PROCEDURE — 80048 BASIC METABOLIC PNL TOTAL CA: CPT | Performed by: STUDENT IN AN ORGANIZED HEALTH CARE EDUCATION/TRAINING PROGRAM

## 2023-10-09 PROCEDURE — 99285 EMERGENCY DEPT VISIT HI MDM: CPT

## 2023-10-09 PROCEDURE — 63600175 PHARM REV CODE 636 W HCPCS

## 2023-10-09 PROCEDURE — 63600175 PHARM REV CODE 636 W HCPCS: Performed by: PHYSICIAN ASSISTANT

## 2023-10-09 PROCEDURE — 99214 OFFICE O/P EST MOD 30 MIN: CPT | Mod: ,,, | Performed by: STUDENT IN AN ORGANIZED HEALTH CARE EDUCATION/TRAINING PROGRAM

## 2023-10-09 PROCEDURE — 96372 THER/PROPH/DIAG INJ SC/IM: CPT | Performed by: NURSE PRACTITIONER

## 2023-10-09 PROCEDURE — 99214 PR OFFICE/OUTPT VISIT, EST, LEVL IV, 30-39 MIN: ICD-10-PCS | Mod: ,,, | Performed by: STUDENT IN AN ORGANIZED HEALTH CARE EDUCATION/TRAINING PROGRAM

## 2023-10-09 PROCEDURE — 85025 COMPLETE CBC W/AUTO DIFF WBC: CPT | Performed by: STUDENT IN AN ORGANIZED HEALTH CARE EDUCATION/TRAINING PROGRAM

## 2023-10-09 PROCEDURE — 11000001 HC ACUTE MED/SURG PRIVATE ROOM

## 2023-10-09 RX ORDER — SIMETHICONE 80 MG
1 TABLET,CHEWABLE ORAL 4 TIMES DAILY PRN
Status: DISCONTINUED | OUTPATIENT
Start: 2023-10-09 | End: 2023-10-13 | Stop reason: HOSPADM

## 2023-10-09 RX ORDER — SODIUM CHLORIDE 0.9 % (FLUSH) 0.9 %
10 SYRINGE (ML) INJECTION EVERY 8 HOURS PRN
Status: DISCONTINUED | OUTPATIENT
Start: 2023-10-09 | End: 2023-10-13 | Stop reason: HOSPADM

## 2023-10-09 RX ORDER — IBUPROFEN 200 MG
24 TABLET ORAL
Status: DISCONTINUED | OUTPATIENT
Start: 2023-10-09 | End: 2023-10-13 | Stop reason: HOSPADM

## 2023-10-09 RX ORDER — IBUPROFEN 200 MG
1 TABLET ORAL DAILY
Status: DISCONTINUED | OUTPATIENT
Start: 2023-10-10 | End: 2023-10-13 | Stop reason: HOSPADM

## 2023-10-09 RX ORDER — SODIUM,POTASSIUM PHOSPHATES 280-250MG
2 POWDER IN PACKET (EA) ORAL
Status: DISCONTINUED | OUTPATIENT
Start: 2023-10-09 | End: 2023-10-13 | Stop reason: HOSPADM

## 2023-10-09 RX ORDER — PROCHLORPERAZINE EDISYLATE 5 MG/ML
5 INJECTION INTRAMUSCULAR; INTRAVENOUS EVERY 6 HOURS PRN
Status: DISCONTINUED | OUTPATIENT
Start: 2023-10-09 | End: 2023-10-13 | Stop reason: HOSPADM

## 2023-10-09 RX ORDER — LANOLIN ALCOHOL/MO/W.PET/CERES
800 CREAM (GRAM) TOPICAL
Status: DISCONTINUED | OUTPATIENT
Start: 2023-10-09 | End: 2023-10-13 | Stop reason: HOSPADM

## 2023-10-09 RX ORDER — NALOXONE HCL 0.4 MG/ML
0.02 VIAL (ML) INJECTION
Status: DISCONTINUED | OUTPATIENT
Start: 2023-10-09 | End: 2023-10-13 | Stop reason: HOSPADM

## 2023-10-09 RX ORDER — TALC
6 POWDER (GRAM) TOPICAL NIGHTLY PRN
Status: DISCONTINUED | OUTPATIENT
Start: 2023-10-09 | End: 2023-10-13 | Stop reason: HOSPADM

## 2023-10-09 RX ORDER — KETOROLAC TROMETHAMINE 30 MG/ML
30 INJECTION, SOLUTION INTRAMUSCULAR; INTRAVENOUS
Status: COMPLETED | OUTPATIENT
Start: 2023-10-09 | End: 2023-10-09

## 2023-10-09 RX ORDER — ATORVASTATIN CALCIUM 40 MG/1
40 TABLET, FILM COATED ORAL NIGHTLY
Status: DISCONTINUED | OUTPATIENT
Start: 2023-10-09 | End: 2023-10-13 | Stop reason: HOSPADM

## 2023-10-09 RX ORDER — AMOXICILLIN 250 MG
1 CAPSULE ORAL DAILY PRN
Status: DISCONTINUED | OUTPATIENT
Start: 2023-10-09 | End: 2023-10-13 | Stop reason: HOSPADM

## 2023-10-09 RX ORDER — PREGABALIN 50 MG/1
50 CAPSULE ORAL 2 TIMES DAILY
Status: DISCONTINUED | OUTPATIENT
Start: 2023-10-09 | End: 2023-10-13 | Stop reason: HOSPADM

## 2023-10-09 RX ORDER — TIZANIDINE 4 MG/1
4 TABLET ORAL EVERY 8 HOURS
Status: DISCONTINUED | OUTPATIENT
Start: 2023-10-09 | End: 2023-10-13 | Stop reason: HOSPADM

## 2023-10-09 RX ORDER — IBUPROFEN 200 MG
16 TABLET ORAL
Status: DISCONTINUED | OUTPATIENT
Start: 2023-10-09 | End: 2023-10-13 | Stop reason: HOSPADM

## 2023-10-09 RX ORDER — ONDANSETRON 2 MG/ML
4 INJECTION INTRAMUSCULAR; INTRAVENOUS EVERY 8 HOURS PRN
Status: DISCONTINUED | OUTPATIENT
Start: 2023-10-09 | End: 2023-10-13 | Stop reason: HOSPADM

## 2023-10-09 RX ORDER — HYDROCODONE BITARTRATE AND ACETAMINOPHEN 7.5; 325 MG/1; MG/1
1 TABLET ORAL EVERY 6 HOURS PRN
Status: DISCONTINUED | OUTPATIENT
Start: 2023-10-09 | End: 2023-10-10

## 2023-10-09 RX ORDER — LIDOCAINE 50 MG/G
1 PATCH TOPICAL DAILY PRN
Status: DISCONTINUED | OUTPATIENT
Start: 2023-10-09 | End: 2023-10-13 | Stop reason: HOSPADM

## 2023-10-09 RX ORDER — ENOXAPARIN SODIUM 100 MG/ML
40 INJECTION SUBCUTANEOUS EVERY 24 HOURS
Status: DISCONTINUED | OUTPATIENT
Start: 2023-10-09 | End: 2023-10-10

## 2023-10-09 RX ORDER — GLUCAGON 1 MG
1 KIT INJECTION
Status: DISCONTINUED | OUTPATIENT
Start: 2023-10-09 | End: 2023-10-13 | Stop reason: HOSPADM

## 2023-10-09 RX ORDER — ACETAMINOPHEN 325 MG/1
650 TABLET ORAL EVERY 8 HOURS PRN
Status: DISCONTINUED | OUTPATIENT
Start: 2023-10-09 | End: 2023-10-10

## 2023-10-09 RX ORDER — CYCLOBENZAPRINE HCL 10 MG
10 TABLET ORAL
Status: DISCONTINUED | OUTPATIENT
Start: 2023-10-09 | End: 2023-10-09

## 2023-10-09 RX ORDER — PREGABALIN 25 MG/1
25 CAPSULE ORAL 2 TIMES DAILY
COMMUNITY
Start: 2023-09-14

## 2023-10-09 RX ORDER — MAG HYDROX/ALUMINUM HYD/SIMETH 200-200-20
30 SUSPENSION, ORAL (FINAL DOSE FORM) ORAL 4 TIMES DAILY PRN
Status: DISCONTINUED | OUTPATIENT
Start: 2023-10-09 | End: 2023-10-13 | Stop reason: HOSPADM

## 2023-10-09 RX ORDER — INSULIN ASPART 100 [IU]/ML
0-10 INJECTION, SOLUTION INTRAVENOUS; SUBCUTANEOUS
Status: DISCONTINUED | OUTPATIENT
Start: 2023-10-09 | End: 2023-10-13 | Stop reason: HOSPADM

## 2023-10-09 RX ADMIN — TIZANIDINE 4 MG: 4 TABLET ORAL at 03:10

## 2023-10-09 RX ADMIN — INSULIN ASPART 2 UNITS: 100 INJECTION, SOLUTION INTRAVENOUS; SUBCUTANEOUS at 09:10

## 2023-10-09 RX ADMIN — ENOXAPARIN SODIUM 40 MG: 40 INJECTION SUBCUTANEOUS at 09:10

## 2023-10-09 RX ADMIN — TIZANIDINE 4 MG: 4 TABLET ORAL at 09:10

## 2023-10-09 RX ADMIN — INSULIN DETEMIR 10 UNITS: 100 INJECTION, SOLUTION SUBCUTANEOUS at 09:10

## 2023-10-09 RX ADMIN — ATORVASTATIN CALCIUM 40 MG: 40 TABLET, FILM COATED ORAL at 09:10

## 2023-10-09 RX ADMIN — PREGABALIN 50 MG: 50 CAPSULE ORAL at 09:10

## 2023-10-09 RX ADMIN — KETOROLAC TROMETHAMINE 30 MG: 30 INJECTION, SOLUTION INTRAMUSCULAR; INTRAVENOUS at 12:10

## 2023-10-09 RX ADMIN — HYDROCODONE BITARTRATE AND ACETAMINOPHEN 1 TABLET: 7.5; 325 TABLET ORAL at 07:10

## 2023-10-09 NOTE — ASSESSMENT & PLAN NOTE
Dangers of cigarette smoking were reviewed with patient in detail. Patient was Counseled for 10 minutes or greater. Nicotine replacement options were discussed. Nicotine replacement was discussed- prescribed

## 2023-10-09 NOTE — Clinical Note
Diagnosis: Lumbosacral radiculopathy at L5 [[id number]]   Future Attending Provider: PARVEEN YARBROUGH [[id number]]   Admitting Provider:: PARVEEN YARBROUGH [[id number]]

## 2023-10-09 NOTE — HPI
Lizzie Saunders is 37-year-old female with a past medical history of diabetes mellitus type 2, diabetic neuropathy, chronic back pain, and bipolar disorder who presents to the ED for evaluation worsening of lumbar pain which she describes as spasms and sharp shooting pain radiating to her left lower extremity.  She reports she did not take medication at home for relief.  She took Robaxin over the weekend however ran out of this medication.  She states she was prescribed Lyrica however has not started taking this medication.  She underwent an MRI of the lumbar spine  on 09/25 which resulted with L4 through L5 disc herniation and compression of the L5 nerve.  Patient is being followed by Dr. Pierre in neurosurgery and was evaluated in clinic on 09/27/2023.  She was taking steroids at that time which did alleviate her symptoms.  She reports progressive worsening of weakness of the left lower extremity  and uncontrolled pain over the last several weeks resulting in multiple ED visits.  Patient denies any fever, chest pain, shortness of breath, nausea, vomiting, diarrhea, numbness, tingling, saddle anesthesia, urinary retention, bowel incontinence.    In the ED she was evaluated by Neurosurgery who recommends resuming home Lyrica and Robaxin.  Patient potentially scheduled for surgery on Wednesday for diskectomy.

## 2023-10-09 NOTE — ASSESSMENT & PLAN NOTE
Resume home Lyrica and Robaxin.  Neurosurgery consulted.  Plan for surgery on 10/11/2023.  Pain control

## 2023-10-09 NOTE — ED PROVIDER NOTES
Encounter Date: 10/9/2023       History     Chief Complaint   Patient presents with    Back Pain     Pt c/o of intermittent lower left back pain radiating down her leg x1 month. Pt stated she was given medications but they are no longer working. Pt stated she was instructed to ED if pain is unbearable.     The history is provided by the patient and medical records.     37-year-old female with a past medical history of diabetes, chronic back pain, bipolar presenting with worsening low back pain and lumbar radiculopathy.  Patient had an MRI of the lumbar spine on 09/25 which resulted with L4 through L5 disc herniation and compression of the L5 nerve.  Patient is being followed by Dr. Pierre in neurosurgery.  Patient states her medications are no longer controlling her pain and is having increased weakness in her left leg.  Patient denies any fever, chest pain, shortness of breath, nausea, vomiting, diarrhea, numbness, tingling, saddle anesthesia, urinary retention, bowel incontinence.    Review of patient's allergies indicates:   Allergen Reactions    Beatriz Anaphylaxis    Pecan nut Dermatitis    Sulfa (sulfonamide antibiotics) Itching     Past Medical History:   Diagnosis Date    Bipolar disorder     per patient report    Chronic pain     Depression     Diabetes mellitus     Diabetes mellitus, type 2     Driving safety issue     DRIVING AFTER RECEIVING DILAUDID INJECTION    Hx of psychiatric care     Neuropathy     per patient report    Psychiatric problem     Therapy      Past Surgical History:   Procedure Laterality Date    BACK SURGERY      EYE SURGERY       Family History   Problem Relation Age of Onset    Bipolar disorder Mother     Bipolar disorder Father     Cancer Paternal Grandfather      Social History     Tobacco Use    Smoking status: Some Days     Current packs/day: 0.00     Types: Cigarettes     Last attempt to quit: 9/1/2020     Years since quitting: 3.1    Smokeless tobacco:  Never   Substance Use Topics    Alcohol use: Yes     Comment: rare    Drug use: Yes     Types: Marijuana     Comment: Daily     Review of Systems   Constitutional:  Negative for chills, diaphoresis, fatigue and fever.   HENT:  Negative for congestion, rhinorrhea and sore throat.    Eyes:  Negative for photophobia, pain and visual disturbance.   Respiratory:  Negative for cough and shortness of breath.    Cardiovascular:  Negative for chest pain, palpitations and leg swelling.   Gastrointestinal:  Negative for abdominal distention, abdominal pain, diarrhea, nausea and vomiting.   Genitourinary:  Negative for difficulty urinating, dysuria, flank pain, frequency and hematuria.   Musculoskeletal:  Positive for back pain (low back) and myalgias (L leg). Negative for arthralgias, gait problem, joint swelling, neck pain and neck stiffness.   Skin:  Negative for rash.   Neurological:  Positive for weakness (L leg). Negative for dizziness, tremors, seizures, syncope, facial asymmetry, speech difficulty, light-headedness, numbness and headaches.   Hematological:  Does not bruise/bleed easily.   Psychiatric/Behavioral:  Negative for confusion.        Physical Exam     Initial Vitals [10/09/23 1047]   BP Pulse Resp Temp SpO2   99/61 102 20 98.6 °F (37 °C) 98 %      MAP       --         Physical Exam    Nursing note and vitals reviewed.  Constitutional: She appears well-developed and well-nourished. She is not diaphoretic. She does not appear ill. No distress.   HENT:   Head: Normocephalic and atraumatic.   Right Ear: External ear normal.   Left Ear: External ear normal.   Nose: Nose normal.   Mouth/Throat: Uvula is midline, oropharynx is clear and moist and mucous membranes are normal.   Eyes: Conjunctivae, EOM and lids are normal. Pupils are equal, round, and reactive to light. Right eye exhibits no discharge. Left eye exhibits no discharge.   Neck:   Normal range of motion.   Full passive range of motion without pain.      Cardiovascular:  Normal rate, regular rhythm, normal heart sounds, intact distal pulses and normal pulses.           Pulmonary/Chest: Effort normal and breath sounds normal. No respiratory distress.   Abdominal: Abdomen is soft. Bowel sounds are normal. She exhibits no distension. There is no abdominal tenderness.   Musculoskeletal:      Right shoulder: Normal.      Left shoulder: Normal.      Right elbow: Normal.      Left elbow: Normal.      Right wrist: Normal.      Left wrist: Normal.      Cervical back: Normal, full passive range of motion without pain and normal range of motion. No edema, erythema or rigidity. No spinous process tenderness or muscular tenderness. Normal range of motion.      Thoracic back: Normal.      Lumbar back: Bony tenderness present. No swelling, edema, deformity, signs of trauma, lacerations, spasms or tenderness. Decreased range of motion (Confounded by pain). Positive left straight leg raise test. Negative right straight leg raise test. No scoliosis.      Right hip: Normal.      Left hip: No deformity, lacerations, tenderness, bony tenderness or crepitus. Decreased range of motion (Confounded by pain). Decreased strength (Confounded by pain).      Right upper leg: Normal.      Left upper leg: Normal.      Right lower leg: Normal.      Left lower leg: Normal.      Comments: 5/5 strength LE Right  4/5 strength LE left       Neurological: She is alert and oriented to person, place, and time. She has normal strength and normal reflexes. She displays no atrophy and no tremor. No cranial nerve deficit or sensory deficit. She exhibits normal muscle tone. She displays no seizure activity. Coordination normal. GCS eye subscore is 4. GCS verbal subscore is 5. GCS motor subscore is 6.   5/5 strength LE Right  4/5 strength LE left  Neurovascular exam intact   Skin: Skin is warm and dry. Capillary refill takes less than 2 seconds. No bruising, no ecchymosis and no rash noted. No erythema. No  pallor.   Psychiatric: She has a normal mood and affect. Her speech is normal and behavior is normal. Thought content normal.       ED Course   Procedures  Labs Reviewed   PROTIME-INR   APTT   CBC W/ AUTO DIFFERENTIAL   BASIC METABOLIC PANEL   POCT URINE PREGNANCY          Imaging Results    None          Medications   ketorolac injection 30 mg (30 mg Intramuscular Given 10/9/23 1233)     Medical Decision Making  37-year-old female with a past medical history of diabetes, chronic back pain, bipolar presenting with worsening low back pain and lumbar radiculopathy.  Patient had an MRI of the lumbar spine on 09/25 which resulted with L4 through L5 disc herniation and compression of the L5 nerve.  Patient is being followed by Dr. Pierre in neurosurgery.  Patient states her medications are no longer controlling her pain and is having increased weakness in her left leg.  Patient denies any fever, chest pain, shortness of breath, nausea, vomiting, diarrhea, numbness, tingling, saddle anesthesia, urinary retention, bowel incontinence.  Patient's chart and medical history reviewed.  Patient's vitals reviewed.  They are afebrile, no respiratory distress, nontoxic-appearing in the ED.  - MSK strain: considered with back pain  - Pyelonephritis: unlikely with no CVA tenderness or fever or urinary complaints   - Spinal Fx: unlikely with no recent trauma/injury. Non-mechanical   - Discitis/Spinal abscess/Osteomyelitis: unlikely with no risk factors including current immunosuppression, hemodialysis, current or recent injection drug use, current or recent invasive epidural/spinal procedure, current or recent endocarditis or bacteremia. no fever. no neuro deficits.  - Cauda Equina: unlikely with no saddle anesthesia, urinary retention, bowel incontinence   - Metastasis: unlikely with no current or past Hx of cancer including breast, prostate, lung, kidney, thyroid.   Patient has a known L4 and L5 disc herniation with compression of  the L5 nerve root being followed by Dr. Pierre. Informed Dr. Pierre of patient presenting in the emergency department with worsening pain and he will come down and see her himself.  Dr. Ryan hand did self evaluation on the patient in the emergency department and wants to have the patient admitted for spinal surgery on Wednesday.  Patient case was discussed with case management and with Dr. Pierre and determine need for refractory and progressively worsening lumbar radiculopathy with new lower extremity weakness different from previous exams. Spoke with Lizzie Lindo NP and case for discussed with information above and agrees with admission for observation due to worsening weakness, refractory lumbar radiculopathy, and neurosurgery. Patient will be admitted to observation under the care of Dr. Mata.     Amount and/or Complexity of Data Reviewed  External Data Reviewed: labs, radiology, ECG and notes.    Risk  Prescription drug management.  Diagnosis or treatment significantly limited by social determinants of health.                               Clinical Impression:   Final diagnoses:  [M51.16] Lumbar disc herniation with radiculopathy (Primary)  [M54.16] Lumbar radiculopathy  [M51.36] Bulging of intervertebral disc between L4 and L5        ED Disposition Condition    Observation Stable                Galo Bowles, ESTER  10/09/23 9734

## 2023-10-09 NOTE — CONSULTS
[facility]  Neurosurgery    SUBJECTIVE:     History of Present Illness:  Lizzie Saunders is a 37 y.o. female past medical history significant for diabetes mellitus as well as diabetic neuropathy, as well as a prior left-sided L4-5 disc herniation with a microdiskectomy in 2017, who presents with a recurrent disc herniation and severe left L5 radiculopathy.  I recently saw this patient in clinic on 09/27/2023.  At that time, she was in the middle of taking steroids and was feeling pretty well.  However, she tells me that she is had a total of 13 emergency department visits in the last few weeks.  She had a few muscle spasms over the weekend, but starting at about 3:00 a.m. this morning she began having severe left-sided leg pain and left lower back pain again.  All in all, this has been going on for a little over 6 weeks.  She unfortunately has not been able to get a steroid injection, she says due to her insurance.    Her pain starts in the left lower side of the back.  It shoots down the lateral aspect of the left thigh in the lateral portion of the lower leg as well as the top of the foot.  She does have chronic diabetic neuropathy with tingling on the bottom of both feet.  However, this radicular pain is different.  She also has a severe cramping pain causing her leg to lock up on the left side.    Currently, what also worries the patient is that her left leg appears to be weaker now than it was yesterday.    Review of patient's allergies indicates:   Allergen Reactions    Rosemary Anaphylaxis    Pecan nut Dermatitis    Sulfa (sulfonamide antibiotics) Itching       Past Medical History:   Diagnosis Date    Bipolar disorder     per patient report    Chronic pain     Depression     Diabetes mellitus     Diabetes mellitus, type 2     Driving safety issue     DRIVING AFTER RECEIVING DILAUDID INJECTION    Hx of psychiatric care     Neuropathy     per patient report    Psychiatric problem     Therapy       Past Surgical History:   Procedure Laterality Date    BACK SURGERY      EYE SURGERY       Family History   Problem Relation Age of Onset    Bipolar disorder Mother     Bipolar disorder Father     Cancer Paternal Grandfather      Social History     Tobacco Use    Smoking status: Some Days     Current packs/day: 0.00     Types: Cigarettes     Last attempt to quit: 9/1/2020     Years since quitting: 3.1    Smokeless tobacco: Never   Substance Use Topics    Alcohol use: Yes     Comment: rare    Drug use: Yes     Types: Marijuana     Comment: Daily        Review of Systems:  As noted in HPI    OBJECTIVE:     Vital Signs (Most Recent):  Temp: 98.6 °F (37 °C) (10/09/23 1047)  Pulse: 102 (10/09/23 1047)  Resp: 20 (10/09/23 1047)  BP: 99/61 (10/09/23 1047)  SpO2: 98 % (10/09/23 1047)    Physical Exam:  General: well developed, well nourished, no distress  Head: normocephalic, atraumatic  Neurologic: Alert and oriented. Thought content appropriate  GCS: Motor: 6/Verbal: 5/Eyes: 4 GCS Total: 15  Language: No aphasia  Speech: No dysarthria  Cranial nerves: face symmetric, tongue midline, CN II-XII grossly intact.   Eyes: pupils equal, round, reactive to light with accommodation, EOMI.   Pulmonary: normal respirations, not labored, no accessory muscles used  Sensory: intact to light touch throughout  Motor Strength: Moves all extremities spontaneously with good tone.  Right leg has full strength.  There is weakness throughout the left leg which is in part pain limited.  Strength  Deltoids Triceps Biceps Wrist Extension Wrist Flexion Hand  FA   Upper: R 5/5 5/5 5/5 5/5 5/5 5/5 5/5    L 5/5 5/5 5/5 5/5 5/5 5/5 5/5     Iliopsoas Quadriceps Knee  Flexion Tibialis  anterior Gastro- cnemius EHL    Lower: R 5/5 5/5 5/5 5/5 5/5 5/5     L 3/5 3/5 3/5 3/5 4/5 3/5      DTR's - patellar reflexes are diminished bilaterally    Gait is highly antalgic.  She can not bear weight on the left leg without severe pain shooting down the leg.        Diagnostic Results:  I have personally reviewed imaging. My impression is as follows.    MRI: I reviewed MRIs on this patient from 2017, 2020, 2023.  She had a left paracentral disc in 2017 which was caudally extruded.  In 2020 this had returned but there is still some space in the left lateral recess for the L5 nerve root.  Now, on the MRI dated 09/25/2023, she has a left lateral recess herniated disc at L4-5 which is caudally extruded, and follows the left L5 nerve root partially out into the left-sided L5 5 S1 foramen. So, her Left L5 root is being compressed in the lateral recess and in the foramen.     Patient also had a CT of the pelvis which was performed in September of 2023 which shows there is a small bony irregularity at the site of the past laminectomy but most of the lamina is still completely intact.        ASSESSMENT/PLAN:     Lizzie Saunders is a 37 y.o. female who presents with severe left L5 radiculopathy and left leg weakness due to recurrent herniated disc.  #severe recurrent Left L5 radiculopathy due to Left L4/5 herniated disc with leg weakness  -at this point, the patient appears to be having worsening weakness of the left foot and leg.  While it is somewhat difficult to distinguish what is pain limited in what is not, I would be concerned about discharging her from the hospital without addressing the nerve root compression which is evident on her recent MRI.  -recommend admission to internal medicine hospital team.  -would appreciate assistance with hospital-based team to assist with pain control- would recommend continuing Lyrica and Robaxin. Can cautiously use opioids. Patient has had a lot of steroids recently so I am not sure with her blood sugar if it is worth restarting steroids  -would appreciate medical risk stratification from hospital medicine team.  Patient's recent A1C was noted to be 8.8  -will order normal preoperative labs  -if patient can be medically optimized for  surgery, would tentatively put her on for Wednesday morning for a minimally invasive left L4-5 repeat hemilaminectomy and microdiskectomy with a left L5/S1 foraminotomy    Please contact me with any questions or concerns.    Boom DOUGLAS [place]  [facility]  Department of Neurosurgery  [phone]    Disclaimer: This note was dictated by speech recognition. Minor errors in transcription may be present.  Please call with any questions.

## 2023-10-09 NOTE — SUBJECTIVE & OBJECTIVE
Past Medical History:   Diagnosis Date    Bipolar disorder     per patient report    Chronic pain     Depression     Diabetes mellitus     Diabetes mellitus, type 2     Driving safety issue     DRIVING AFTER RECEIVING DILAUDID INJECTION    Hx of psychiatric care     Neuropathy     per patient report    Psychiatric problem     Therapy        Past Surgical History:   Procedure Laterality Date    BACK SURGERY      EYE SURGERY         Review of patient's allergies indicates:   Allergen Reactions    Rosemary Anaphylaxis    Pecan nut Dermatitis    Sulfa (sulfonamide antibiotics) Itching       No current facility-administered medications on file prior to encounter.     Current Outpatient Medications on File Prior to Encounter   Medication Sig    acetaminophen (TYLENOL) 650 MG TbSR Take 1 tablet (650 mg total) by mouth every 6 (six) hours as needed.    AJOVY AUTOINJECTOR 225 mg/1.5 mL autoinjector INJECT UNDER THE SKIN EVERY MONTH    atorvastatin (LIPITOR) 40 MG tablet Take 1 tablet (40 mg total) by mouth every evening.    blood sugar diagnostic Strp To check BG two times daily, to use with insurance preferred meter    blood-glucose meter kit To check BG two  times daily, to use with insurance preferred meter    diclofenac (VOLTAREN) 75 MG EC tablet Take 1 tablet (75 mg total) by mouth 2 (two) times daily as needed (pain).    dulaglutide (TRULICITY) 1.5 mg/0.5 mL pen injector INJECT 1.5 MILLIGRAMS INTO THE SKIN EVERY 7 DAYS    famotidine (PEPCID) 20 MG tablet Take 1 tablet (20 mg total) by mouth 2 (two) times daily. for 7 days    HYDROcodone-acetaminophen (NORCO) 7.5-325 mg per tablet Take 1 tablet by mouth every 6 (six) hours as needed for Pain.    insulin (LANTUS SOLOSTAR U-100 INSULIN) glargine 100 units/mL SubQ pen Inject 20 Units into the skin every evening.    lancets Misc To check BG two times daily, to use with insurance preferred meter    LIDOcaine (LIDODERM) 5 % 1 patch daily as needed.    LIDOcaine (LIDODERM) 5 %  Place 1 patch onto the skin once daily. Remove & Discard patch within 12 hours or as directed by MD    metFORMIN (GLUCOPHAGE-XR) 500 MG ER 24hr tablet Take 2 tablets (1,000 mg total) by mouth daily with breakfast.    methylPREDNISolone (MEDROL DOSEPACK) 4 mg tablet Take as directed for pain and inflammation. Monitor your blood sugar closely while taking this medication and be sure to contact your PCP for blood glucose over 250. Take your home medications as directed.    metoclopramide HCl (REGLAN) 10 MG tablet Take 1 tablet (10 mg total) by mouth 3 (three) times daily as needed (nausea/vomiting, abdominal pain).    ondansetron (ZOFRAN-ODT) 4 MG TbDL Take 1 tablet (4 mg total) by mouth every 6 (six) hours as needed (nausea).    pregabalin (LYRICA) 25 MG capsule Take 25 mg by mouth 2 (two) times daily.    rizatriptan (MAXALT) 10 MG tablet Take 10 mg by mouth once as needed for Migraine.    tiZANidine (ZANAFLEX) 4 MG tablet Take 1 tablet (4 mg total) by mouth every 8 (eight) hours. Take 1 po q 8 hrs prn muscle spasm     Family History       Problem Relation (Age of Onset)    Bipolar disorder Mother, Father    Cancer Paternal Grandfather          Tobacco Use    Smoking status: Some Days     Current packs/day: 0.00     Types: Cigarettes     Last attempt to quit: 9/1/2020     Years since quitting: 3.1    Smokeless tobacco: Never   Substance and Sexual Activity    Alcohol use: Yes     Comment: rare    Drug use: Yes     Types: Marijuana     Comment: 10/9/23:Daily    Sexual activity: Yes     Partners: Male     Comment:      Review of Systems   Constitutional:  Positive for activity change and fatigue.   Musculoskeletal:  Positive for back pain and gait problem.   Neurological:  Positive for weakness (LLE) and numbness.   Psychiatric/Behavioral:  Negative for confusion. The patient is nervous/anxious.      Objective:     Vital Signs (Most Recent):  Temp: 98 °F (36.7 °C) (10/09/23 1639)  Pulse: 83 (10/09/23 1639)  Resp:  18 (10/09/23 1639)  BP: (!) 100/59 (10/09/23 1639)  SpO2: 97 % (10/09/23 1639) Vital Signs (24h Range):  Temp:  [98 °F (36.7 °C)-98.6 °F (37 °C)] 98 °F (36.7 °C)  Pulse:  [] 83  Resp:  [18-20] 18  SpO2:  [96 %-98 %] 97 %  BP: ()/(59-61) 100/59     Weight: 95.3 kg (210 lb)  Body mass index is 33.89 kg/m².     Physical Exam  Constitutional:       Appearance: Normal appearance.   Cardiovascular:      Rate and Rhythm: Normal rate.   Neurological:      Mental Status: She is alert and oriented to person, place, and time.      Sensory: Sensory deficit (Decreased sensation to bilateral lower extremities left greater than right) present.      Motor: Weakness (slight decrease in strength to left lower extremity as compared to the right.) present.                Significant Labs: All pertinent labs within the past 24 hours have been reviewed.  BMP:   Recent Labs   Lab 10/09/23  1331   *      K 4.8      CO2 23   BUN 17   CREATININE 0.8   CALCIUM 9.1     CBC:   Recent Labs   Lab 10/09/23  1331   WBC 11.13   HGB 12.9   HCT 41.0   *       Significant Imaging: I have reviewed all pertinent imaging results/findings within the past 24 hours.

## 2023-10-09 NOTE — ASSESSMENT & PLAN NOTE
"Patient's FSGs are uncontrolled due to hyperglycemia on current medication regimen.  Last A1c reviewed-   Lab Results   Component Value Date    HGBA1C 8.8 (H) 09/25/2023     Most recent fingerstick glucose reviewed- No results for input(s): "POCTGLUCOSE" in the last 24 hours.  Current correctional scale  Low  Maintain anti-hyperglycemic dose as follows-   Antihyperglycemics (From admission, onward)    Start     Stop Route Frequency Ordered    10/09/23 2100  insulin detemir U-100 (Levemir) pen 10 Units         -- SubQ Nightly 10/09/23 1405        Hold Oral hypoglycemics while patient is in the hospital.      "

## 2023-10-09 NOTE — PROGRESS NOTES
[facility]  [facility] Medicine  History and Physical    Patient Name: Lizzie Saunders  MRN: [id number]  Patient Class: OP- Observation   Admission Date: 10/9/2023  Length of Stay: 0 days  Attending Physician: Yenifer Asencio, *  Primary Care Provider: Maxi Anna MD        Subjective:     Principal Problem:Lumbosacral radiculopathy at L5        HPI:   Lizzie Saunders is 37-year-old female with a past medical history of diabetes mellitus type 2, diabetic neuropathy, chronic back pain, and bipolar disorder who presents to the ED for evaluation worsening of lumbar pain which she describes as spasms and sharp shooting pain radiating to her left lower extremity.  She reports she did not take medication at home for relief.  She took Robaxin over the weekend however ran out of this medication.  She states she was prescribed Lyrica however has not started taking this medication.  She underwent an MRI of the lumbar spine  on 09/25 which resulted with L4 through L5 disc herniation and compression of the L5 nerve.  Patient is being followed by Dr. Pierre in neurosurgery and was evaluated in clinic on 09/27/2023.  She was taking steroids at that time which did alleviate her symptoms.  She reports progressive worsening of weakness of the left lower extremity  and uncontrolled pain over the last several weeks resulting in multiple ED visits.  Patient denies any fever, chest pain, shortness of breath, nausea, vomiting, diarrhea, numbness, tingling, saddle anesthesia, urinary retention, bowel incontinence.    In the ED she was evaluated by Neurosurgery who recommends resuming home Lyrica and Robaxin.  Patient potentially scheduled for surgery on Wednesday for diskectomy.            Overview/Hospital Course:  No notes on file    Past Medical History:   Diagnosis Date    Bipolar disorder     per patient report    Chronic pain     Depression     Diabetes mellitus     Diabetes mellitus, type 2     Driving  safety issue     DRIVING AFTER RECEIVING DILAUDID INJECTION    Hx of psychiatric care     Neuropathy     per patient report    Psychiatric problem     Therapy        Past Surgical History:   Procedure Laterality Date    BACK SURGERY      EYE SURGERY         Review of patient's allergies indicates:   Allergen Reactions    Rosemary Anaphylaxis    Pecan nut Dermatitis    Sulfa (sulfonamide antibiotics) Itching       No current facility-administered medications on file prior to encounter.     Current Outpatient Medications on File Prior to Encounter   Medication Sig    acetaminophen (TYLENOL) 650 MG TbSR Take 1 tablet (650 mg total) by mouth every 6 (six) hours as needed.    AJOVY AUTOINJECTOR 225 mg/1.5 mL autoinjector INJECT UNDER THE SKIN EVERY MONTH    atorvastatin (LIPITOR) 40 MG tablet Take 1 tablet (40 mg total) by mouth every evening.    blood sugar diagnostic Strp To check BG two times daily, to use with insurance preferred meter    blood-glucose meter kit To check BG two  times daily, to use with insurance preferred meter    diclofenac (VOLTAREN) 75 MG EC tablet Take 1 tablet (75 mg total) by mouth 2 (two) times daily as needed (pain).    dulaglutide (TRULICITY) 1.5 mg/0.5 mL pen injector INJECT 1.5 MILLIGRAMS INTO THE SKIN EVERY 7 DAYS    famotidine (PEPCID) 20 MG tablet Take 1 tablet (20 mg total) by mouth 2 (two) times daily. for 7 days    HYDROcodone-acetaminophen (NORCO) 7.5-325 mg per tablet Take 1 tablet by mouth every 6 (six) hours as needed for Pain.    insulin (LANTUS SOLOSTAR U-100 INSULIN) glargine 100 units/mL SubQ pen Inject 20 Units into the skin every evening.    lancets Misc To check BG two times daily, to use with insurance preferred meter    LIDOcaine (LIDODERM) 5 % 1 patch daily as needed.    LIDOcaine (LIDODERM) 5 % Place 1 patch onto the skin once daily. Remove & Discard patch within 12 hours or as directed by MD    metFORMIN (GLUCOPHAGE-XR) 500 MG ER 24hr tablet Take 2 tablets (1,000 mg  total) by mouth daily with breakfast.    methylPREDNISolone (MEDROL DOSEPACK) 4 mg tablet Take as directed for pain and inflammation. Monitor your blood sugar closely while taking this medication and be sure to contact your PCP for blood glucose over 250. Take your home medications as directed.    metoclopramide HCl (REGLAN) 10 MG tablet Take 1 tablet (10 mg total) by mouth 3 (three) times daily as needed (nausea/vomiting, abdominal pain).    ondansetron (ZOFRAN-ODT) 4 MG TbDL Take 1 tablet (4 mg total) by mouth every 6 (six) hours as needed (nausea).    pregabalin (LYRICA) 25 MG capsule Take 25 mg by mouth 2 (two) times daily.    rizatriptan (MAXALT) 10 MG tablet Take 10 mg by mouth once as needed for Migraine.    tiZANidine (ZANAFLEX) 4 MG tablet Take 1 tablet (4 mg total) by mouth every 8 (eight) hours. Take 1 po q 8 hrs prn muscle spasm     Family History       Problem Relation (Age of Onset)    Bipolar disorder Mother, Father    Cancer Paternal Grandfather          Tobacco Use    Smoking status: Some Days     Current packs/day: 0.00     Types: Cigarettes     Last attempt to quit: 9/1/2020     Years since quitting: 3.1    Smokeless tobacco: Never   Substance and Sexual Activity    Alcohol use: Yes     Comment: rare    Drug use: Yes     Types: Marijuana     Comment: 10/9/23:Daily    Sexual activity: Yes     Partners: Male     Comment:      Review of Systems   Constitutional:  Positive for activity change and fatigue.   Musculoskeletal:  Positive for back pain and gait problem.   Neurological:  Positive for weakness (LLE) and numbness.   Psychiatric/Behavioral:  Negative for confusion. The patient is nervous/anxious.      Objective:     Vital Signs (Most Recent):  Temp: 98 °F (36.7 °C) (10/09/23 1639)  Pulse: 83 (10/09/23 1639)  Resp: 18 (10/09/23 1639)  BP: (!) 100/59 (10/09/23 1639)  SpO2: 97 % (10/09/23 1639) Vital Signs (24h Range):  Temp:  [98 °F (36.7 °C)-98.6 °F (37 °C)] 98 °F (36.7 °C)  Pulse:   [] 83  Resp:  [18-20] 18  SpO2:  [96 %-98 %] 97 %  BP: ()/(59-61) 100/59     Weight: 95.3 kg (210 lb)  Body mass index is 33.89 kg/m².     Physical Exam  Constitutional:       Appearance: Normal appearance.   Cardiovascular:      Rate and Rhythm: Normal rate.   Neurological:      Mental Status: She is alert and oriented to person, place, and time.      Sensory: Sensory deficit (Decreased sensation to bilateral lower extremities left greater than right) present.      Motor: Weakness (slight decrease in strength to left lower extremity as compared to the right.) present.                Significant Labs: All pertinent labs within the past 24 hours have been reviewed.  BMP:   Recent Labs   Lab 10/09/23  1331   *      K 4.8      CO2 23   BUN 17   CREATININE 0.8   CALCIUM 9.1     CBC:   Recent Labs   Lab 10/09/23  1331   WBC 11.13   HGB 12.9   HCT 41.0   *       Significant Imaging: I have reviewed all pertinent imaging results/findings within the past 24 hours.      Assessment/Plan:      * Lumbosacral radiculopathy at L5  Resume home Lyrica and Robaxin.  Neurosurgery consulted.  Plan for surgery on 10/11/2023.  Pain control      Tobacco dependence  Dangers of cigarette smoking were reviewed with patient in detail. Patient was Counseled for 10 minutes or greater. Nicotine replacement options were discussed. Nicotine replacement was discussed- prescribed    Bipolar affective disorder, current episode depressed  Patient has persistent depression which is mild and is currently controlled. Will Continue anti-depressant medications. We will not consult psychiatry at this time. Patient does not display psychosis at this time. Continue to monitor closely and adjust plan of care as needed.        Hyperlipidemia     Patient is chronically on statin.will continue for now. Monitor clinically. Last LDL was   Lab Results   Component Value Date    LDLCALC 180 (H) 02/23/2023          Type 2 diabetes  "mellitus with neurologic complication, with long-term current use of insulin  Patient's FSGs are uncontrolled due to hyperglycemia on current medication regimen.  Last A1c reviewed-   Lab Results   Component Value Date    HGBA1C 8.8 (H) 09/25/2023     Most recent fingerstick glucose reviewed- No results for input(s): "POCTGLUCOSE" in the last 24 hours.  Current correctional scale  Low  Maintain anti-hyperglycemic dose as follows-   Antihyperglycemics (From admission, onward)      Start     Stop Route Frequency Ordered    10/09/23 2100  insulin detemir U-100 (Levemir) pen 10 Units         -- SubQ Nightly 10/09/23 1405          Hold Oral hypoglycemics while patient is in the hospital.          VTE Risk Mitigation (From admission, onward)           Ordered     enoxaparin injection 40 mg  Daily         10/09/23 1405     IP VTE HIGH RISK PATIENT  Once         10/09/23 1405     Place sequential compression device  Until discontinued         10/09/23 1405                    Discharge Planning   TETE:      Code Status: Full Code   Is the patient medically ready for discharge?:     Reason for patient still in hospital (select all that apply): Laboratory test, Treatment and Consult recommendations                     Lizzie Lindo NP  Department of Hospital Medicine   [facility] - Emergency Dept  "

## 2023-10-09 NOTE — ASSESSMENT & PLAN NOTE
Patient is chronically on statin.will continue for now. Monitor clinically. Last LDL was   Lab Results   Component Value Date    LDLCALC 180 (H) 02/23/2023

## 2023-10-10 ENCOUNTER — ANESTHESIA EVENT (OUTPATIENT)
Dept: SURGERY | Facility: HOSPITAL | Age: 38
DRG: 519 | End: 2023-10-10
Payer: MEDICAID

## 2023-10-10 LAB
ABO + RH BLD: NORMAL
BLD GP AB SCN CELLS X3 SERPL QL: NORMAL
POCT GLUCOSE: 132 MG/DL (ref 70–110)
POCT GLUCOSE: 199 MG/DL (ref 70–110)
POCT GLUCOSE: 223 MG/DL (ref 70–110)
POCT GLUCOSE: 253 MG/DL (ref 70–110)

## 2023-10-10 PROCEDURE — 11000001 HC ACUTE MED/SURG PRIVATE ROOM

## 2023-10-10 PROCEDURE — 93010 ELECTROCARDIOGRAM REPORT: CPT | Mod: ,,, | Performed by: INTERNAL MEDICINE

## 2023-10-10 PROCEDURE — G0378 HOSPITAL OBSERVATION PER HR: HCPCS

## 2023-10-10 PROCEDURE — 93005 ELECTROCARDIOGRAM TRACING: CPT

## 2023-10-10 PROCEDURE — 99213 OFFICE O/P EST LOW 20 MIN: CPT | Mod: ,,, | Performed by: STUDENT IN AN ORGANIZED HEALTH CARE EDUCATION/TRAINING PROGRAM

## 2023-10-10 PROCEDURE — 99213 PR OFFICE/OUTPT VISIT, EST, LEVL III, 20-29 MIN: ICD-10-PCS | Mod: ,,, | Performed by: STUDENT IN AN ORGANIZED HEALTH CARE EDUCATION/TRAINING PROGRAM

## 2023-10-10 PROCEDURE — 93010 EKG 12-LEAD: ICD-10-PCS | Mod: ,,, | Performed by: INTERNAL MEDICINE

## 2023-10-10 PROCEDURE — 25000003 PHARM REV CODE 250: Performed by: NURSE PRACTITIONER

## 2023-10-10 PROCEDURE — 25000003 PHARM REV CODE 250: Performed by: PHYSICIAN ASSISTANT

## 2023-10-10 PROCEDURE — 25000003 PHARM REV CODE 250: Performed by: HOSPITALIST

## 2023-10-10 PROCEDURE — 86900 BLOOD TYPING SEROLOGIC ABO: CPT | Performed by: PHYSICIAN ASSISTANT

## 2023-10-10 PROCEDURE — 36415 COLL VENOUS BLD VENIPUNCTURE: CPT | Performed by: PHYSICIAN ASSISTANT

## 2023-10-10 RX ORDER — MORPHINE SULFATE 4 MG/ML
2 INJECTION, SOLUTION INTRAMUSCULAR; INTRAVENOUS EVERY 4 HOURS PRN
Status: DISCONTINUED | OUTPATIENT
Start: 2023-10-10 | End: 2023-10-13 | Stop reason: HOSPADM

## 2023-10-10 RX ORDER — HYDROCODONE BITARTRATE AND ACETAMINOPHEN 7.5; 325 MG/1; MG/1
1 TABLET ORAL EVERY 4 HOURS PRN
Status: DISCONTINUED | OUTPATIENT
Start: 2023-10-10 | End: 2023-10-13 | Stop reason: HOSPADM

## 2023-10-10 RX ORDER — ACETAMINOPHEN 325 MG/1
650 TABLET ORAL EVERY 4 HOURS PRN
Status: DISCONTINUED | OUTPATIENT
Start: 2023-10-10 | End: 2023-10-13 | Stop reason: HOSPADM

## 2023-10-10 RX ADMIN — HYDROCODONE BITARTRATE AND ACETAMINOPHEN 1 TABLET: 7.5; 325 TABLET ORAL at 08:10

## 2023-10-10 RX ADMIN — ATORVASTATIN CALCIUM 40 MG: 40 TABLET, FILM COATED ORAL at 08:10

## 2023-10-10 RX ADMIN — TIZANIDINE 4 MG: 4 TABLET ORAL at 01:10

## 2023-10-10 RX ADMIN — HYDROCODONE BITARTRATE AND ACETAMINOPHEN 1 TABLET: 7.5; 325 TABLET ORAL at 11:10

## 2023-10-10 RX ADMIN — TIZANIDINE 4 MG: 4 TABLET ORAL at 09:10

## 2023-10-10 RX ADMIN — TIZANIDINE 4 MG: 4 TABLET ORAL at 05:10

## 2023-10-10 RX ADMIN — HYDROCODONE BITARTRATE AND ACETAMINOPHEN 1 TABLET: 7.5; 325 TABLET ORAL at 04:10

## 2023-10-10 RX ADMIN — INSULIN ASPART 1 UNITS: 100 INJECTION, SOLUTION INTRAVENOUS; SUBCUTANEOUS at 08:10

## 2023-10-10 RX ADMIN — PREGABALIN 50 MG: 50 CAPSULE ORAL at 08:10

## 2023-10-10 RX ADMIN — INSULIN ASPART 6 UNITS: 100 INJECTION, SOLUTION INTRAVENOUS; SUBCUTANEOUS at 11:10

## 2023-10-10 RX ADMIN — INSULIN DETEMIR 10 UNITS: 100 INJECTION, SOLUTION SUBCUTANEOUS at 08:10

## 2023-10-10 RX ADMIN — INSULIN ASPART 4 UNITS: 100 INJECTION, SOLUTION INTRAVENOUS; SUBCUTANEOUS at 08:10

## 2023-10-10 NOTE — ASSESSMENT & PLAN NOTE
Appreciate Neurosurgery input.  Planning left L4/5 hemilaminotomy/microdiscectomy and partial left L5/S1 foraminotomy tomorrow.  Continue pain medications and muscle relaxers.

## 2023-10-10 NOTE — PLAN OF CARE
Case Management Assessment     PCP: Maxi Anna  Pharmacy: [facility] on [address] in [place]    Patient Arrived From: home  Existing Help at Home: none    Barriers to Discharge: none    Discharge Plan:    A. Home    B. Home       SW completed brief assessment and discussed discharge planning with patient at her bedside. Patient stated that the she lives with a roommate. Patient's friend Jennifer will provide transportation for her to get home when discharge from the hospital.      10/09/23 7507   Discharge Planning   Assessment Type Discharge Planning Brief Assessment   Resource/Environmental Concerns none   Support Systems Spouse/significant other;Friends/neighbors   Equipment Currently Used at Home none   Current Living Arrangements home   Patient/Family Anticipates Transition to home   Patient/Family Anticipated Services at Transition none   DME Needed Upon Discharge  none   Discharge Plan A Home   Discharge Plan B Group home;Home

## 2023-10-10 NOTE — PROGRESS NOTES
[facility]  Neurosurgery    SUBJECTIVE:     History of Present Illness:  No acute events overnight  Her pain is a little bit better today    Review of Systems:  As noted in HPI    OBJECTIVE:     Vital Signs (Most Recent):  Temp: 98.7 °F (37.1 °C) (10/10/23 0421)  Pulse: 77 (10/10/23 0421)  Resp: 18 (10/10/23 0426)  BP: 108/60 (10/10/23 0421)  SpO2: 98 % (10/10/23 0421)    Physical Exam:  General: well developed, well nourished, no distress  Head: normocephalic, atraumatic  Neurologic: Alert and oriented. Thought content appropriate  GCS: Motor: 6/Verbal: 5/Eyes: 4 GCS Total: 15  Language: No aphasia  Speech: No dysarthria  Cranial nerves: face symmetric, tongue midline, CN II-XII grossly intact.   Eyes: pupils equal, round, reactive to light with accommodation, EOMI.   Pulmonary: normal respirations, not labored, no accessory muscles used  Sensory:  Has numbness and tingling in an L5 distribution on the left side as compared to the right.  Motor Strength: Moves all extremities spontaneously with good tone.  Left leg notably weaker than the right  Highly positive straight leg raise on the left    Strength  Deltoids Triceps Biceps Wrist Extension Wrist Flexion Hand  FA   Upper: R 5/5 5/5 5/5 5/5 5/5 5/5 5/5    L 5/5 5/5 5/5 5/5 5/5 5/5 5/5     Iliopsoas Quadriceps Knee  Flexion Tibialis  anterior Gastro- cnemius EHL    Lower: R 5/5 5/5 5/5 5/5 5/5 5/5     L 4/5 4/5 4/5 4/5 4/5 4/5      DTR's - diminished patellar reflexes bilaterally      Diagnostic Results:  I have personally reviewed imaging. My impression is as follows.    MRI: I reviewed MRIs on this patient from 2017, 2020, 2023.  She had a left paracentral disc in 2017 which was caudally extruded.  In 2020 this had returned but there is still some space in the left lateral recess for the L5 nerve root.  Now, on the MRI dated 09/25/2023, she has a left lateral recess herniated disc at L4-5 which is caudally extruded, and follows the left L5 nerve  root partially out into the left-sided L5 5 S1 foramen. So, her Left L5 root is being compressed in the lateral recess and in the foramen.     Patient also had a CT of the pelvis which was performed in September of 2023 which shows there is a small bony irregularity at the site of the past laminectomy but most of the lamina is still completely intact.        ASSESSMENT/PLAN:     Lizzie Saunders is a 37 y.o. female who presents with severe left L5 radiculopathy and left leg weakness due to recurrent herniated disc.  #severe recurrent Left L5 radiculopathy due to Left L4/5 herniated disc with leg weakness  -patient's strength is somewhat better after pain control  -appreciate Medicine team risk stratification for a 1 hour prone position surgery  -preop labs reviewed, acceptable  -NPO at midnight  -plan for left L4/5 hemilaminotomy/microdiscectomy and partial left L5/S1 foraminotomy tomorrow      Time spent on this encounter: 25 minutes. This includes face-to-face time and non-face to face time preparing to see the patient (eg, review of tests), obtaining and/or reviewing separately obtained history, documenting clinical information in the electronic or other health record, independently interpreting results and communicating results to the patient/family/caregiver, or care coordinator.      Boom DOUGLAS [place]  [facility]  Department of Neurosurgery  [phone]    Disclaimer: This note was dictated by speech recognition. Minor errors in transcription may be present.  Please call with any questions.

## 2023-10-10 NOTE — ASSESSMENT & PLAN NOTE
Patient's FSGs are uncontrolled due to hyperglycemia on current medication regimen.  Last A1c reviewed-   Lab Results   Component Value Date    HGBA1C 8.8 (H) 09/25/2023     Most recent fingerstick glucose reviewed-   Recent Labs   Lab 10/09/23  2012 10/10/23  0802 10/10/23  1139   POCTGLUCOSE 249* 223* 253*     Current correctional scale  Low  Maintain anti-hyperglycemic dose as follows-   Antihyperglycemics (From admission, onward)    Start     Stop Route Frequency Ordered    10/09/23 2212  insulin aspart U-100 pen 0-10 Units         -- SubQ Before meals & nightly PRN 10/09/23 2113    10/09/23 2100  insulin detemir U-100 (Levemir) pen 10 Units         -- SubQ Nightly 10/09/23 1405        Hold Oral hypoglycemics while patient is in the hospital.

## 2023-10-10 NOTE — NURSING
Notified MELODY Chandler regarding bp on the lower side and according to the patient her bp is always on the lower side, asymptomatic as well. No new order.

## 2023-10-10 NOTE — PROGRESS NOTES
[facility] Medicine  Progress Note    Patient Name: Lizzie Saunders  MRN: [id number]  Patient Class: OP- Observation   Admission Date: 10/9/2023  Length of Stay: 0 days  Attending Physician: Mario Cardoza MD  Primary Care Provider: Maxi Anna MD        Subjective:     Principal Problem:Lumbosacral radiculopathy at L5        HPI:   Lizzie Saunders is 37-year-old female with a past medical history of diabetes mellitus type 2, diabetic neuropathy, chronic back pain, and bipolar disorder who presents to the ED for evaluation worsening of lumbar pain which she describes as spasms and sharp shooting pain radiating to her left lower extremity.  She reports she did not take medication at home for relief.  She took Robaxin over the weekend however ran out of this medication.  She states she was prescribed Lyrica however has not started taking this medication.  She underwent an MRI of the lumbar spine  on 09/25 which resulted with L4 through L5 disc herniation and compression of the L5 nerve.  Patient is being followed by Dr. Pierre in neurosurgery and was evaluated in clinic on 09/27/2023.  She was taking steroids at that time which did alleviate her symptoms.  She reports progressive worsening of weakness of the left lower extremity  and uncontrolled pain over the last several weeks resulting in multiple ED visits.  Patient denies any fever, chest pain, shortness of breath, nausea, vomiting, diarrhea, numbness, tingling, saddle anesthesia, urinary retention, bowel incontinence.    In the ED she was evaluated by Neurosurgery who recommends resuming home Lyrica and Robaxin.  Patient potentially scheduled for surgery on Wednesday for diskectomy.            Overview/Hospital Course:  38 y/o female presents with worsening of chronic back pain.  Recent MRI showed continued disc extrusion at L4-L5 narrows the left subarticular zone and abuts the descending left L5 nerve.  Neurosurgery consulted.   Severe left L5 radiculopathy and left leg weakness due to recurrent herniated disc.  Recommending left L4/5 hemilaminotomy/microdiscectomy and partial left L5/S1 foraminotomy.      Interval History: severe pain when moved.    Review of Systems   HENT:  Negative for ear discharge and ear pain.    Eyes:  Negative for discharge and itching.   Endocrine: Negative for cold intolerance and heat intolerance.   Neurological:  Negative for seizures and syncope.     Objective:     Vital Signs (Most Recent):  Temp: 99.3 °F (37.4 °C) (10/10/23 1138)  Pulse: 80 (10/10/23 1138)  Resp: 18 (10/10/23 1158)  BP: 122/77 (10/10/23 1138)  SpO2: 100 % (10/10/23 1138) Vital Signs (24h Range):  Temp:  [98 °F (36.7 °C)-99.3 °F (37.4 °C)] 99.3 °F (37.4 °C)  Pulse:  [68-92] 80  Resp:  [14-20] 18  SpO2:  [96 %-100 %] 100 %  BP: ()/(44-77) 122/77     Weight: 101.5 kg (223 lb 12.3 oz)  Body mass index is 36.12 kg/m².  No intake or output data in the 24 hours ending 10/10/23 1216      Physical Exam  Constitutional:       Appearance: Normal appearance.   HENT:      Head: Normocephalic and atraumatic.      Mouth/Throat:      Mouth: Mucous membranes are dry.      Pharynx: No oropharyngeal exudate or posterior oropharyngeal erythema.   Cardiovascular:      Rate and Rhythm: Normal rate and regular rhythm.   Pulmonary:      Effort: No respiratory distress.      Breath sounds: Normal breath sounds.   Abdominal:      General: Bowel sounds are normal.      Palpations: Abdomen is soft.   Musculoskeletal:         General: No deformity or signs of injury.   Skin:     General: Skin is warm and dry.   Neurological:      Mental Status: She is alert and oriented to person, place, and time.      Sensory: Sensory deficit (Decreased sensation to bilateral lower extremities left greater than right) present.      Motor: Weakness (slight decrease in strength to left lower extremity as compared to the right.) present.             Significant Labs: All pertinent  labs within the past 24 hours have been reviewed.  BMP:   Recent Labs   Lab 10/09/23  1331   *      K 4.8      CO2 23   BUN 17   CREATININE 0.8   CALCIUM 9.1     CBC:   Recent Labs   Lab 10/09/23  1331   WBC 11.13   HGB 12.9   HCT 41.0   *       Significant Imaging: I have reviewed all pertinent imaging results/findings within the past 24 hours.      Assessment/Plan:      * Lumbosacral radiculopathy at L5  Appreciate Neurosurgery input.  Planning left L4/5 hemilaminotomy/microdiscectomy and partial left L5/S1 foraminotomy tomorrow.  Continue pain medications and muscle relaxers.        Tobacco dependence  Dangers of cigarette smoking were reviewed with patient in detail. Patient was Counseled for 10 minutes or greater. Nicotine replacement options were discussed. Nicotine replacement was discussed- prescribed    Bipolar affective disorder, current episode depressed  Patient has persistent depression which is mild and is currently controlled. Will Continue anti-depressant medications. We will not consult psychiatry at this time. Patient does not display psychosis at this time. Continue to monitor closely and adjust plan of care as needed.        Hyperlipidemia     Patient is chronically on statin.will continue for now. Monitor clinically. Last LDL was   Lab Results   Component Value Date    LDLCALC 180 (H) 02/23/2023          Type 2 diabetes mellitus with neurologic complication, with long-term current use of insulin  Patient's FSGs are uncontrolled due to hyperglycemia on current medication regimen.  Last A1c reviewed-   Lab Results   Component Value Date    HGBA1C 8.8 (H) 09/25/2023     Most recent fingerstick glucose reviewed-   Recent Labs   Lab 10/09/23  2012 10/10/23  0802 10/10/23  1139   POCTGLUCOSE 249* 223* 253*     Current correctional scale  Low  Maintain anti-hyperglycemic dose as follows-   Antihyperglycemics (From admission, onward)    Start     Stop Route Frequency Ordered     10/09/23 2212  insulin aspart U-100 pen 0-10 Units         -- SubQ Before meals & nightly PRN 10/09/23 2113    10/09/23 2100  insulin detemir U-100 (Levemir) pen 10 Units         -- SubQ Nightly 10/09/23 1405        Hold Oral hypoglycemics while patient is in the hospital.    Pre op risk assessment: patient has been at baseline functional status, except for issues with pain.  Denies any dizziness, lightheadedness or syncopal episodes.  Denies any chest pain or dyspnea on exertion.  Unremarkable CXR.  EKG pending, but recent ones unremarkable.  Low risk for cardiopulmonary complications.      VTE Risk Mitigation (From admission, onward)         Ordered     enoxaparin injection 40 mg  Daily         10/09/23 1405     IP VTE HIGH RISK PATIENT  Once         10/09/23 1405     Place sequential compression device  Until discontinued         10/09/23 1405                Discharge Planning   TETE:      Code Status: Full Code   Is the patient medically ready for discharge?:     Reason for patient still in hospital (select all that apply): Patient trending condition and Treatment  Discharge Plan A: Home                  Mario Cardoza MD  Department of Hospital Medicine   [facility] Surg

## 2023-10-10 NOTE — NURSING
[facility] [place]  Nurses Note -- 4 Eyes      10/9/2023       Skin assessed on: Admit      [x] No Pressure Injuries Present    [x]Prevention Measures Documented    [] Yes LDA  for Pressure Injury Previously documented     [] Yes New Pressure Injury Discovered   [] LDA for New Pressure Injury Added      Attending RN:  Becki Rahman, RN     Second RN:  MARIANNE Layne

## 2023-10-10 NOTE — SUBJECTIVE & OBJECTIVE
Interval History: severe pain when moved.    Review of Systems   HENT:  Negative for ear discharge and ear pain.    Eyes:  Negative for discharge and itching.   Endocrine: Negative for cold intolerance and heat intolerance.   Neurological:  Negative for seizures and syncope.     Objective:     Vital Signs (Most Recent):  Temp: 99.3 °F (37.4 °C) (10/10/23 1138)  Pulse: 80 (10/10/23 1138)  Resp: 18 (10/10/23 1158)  BP: 122/77 (10/10/23 1138)  SpO2: 100 % (10/10/23 1138) Vital Signs (24h Range):  Temp:  [98 °F (36.7 °C)-99.3 °F (37.4 °C)] 99.3 °F (37.4 °C)  Pulse:  [68-92] 80  Resp:  [14-20] 18  SpO2:  [96 %-100 %] 100 %  BP: ()/(44-77) 122/77     Weight: 101.5 kg (223 lb 12.3 oz)  Body mass index is 36.12 kg/m².  No intake or output data in the 24 hours ending 10/10/23 1216      Physical Exam  Constitutional:       Appearance: Normal appearance.   HENT:      Head: Normocephalic and atraumatic.      Mouth/Throat:      Mouth: Mucous membranes are dry.      Pharynx: No oropharyngeal exudate or posterior oropharyngeal erythema.   Cardiovascular:      Rate and Rhythm: Normal rate and regular rhythm.   Pulmonary:      Effort: No respiratory distress.      Breath sounds: Normal breath sounds.   Abdominal:      General: Bowel sounds are normal.      Palpations: Abdomen is soft.   Musculoskeletal:         General: No deformity or signs of injury.   Skin:     General: Skin is warm and dry.   Neurological:      Mental Status: She is alert and oriented to person, place, and time.      Sensory: Sensory deficit (Decreased sensation to bilateral lower extremities left greater than right) present.      Motor: Weakness (slight decrease in strength to left lower extremity as compared to the right.) present.             Significant Labs: All pertinent labs within the past 24 hours have been reviewed.  BMP:   Recent Labs   Lab 10/09/23  1331   *      K 4.8      CO2 23   BUN 17   CREATININE 0.8   CALCIUM 9.1      CBC:   Recent Labs   Lab 10/09/23  1331   WBC 11.13   HGB 12.9   HCT 41.0   *       Significant Imaging: I have reviewed all pertinent imaging results/findings within the past 24 hours.

## 2023-10-10 NOTE — HOSPITAL COURSE
36 y/o female presents with worsening of chronic back pain.  Recent MRI showed continued disc extrusion at L4-L5 narrows the left subarticular zone and abuts the descending left L5 nerve.  Neurosurgery consulted.  Severe left L5 radiculopathy and left leg weakness due to recurrent herniated disc.  Recommending left L4/5 hemilaminotomy/microdiscectomy and partial left L5/S1 foraminotomy.  Procedure performed without complications on 10/12.  Patient still having pain, but LE weakness much improved.  She has remained afebrile and hemodynamically stable.  Patient will be discharged home to follow up with PCP and Neurosurgery.

## 2023-10-11 ENCOUNTER — ANESTHESIA (OUTPATIENT)
Dept: SURGERY | Facility: HOSPITAL | Age: 38
DRG: 519 | End: 2023-10-11
Payer: MEDICAID

## 2023-10-11 LAB
POCT GLUCOSE: 115 MG/DL (ref 70–110)
POCT GLUCOSE: 148 MG/DL (ref 70–110)
POCT GLUCOSE: 190 MG/DL (ref 70–110)
POCT GLUCOSE: 227 MG/DL (ref 70–110)

## 2023-10-11 PROCEDURE — 25000003 PHARM REV CODE 250: Performed by: NURSE PRACTITIONER

## 2023-10-11 PROCEDURE — 11000001 HC ACUTE MED/SURG PRIVATE ROOM

## 2023-10-11 PROCEDURE — 25000003 PHARM REV CODE 250: Performed by: HOSPITALIST

## 2023-10-11 PROCEDURE — 25000003 PHARM REV CODE 250: Performed by: PHYSICIAN ASSISTANT

## 2023-10-11 RX ADMIN — TIZANIDINE 4 MG: 4 TABLET ORAL at 05:10

## 2023-10-11 RX ADMIN — PREGABALIN 50 MG: 50 CAPSULE ORAL at 09:10

## 2023-10-11 RX ADMIN — HYDROCODONE BITARTRATE AND ACETAMINOPHEN 1 TABLET: 7.5; 325 TABLET ORAL at 08:10

## 2023-10-11 RX ADMIN — Medication 6 MG: at 09:10

## 2023-10-11 RX ADMIN — INSULIN ASPART 1 UNITS: 100 INJECTION, SOLUTION INTRAVENOUS; SUBCUTANEOUS at 08:10

## 2023-10-11 RX ADMIN — TIZANIDINE 4 MG: 4 TABLET ORAL at 01:10

## 2023-10-11 RX ADMIN — TIZANIDINE 4 MG: 4 TABLET ORAL at 09:10

## 2023-10-11 RX ADMIN — HYDROCODONE BITARTRATE AND ACETAMINOPHEN 1 TABLET: 7.5; 325 TABLET ORAL at 01:10

## 2023-10-11 RX ADMIN — ATORVASTATIN CALCIUM 40 MG: 40 TABLET, FILM COATED ORAL at 08:10

## 2023-10-11 RX ADMIN — HYDROCODONE BITARTRATE AND ACETAMINOPHEN 1 TABLET: 7.5; 325 TABLET ORAL at 05:10

## 2023-10-11 RX ADMIN — HYDROCODONE BITARTRATE AND ACETAMINOPHEN 1 TABLET: 7.5; 325 TABLET ORAL at 12:10

## 2023-10-11 RX ADMIN — INSULIN DETEMIR 10 UNITS: 100 INJECTION, SOLUTION SUBCUTANEOUS at 08:10

## 2023-10-11 RX ADMIN — PREGABALIN 50 MG: 50 CAPSULE ORAL at 08:10

## 2023-10-11 NOTE — NURSING
[facility] [place]  Nurses Note -- 4 Eyes      10/10/2023       Skin assessed on: Q Shift      [x] No Pressure Injuries Present    [x]Prevention Measures Documented    [] Yes LDA  for Pressure Injury Previously documented     [] Yes New Pressure Injury Discovered   [] LDA for New Pressure Injury Added      Attending RN:  Becki Rahman RN     Second RN:  MARIANNE Layne

## 2023-10-11 NOTE — ASSESSMENT & PLAN NOTE
Patient's FSGs are uncontrolled due to hyperglycemia on current medication regimen.  Last A1c reviewed-   Lab Results   Component Value Date    HGBA1C 8.8 (H) 09/25/2023     Most recent fingerstick glucose reviewed-   Recent Labs   Lab 10/10/23  1619 10/10/23  2017 10/11/23  0804 10/11/23  1128   POCTGLUCOSE 132* 199* 148* 115*     Current correctional scale  Low  Maintain anti-hyperglycemic dose as follows-   Antihyperglycemics (From admission, onward)    Start     Stop Route Frequency Ordered    10/09/23 2212  insulin aspart U-100 pen 0-10 Units         -- SubQ Before meals & nightly PRN 10/09/23 2113    10/09/23 2100  insulin detemir U-100 (Levemir) pen 10 Units         -- SubQ Nightly 10/09/23 1405        Hold Oral hypoglycemics while patient is in the hospital.

## 2023-10-11 NOTE — ASSESSMENT & PLAN NOTE
Appreciate Neurosurgery input.  Planning left L4/5 hemilaminotomy/microdiscectomy and partial left L5/S1 foraminotomy today.  Continue pain medications and muscle relaxers.

## 2023-10-11 NOTE — SUBJECTIVE & OBJECTIVE
Interval History: about the same.  Hungry     Review of Systems   HENT:  Negative for ear discharge and ear pain.    Eyes:  Negative for discharge and itching.   Endocrine: Negative for cold intolerance and heat intolerance.   Neurological:  Negative for seizures and syncope.     Objective:     Vital Signs (Most Recent):  Temp: 98.7 °F (37.1 °C) (10/11/23 1128)  Pulse: 76 (10/11/23 1128)  Resp: 15 (10/11/23 1128)  BP: 99/60 (10/11/23 1128)  SpO2: 100 % (10/11/23 1128) Vital Signs (24h Range):  Temp:  [98.2 °F (36.8 °C)-99 °F (37.2 °C)] 98.7 °F (37.1 °C)  Pulse:  [62-76] 76  Resp:  [15-20] 15  SpO2:  [98 %-100 %] 100 %  BP: ()/(57-76) 99/60     Weight: 101.5 kg (223 lb 12.3 oz)  Body mass index is 36.12 kg/m².    Intake/Output Summary (Last 24 hours) at 10/11/2023 1235  Last data filed at 10/11/2023 0827  Gross per 24 hour   Intake 0 ml   Output --   Net 0 ml         Physical Exam  Constitutional:       Appearance: Normal appearance.   HENT:      Head: Normocephalic and atraumatic.      Mouth/Throat:      Mouth: Mucous membranes are dry.      Pharynx: No oropharyngeal exudate or posterior oropharyngeal erythema.   Cardiovascular:      Rate and Rhythm: Normal rate and regular rhythm.   Pulmonary:      Effort: No respiratory distress.      Breath sounds: Normal breath sounds.   Abdominal:      General: Bowel sounds are normal.      Palpations: Abdomen is soft.   Musculoskeletal:         General: No deformity or signs of injury.   Skin:     General: Skin is warm and dry.   Neurological:      Mental Status: She is alert and oriented to person, place, and time.      Sensory: Sensory deficit (Decreased sensation to bilateral lower extremities left greater than right) present.      Motor: Weakness (slight decrease in strength to left lower extremity as compared to the right.) present.             Significant Labs: All pertinent labs within the past 24 hours have been reviewed.  BMP:   Recent Labs   Lab 10/09/23  6035    *      K 4.8      CO2 23   BUN 17   CREATININE 0.8   CALCIUM 9.1       CBC:   Recent Labs   Lab 10/09/23  1331   WBC 11.13   HGB 12.9   HCT 41.0   *         Significant Imaging: I have reviewed all pertinent imaging results/findings within the past 24 hours.

## 2023-10-11 NOTE — PROGRESS NOTES
[facility]  Progress Note    Patient Name: Lizzie Saunders  MRN: [id number]  Patient Class: OP- Observation   Admission Date: 10/9/2023  Length of Stay: 0 days  Attending Physician: Mario Cardoza MD  Primary Care Provider: Maxi Anna MD        Subjective:     Principal Problem:Lumbosacral radiculopathy at L5        HPI:   Lizzie Saunders is 37-year-old female with a past medical history of diabetes mellitus type 2, diabetic neuropathy, chronic back pain, and bipolar disorder who presents to the ED for evaluation worsening of lumbar pain which she describes as spasms and sharp shooting pain radiating to her left lower extremity.  She reports she did not take medication at home for relief.  She took Robaxin over the weekend however ran out of this medication.  She states she was prescribed Lyrica however has not started taking this medication.  She underwent an MRI of the lumbar spine  on 09/25 which resulted with L4 through L5 disc herniation and compression of the L5 nerve.  Patient is being followed by Dr. Pierre in neurosurgery and was evaluated in clinic on 09/27/2023.  She was taking steroids at that time which did alleviate her symptoms.  She reports progressive worsening of weakness of the left lower extremity  and uncontrolled pain over the last several weeks resulting in multiple ED visits.  Patient denies any fever, chest pain, shortness of breath, nausea, vomiting, diarrhea, numbness, tingling, saddle anesthesia, urinary retention, bowel incontinence.    In the ED she was evaluated by Neurosurgery who recommends resuming home Lyrica and Robaxin.  Patient potentially scheduled for surgery on Wednesday for diskectomy.            Overview/Hospital Course:  38 y/o female presents with worsening of chronic back pain.  Recent MRI showed continued disc extrusion at L4-L5 narrows the left subarticular zone and abuts the descending left L5 nerve.  Neurosurgery consulted.   Severe left L5 radiculopathy and left leg weakness due to recurrent herniated disc.  Recommending left L4/5 hemilaminotomy/microdiscectomy and partial left L5/S1 foraminotomy.      Interval History: about the same.  Hungry     Review of Systems   HENT:  Negative for ear discharge and ear pain.    Eyes:  Negative for discharge and itching.   Endocrine: Negative for cold intolerance and heat intolerance.   Neurological:  Negative for seizures and syncope.     Objective:     Vital Signs (Most Recent):  Temp: 98.7 °F (37.1 °C) (10/11/23 1128)  Pulse: 76 (10/11/23 1128)  Resp: 15 (10/11/23 1128)  BP: 99/60 (10/11/23 1128)  SpO2: 100 % (10/11/23 1128) Vital Signs (24h Range):  Temp:  [98.2 °F (36.8 °C)-99 °F (37.2 °C)] 98.7 °F (37.1 °C)  Pulse:  [62-76] 76  Resp:  [15-20] 15  SpO2:  [98 %-100 %] 100 %  BP: ()/(57-76) 99/60     Weight: 101.5 kg (223 lb 12.3 oz)  Body mass index is 36.12 kg/m².    Intake/Output Summary (Last 24 hours) at 10/11/2023 1235  Last data filed at 10/11/2023 0827  Gross per 24 hour   Intake 0 ml   Output --   Net 0 ml         Physical Exam  Constitutional:       Appearance: Normal appearance.   HENT:      Head: Normocephalic and atraumatic.      Mouth/Throat:      Mouth: Mucous membranes are dry.      Pharynx: No oropharyngeal exudate or posterior oropharyngeal erythema.   Cardiovascular:      Rate and Rhythm: Normal rate and regular rhythm.   Pulmonary:      Effort: No respiratory distress.      Breath sounds: Normal breath sounds.   Abdominal:      General: Bowel sounds are normal.      Palpations: Abdomen is soft.   Musculoskeletal:         General: No deformity or signs of injury.   Skin:     General: Skin is warm and dry.   Neurological:      Mental Status: She is alert and oriented to person, place, and time.      Sensory: Sensory deficit (Decreased sensation to bilateral lower extremities left greater than right) present.      Motor: Weakness (slight decrease in strength to left lower  extremity as compared to the right.) present.             Significant Labs: All pertinent labs within the past 24 hours have been reviewed.  BMP:   Recent Labs   Lab 10/09/23  1331   *      K 4.8      CO2 23   BUN 17   CREATININE 0.8   CALCIUM 9.1       CBC:   Recent Labs   Lab 10/09/23  1331   WBC 11.13   HGB 12.9   HCT 41.0   *         Significant Imaging: I have reviewed all pertinent imaging results/findings within the past 24 hours.      Assessment/Plan:      * Lumbosacral radiculopathy at L5  Appreciate Neurosurgery input.  Planning left L4/5 hemilaminotomy/microdiscectomy and partial left L5/S1 foraminotomy today.  Continue pain medications and muscle relaxers.        Tobacco dependence  Dangers of cigarette smoking were reviewed with patient in detail. Patient was Counseled for 10 minutes or greater. Nicotine replacement options were discussed. Nicotine replacement was discussed- prescribed    Bipolar affective disorder, current episode depressed  Patient has persistent depression which is mild and is currently controlled. Will Continue anti-depressant medications. We will not consult psychiatry at this time. Patient does not display psychosis at this time. Continue to monitor closely and adjust plan of care as needed.        Hyperlipidemia     Patient is chronically on statin.will continue for now. Monitor clinically. Last LDL was   Lab Results   Component Value Date    LDLCALC 180 (H) 02/23/2023          Type 2 diabetes mellitus with neurologic complication, with long-term current use of insulin  Patient's FSGs are uncontrolled due to hyperglycemia on current medication regimen.  Last A1c reviewed-   Lab Results   Component Value Date    HGBA1C 8.8 (H) 09/25/2023     Most recent fingerstick glucose reviewed-   Recent Labs   Lab 10/10/23  1619 10/10/23  2017 10/11/23  0804 10/11/23  1128   POCTGLUCOSE 132* 199* 148* 115*     Current correctional scale  Low  Maintain  anti-hyperglycemic dose as follows-   Antihyperglycemics (From admission, onward)    Start     Stop Route Frequency Ordered    10/09/23 2212  insulin aspart U-100 pen 0-10 Units         -- SubQ Before meals & nightly PRN 10/09/23 2113    10/09/23 2100  insulin detemir U-100 (Levemir) pen 10 Units         -- SubQ Nightly 10/09/23 1405        Hold Oral hypoglycemics while patient is in the hospital.          VTE Risk Mitigation (From admission, onward)         Ordered     IP VTE HIGH RISK PATIENT  Once         10/09/23 1405     Place sequential compression device  Until discontinued         10/09/23 1405                Discharge Planning   TETE:      Code Status: Full Code   Is the patient medically ready for discharge?:     Reason for patient still in hospital (select all that apply): Treatment  Discharge Plan A: Home                  Mario Cardoza MD  Department of Hospital Medicine   [facility]

## 2023-10-11 NOTE — ANESTHESIA PREPROCEDURE EVALUATION
10/11/2023  Lizzie Saunders is a 37 y.o., female.  To undergo Procedure(s) (LRB):  LAMINECTOMY, SPINE, LUMBAR, WITH DISCECTOMY (Left)       Past Medical History:  Past Medical History:   Diagnosis Date    Bipolar disorder     per patient report    Chronic pain     Depression     Diabetes mellitus     Diabetes mellitus, type 2     Driving safety issue     DRIVING AFTER RECEIVING DILAUDID INJECTION    Hx of psychiatric care     Neuropathy     per patient report    Psychiatric problem     Therapy        Past Surgical History:  Past Surgical History:   Procedure Laterality Date    BACK SURGERY      EYE SURGERY         Social History:  Social History     Socioeconomic History    Marital status:      Spouse name: Sonja Saunders    Number of children: 0   Occupational History     Comment: currently unemployed   Tobacco Use    Smoking status: Some Days     Current packs/day: 0.00     Types: Cigarettes     Last attempt to quit: 9/1/2020     Years since quitting: 3.1    Smokeless tobacco: Never   Substance and Sexual Activity    Alcohol use: Yes     Comment: rare    Drug use: Yes     Types: Marijuana     Comment: 10/9/23:Daily    Sexual activity: Yes     Partners: Male     Comment:    Social History Narrative    Patient tearful and anxious on admit; unable to answer all admit questions at this time. 2/3/17 @ 21:45     Social Determinants of Health     Financial Resource Strain: Low Risk  (9/25/2023)    Overall Financial Resource Strain (CARDIA)     Difficulty of Paying Living Expenses: Not hard at all   Food Insecurity: No Food Insecurity (9/25/2023)    Hunger Vital Sign     Worried About Running Out of Food in the Last Year: Never true     Ran Out of Food in the Last Year: Never true   Transportation Needs: No Transportation Needs (9/25/2023)    PRAPARE - Transportation     Lack of Transportation (Medical): No     Lack of Transportation (Non-Medical): No   Physical Activity:  Sufficiently Active (9/25/2023)    Exercise Vital Sign     Days of Exercise per Week: 3 days     Minutes of Exercise per Session: 60 min   Stress: Stress Concern Present (9/25/2023)    [employer] - Occupational Stress Questionnaire     Feeling of Stress : To some extent   Social Connections: Socially Isolated (9/25/2023)    Social Connection and Isolation Panel [NHANES]     Frequency of Communication with Friends and Family: Three times a week     Frequency of Social Gatherings with Friends and Family: More than three times a week     Attends Quaker Services: Never     Active Member of Clubs or Organizations: No     Attends Club or Organization Meetings: Never     Marital Status:    Housing Stability: Low Risk  (9/25/2023)    Housing Stability Vital Sign     Unable to Pay for Housing in the Last Year: No     Number of Places Lived in the Last Year: 1     Unstable Housing in the Last Year: No       Medications:  No current facility-administered medications on file prior to encounter.     Current Outpatient Medications on File Prior to Encounter   Medication Sig Dispense Refill    acetaminophen (TYLENOL) 650 MG TbSR Take 1 tablet (650 mg total) by mouth every 6 (six) hours as needed. 30 tablet 0    AJOVY AUTOINJECTOR 225 mg/1.5 mL autoinjector INJECT UNDER THE SKIN EVERY MONTH      atorvastatin (LIPITOR) 40 MG tablet Take 1 tablet (40 mg total) by mouth every evening. 90 tablet 1    blood sugar diagnostic Strp To check BG two times daily, to use with insurance preferred meter 50 strip 5    blood-glucose meter kit To check BG two  times daily, to use with insurance preferred meter 1 each 0    diclofenac (VOLTAREN) 75 MG EC tablet Take 1 tablet (75 mg total) by mouth 2 (two) times daily as needed (pain). 20 tablet 0    dulaglutide (TRULICITY) 1.5 mg/0.5 mL pen injector INJECT 1.5 MILLIGRAMS INTO THE SKIN EVERY 7 DAYS 4 pen 5    famotidine (PEPCID) 20 MG tablet  Take 1 tablet (20 mg total) by mouth 2 (two) times daily. for 7 days 14 tablet 0    HYDROcodone-acetaminophen (NORCO) 7.5-325 mg per tablet Take 1 tablet by mouth every 6 (six) hours as needed for Pain. 12 tablet 0    insulin (LANTUS SOLOSTAR U-100 INSULIN) glargine 100 units/mL SubQ pen Inject 20 Units into the skin every evening. 6 mL 5    lancets Misc To check BG two times daily, to use with insurance preferred meter 50 each 5    LIDOcaine (LIDODERM) 5 % 1 patch daily as needed.      LIDOcaine (LIDODERM) 5 % Place 1 patch onto the skin once daily. Remove & Discard patch within 12 hours or as directed by MD 15 patch 0    metFORMIN (GLUCOPHAGE-XR) 500 MG ER 24hr tablet Take 2 tablets (1,000 mg total) by mouth daily with breakfast. 60 tablet 5    methylPREDNISolone (MEDROL DOSEPACK) 4 mg tablet Take as directed for pain and inflammation. Monitor your blood sugar closely while taking this medication and be sure to contact your PCP for blood glucose over 250. Take your home medications as directed. 21 tablet 0    metoclopramide HCl (REGLAN) 10 MG tablet Take 1 tablet (10 mg total) by mouth 3 (three) times daily as needed (nausea/vomiting, abdominal pain). 30 tablet 0    ondansetron (ZOFRAN-ODT) 4 MG TbDL Take 1 tablet (4 mg total) by mouth every 6 (six) hours as needed (nausea). 12 tablet 0    pregabalin (LYRICA) 25 MG capsule Take 25 mg by mouth 2 (two) times daily.      rizatriptan (MAXALT) 10 MG tablet Take 10 mg by mouth once as needed for Migraine.      tiZANidine (ZANAFLEX) 4 MG tablet Take 1 tablet (4 mg total) by mouth every 8 (eight) hours. Take 1 po q 8 hrs prn muscle spasm 20 tablet 0       Allergies:  Review of patient's allergies indicates:   Allergen Reactions    Beatriz Anaphylaxis    Pecan nut Dermatitis    Sulfa (sulfonamide antibiotics) Itching       Active Problems:  Patient Active Problem List   Diagnosis    Type 2 diabetes mellitus with neurologic complication, with long-term  current use of insulin    History of tobacco use    Obesity    Hyperlipidemia    Hx of migraines    History of PCOS    Adjustment disorder with mixed disturbance of emotions and conduct    Bipolar affective disorder, current episode depressed    Thrombocytosis, unspecified    Cellulitis of right lower extremity    Itching    Left lumbar radiculopathy    Lumbar disc herniation    Lumbosacral radiculopathy at L5    Tobacco dependence       Diagnostic Studies:   Latest Reference Range & Units 10/09/23 13:31   WBC 3.90 - 12.70 K/uL 11.13   RBC 4.00 - 5.40 M/uL 4.64   Hemoglobin 12.0 - 16.0 g/dL 12.9   Hematocrit 37.0 - 48.5 % 41.0   MCV 82 - 98 fL 88   MCH 27.0 - 31.0 pg 27.8   MCHC 32.0 - 36.0 g/dL 31.5 (L)   RDW 11.5 - 14.5 % 13.3   Platelet Count 150 - 450 K/uL 481 (H)   MPV 9.2 - 12.9 fL 9.1 (L)   Gran % 38.0 - 73.0 % 73.7 (H)      Latest Reference Range & Units 10/09/23 13:31   Sodium 136 - 145 mmol/L 139   Potassium 3.5 - 5.1 mmol/L 4.8   Chloride 95 - 110 mmol/L 104   CO2 23 - 29 mmol/L 23   Anion Gap 8 - 16 mmol/L 12   BUN 6 - 20 mg/dL 17   Creatinine 0.5 - 1.4 mg/dL 0.8     EKG (9/24/23):  ST    24 Hour Vitals:  Temp:  [36.8 °C (98.2 °F)-37.4 °C (99.3 °F)] 36.9 °C (98.5 °F)  Pulse:  [62-80] 68  Resp:  [14-20] 18  SpO2:  [98 %-100 %] 100 %  BP: ()/(57-77) 104/58   See Nursing Charting For Additional Vitals      Pre-op Assessment    I have reviewed the Patient Summary Reports.     I have reviewed the Nursing Notes. I have reviewed the NPO Status.   I have reviewed the Medications.     Review of Systems  Anesthesia Hx:  No problems with previous Anesthesia  Denies Family Hx of Anesthesia complications.   Denies Personal Hx of Anesthesia complications.   Social:  Smoker, Social Alcohol Use    Hematology/Oncology:  Hematology Normal        EENT/Dental:EENT/Dental Normal   Cardiovascular:   hyperlipidemia ECG has been reviewed.    Pulmonary:  Pulmonary Normal    Hepatic/GI:  Hepatic/GI Normal     Musculoskeletal:  Spine Disorders: lumbar  Lumbar Spine Disorders, Lumbar Disc Disease, Radiculopathy   Neurological:   Headaches    Endocrine:   Diabetes, type 2 Trulicity Obesity / BMI > 30  Psych:   Psychiatric History depression          Physical Exam  General: Cooperative, Alert, Oriented and Cachexia    Airway:  Mallampati: / I  Mouth Opening: Normal  TM Distance: Normal  Tongue: Normal  Neck ROM: Normal ROM    Dental:Multiple missing teeth      Anesthesia Plan  Type of Anesthesia, risks & benefits discussed:    Anesthesia Type: Gen ETT  Intra-op Monitoring Plan: Standard ASA Monitors  Post Op Pain Control Plan: multimodal analgesia  Induction:  IV  Airway Plan: Video, Post-Induction  Informed Consent: Informed consent signed with the Patient and all parties understand the risks and agree with anesthesia plan.  All questions answered. Patient consented to blood products? No  ASA Score: 2    Ready For Surgery From Anesthesia Perspective.     .

## 2023-10-12 PROBLEM — Z98.890 S/P LUMBAR MICRODISCECTOMY: Status: ACTIVE | Noted: 2023-10-12

## 2023-10-12 LAB
POCT GLUCOSE: 142 MG/DL (ref 70–110)
POCT GLUCOSE: 156 MG/DL (ref 70–110)
POCT GLUCOSE: 267 MG/DL (ref 70–110)

## 2023-10-12 PROCEDURE — 63600175 PHARM REV CODE 636 W HCPCS: Performed by: ANESTHESIOLOGY

## 2023-10-12 PROCEDURE — 63600175 PHARM REV CODE 636 W HCPCS: Performed by: STUDENT IN AN ORGANIZED HEALTH CARE EDUCATION/TRAINING PROGRAM

## 2023-10-12 PROCEDURE — D9220A PRA ANESTHESIA: ICD-10-PCS | Mod: ANES,,, | Performed by: ANESTHESIOLOGY

## 2023-10-12 PROCEDURE — 27201423 OPTIME MED/SURG SUP & DEVICES STERILE SUPPLY: Performed by: STUDENT IN AN ORGANIZED HEALTH CARE EDUCATION/TRAINING PROGRAM

## 2023-10-12 PROCEDURE — 25000003 PHARM REV CODE 250: Performed by: PHYSICIAN ASSISTANT

## 2023-10-12 PROCEDURE — 63030 LAMOT DCMPRN NRV RT 1 LMBR: CPT | Mod: 59,AS,LT, | Performed by: PHYSICIAN ASSISTANT

## 2023-10-12 PROCEDURE — 25000003 PHARM REV CODE 250: Performed by: NURSE PRACTITIONER

## 2023-10-12 PROCEDURE — 71000033 HC RECOVERY, INTIAL HOUR: Performed by: STUDENT IN AN ORGANIZED HEALTH CARE EDUCATION/TRAINING PROGRAM

## 2023-10-12 PROCEDURE — 25000003 PHARM REV CODE 250: Performed by: STUDENT IN AN ORGANIZED HEALTH CARE EDUCATION/TRAINING PROGRAM

## 2023-10-12 PROCEDURE — 63047 PR LAMINEC/FACETECT/FORAMIN,LUMBAR 1 SEG: ICD-10-PCS | Mod: 22,,, | Performed by: STUDENT IN AN ORGANIZED HEALTH CARE EDUCATION/TRAINING PROGRAM

## 2023-10-12 PROCEDURE — 25000003 PHARM REV CODE 250: Performed by: NURSE ANESTHETIST, CERTIFIED REGISTERED

## 2023-10-12 PROCEDURE — 25000003 PHARM REV CODE 250: Performed by: ANESTHESIOLOGY

## 2023-10-12 PROCEDURE — 63600175 PHARM REV CODE 636 W HCPCS: Performed by: PHYSICIAN ASSISTANT

## 2023-10-12 PROCEDURE — 63600175 PHARM REV CODE 636 W HCPCS: Performed by: NURSE ANESTHETIST, CERTIFIED REGISTERED

## 2023-10-12 PROCEDURE — 71000039 HC RECOVERY, EACH ADD'L HOUR: Performed by: STUDENT IN AN ORGANIZED HEALTH CARE EDUCATION/TRAINING PROGRAM

## 2023-10-12 PROCEDURE — 63030 PR LAMINOTOMY,LUMBAR DISK,1 INTRSP: ICD-10-PCS | Mod: 59,AS,LT, | Performed by: PHYSICIAN ASSISTANT

## 2023-10-12 PROCEDURE — 63047 PR LAMINEC/FACETECT/FORAMIN,LUMBAR 1 SEG: ICD-10-PCS | Mod: 22,AS,, | Performed by: PHYSICIAN ASSISTANT

## 2023-10-12 PROCEDURE — 99221 PR INITIAL HOSPITAL CARE,LEVL I: ICD-10-PCS | Mod: ,,, | Performed by: STUDENT IN AN ORGANIZED HEALTH CARE EDUCATION/TRAINING PROGRAM

## 2023-10-12 PROCEDURE — D9220A PRA ANESTHESIA: Mod: ANES,,, | Performed by: ANESTHESIOLOGY

## 2023-10-12 PROCEDURE — D9220A PRA ANESTHESIA: Mod: CRNA,,, | Performed by: NURSE ANESTHETIST, CERTIFIED REGISTERED

## 2023-10-12 PROCEDURE — 36000711: Performed by: STUDENT IN AN ORGANIZED HEALTH CARE EDUCATION/TRAINING PROGRAM

## 2023-10-12 PROCEDURE — 37000009 HC ANESTHESIA EA ADD 15 MINS: Performed by: STUDENT IN AN ORGANIZED HEALTH CARE EDUCATION/TRAINING PROGRAM

## 2023-10-12 PROCEDURE — 37000008 HC ANESTHESIA 1ST 15 MINUTES: Performed by: STUDENT IN AN ORGANIZED HEALTH CARE EDUCATION/TRAINING PROGRAM

## 2023-10-12 PROCEDURE — D9220A PRA ANESTHESIA: ICD-10-PCS | Mod: CRNA,,, | Performed by: NURSE ANESTHETIST, CERTIFIED REGISTERED

## 2023-10-12 PROCEDURE — 63047 LAM FACETEC & FORAMOT LUMBAR: CPT | Mod: 22,AS,, | Performed by: PHYSICIAN ASSISTANT

## 2023-10-12 PROCEDURE — 25000003 PHARM REV CODE 250: Performed by: HOSPITALIST

## 2023-10-12 PROCEDURE — 63030 PR LAMINOTOMY,LUMBAR DISK,1 INTRSP: ICD-10-PCS | Mod: 59,LT,, | Performed by: STUDENT IN AN ORGANIZED HEALTH CARE EDUCATION/TRAINING PROGRAM

## 2023-10-12 PROCEDURE — 11000001 HC ACUTE MED/SURG PRIVATE ROOM

## 2023-10-12 PROCEDURE — 99221 1ST HOSP IP/OBS SF/LOW 40: CPT | Mod: ,,, | Performed by: STUDENT IN AN ORGANIZED HEALTH CARE EDUCATION/TRAINING PROGRAM

## 2023-10-12 PROCEDURE — 63030 LAMOT DCMPRN NRV RT 1 LMBR: CPT | Mod: 59,LT,, | Performed by: STUDENT IN AN ORGANIZED HEALTH CARE EDUCATION/TRAINING PROGRAM

## 2023-10-12 PROCEDURE — 36000710: Performed by: STUDENT IN AN ORGANIZED HEALTH CARE EDUCATION/TRAINING PROGRAM

## 2023-10-12 PROCEDURE — 63047 LAM FACETEC & FORAMOT LUMBAR: CPT | Mod: 22,,, | Performed by: STUDENT IN AN ORGANIZED HEALTH CARE EDUCATION/TRAINING PROGRAM

## 2023-10-12 RX ORDER — CEFAZOLIN SODIUM 2 G/50ML
2 SOLUTION INTRAVENOUS ONCE
Status: COMPLETED | OUTPATIENT
Start: 2023-10-12 | End: 2023-10-12

## 2023-10-12 RX ORDER — ACETAMINOPHEN 500 MG
1000 TABLET ORAL
Status: DISCONTINUED | OUTPATIENT
Start: 2023-10-12 | End: 2023-10-12 | Stop reason: HOSPADM

## 2023-10-12 RX ORDER — METHYLPREDNISOLONE ACETATE 40 MG/ML
INJECTION, SUSPENSION INTRA-ARTICULAR; INTRALESIONAL; INTRAMUSCULAR; SOFT TISSUE
Status: DISCONTINUED | OUTPATIENT
Start: 2023-10-12 | End: 2023-10-12 | Stop reason: HOSPADM

## 2023-10-12 RX ORDER — DOCUSATE SODIUM 100 MG/1
100 CAPSULE, LIQUID FILLED ORAL DAILY
Status: DISCONTINUED | OUTPATIENT
Start: 2023-10-13 | End: 2023-10-13 | Stop reason: HOSPADM

## 2023-10-12 RX ORDER — SODIUM CHLORIDE, SODIUM LACTATE, POTASSIUM CHLORIDE, CALCIUM CHLORIDE 600; 310; 30; 20 MG/100ML; MG/100ML; MG/100ML; MG/100ML
INJECTION, SOLUTION INTRAVENOUS CONTINUOUS
Status: DISCONTINUED | OUTPATIENT
Start: 2023-10-12 | End: 2023-10-13 | Stop reason: HOSPADM

## 2023-10-12 RX ORDER — BUPIVACAINE HYDROCHLORIDE AND EPINEPHRINE 5; 5 MG/ML; UG/ML
INJECTION, SOLUTION EPIDURAL; INTRACAUDAL; PERINEURAL
Status: DISCONTINUED | OUTPATIENT
Start: 2023-10-12 | End: 2023-10-12 | Stop reason: HOSPADM

## 2023-10-12 RX ORDER — PROPOFOL 10 MG/ML
VIAL (ML) INTRAVENOUS
Status: DISCONTINUED | OUTPATIENT
Start: 2023-10-12 | End: 2023-10-12

## 2023-10-12 RX ORDER — EPHEDRINE SULFATE 50 MG/ML
INJECTION, SOLUTION INTRAVENOUS
Status: DISCONTINUED | OUTPATIENT
Start: 2023-10-12 | End: 2023-10-12

## 2023-10-12 RX ORDER — PHENYLEPHRINE HYDROCHLORIDE 10 MG/ML
INJECTION INTRAVENOUS
Status: DISCONTINUED | OUTPATIENT
Start: 2023-10-12 | End: 2023-10-12

## 2023-10-12 RX ORDER — ONDANSETRON 2 MG/ML
INJECTION INTRAMUSCULAR; INTRAVENOUS
Status: DISCONTINUED | OUTPATIENT
Start: 2023-10-12 | End: 2023-10-12

## 2023-10-12 RX ORDER — LIDOCAINE HYDROCHLORIDE 10 MG/ML
1 INJECTION, SOLUTION EPIDURAL; INFILTRATION; INTRACAUDAL; PERINEURAL ONCE
Status: COMPLETED | OUTPATIENT
Start: 2023-10-12 | End: 2023-10-12

## 2023-10-12 RX ORDER — HYDROMORPHONE HYDROCHLORIDE 2 MG/ML
0.2 INJECTION, SOLUTION INTRAMUSCULAR; INTRAVENOUS; SUBCUTANEOUS EVERY 5 MIN PRN
Status: DISCONTINUED | OUTPATIENT
Start: 2023-10-12 | End: 2023-10-12 | Stop reason: HOSPADM

## 2023-10-12 RX ORDER — MUPIROCIN 20 MG/G
OINTMENT TOPICAL 2 TIMES DAILY
Status: DISCONTINUED | OUTPATIENT
Start: 2023-10-12 | End: 2023-10-13 | Stop reason: HOSPADM

## 2023-10-12 RX ORDER — CEFAZOLIN SODIUM 2 G/50ML
2 SOLUTION INTRAVENOUS
Status: COMPLETED | OUTPATIENT
Start: 2023-10-12 | End: 2023-10-13

## 2023-10-12 RX ORDER — ROCURONIUM BROMIDE 10 MG/ML
INJECTION, SOLUTION INTRAVENOUS
Status: DISCONTINUED | OUTPATIENT
Start: 2023-10-12 | End: 2023-10-12

## 2023-10-12 RX ORDER — MIDAZOLAM HYDROCHLORIDE 1 MG/ML
INJECTION, SOLUTION INTRAMUSCULAR; INTRAVENOUS
Status: DISCONTINUED | OUTPATIENT
Start: 2023-10-12 | End: 2023-10-12

## 2023-10-12 RX ORDER — LIDOCAINE HYDROCHLORIDE 20 MG/ML
INJECTION INTRAVENOUS
Status: DISCONTINUED | OUTPATIENT
Start: 2023-10-12 | End: 2023-10-12

## 2023-10-12 RX ORDER — SCOLOPAMINE TRANSDERMAL SYSTEM 1 MG/1
PATCH, EXTENDED RELEASE TRANSDERMAL
Status: DISCONTINUED | OUTPATIENT
Start: 2023-10-12 | End: 2023-10-12

## 2023-10-12 RX ORDER — HEPARIN SODIUM 5000 [USP'U]/ML
5000 INJECTION, SOLUTION INTRAVENOUS; SUBCUTANEOUS EVERY 8 HOURS
Status: DISCONTINUED | OUTPATIENT
Start: 2023-10-13 | End: 2023-10-13 | Stop reason: HOSPADM

## 2023-10-12 RX ORDER — DEXAMETHASONE SODIUM PHOSPHATE 4 MG/ML
INJECTION, SOLUTION INTRA-ARTICULAR; INTRALESIONAL; INTRAMUSCULAR; INTRAVENOUS; SOFT TISSUE
Status: DISCONTINUED | OUTPATIENT
Start: 2023-10-12 | End: 2023-10-12

## 2023-10-12 RX ORDER — SODIUM CHLORIDE 0.9 % (FLUSH) 0.9 %
10 SYRINGE (ML) INJECTION
Status: DISCONTINUED | OUTPATIENT
Start: 2023-10-12 | End: 2023-10-12 | Stop reason: HOSPADM

## 2023-10-12 RX ORDER — BISACODYL 10 MG
10 SUPPOSITORY, RECTAL RECTAL DAILY PRN
Status: DISCONTINUED | OUTPATIENT
Start: 2023-10-12 | End: 2023-10-13 | Stop reason: HOSPADM

## 2023-10-12 RX ORDER — FENTANYL CITRATE 50 UG/ML
INJECTION, SOLUTION INTRAMUSCULAR; INTRAVENOUS
Status: DISCONTINUED | OUTPATIENT
Start: 2023-10-12 | End: 2023-10-12

## 2023-10-12 RX ORDER — METHOCARBAMOL 750 MG/1
750 TABLET, FILM COATED ORAL EVERY 8 HOURS PRN
Status: DISCONTINUED | OUTPATIENT
Start: 2023-10-12 | End: 2023-10-13 | Stop reason: HOSPADM

## 2023-10-12 RX ADMIN — PHENYLEPHRINE HYDROCHLORIDE 100 MCG: 10 INJECTION INTRAVENOUS at 03:10

## 2023-10-12 RX ADMIN — LIDOCAINE HYDROCHLORIDE 10 MG: 10 INJECTION, SOLUTION EPIDURAL; INFILTRATION; INTRACAUDAL; PERINEURAL at 07:10

## 2023-10-12 RX ADMIN — EPHEDRINE SULFATE 5 MG: 50 INJECTION INTRAVENOUS at 03:10

## 2023-10-12 RX ADMIN — HYDROMORPHONE HYDROCHLORIDE 0.2 MG: 2 INJECTION, SOLUTION INTRAMUSCULAR; INTRAVENOUS; SUBCUTANEOUS at 06:10

## 2023-10-12 RX ADMIN — HYDROCODONE BITARTRATE AND ACETAMINOPHEN 1 TABLET: 7.5; 325 TABLET ORAL at 10:10

## 2023-10-12 RX ADMIN — SODIUM CHLORIDE, SODIUM LACTATE, POTASSIUM CHLORIDE, AND CALCIUM CHLORIDE: .6; .31; .03; .02 INJECTION, SOLUTION INTRAVENOUS at 02:10

## 2023-10-12 RX ADMIN — ONDANSETRON 4 MG: 2 INJECTION, SOLUTION INTRAMUSCULAR; INTRAVENOUS at 04:10

## 2023-10-12 RX ADMIN — FENTANYL CITRATE 50 MCG: 50 INJECTION, SOLUTION INTRAMUSCULAR; INTRAVENOUS at 04:10

## 2023-10-12 RX ADMIN — ATORVASTATIN CALCIUM 40 MG: 40 TABLET, FILM COATED ORAL at 09:10

## 2023-10-12 RX ADMIN — ROCURONIUM BROMIDE 10 MG: 10 INJECTION, SOLUTION INTRAVENOUS at 04:10

## 2023-10-12 RX ADMIN — PREGABALIN 50 MG: 50 CAPSULE ORAL at 09:10

## 2023-10-12 RX ADMIN — SUGAMMADEX 200 MG: 100 INJECTION, SOLUTION INTRAVENOUS at 05:10

## 2023-10-12 RX ADMIN — INSULIN DETEMIR 10 UNITS: 100 INJECTION, SOLUTION SUBCUTANEOUS at 09:10

## 2023-10-12 RX ADMIN — FENTANYL CITRATE 100 MCG: 50 INJECTION, SOLUTION INTRAMUSCULAR; INTRAVENOUS at 02:10

## 2023-10-12 RX ADMIN — CEFAZOLIN SODIUM 2 G: 2 SOLUTION INTRAVENOUS at 10:10

## 2023-10-12 RX ADMIN — PHENYLEPHRINE HYDROCHLORIDE 200 MCG: 10 INJECTION INTRAVENOUS at 03:10

## 2023-10-12 RX ADMIN — MIDAZOLAM HYDROCHLORIDE 2 MG: 1 INJECTION, SOLUTION INTRAMUSCULAR; INTRAVENOUS at 02:10

## 2023-10-12 RX ADMIN — HYDROCODONE BITARTRATE AND ACETAMINOPHEN 1 TABLET: 7.5; 325 TABLET ORAL at 04:10

## 2023-10-12 RX ADMIN — LIDOCAINE HYDROCHLORIDE 100 MG: 20 INJECTION, SOLUTION INTRAVENOUS at 02:10

## 2023-10-12 RX ADMIN — MIDAZOLAM HYDROCHLORIDE 2 MG: 1 INJECTION, SOLUTION INTRAMUSCULAR; INTRAVENOUS at 04:10

## 2023-10-12 RX ADMIN — ROCURONIUM BROMIDE 50 MG: 10 INJECTION, SOLUTION INTRAVENOUS at 02:10

## 2023-10-12 RX ADMIN — ROCURONIUM BROMIDE 30 MG: 10 INJECTION, SOLUTION INTRAVENOUS at 03:10

## 2023-10-12 RX ADMIN — MUPIROCIN: 20 OINTMENT TOPICAL at 09:10

## 2023-10-12 RX ADMIN — SCOPALAMINE 1 PATCH: 1 PATCH, EXTENDED RELEASE TRANSDERMAL at 01:10

## 2023-10-12 RX ADMIN — GLYCOPYRROLATE 0.2 MG: 0.2 INJECTION, SOLUTION INTRAMUSCULAR; INTRAVITREAL at 01:10

## 2023-10-12 RX ADMIN — TIZANIDINE 4 MG: 4 TABLET ORAL at 10:10

## 2023-10-12 RX ADMIN — INSULIN ASPART 2 UNITS: 100 INJECTION, SOLUTION INTRAVENOUS; SUBCUTANEOUS at 09:10

## 2023-10-12 RX ADMIN — PROPOFOL 200 MG: 10 INJECTION, EMULSION INTRAVENOUS at 02:10

## 2023-10-12 RX ADMIN — EPHEDRINE SULFATE 5 MG: 50 INJECTION INTRAVENOUS at 04:10

## 2023-10-12 RX ADMIN — PHENYLEPHRINE HYDROCHLORIDE 100 MCG: 10 INJECTION INTRAVENOUS at 02:10

## 2023-10-12 RX ADMIN — TIZANIDINE 4 MG: 4 TABLET ORAL at 05:10

## 2023-10-12 RX ADMIN — SODIUM CHLORIDE, SODIUM LACTATE, POTASSIUM CHLORIDE, AND CALCIUM CHLORIDE: .6; .31; .03; .02 INJECTION, SOLUTION INTRAVENOUS at 03:10

## 2023-10-12 RX ADMIN — CEFAZOLIN SODIUM 2 G: 2 SOLUTION INTRAVENOUS at 02:10

## 2023-10-12 RX ADMIN — DEXAMETHASONE SODIUM PHOSPHATE 4 MG: 4 INJECTION, SOLUTION INTRAMUSCULAR; INTRAVENOUS at 02:10

## 2023-10-12 RX ADMIN — Medication 6 MG: at 10:10

## 2023-10-12 RX ADMIN — SODIUM CHLORIDE, POTASSIUM CHLORIDE, SODIUM LACTATE AND CALCIUM CHLORIDE: 600; 310; 30; 20 INJECTION, SOLUTION INTRAVENOUS at 10:10

## 2023-10-12 NOTE — PROGRESS NOTES
[facility]  Neurosurgery    SUBJECTIVE:     History of Present Illness:  Had a rough night, leg was bothering her a good bit    OBJECTIVE:     Vital Signs (Most Recent):  Temp: 98.7 °F (37.1 °C) (10/12/23 0735)  Pulse: 67 (10/12/23 0735)  Resp: 20 (10/12/23 0735)  BP: 113/64 (10/12/23 0735)  SpO2: 98 % (10/12/23 0735)    Physical Exam:  General: well developed, well nourished, no distress  Sensory: Has numbness and tingling in an L5 distribution on the left side as compared to the right.  Motor Strength: Moves all extremities spontaneously with good tone.  Left leg notably weaker than the right  Highly positive straight leg raise on the left    Strength  Deltoids Triceps Biceps Wrist Extension Wrist Flexion Hand  FA   Upper: R 5/5 5/5 5/5 5/5 5/5 5/5 5/5    L 5/5 5/5 5/5 5/5 5/5 5/5 5/5     Iliopsoas Quadriceps Knee  Flexion Tibialis  anterior Gastro- cnemius EHL    Lower: R 5/5 5/5 5/5 5/5 5/5 5/5     L 4/5 4/5 4/5 4-/5 4/5 4-/5      DTR's - diminished patellar reflexes bilaterally    Diagnostic Results:  I have personally reviewed imaging. My impression is as follows.    MRI: I reviewed MRIs on this patient from 2017, 2020, 2023.  She had a left paracentral disc in 2017 which was caudally extruded.  In 2020 this had returned but there is still some space in the left lateral recess for the L5 nerve root.  Now, on the MRI dated 09/25/2023, she has a left lateral recess herniated disc at L4-5 which is caudally extruded, and follows the left L5 nerve root partially out into the left-sided L5 5 S1 foramen. So, her Left L5 root is being compressed in the lateral recess and in the foramen.     Patient also had a CT of the pelvis which was performed in September of 2023 which shows there is a small bony irregularity at the site of the past laminectomy but most of the lamina is still completely intact.       ASSESSMENT/PLAN:     Lizzie Saunders is a 37 y.o. female who presents with severe left L5  radiculopathy and left leg weakness due to recurrent herniated disc.  #severe recurrent Left L5 radiculopathy due to Left L4/5 herniated disc with leg weakness  -patient's strength is slightly worse this morning, likely related to pain  -preop labs reviewed, acceptable  -NPO  -plan for left L4/5 hemilaminotomy/microdiscectomy and partial left L5/S1 foraminotomy today  -will be stable for discharge after surgery    Boom DOUGLAS Mangham [facility]  Department of Neurosurgery  [phone]    Disclaimer: This note was dictated by speech recognition. Minor errors in transcription may be present.  Please call with any questions.

## 2023-10-12 NOTE — OP NOTE
[facility] [place] - Surgery  Neurosurgery  Operative Note    OP Note      Date of Procedure: 10/12/2023       Pre-Operative Diagnosis: Lumbosacral radiculopathy at L5 [M54.17]    Post-Operative Diagnosis: Post-Op Diagnosis Codes:     * Lumbosacral radiculopathy at L5 [M54.17]    Anesthesia: General    Procedures performed:  1) left-sided L4/5 hemilaminotomy, left L4/5 medial facetectomy, left L5/S1 foraminotomy, left L4/5 microdiskectomy for decompression of the left L5 nerve root  2) use of the operative microscope   3) microscopic lysis of scar tissue and adhesions from prior surgery- modifier 22 should be applied    Surgeon: Boom Pierre MD    Assistant:: DEMARCUS Cordon  A qualified resident was not available to assist with this procedure.     Indication for Procedure:  Patient is a 37-year-old female who has a history of a left-sided L4-5 hemilaminectomy and microdiskectomy which was performed roughly 2 years earlier at a different facility.  She presented with roughly 1 month foot weakness on the left side as well as intense leg pain in an L5 distribution.  She presented 13 times to the emergency department as a result of the intense pain and weakness in the foot.  Due to her footdrop, we felt that further conservative management would likely be detrimental to this patient's nerve. Risks, benefits, indications, alternatives were discussed with the patient including but not limited to bleeding, CSF leak, nerve damage, foot weakness, failure to improve.  Despite these risks patient wished to proceed with surgery so signed informed consent was obtained.    Operative Note:  Patient was identified with 2 independent patient identifiers.  She was taken to the operating room where general endotracheal anesthesia was induced without any problems.  She was turned prone onto a James table with a Maximo frame.  All pressure points were padded appropriately.  Arms were the up position.  Her skin was cleansed with a  ChloraPrep which was allowed to dry completely.  The skin was then cleansed again with rubbing alcohol and DuraPrep x2.  The patient was then draped in the usual sterile manner.  A time-out was held identifying the correct patient side site and procedure to be performed.  All parties agreed imaging was displayed.  After the time-out the fluoro unit was brought in to the AP orientation and we used an AP x-ray to zhang out the exact midline of the spine.  We then switched to a lateral fluoro and used a 20 gauge spinal needle to identify the L4/5 disc space on lateral.  We fashioned a 2 cm incision 1.5 cm to the left of midline centered on the L4-5 disc space.  Local anesthetic was injected subcutaneously.  The skin was then opened with a 15. Blade knife.  Bovie was used to take this down to the muscular fascia.  A pair of Villalba scissors was then used to spread open the fascia further.  We then used sequential dilation from the metrx tubes to dilate up to an 18 mm tube.  We chose a 5 cm deep tube in place this into the appropriate position under lateral fluoroscopy.  The flex arm was then attached to the table and attached the metrics tube.  The operative microscope was then brought into the field for magnification illumination in the use of microsurgical techniques.  The Bovie was used to remove the muscle from the medial portion of the SP of L4, the lamina of L4, and the medial L4/5 facet.  The Midas Neeraj high-speed drill was then used to perform a hemilaminotomy on the left at L4 as well as a medial L4/5 medial facetectomy.  There was a good amount of scar tissue tethering the entire thecal sac as well as the traversing L5 nerve in this area.  We did identify some normal ligamentum flavum which was both medial, lateral, and superior to the area of scar tissue as a result of the last surgery.  We freed up the ligamentum flavum using a ball hook and then resected it using a 2. Kerrison rongeur.  We used a combination of  blunt hooks, upgoing sharp curettes, as well as bipolar electrocautery and sharp dissection to remove the scar tissue from the thecal sac and left L5 nerve.  We had to proceed quite cautiously in order to avoid a CSF leak and this did take an inordinate amount of time in order to accomplish this carefully.  Once we mobilized the scar tissue we were able to retract the L5 nerve medially and we identified a very large lump of disc which was compressing the L5 nerve in the lateral recess.  We cauterized the disc with bipolar electrocautery and then incised it with a 11. Blade knife.  We used a micro pituitary rongeur to create a cavity in the disc space.  We then used downgoing curettes to mobilize the loose pieces of disc under the L5 nerve.  Using a combination of straight and upgoing micro pituitary rongeurs we removed quite a large portion of disc from this area as well as multiple free fragments of disc which were impinging on the L5 nerve.  Once we felt our diskectomy was complete we irrigated inside of the disc space and mobilized a few more fragments.  At this point, the disc space felt flat and we were able to get a ball hook medially all the way under the thecal sac.  We used a ball hook to feel under the L5 nerve and it felt very free medially and we felt like our lateral recess work was done at this point.  We then felt out the L5/S1 foramen and we could feel a little bit of tethering in this area.  We therefore took the tube and wanded it inferiorly in order to gain access to the L5/S1 foramen.  We used a combination of blunt hooks and a sharp upgoing curette to mobilize more scar tissue off of the left L5 nerve until it was free enough to get a nerve hook under the root.  We then took a 2. Kerrison rongeur and removed the ligamentum flavum from this area as well as removed bone on the posterior aspect of the foramen in order to perform as much of a L5/S1 foraminotomy as possible.  At the completion of  this part of the procedure we had a very excellent lateral recess decompression of the root as well as a foraminotomy as the root exited L5/S1 foramen.  We felt the procedure to be complete at this point.  We therefore irrigated copiously consisting of roughly 400 mL of antibiotic infused saline.  We obtained hemostasis in the epidural space using Gelfoam soaked in thrombin.  We then removed the tube slowly and obtained hemostasis along the muscles using bipolar electrocautery.  We put liquid Depo-Medrol onto the left L5 root on the way out.  Once the tube was completely removed we took the operative microscope out of the field.  We then closed the wound in layers including 0 Vicryl sutures for the fascia, inverted interrupted 2-0 Vicryl sutures for the dermis.  The wound was then cleansed and dried and a sterile dressing was applied consisting of Mastisol, Steri-Strips, and a sterile island dressing.  All sponge and needle counts were correct x2 at the end of the case.  There were no complications.  I was present and participating for the entire case.    EBL:20cc    Boom Pierre  Neurosurgery

## 2023-10-12 NOTE — SUBJECTIVE & OBJECTIVE
"Interval History: still in pain.    Review of Systems   HENT:  Negative for ear discharge and ear pain.    Eyes:  Negative for discharge and itching.   Endocrine: Negative for cold intolerance and heat intolerance.   Neurological:  Negative for seizures and syncope.     Objective:     Vital Signs (Most Recent):  Temp: 100.2 °F (37.9 °C) (10/12/23 1202)  Pulse: 73 (10/12/23 1202)  Resp: 20 (10/12/23 1202)  BP: (!) 103/58 (10/12/23 1202)  SpO2: 98 % (10/12/23 1202) Vital Signs (24h Range):  Temp:  [98.4 °F (36.9 °C)-100.2 °F (37.9 °C)] 100.2 °F (37.9 °C)  Pulse:  [67-73] 73  Resp:  [17-20] 20  SpO2:  [96 %-99 %] 98 %  BP: ()/(52-64) 103/58     Weight: 101.5 kg (223 lb 12.3 oz)  Body mass index is 36.12 kg/m².    Intake/Output Summary (Last 24 hours) at 10/12/2023 1252  Last data filed at 10/11/2023 1729  Gross per 24 hour   Intake 0 ml   Output --   Net 0 ml           Physical Exam  Constitutional:       Appearance: Normal appearance.   HENT:      Head: Normocephalic and atraumatic.      Mouth/Throat:      Mouth: Mucous membranes are dry.      Pharynx: No oropharyngeal exudate or posterior oropharyngeal erythema.   Cardiovascular:      Rate and Rhythm: Normal rate and regular rhythm.   Pulmonary:      Effort: No respiratory distress.      Breath sounds: Normal breath sounds.   Abdominal:      General: Bowel sounds are normal.      Palpations: Abdomen is soft.   Musculoskeletal:         General: No deformity or signs of injury.   Skin:     General: Skin is warm and dry.   Neurological:      Mental Status: She is alert and oriented to person, place, and time.      Sensory: Sensory deficit (Decreased sensation to bilateral lower extremities left greater than right) present.      Motor: Weakness (slight decrease in strength to left lower extremity as compared to the right.) present.             Significant Labs: All pertinent labs within the past 24 hours have been reviewed.  BMP:   No results for input(s): "GLU", " ""NA", "K", "CL", "CO2", "BUN", "CREATININE", "CALCIUM", "MG" in the last 48 hours.    CBC:   No results for input(s): "WBC", "HGB", "HCT", "PLT" in the last 48 hours.      Significant Imaging: I have reviewed all pertinent imaging results/findings within the past 24 hours.  "

## 2023-10-12 NOTE — ANESTHESIA PROCEDURE NOTES
Intubation    Date/Time: 10/12/2023 2:42 PM    Performed by: Markus Anne CRNA  Authorized by: Michael Woodward MD    Intubation:     Induction:  Intravenous    Intubated:  Postinduction    Mask Ventilation:  Easy with oral airway    Attempts:  1    Attempted By:  CRNA    Method of Intubation:  Video laryngoscopy    Blade:  Carranza 3    Laryngeal View Grade: Grade I - full view of cords      Difficult Airway Encountered?: No      Complications:  None    Airway Device:  Oral endotracheal tube    Airway Device Size:  7.0    Style/Cuff Inflation:  Cuffed (inflated to minimal occlusive pressure)    Inflation Amount (mL):  6    Tube secured:  21    Secured at:  The lips    Placement Verified By:  Capnometry    Complicating Factors:  Obesity, short neck and poor neck/head extension    Findings Post-Intubation:  BS equal bilateral and atraumatic/condition of teeth unchanged

## 2023-10-12 NOTE — ASSESSMENT & PLAN NOTE
Patient's FSGs are uncontrolled due to hyperglycemia on current medication regimen.  Last A1c reviewed-   Lab Results   Component Value Date    HGBA1C 8.8 (H) 09/25/2023     Most recent fingerstick glucose reviewed-   Recent Labs   Lab 10/11/23  1609 10/11/23  1942 10/12/23  0737 10/12/23  1119   POCTGLUCOSE 227* 190* 156* 142*     Current correctional scale  Low  Maintain anti-hyperglycemic dose as follows-   Antihyperglycemics (From admission, onward)    Start     Stop Route Frequency Ordered    10/09/23 2212  insulin aspart U-100 pen 0-10 Units         -- SubQ Before meals & nightly PRN 10/09/23 2113    10/09/23 2100  insulin detemir U-100 (Levemir) pen 10 Units         -- SubQ Nightly 10/09/23 1405        Hold Oral hypoglycemics while patient is in the hospital.

## 2023-10-12 NOTE — NURSING
[facility]  Nurses Note -- 4 Eyes      10/12/2023       Skin assessed on: Q Shift      [x] No Pressure Injuries Present    []Prevention Measures Documented    [] Yes LDA  for Pressure Injury Previously documented     [] Yes New Pressure Injury Discovered   [] LDA for New Pressure Injury Added      Attending RN:  Erica Coppola RN     Second RN:  MARIANNE Phillip

## 2023-10-12 NOTE — TRANSFER OF CARE
"Anesthesia Transfer of Care Note    Patient: Lizzie Saunders    Procedure(s) Performed: Procedure(s) (LRB):  MIS LEFT L 4-L5 HEMILAMINECTOMY AND DISCECTOMY, LUMBAR SPINE (Left)    Patient location: PACU    Anesthesia Type: general    Transport from OR: Transported from OR on 100% O2 by closed face mask with adequate spontaneous ventilation    Post pain: adequate analgesia    Post assessment: no apparent anesthetic complications and tolerated procedure well    Post vital signs: stable    Level of consciousness: sedated and responds to stimulation    Nausea/Vomiting: no nausea/vomiting    Complications: none    Transfer of care protocol was followed      Last vitals:   Visit Vitals  /67 (BP Location: Right arm)   Pulse 94   Temp 36.7 °C (98.1 °F) (Temporal)   Resp 15   Ht 5' 6" (1.676 m)   Wt 101.5 kg (223 lb 12.3 oz)   LMP 09/24/2023 (Exact Date)   SpO2 100%   Breastfeeding No   BMI 36.12 kg/m²     "

## 2023-10-12 NOTE — PLAN OF CARE
Problem: Adult Inpatient Plan of Care  Goal: Plan of Care Review  Outcome: Ongoing, Progressing  Flowsheets (Taken 10/12/2023 0936)  Plan of Care Reviewed With: patient  Goal: Patient-Specific Goal (Individualized)  Outcome: Ongoing, Progressing  Goal: Absence of Hospital-Acquired Illness or Injury  Outcome: Ongoing, Progressing  Intervention: Identify and Manage Fall Risk  Flowsheets (Taken 10/12/2023 0936)  Safety Promotion/Fall Prevention:   assistive device/personal item within reach   bed alarm set   instructed to call staff for mobility   side rails raised x 2  Intervention: Prevent Skin Injury  Flowsheets (Taken 10/12/2023 0936)  Body Position: position changed independently  Intervention: Prevent Infection  Flowsheets (Taken 10/12/2023 0936)  Infection Prevention:   single patient room provided   rest/sleep promoted   hand hygiene promoted  Goal: Optimal Comfort and Wellbeing  Outcome: Ongoing, Progressing  Intervention: Monitor Pain and Promote Comfort  Flowsheets (Taken 10/12/2023 0936)  Pain Management Interventions:   care clustered   quiet environment facilitated  Goal: Readiness for Transition of Care  Outcome: Ongoing, Progressing     Problem: Diabetes Comorbidity  Goal: Blood Glucose Level Within Targeted Range  Outcome: Ongoing, Progressing     Problem: Pain Acute  Goal: Acceptable Pain Control and Functional Ability  Outcome: Ongoing, Progressing  Intervention: Prevent or Manage Pain  Flowsheets (Taken 10/12/2023 0936)  Sleep/Rest Enhancement:   regular sleep/rest pattern promoted   room darkened   noise level reduced  Sensory Stimulation Regulation:   quiet environment promoted   care clustered  Medication Review/Management: medications reviewed  Intervention: Optimize Psychosocial Wellbeing  Flowsheets (Taken 10/12/2023 0936)  Supportive Measures:   active listening utilized   counseling provided   goal-setting facilitated   self-care encouraged   positive reinforcement provided   verbalization  of feelings encouraged

## 2023-10-12 NOTE — PLAN OF CARE
Plan for patient procedure with neurosurgery today. Neurosurgery follow up appointment previously scheduled and listed on avs. CM to continue to follow for dc needs.    10/12/23 1252   Discharge Reassessment   Assessment Type Discharge Planning Reassessment   Did the patient's condition or plan change since previous assessment? Yes   Communicated TETE with patient/caregiver Date not available/Unable to determine   Discharge Plan A Other  (TBD)   DME Needed Upon Discharge  none   Transition of Care Barriers None   Why the patient remains in the hospital Requires continued medical care   Post-Acute Status   Discharge Delays None known at this time

## 2023-10-12 NOTE — PROGRESS NOTES
[facility] Medicine  Progress Note    Patient Name: Lizzie Saunders  MRN: [id number]  Patient Class: IP- Inpatient   Admission Date: 10/9/2023  Length of Stay: 1 days  Attending Physician: Mario Cardoza MD  Primary Care Provider: Maxi Anna MD        Subjective:     Principal Problem:Lumbosacral radiculopathy at L5        HPI:   Lizzie Saunders is 37-year-old female with a past medical history of diabetes mellitus type 2, diabetic neuropathy, chronic back pain, and bipolar disorder who presents to the ED for evaluation worsening of lumbar pain which she describes as spasms and sharp shooting pain radiating to her left lower extremity.  She reports she did not take medication at home for relief.  She took Robaxin over the weekend however ran out of this medication.  She states she was prescribed Lyrica however has not started taking this medication.  She underwent an MRI of the lumbar spine  on 09/25 which resulted with L4 through L5 disc herniation and compression of the L5 nerve.  Patient is being followed by Dr. Pierre in neurosurgery and was evaluated in clinic on 09/27/2023.  She was taking steroids at that time which did alleviate her symptoms.  She reports progressive worsening of weakness of the left lower extremity  and uncontrolled pain over the last several weeks resulting in multiple ED visits.  Patient denies any fever, chest pain, shortness of breath, nausea, vomiting, diarrhea, numbness, tingling, saddle anesthesia, urinary retention, bowel incontinence.    In the ED she was evaluated by Neurosurgery who recommends resuming home Lyrica and Robaxin.  Patient potentially scheduled for surgery on Wednesday for diskectomy.            Overview/Hospital Course:  38 y/o female presents with worsening of chronic back pain.  Recent MRI showed continued disc extrusion at L4-L5 narrows the left subarticular zone and abuts the descending left L5 nerve.  Neurosurgery consulted.   Severe left L5 radiculopathy and left leg weakness due to recurrent herniated disc.  Recommending left L4/5 hemilaminotomy/microdiscectomy and partial left L5/S1 foraminotomy.      Interval History: still in pain.    Review of Systems   HENT:  Negative for ear discharge and ear pain.    Eyes:  Negative for discharge and itching.   Endocrine: Negative for cold intolerance and heat intolerance.   Neurological:  Negative for seizures and syncope.     Objective:     Vital Signs (Most Recent):  Temp: 100.2 °F (37.9 °C) (10/12/23 1202)  Pulse: 73 (10/12/23 1202)  Resp: 20 (10/12/23 1202)  BP: (!) 103/58 (10/12/23 1202)  SpO2: 98 % (10/12/23 1202) Vital Signs (24h Range):  Temp:  [98.4 °F (36.9 °C)-100.2 °F (37.9 °C)] 100.2 °F (37.9 °C)  Pulse:  [67-73] 73  Resp:  [17-20] 20  SpO2:  [96 %-99 %] 98 %  BP: ()/(52-64) 103/58     Weight: 101.5 kg (223 lb 12.3 oz)  Body mass index is 36.12 kg/m².    Intake/Output Summary (Last 24 hours) at 10/12/2023 1252  Last data filed at 10/11/2023 1729  Gross per 24 hour   Intake 0 ml   Output --   Net 0 ml           Physical Exam  Constitutional:       Appearance: Normal appearance.   HENT:      Head: Normocephalic and atraumatic.      Mouth/Throat:      Mouth: Mucous membranes are dry.      Pharynx: No oropharyngeal exudate or posterior oropharyngeal erythema.   Cardiovascular:      Rate and Rhythm: Normal rate and regular rhythm.   Pulmonary:      Effort: No respiratory distress.      Breath sounds: Normal breath sounds.   Abdominal:      General: Bowel sounds are normal.      Palpations: Abdomen is soft.   Musculoskeletal:         General: No deformity or signs of injury.   Skin:     General: Skin is warm and dry.   Neurological:      Mental Status: She is alert and oriented to person, place, and time.      Sensory: Sensory deficit (Decreased sensation to bilateral lower extremities left greater than right) present.      Motor: Weakness (slight decrease in strength to left lower  "extremity as compared to the right.) present.             Significant Labs: All pertinent labs within the past 24 hours have been reviewed.  BMP:   No results for input(s): "GLU", "NA", "K", "CL", "CO2", "BUN", "CREATININE", "CALCIUM", "MG" in the last 48 hours.    CBC:   No results for input(s): "WBC", "HGB", "HCT", "PLT" in the last 48 hours.      Significant Imaging: I have reviewed all pertinent imaging results/findings within the past 24 hours.      Assessment/Plan:      * Lumbosacral radiculopathy at L5  Appreciate Neurosurgery input.  Planning left L4/5 hemilaminotomy/microdiscectomy and partial left L5/S1 foraminotomy today.  Continue pain medications and muscle relaxers.        Tobacco dependence  Dangers of cigarette smoking were reviewed with patient in detail. Patient was Counseled for 10 minutes or greater. Nicotine replacement options were discussed. Nicotine replacement was discussed- prescribed    Bipolar affective disorder, current episode depressed  Patient has persistent depression which is mild and is currently controlled. Will Continue anti-depressant medications. We will not consult psychiatry at this time. Patient does not display psychosis at this time. Continue to monitor closely and adjust plan of care as needed.        Hyperlipidemia     Patient is chronically on statin.will continue for now. Monitor clinically. Last LDL was   Lab Results   Component Value Date    LDLCALC 180 (H) 02/23/2023          Type 2 diabetes mellitus with neurologic complication, with long-term current use of insulin  Patient's FSGs are uncontrolled due to hyperglycemia on current medication regimen.  Last A1c reviewed-   Lab Results   Component Value Date    HGBA1C 8.8 (H) 09/25/2023     Most recent fingerstick glucose reviewed-   Recent Labs   Lab 10/11/23  1609 10/11/23  1942 10/12/23  0737 10/12/23  1119   POCTGLUCOSE 227* 190* 156* 142*     Current correctional scale  Low  Maintain anti-hyperglycemic dose as " follows-   Antihyperglycemics (From admission, onward)    Start     Stop Route Frequency Ordered    10/09/23 2212  insulin aspart U-100 pen 0-10 Units         -- SubQ Before meals & nightly PRN 10/09/23 2113    10/09/23 2100  insulin detemir U-100 (Levemir) pen 10 Units         -- SubQ Nightly 10/09/23 1405        Hold Oral hypoglycemics while patient is in the hospital.          VTE Risk Mitigation (From admission, onward)         Ordered     IP VTE HIGH RISK PATIENT  Once         10/09/23 1405     Place sequential compression device  Until discontinued         10/09/23 1405                Discharge Planning   TETE:      Code Status: Full Code   Is the patient medically ready for discharge?:     Reason for patient still in hospital (select all that apply): Treatment  Discharge Plan A: (P) Other (TBD)   Discharge Delays: (P) None known at this time              Mario Cardoza MD  Department of Hospital Medicine   [facility] Surg

## 2023-10-12 NOTE — PROGRESS NOTES
Certification of Assistant at Surgery       Surgery Date: 10/12/2023     Participating Surgeons:  Surgeon(s) and Role:     * Boom Pierre MD - Primary       Rowan Howard PA-C - Assisting       Procedures:  Procedure(s) (LRB):  MIS LEFT L 4-L5 HEMILAMINECTOMY AND DISCECTOMY, LUMBAR SPINE (Left)    Assistant Surgeon's Certification of Necessity:  I understand that section 1842 (b) (6) (d) of the Social Security Act generally prohibits Medicare Part B reasonable charge payment for the services of assistants at surgery in teaching hospitals when qualified residents are available to furnish such services. I certify that the services for which payment is claimed were medically necessary, and that no qualified resident was available to perform the services. I further understand that these services are subject to post-payment review by the Medicare carrier.      Rowan Howard PA-C    10/12/2023  5:20 PM

## 2023-10-13 VITALS
TEMPERATURE: 99 F | DIASTOLIC BLOOD PRESSURE: 70 MMHG | HEART RATE: 89 BPM | BODY MASS INDEX: 35.96 KG/M2 | WEIGHT: 223.75 LBS | SYSTOLIC BLOOD PRESSURE: 135 MMHG | HEIGHT: 66 IN | RESPIRATION RATE: 20 BRPM | OXYGEN SATURATION: 97 %

## 2023-10-13 LAB
POCT GLUCOSE: 238 MG/DL (ref 70–110)
POCT GLUCOSE: 284 MG/DL (ref 70–110)
POCT GLUCOSE: 91 MG/DL (ref 70–110)

## 2023-10-13 PROCEDURE — 97165 OT EVAL LOW COMPLEX 30 MIN: CPT

## 2023-10-13 PROCEDURE — 63600175 PHARM REV CODE 636 W HCPCS: Performed by: PHYSICIAN ASSISTANT

## 2023-10-13 PROCEDURE — 25000003 PHARM REV CODE 250: Performed by: PHYSICIAN ASSISTANT

## 2023-10-13 PROCEDURE — 25000003 PHARM REV CODE 250: Performed by: NURSE PRACTITIONER

## 2023-10-13 PROCEDURE — 97161 PT EVAL LOW COMPLEX 20 MIN: CPT

## 2023-10-13 PROCEDURE — 97535 SELF CARE MNGMENT TRAINING: CPT

## 2023-10-13 RX ORDER — POLYETHYLENE GLYCOL 3350 17 G/17G
17 POWDER, FOR SOLUTION ORAL 2 TIMES DAILY PRN
Refills: 0
Start: 2023-10-13

## 2023-10-13 RX ORDER — TIZANIDINE 4 MG/1
4 TABLET ORAL EVERY 8 HOURS PRN
Qty: 60 TABLET | Refills: 0 | Status: SHIPPED | OUTPATIENT
Start: 2023-10-13

## 2023-10-13 RX ORDER — HYDROCODONE BITARTRATE AND ACETAMINOPHEN 7.5; 325 MG/1; MG/1
1 TABLET ORAL EVERY 6 HOURS PRN
Qty: 20 TABLET | Refills: 0 | Status: SHIPPED | OUTPATIENT
Start: 2023-10-13 | End: 2023-10-13 | Stop reason: SDUPTHER

## 2023-10-13 RX ORDER — LIDOCAINE 50 MG/G
1 PATCH TOPICAL DAILY
Qty: 15 PATCH | Refills: 0 | Status: SHIPPED | OUTPATIENT
Start: 2023-10-13

## 2023-10-13 RX ORDER — HYDROCODONE BITARTRATE AND ACETAMINOPHEN 7.5; 325 MG/1; MG/1
1 TABLET ORAL EVERY 4 HOURS PRN
Qty: 30 TABLET | Refills: 0 | Status: SHIPPED | OUTPATIENT
Start: 2023-10-13

## 2023-10-13 RX ORDER — TIZANIDINE 4 MG/1
4 TABLET ORAL EVERY 8 HOURS PRN
Qty: 20 TABLET | Refills: 0 | Status: SHIPPED | OUTPATIENT
Start: 2023-10-13 | End: 2023-10-13 | Stop reason: SDUPTHER

## 2023-10-13 RX ADMIN — MUPIROCIN: 20 OINTMENT TOPICAL at 09:10

## 2023-10-13 RX ADMIN — DOCUSATE SODIUM 100 MG: 100 CAPSULE, LIQUID FILLED ORAL at 09:10

## 2023-10-13 RX ADMIN — HYDROCODONE BITARTRATE AND ACETAMINOPHEN 1 TABLET: 7.5; 325 TABLET ORAL at 06:10

## 2023-10-13 RX ADMIN — PREGABALIN 50 MG: 50 CAPSULE ORAL at 09:10

## 2023-10-13 RX ADMIN — TIZANIDINE 4 MG: 4 TABLET ORAL at 06:10

## 2023-10-13 RX ADMIN — CEFAZOLIN SODIUM 2 G: 2 SOLUTION INTRAVENOUS at 06:10

## 2023-10-13 RX ADMIN — ALUMINUM HYDROXIDE, MAGNESIUM HYDROXIDE, AND DIMETHICONE 30 ML: 200; 20; 200 SUSPENSION ORAL at 06:10

## 2023-10-13 NOTE — DISCHARGE INSTRUCTIONS
Please follow ONLY the instructions that are checked below.    Activity Restrictions:  [x]  Return to work will be determined on an individual basis.  [x]  No lifting greater than 10 pounds.  [x]  Avoid bending and twisting the area of your surgery more than 45 degrees from neutral position in any direction.  [x]  No driving or operating machinery:  [x]  until cleared by your surgeon.  [x]  while taking narcotic pain medications or muscle relaxants.    [x]  No brace/collar required    [x]  Increase ambulation over the next 2 weeks so that you are walking 2 miles per day at 2 weeks post-operatively.  [x]  Make sure to take two dedicated 5-10 minute walks per day, along with short walks every 1-2 hours, even if just to walk to the restroom and back.   [x]  Walk on paved surfaces only. It is okay to walk up and down stairs while holding onto a side rail.  [x]  No sexual activity for 2-3 weeks.    Discharge Medication/Follow-up:  [x]  Please refer to discharge medication reconciliation form.  [x]  For the first week after surgery: Check your blood pressure before taking any blood pressure medications at home. If BP is below 120/80, do not take your blood pressure medication.   [x]  Do not take ANY non-steroidal anti-inflammatory drugs (NSAIDS), including the following: ibuprofen, naprosyn, Aleve, Advil, Indocin, Mobic, or Celebrex for:  [x]  1 week  [x]  Prescriptions for appropriate medication will be given upon discharge.   [x]  Pain control: Norco for severe pain. Over the counter tylenol for mild pain   [x]  Muscle relaxer: Tizanidine for muscle spasms. Can take every 8 hours if needed, but most helpful at bedtime.    [x]  Take docusate (Colace 100 mg) and miralax daily until bowel activity returns to nemesio. You can get this over the counter.  [x]  If you have not had a bowel movement by day 3 after surgery, try a fleet's enema or suppository, both over the counter. Call neurosurgery if you have not had a bowel  movement by day 5 after surgery.   [x]  Follow-up appointment:  [x]  10-14 days post-op for wound check by PA/nurse  [x]  4-6 weeks with MD    Wound Care:  [x]  Remove dressing or bandaid in  2  days.  [x]  No bandage required once removed. Keep your incision open to the air.  [x]  You may shower on the 3rd day after your surgery. Have the force of water hit you opposite from the incision. Pat the incision dry after your shower; do not scrub the incision. Do not use Hibiclens after surgery.  [x]  You wound is closed with one of the following: skin glue, steri strips, OR staples. Allow skin glue/steri strips to fall off on their own over time (if applicable). If you have staples, these will be removed at your clinic appt in 2 weeks.   [x]  You cannot take a bath until 8 weeks after surgery.    Call your doctor or go to the Emergency Room for any signs of infection, including: increased redness, drainage, pain, or fever (temperature ?101.5 for 24 hours). Call your doctor or go to the Emergency Room if there are any localized neurological changes; problems with speech, vision, numbness, tingling, weakness, or severe headache; or for other concerns.    Special Instructions:  [x]  No use of tobacco products.  [x]  Diet: Please eat a regular diet as tolerated.  []  Other diet:              Specific physician instructions:           Physicians need 3 days' notice for pain medicine to be refilled. Pain medicine will only be refilled between 8 AM and 5 PM, Monday through Friday, due to Food and Drug Administration regulation of documentation.    If you have any questions about this form, please call [phone].

## 2023-10-13 NOTE — ANESTHESIA POSTPROCEDURE EVALUATION
Anesthesia Post Evaluation    Patient: Lizzie Saunders    Procedure(s) Performed: Procedure(s) (LRB):  MIS LEFT L 4-L5 HEMILAMINECTOMY AND DISCECTOMY, LUMBAR SPINE (Left)    Final Anesthesia Type: general      Patient location during evaluation: GI PACU  Patient participation: Yes- Able to Participate  Level of consciousness: awake and alert, oriented and awake  Post-procedure vital signs: reviewed and stable  Airway patency: patent    PONV status at discharge: No PONV  Anesthetic complications: no      Cardiovascular status: blood pressure returned to baseline  Respiratory status: unassisted, spontaneous ventilation and room air  Hydration status: euvolemic  Follow-up not needed.          Vitals Value Taken Time   /61 10/13/23 0439   Temp 37 °C (98.6 °F) 10/13/23 0439   Pulse 82 10/13/23 0439   Resp 18 10/13/23 0615   SpO2 96 % 10/13/23 0439         Event Time   Out of Recovery 10/12/2023 19:02:00         Pain/Osmin Score: Pain Rating Prior to Med Admin: 9 (10/13/2023  6:15 AM)  Pain Rating Post Med Admin: 3 (10/12/2023 11:08 PM)  Osmin Score: 9 (10/12/2023  6:30 PM)

## 2023-10-13 NOTE — DISCHARGE SUMMARY
[facility] Medicine  Discharge Summary      Patient Name: Lizzie Saunders  MRN: [id number]  [facility]: [id number]  Patient Class: IP- Inpatient  Admission Date: 10/9/2023  Hospital Length of Stay: 2 days  Discharge Date and Time:  10/13/2023 9:17 AM  Attending Physician: Mario Cardoza MD   Discharging Provider: Mario Cardoza MD  Primary Care Provider: Maxi Anna MD    Primary Care Team: Networked reference to record PCT     HPI:    Lizzie Saunders is 37-year-old female with a past medical history of diabetes mellitus type 2, diabetic neuropathy, chronic back pain, and bipolar disorder who presents to the ED for evaluation worsening of lumbar pain which she describes as spasms and sharp shooting pain radiating to her left lower extremity.  She reports she did not take medication at home for relief.  She took Robaxin over the weekend however ran out of this medication.  She states she was prescribed Lyrica however has not started taking this medication.  She underwent an MRI of the lumbar spine  on 09/25 which resulted with L4 through L5 disc herniation and compression of the L5 nerve.  Patient is being followed by Dr. Pierre in neurosurgery and was evaluated in clinic on 09/27/2023.  She was taking steroids at that time which did alleviate her symptoms.  She reports progressive worsening of weakness of the left lower extremity  and uncontrolled pain over the last several weeks resulting in multiple ED visits.  Patient denies any fever, chest pain, shortness of breath, nausea, vomiting, diarrhea, numbness, tingling, saddle anesthesia, urinary retention, bowel incontinence.    In the ED she was evaluated by Neurosurgery who recommends resuming home Lyrica and Robaxin.  Patient potentially scheduled for surgery on Wednesday for diskectomy.            Procedure(s) (LRB):  MIS LEFT L 4-L5 HEMILAMINECTOMY AND DISCECTOMY, LUMBAR SPINE (Left)      Hospital Course:   38 y/o female presents with  worsening of chronic back pain.  Recent MRI showed continued disc extrusion at L4-L5 narrows the left subarticular zone and abuts the descending left L5 nerve.  Neurosurgery consulted.  Severe left L5 radiculopathy and left leg weakness due to recurrent herniated disc.  Recommending left L4/5 hemilaminotomy/microdiscectomy and partial left L5/S1 foraminotomy.  Procedure performed without complications on 10/12.  Patient still having pain, but LE weakness much improved.  She has remained afebrile and hemodynamically stable.  Patient will be discharged home to follow up with PCP and Neurosurgery.       Goals of Care Treatment Preferences:  Code Status: Full Code      Consults:     No new Assessment & Plan notes have been filed under this hospital service since the last note was generated.  Service: Hospital Medicine    Final Active Diagnoses:    Diagnosis Date Noted POA    PRINCIPAL PROBLEM:  Lumbosacral radiculopathy at L5 [M54.17] 10/09/2023 Yes    S/P lumbar microdiscectomy [Z98.890] 10/12/2023 Not Applicable    Tobacco dependence [F17.200] 10/09/2023 Yes    Bipolar affective disorder, current episode depressed [F31.30] 04/27/2021 Yes    Hyperlipidemia [E78.5] 02/03/2017 Yes     Chronic    Type 2 diabetes mellitus with neurologic complication, with long-term current use of insulin [E11.49, Z79.4] 02/03/2017 Not Applicable      Problems Resolved During this Admission:       Discharged Condition: stable    Disposition: Home or Self Care    Follow Up:   Follow-up Information     Maxi Anna MD. Schedule an appointment as soon as possible for a visit in 1 week(s).    Specialty: Internal Medicine  Contact information:  [address] [facility] [address]  [phone]             Boom Pierre MD Follow up in 2 week(s).    Specialty: Neurosurgery  Contact information:  [address] [facility] [id number] [phone]                       Patient Instructions:      Ambulatory referral/consult to Smoking  Cessation Program   Standing Status: Future   Referral Priority: Routine Referral Type: Consultation   Referral Reason: Specialty Services Required   Requested Specialty: CTTS   Number of Visits Requested: 1     Diet diabetic     Notify your health care provider if you experience any of the following:  temperature >100.4     Notify your health care provider if you experience any of the following:  persistent nausea and vomiting or diarrhea     Notify your health care provider if you experience any of the following:  severe uncontrolled pain     Notify your health care provider if you experience any of the following:  difficulty breathing or increased cough     Notify your health care provider if you experience any of the following:  persistent dizziness, light-headedness, or visual disturbances     Notify your health care provider if you experience any of the following:  increased confusion or weakness     Reason for not Prescribing Nicotine Replacement     Order Specific Question Answer Comments   Reason for not Prescribing: Not medically appropriate at this time      Activity as tolerated         Pending Diagnostic Studies:     None         Medications:  Reconciled Home Medications:      Medication List      START taking these medications    polyethylene glycol 17 gram Pwpk  Commonly known as: GLYCOLAX  Take 17 g by mouth 2 (two) times daily as needed (constipation).        CHANGE how you take these medications    tiZANidine 4 MG tablet  Commonly known as: ZANAFLEX  Take 1 tablet (4 mg total) by mouth every 8 (eight) hours as needed (muscle spasms). Take 1 po q 8 hrs prn muscle spasm  What changed:   · when to take this  · reasons to take this        CONTINUE taking these medications    acetaminophen 650 MG Tbsr  Commonly known as: TYLENOL  Take 1 tablet (650 mg total) by mouth every 6 (six) hours as needed.     AJOVY AUTOINJECTOR 225 mg/1.5 mL autoinjector  Generic drug: fremanezumab-vfrm  INJECT UNDER THE SKIN  EVERY MONTH     atorvastatin 40 MG tablet  Commonly known as: LIPITOR  Take 1 tablet (40 mg total) by mouth every evening.     blood sugar diagnostic Strp  To check BG two times daily, to use with insurance preferred meter     blood-glucose meter kit  To check BG two  times daily, to use with insurance preferred meter     diclofenac 75 MG EC tablet  Commonly known as: VOLTAREN  Take 1 tablet (75 mg total) by mouth 2 (two) times daily as needed (pain).     famotidine 20 MG tablet  Commonly known as: PEPCID  Take 1 tablet (20 mg total) by mouth 2 (two) times daily. for 7 days     HYDROcodone-acetaminophen 7.5-325 mg per tablet  Commonly known as: NORCO  Take 1 tablet by mouth every 6 (six) hours as needed for Pain.     insulin glargine 100 units/mL SubQ pen  Commonly known as: LANTUS SOLOSTAR U-100 INSULIN  Inject 20 Units into the skin every evening.     lancets Misc  To check BG two times daily, to use with insurance preferred meter     * LIDOcaine 5 %  Commonly known as: LIDODERM  1 patch daily as needed.     * LIDOcaine 5 %  Commonly known as: LIDODERM  Place 1 patch onto the skin once daily. Remove & Discard patch within 12 hours or as directed by MD     metFORMIN 500 MG ER 24hr tablet  Commonly known as: GLUCOPHAGE-XR  Take 2 tablets (1,000 mg total) by mouth daily with breakfast.     metoclopramide HCl 10 MG tablet  Commonly known as: REGLAN  Take 1 tablet (10 mg total) by mouth 3 (three) times daily as needed (nausea/vomiting, abdominal pain).     ondansetron 4 MG Tbdl  Commonly known as: ZOFRAN-ODT  Take 1 tablet (4 mg total) by mouth every 6 (six) hours as needed (nausea).     pregabalin 25 MG capsule  Commonly known as: LYRICA  Take 25 mg by mouth 2 (two) times daily.     rizatriptan 10 MG tablet  Commonly known as: MAXALT  Take 10 mg by mouth once as needed for Migraine.     TRULICITY 1.5 mg/0.5 mL pen injector  Generic drug: dulaglutide  INJECT 1.5 MILLIGRAMS INTO THE SKIN EVERY 7 DAYS         * This list  has 2 medication(s) that are the same as other medications prescribed for you. Read the directions carefully, and ask your doctor or other care provider to review them with you.            STOP taking these medications    methylPREDNISolone 4 mg tablet  Commonly known as: MEDROL DOSEPACK            Indwelling Lines/Drains at time of discharge:   Lines/Drains/Airways     None                 Time spent on the discharge of patient: >30 minutes         Mario Cardoza MD  Department of Hospital Medicine  [facility]

## 2023-10-13 NOTE — PLAN OF CARE
[facility] - Med Surg  Discharge Final Note    All CM needs met. Follow up appointments scheduled and listed on avs. Pt declined TTB at this time and states she would purchase from [facility]. Pt to drive herself home.     Primary Care Provider: Maxi Anna MD    Expected Discharge Date: 10/13/2023    Final Discharge Note (most recent)       Final Note - 10/13/23 1047          Final Note    Assessment Type Final Discharge Note (P)      Anticipated Discharge Disposition Home or Self Care (P)      Hospital Resources/Appts/Education Provided Provided patient/caregiver with written discharge plan information;Appointments scheduled and added to AVS (P)         Post-Acute Status    Discharge Delays None known at this time (P)                      Important Message from Medicare             Contact Info       Maxi Anna MD   Specialty: Internal Medicine   Relationship: PCP - General    [address] [place] [id number]   Phone: [phone]       Next Steps: Go on 10/16/2023    Instructions: Appointment scheduled for 9:00am

## 2023-10-13 NOTE — PT/OT/SLP EVAL
Occupational Therapy   Evaluation/Treatment and Discharge Note    Name: Lizzie Saunders  MRN: [id number]  Admitting Diagnosis: Lumbosacral radiculopathy at L5  Recent Surgery: Procedure(s) (LRB):  MIS LEFT L 4-L5 HEMILAMINECTOMY AND DISCECTOMY, LUMBAR SPINE (Left) 1 Day Post-Op    Recommendations:     Discharge Recommendations: home  Discharge Equipment Recommendations: bath bench  Barriers to discharge:   (fall risk)    Assessment:     Lizzie Saunders is a 37 y.o. female with a medical diagnosis of Lumbosacral radiculopathy at L5.    The patient is Mod I with amb and toilet transfer without AD. The patient was educated verbally and via handout re: spinal precautions. The patient was provided info for potential purchase of a TTB.  The patient is ok for D/C to home from OT standpoint.  Plan:     During this hospitalization, patient does not require further acute OT services.  Please re-consult if situation changes.    Plan of Care Reviewed with: patient    Subjective     Chief Complaint: feeling better  Patient/Family Comments/goals: D/C to home today    Occupational Profile:  Living Environment: lives with a roommate, roommate spouse and daughter in a in a 2SH with bedroom is on the 2nd floor.  Previous level of function: (I) self care and amb with recent use of a rollator  Roles and Routines: works at [employer] 1 day/week  Equipment Used at home: rollator  Assistance upon Discharge: Upon discharge, patient will have assistance from room-mate's dtr coming in [facility] to assist.  Pt reported her room-mate is having heart sx and room-mate's dtr is coming in [facility] to assist both of them.    Pain/Comfort:  Pain Rating 1: 0/10 (at rest,c/o pain with mobility)  Location 1: back    Patients cultural, spiritual, Yazidi conflicts given the current situation: no    Objective:     Communicated with: nurseNereyda prior to session.  Patient found sitting edge of bed with peripheral IV upon OT entry to room.    General  Precautions: Standard, fall  Orthopedic Precautions: spinal precautions  Braces: N/A  Respiratory Status: Room air     Occupational Performance:    Bed Mobility:    N/T    Functional Mobility/Transfers:  Patient completed Sit <> Stand Transfer with modified independence  with  no assistive device   Patient completed Toilet Transfer Step Transfer technique with modified independence with  no AD  Functional Mobility: The patient amb to the toilet and sink, Mod I without AD, no LOB    Activities of Daily Living:  Grooming: independence to stand at the sink to wash  her hands and face and brush her teeth and partial plate  Upper Body Dressing: modified independence to don back gown while standing  Lower Body Dressing: modified independence to don sandals    Cognitive/Visual Perceptual:  Cognitive/Psychosocial Skills:     -       Oriented to: Person, Place, Time, and Situation   -       Follows Commands/attention:Follows multistep  commands  -       Communication: clear/fluent  -       Memory: No Deficits noted  -       Safety awareness/insight to disability: verbal cues needed to recall and observe spinal precautions   -       Mood/Affect/Coping skills/emotional control: Appropriate to situation  Visual/Perceptual:      -Intact      Physical Exam:  Balance: -       good  Postural examination/scapula alignment:    -       No postural abnormalities identified  Skin integrity: Visible skin intact and spinal incision not visualized  Edema:  None noted  Sensation:    -       Impaired  c/o occasional numbness in finger tips, neuropathy in B feet  Upper Extremity Range of Motion:     -       Right Upper Extremity: WFL  -       Left Upper Extremity: WFL  Upper Extremity Strength:    -       Right Upper Extremity: WFL  -       Left Upper Extremity: WFL   Strength:    -       Right Upper Extremity: WFL  -       Left Upper Extremity: WFL    AMPAC 6 Click ADL:  AMPAC Total Score: 24    Treatment & Education:  Participated in OT  shahana  Performed self care and functional mobility as noted above  Educated the patient verbally and via handout re: Spinal Precautions. Pt issued written information on back precautions: no heavy lifting >5lbs, no twisting/bending at the waist, and log rolling for bed mobility.   The patient was educated re: precautions R/T her suitcase (sitting on the bed) and need to request assist to lify off the bed and place in the trunk of the car    Patient left sitting edge of bed with all lines intact, call button in reach, and nurse notified    GOALS:   Multidisciplinary Problems       Occupational Therapy Goals       Not on file              Multidisciplinary Problems (Resolved)          Problem: Occupational Therapy    Goal Priority Disciplines Outcome Interventions   Occupational Therapy Goal   (Resolved)     OT, PT/OT Met                        History:     Past Medical History:   Diagnosis Date    Bipolar disorder     per patient report    Chronic pain     Depression     Diabetes mellitus     Diabetes mellitus, type 2     Driving safety issue     DRIVING AFTER RECEIVING DILAUDID INJECTION    Hx of psychiatric care     Neuropathy     per patient report    Psychiatric problem     Therapy          Past Surgical History:   Procedure Laterality Date    BACK SURGERY      EYE SURGERY      LUMBAR LAMINECTOMY WITH DISCECTOMY Left 10/12/2023    Procedure: MIS LEFT L 4-L5 HEMILAMINECTOMY AND DISCECTOMY, LUMBAR SPINE;  Surgeon: Boom Pierre MD;  Location: [facility];  Service: Neurosurgery;  Laterality: Left;  MIS Left L4-5 hemilaminectomy and microdisectomy  -tiffanie table with austin frame, x-ray  -metrx       Time Tracking:     OT Date of Treatment: 10/13/23  OT Start Time: 0931  OT Stop Time: 0954  OT Total Time (min): 23 min    Billable Minutes:Evaluation 15 (with PT)  Self Care/Home Management 8  Total Time 23    10/13/2023

## 2023-10-13 NOTE — PT/OT/SLP EVAL
Physical Therapy Evaluation and Discharge Note    Patient Name:  Lizzie Saunders   MRN:  [id number]    Recommendations:     Discharge Recommendations: home  Discharge Equipment Recommendations: none   Barriers to discharge: None    Assessment:     Lizzie Saunders is a 37 y.o. female admitted with a medical diagnosis of Lumbosacral radiculopathy at L5.  At this time, patient is functioning at their prior level of function and does not require further acute PT services.     Recent Surgery: Procedure(s) (LRB):  MIS LEFT L 4-L5 HEMILAMINECTOMY AND DISCECTOMY, LUMBAR SPINE (Left) 1 Day Post-Op    Plan:     During this hospitalization, patient does not require further acute PT services.  Please re-consult if situation changes.  Pt to be D/C'ed home today.     Subjective     Chief Complaint: minor back pain with movement, no pain at rest  Patient/Family Comments/goals: Pt agreeable to ambulation in the hallway.  Pain/Comfort:  Pain Rating 1: 3/10  Location 1: back  Pain Addressed 1: Pre-medicate for activity, Reposition, Distraction, Cessation of Activity      Living Environment:  Pt lives with a room-mate in a 2SH.  Pt's bedroom is on the 2nd floor.  Prior to admission, patients level of function was independent and driving.  Equipment at home: rollator.  Upon discharge, patient will have assistance from room-mate's dtr coming in town to assist.  Pt reported her room-mate is having heart sx and room-mate's dtr is coming in [facility] to assist both of them.    Objective:     Communicated with nurse Strickland prior to session.  Patient found ambulatory in room/church with peripheral IV upon PT entry to room.    General Precautions: Standard, fall, diabetic    Orthopedic Precautions:spinal precautions   Braces: N/A  Respiratory Status: Room air    Exams:  Cognitive Exam:  Patient was able to follow multiple commands.   Gross Motor Coordination:  WFL  Postural Exam:  Patient presented with the following abnormalities:    -        "Rounded shoulders  -       Forward head  Sensation:    -       Intact  light/touch BLE; Pt reported h/o neuropathy, mostly with L foot numbness.    Skin Integrity/Edema:      -       Skin integrity: Visible skin intact  -       Edema: None noted BLE  BLE ROM: WFL except decreased L knee full extension (-5-10* in extension, pt reported chronic 2* long h/o back issues)  BLE Strength: WFL; Pt reported LLE slight weaker than RLE.    Functional Mobility:  Transfers:     Sit to Stand:  modified independence with no AD  Bed to Chair: modified independence with  no AD  using  Step Transfer  Gait: Pt ambulated ~250 ft with mod I using no AD and B shoes.  Pt with minor lateral swaying, reported that she always walked with a "limp".   Balance: Pt with fair+ dynamic standing balance.     AM-PAC 6 CLICK MOBILITY  Total Score:24       Treatment and Education:  Pt educated on spinal precautions via handout provided: no brace needed at this time, no bending, no twisting, no heavy lifting >10 lbs, and log roll for bed mobility.  Pt encouraged ambulation as tolerated when D/C'ed home.  Pt verbalized understanding.         Patient left ambulatory in room/church with all lines intact and ARSH He present.    GOALS:   Multidisciplinary Problems       Physical Therapy Goals       Not on file              Multidisciplinary Problems (Resolved)          Problem: Physical Therapy    Goal Priority Disciplines Outcome Goal Variances Interventions   Physical Therapy Goal   (Resolved)     PT, PT/OT Met                         History:     Past Medical History:   Diagnosis Date    Bipolar disorder     per patient report    Chronic pain     Depression     Diabetes mellitus     Diabetes mellitus, type 2     Driving safety issue     DRIVING AFTER RECEIVING DILAUDID INJECTION    Hx of psychiatric care     Neuropathy     per patient report    Psychiatric problem     Therapy        Past Surgical History:   Procedure Laterality Date    BACK SURGERY      " EYE SURGERY      LUMBAR LAMINECTOMY WITH DISCECTOMY Left 10/12/2023    Procedure: MIS LEFT L 4-L5 HEMILAMINECTOMY AND DISCECTOMY, LUMBAR SPINE;  Surgeon: Boom Pierre MD;  Location: [facility];  Service: Neurosurgery;  Laterality: Left;  MIS Left L4-5 hemilaminectomy and microdisectomy  -tiffanie table with austin frame, x-ray  -metrx       Time Tracking:     PT Received On: 10/13/23  PT Start Time: 0953     PT Stop Time: 1005  PT Total Time (min): 12 min     Billable Minutes: Evaluation 12 min co-eval with OT      10/13/2023

## 2023-10-13 NOTE — PLAN OF CARE
Problem: Occupational Therapy  Goal: Occupational Therapy Goal  Outcome: Met     The patient is Mod I with amb and toilet transfer without AD. The patient was educated verbally and via handout re: spinal precautions. The patient was provided info for potential purchase of a TTB.  The patient is ok for D/C to home from OT standpoint.

## 2023-10-13 NOTE — PROGRESS NOTES
[facility]  Neurosurgery    SUBJECTIVE:     History of Present Illness:  Patient states her left leg feels better, no pain. She is endorsing back pain as expected. Feels her left foot strength is a lot better since surgery.    OBJECTIVE:     Vital Signs (Most Recent):  Temp: 98.3 °F (36.8 °C) (10/13/23 0755)  Pulse: 82 (10/13/23 0755)  Resp: 20 (10/13/23 0755)  BP: 118/69 (10/13/23 0755)  SpO2: 99 % (10/13/23 0755)    Physical Exam:  General: well developed, well nourished, no distress  Motor Strength: Moves all extremities spontaneously with good tone.  Left leg weaker than the right but improved since surgery     Strength   Deltoids Triceps Biceps Wrist Extension Wrist Flexion Hand  FA   Upper: R 5/5 5/5 5/5 5/5 5/5 5/5 5/5     L 5/5 5/5 5/5 5/5 5/5 5/5 5/5       Iliopsoas Quadriceps Knee  Flexion Tibialis  anterior Gastro- cnemius EHL     Lower: R 5/5 5/5 5/5 5/5 5/5 5/5       L 4+/5 4+/5 4+/5 4/5 4+/5 4/5        DTR's - diminished patellar reflexes bilaterally    ASSESSMENT/PLAN:     Lizzie Saunders is a 37 y.o. female POD#1 s/p left L4/5 MIS galloway/disc for recurrent herniation and severe radic with foot weakness  -patient's strength is improved since surgery. She has no pain in the left leg. Back pain is normal.   -she is stable for dc home  -wound care and activity instructions reviewed in detail  -will rx for pain control and muscle relaxer  -rtc in 2 weeks for wound check     Boom Pierre  [facility]  Department of Neurosurgery  [phone]    Disclaimer: This note was dictated by speech recognition. Minor errors in transcription may be present.  Please call with any questions.

## 2023-10-13 NOTE — NURSING
Discharge instructions, follow-up appts., and prescriptions given and explained to patient. Patient verbalizes understanding of all. Opportunity for questions given. All questions answered. IV removed, pt tolerated well.

## 2023-10-13 NOTE — PLAN OF CARE
Problem: Physical Therapy  Goal: Physical Therapy Goal  Outcome: Met     Pt ambulated ~250 ft mod I using no AD.  Pt can be D/C'ed home, no further skilled PT services needed at this time.

## 2023-10-14 NOTE — H&P
[facility]  [facility] Medicine  History and Physical    Patient Name: Lizzie Saunders  MRN: [id number]  Patient Class: IP- Inpatient   Admission Date: 10/9/2023  Length of Stay: 2 days  Attending Physician: No att. providers found  Primary Care Provider: Maxi Anna MD        Subjective:     Principal Problem:Lumbosacral radiculopathy at L5        HPI:   Lizzie Saunders is 37-year-old female with a past medical history of diabetes mellitus type 2, diabetic neuropathy, chronic back pain, and bipolar disorder who presents to the ED for evaluation worsening of lumbar pain which she describes as spasms and sharp shooting pain radiating to her left lower extremity.  She reports she did not take medication at home for relief.  She took Robaxin over the weekend however ran out of this medication.  She states she was prescribed Lyrica however has not started taking this medication.  She underwent an MRI of the lumbar spine  on 09/25 which resulted with L4 through L5 disc herniation and compression of the L5 nerve.  Patient is being followed by Dr. Pierre in neurosurgery and was evaluated in clinic on 09/27/2023.  She was taking steroids at that time which did alleviate her symptoms.  She reports progressive worsening of weakness of the left lower extremity  and uncontrolled pain over the last several weeks resulting in multiple ED visits.  Patient denies any fever, chest pain, shortness of breath, nausea, vomiting, diarrhea, numbness, tingling, saddle anesthesia, urinary retention, bowel incontinence.    In the ED she was evaluated by Neurosurgery who recommends resuming home Lyrica and Robaxin.  Patient potentially scheduled for surgery on Wednesday for diskectomy.            Overview/Hospital Course:  38 y/o female presents with worsening of chronic back pain.  Recent MRI showed continued disc extrusion at L4-L5 narrows the left subarticular zone and abuts the descending left L5 nerve.  Neurosurgery  consulted.  Severe left L5 radiculopathy and left leg weakness due to recurrent herniated disc.  Recommending left L4/5 hemilaminotomy/microdiscectomy and partial left L5/S1 foraminotomy.  Procedure performed without complications on 10/12.  Patient still having pain, but LE weakness much improved.  She has remained afebrile and hemodynamically stable.  Patient will be discharged home to follow up with PCP and Neurosurgery.    No new subjective & objective note has been filed under this hospital service since the last note was generated.      Assessment/Plan:      * Lumbosacral radiculopathy at L5  Appreciate Neurosurgery input.  Planning left L4/5 hemilaminotomy/microdiscectomy and partial left L5/S1 foraminotomy today.  Continue pain medications and muscle relaxers.        Tobacco dependence  Dangers of cigarette smoking were reviewed with patient in detail. Patient was Counseled for 10 minutes or greater. Nicotine replacement options were discussed. Nicotine replacement was discussed- prescribed    Bipolar affective disorder, current episode depressed  Patient has persistent depression which is mild and is currently controlled. Will Continue anti-depressant medications. We will not consult psychiatry at this time. Patient does not display psychosis at this time. Continue to monitor closely and adjust plan of care as needed.        Hyperlipidemia     Patient is chronically on statin.will continue for now. Monitor clinically. Last LDL was   Lab Results   Component Value Date    LDLCALC 180 (H) 02/23/2023          Type 2 diabetes mellitus with neurologic complication, with long-term current use of insulin  Patient's FSGs are uncontrolled due to hyperglycemia on current medication regimen.  Last A1c reviewed-   Lab Results   Component Value Date    HGBA1C 8.8 (H) 09/25/2023     Most recent fingerstick glucose reviewed-   Recent Labs   Lab 10/11/23  1609 10/11/23  1942 10/12/23  0737 10/12/23  1119   POCTGLUCOSE 227*  190* 156* 142*     Current correctional scale  Low  Maintain anti-hyperglycemic dose as follows-   Antihyperglycemics (From admission, onward)      Start     Stop Route Frequency Ordered    10/09/23 2212  insulin aspart U-100 pen 0-10 Units         -- SubQ Before meals & nightly PRN 10/09/23 2113    10/09/23 2100  insulin detemir U-100 (Levemir) pen 10 Units         -- SubQ Nightly 10/09/23 1405          Hold Oral hypoglycemics while patient is in the hospital.          VTE Risk Mitigation (From admission, onward)           Ordered     IP VTE HIGH RISK PATIENT  Once         10/12/23 1920                    Discharge Planning   TETE: 10/13/2023     Code Status: Prior   Is the patient medically ready for discharge?:     Reason for patient still in hospital (select all that apply): Laboratory test, Treatment and Consult recommendations  Discharge Plan A: Other (TBD)   Discharge Delays: None known at this time              Mario Cardoza MD  Department of Hospital Medicine   [facility] - Emergency Dept

## 2023-10-25 ENCOUNTER — OFFICE VISIT (OUTPATIENT)
Dept: NEUROSURGERY | Facility: CLINIC | Age: 38
End: 2023-10-25
Payer: MEDICAID

## 2023-10-25 VITALS
HEART RATE: 96 BPM | HEIGHT: 66 IN | DIASTOLIC BLOOD PRESSURE: 64 MMHG | SYSTOLIC BLOOD PRESSURE: 101 MMHG | BODY MASS INDEX: 32.95 KG/M2 | WEIGHT: 205 LBS | OXYGEN SATURATION: 100 %

## 2023-10-25 DIAGNOSIS — M51.26 HERNIATION OF LEFT SIDE OF L4-L5 INTERVERTEBRAL DISC: Primary | ICD-10-CM

## 2023-10-25 PROCEDURE — 1111F DSCHRG MED/CURRENT MED MERGE: CPT | Mod: CPTII,S$GLB,, | Performed by: STUDENT IN AN ORGANIZED HEALTH CARE EDUCATION/TRAINING PROGRAM

## 2023-10-25 PROCEDURE — 3008F BODY MASS INDEX DOCD: CPT | Mod: CPTII,S$GLB,, | Performed by: STUDENT IN AN ORGANIZED HEALTH CARE EDUCATION/TRAINING PROGRAM

## 2023-10-25 PROCEDURE — 1159F MED LIST DOCD IN RCRD: CPT | Mod: CPTII,S$GLB,, | Performed by: STUDENT IN AN ORGANIZED HEALTH CARE EDUCATION/TRAINING PROGRAM

## 2023-10-25 PROCEDURE — 3078F DIAST BP <80 MM HG: CPT | Mod: CPTII,S$GLB,, | Performed by: STUDENT IN AN ORGANIZED HEALTH CARE EDUCATION/TRAINING PROGRAM

## 2023-10-25 PROCEDURE — 3074F SYST BP LT 130 MM HG: CPT | Mod: CPTII,S$GLB,, | Performed by: STUDENT IN AN ORGANIZED HEALTH CARE EDUCATION/TRAINING PROGRAM

## 2023-10-25 PROCEDURE — 99024 PR POST-OP FOLLOW-UP VISIT: ICD-10-PCS | Mod: S$GLB,,, | Performed by: STUDENT IN AN ORGANIZED HEALTH CARE EDUCATION/TRAINING PROGRAM

## 2023-10-25 PROCEDURE — 1159F PR MEDICATION LIST DOCUMENTED IN MEDICAL RECORD: ICD-10-PCS | Mod: CPTII,S$GLB,, | Performed by: STUDENT IN AN ORGANIZED HEALTH CARE EDUCATION/TRAINING PROGRAM

## 2023-10-25 PROCEDURE — 3074F PR MOST RECENT SYSTOLIC BLOOD PRESSURE < 130 MM HG: ICD-10-PCS | Mod: CPTII,S$GLB,, | Performed by: STUDENT IN AN ORGANIZED HEALTH CARE EDUCATION/TRAINING PROGRAM

## 2023-10-25 PROCEDURE — 3052F PR MOST RECENT HEMOGLOBIN A1C LEVEL 8.0 - < 9.0%: ICD-10-PCS | Mod: CPTII,S$GLB,, | Performed by: STUDENT IN AN ORGANIZED HEALTH CARE EDUCATION/TRAINING PROGRAM

## 2023-10-25 PROCEDURE — 3078F PR MOST RECENT DIASTOLIC BLOOD PRESSURE < 80 MM HG: ICD-10-PCS | Mod: CPTII,S$GLB,, | Performed by: STUDENT IN AN ORGANIZED HEALTH CARE EDUCATION/TRAINING PROGRAM

## 2023-10-25 PROCEDURE — 99024 POSTOP FOLLOW-UP VISIT: CPT | Mod: S$GLB,,, | Performed by: STUDENT IN AN ORGANIZED HEALTH CARE EDUCATION/TRAINING PROGRAM

## 2023-10-25 PROCEDURE — 1111F PR DISCHARGE MEDS RECONCILED W/ CURRENT OUTPATIENT MED LIST: ICD-10-PCS | Mod: CPTII,S$GLB,, | Performed by: STUDENT IN AN ORGANIZED HEALTH CARE EDUCATION/TRAINING PROGRAM

## 2023-10-25 PROCEDURE — 3052F HG A1C>EQUAL 8.0%<EQUAL 9.0%: CPT | Mod: CPTII,S$GLB,, | Performed by: STUDENT IN AN ORGANIZED HEALTH CARE EDUCATION/TRAINING PROGRAM

## 2023-10-25 PROCEDURE — 3008F PR BODY MASS INDEX (BMI) DOCUMENTED: ICD-10-PCS | Mod: CPTII,S$GLB,, | Performed by: STUDENT IN AN ORGANIZED HEALTH CARE EDUCATION/TRAINING PROGRAM

## 2023-10-25 NOTE — LETTER
October 25, 2023      [facility] - Neurosurgery  [address]   [place] [address]  Phone: [phone]  Fax: [phone]       Patient: Lizzie Saunders   YOB: 1985  Date of Visit: 10/25/2023    To Whom It May Concern:    Chuck Saunders  was at [facility] on 10/25/2023. The patient may return to work/school Full-time with some restrictions.  -No lifting greater than 10lbs  -No bending  -Wear LSO Brace at work  -Ok for Walking    If you have any questions or concerns, or if I can be of further assistance, please do not hesitate to contact me.    Sincerely,        Boom Pierre MD

## 2023-10-25 NOTE — PROGRESS NOTES
[facility]  Neurosurgery    SUBJECTIVE:     Patient is now 2 weeks out from redo left L4-5 minimally invasive laminectomy/microdiskectomy for recurrent herniation and severe radiculopathy.  Patient is very pleased with her surgical result.  She went back to work over the weekend.  This is especially good for her because she was having financial difficulties as a result of having to be out of work due to her extreme pain before surgery.  She feels like she is getting around very well.  Her pain in the the back is actually much improved and she no longer has to walk with a limp.  She does not have pain shooting down the leg.  She did forget to take some of her medications last night and had some isolated intermittent pains in her left foot but this has resolved.  Overall, she is doing remarkably better than before surgery and she is very happy with how things are going.  She states this is also helped her mental status and anxiety quite a bit.  She is sleeping better than she was before.      OBJECTIVE:     Physical Exam:  General: well developed, well nourished, no distress  Head: normocephalic, atraumatic  Neurologic: Alert and oriented. Thought content appropriate  GCS: Motor: 6/Verbal: 5/Eyes: 4 GCS Total: 15  Language: No aphasia  Speech: No dysarthria  Cranial nerves: face symmetric, tongue midline, CN II-XII grossly intact.   Eyes: pupils equal, round, reactive to light with accommodation, EOMI.   Pulmonary: normal respirations, not labored, no accessory muscles used  Sensory: intact to light touch throughout  Motor Strength: Moves all extremities spontaneously with good tone.  Mild weakness of dorsiflexion on the left foot.    DTR's - diminished patellar reflexes bilaterally    Gait: normal      Incision is clean, dry, intact.  Steri-Strips were removed and I cleaned the wound with rubbing alcohol.  There is a small amount of scab remaining but it looks very nice.    Diagnostic Results:  No new  diagnostic testing    ASSESSMENT/PLAN:     Lizzie Saunders is a 37 y.o. female 2 weeks status post minimally invasive redo left L4-5 laminectomy/microdiskectomy.  -patient's symptoms are dramatically improved postoperatively.  She is really doing quite well  -patient can go back to work full-time.  We have provided her a work note with instructions that for the next month, she should wear her LSO brace at work.  She should not do any lifting greater than 10 lb.  She should not do any bending or work on the bottom shelves.  -we will set up patient for outpatient physical therapy to work on her leg strengthening.  I will ask that she wear her LSO brace while she is doing physical therapy.  I will also ask that she maintains a lifting limit of 10 lb and does not do any bending/twisting.  She is okay to do nonweightbearing stretching exercises.  -I emphasized with the patient that she should really work on moving with a neutral back for the rest of her life as she has a compromised disc, especially at L4-5.  She should learn to bend at the knees and at the hips, but not to bend at the waist.  She understands this and is going to do her best  -patient does not need to make another appointment to see me at this time.  We did discuss (and hopefully this does not happen) if her disc does herniate again in the future the next step may be a left L4-5 minimally invasive transforaminal lumbar interbody fusion.    Boom DOUGLAS Mangham [facility]  Department of Neurosurgery  [phone]    Disclaimer: This note was dictated by speech recognition. Minor errors in transcription may be present.  Please call with any questions.

## 2023-12-10 DIAGNOSIS — E78.2 MIXED HYPERLIPIDEMIA: ICD-10-CM

## 2023-12-11 RX ORDER — ATORVASTATIN CALCIUM 40 MG/1
40 TABLET, FILM COATED ORAL NIGHTLY
Qty: 30 TABLET | Refills: 0 | Status: SHIPPED | OUTPATIENT
Start: 2023-12-11

## 2024-01-31 ENCOUNTER — OFFICE VISIT (OUTPATIENT)
Dept: NEUROSURGERY | Facility: CLINIC | Age: 39
End: 2024-01-31
Payer: MEDICAID

## 2024-01-31 VITALS
HEIGHT: 66 IN | WEIGHT: 192.88 LBS | BODY MASS INDEX: 31 KG/M2 | DIASTOLIC BLOOD PRESSURE: 72 MMHG | OXYGEN SATURATION: 100 % | HEART RATE: 96 BPM | SYSTOLIC BLOOD PRESSURE: 120 MMHG

## 2024-01-31 DIAGNOSIS — M51.26 HERNIATION OF LEFT SIDE OF L4-L5 INTERVERTEBRAL DISC: Primary | ICD-10-CM

## 2024-01-31 PROCEDURE — 99213 OFFICE O/P EST LOW 20 MIN: CPT | Mod: S$GLB,,, | Performed by: STUDENT IN AN ORGANIZED HEALTH CARE EDUCATION/TRAINING PROGRAM

## 2024-01-31 PROCEDURE — 3078F DIAST BP <80 MM HG: CPT | Mod: CPTII,S$GLB,, | Performed by: STUDENT IN AN ORGANIZED HEALTH CARE EDUCATION/TRAINING PROGRAM

## 2024-01-31 PROCEDURE — 1159F MED LIST DOCD IN RCRD: CPT | Mod: CPTII,S$GLB,, | Performed by: STUDENT IN AN ORGANIZED HEALTH CARE EDUCATION/TRAINING PROGRAM

## 2024-01-31 PROCEDURE — 3074F SYST BP LT 130 MM HG: CPT | Mod: CPTII,S$GLB,, | Performed by: STUDENT IN AN ORGANIZED HEALTH CARE EDUCATION/TRAINING PROGRAM

## 2024-01-31 PROCEDURE — 3008F BODY MASS INDEX DOCD: CPT | Mod: CPTII,S$GLB,, | Performed by: STUDENT IN AN ORGANIZED HEALTH CARE EDUCATION/TRAINING PROGRAM

## 2024-01-31 RX ORDER — TIRZEPATIDE 7.5 MG/.5ML
7.5 INJECTION, SOLUTION SUBCUTANEOUS
COMMUNITY
End: 2024-06-06 | Stop reason: CLARIF

## 2024-01-31 NOTE — PROGRESS NOTES
[facility]  Neurosurgery    SUBJECTIVE:     Interval History 1/31/24: Patient presents for interval follow up after outpatient PT. Patient is extremely pleased with her continued progress. 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 States she continues Lyrica for occasional restless leg like symptoms in her LLE that force her to continue to more her LLE around in order to relieve symptoms. Denies returned back pain or radicular pain. States she has progressed well with therapy and will now be focusing on flexibility, balance and her upper back strength. Patient tearful during examination due to being very excited about her improvement.     HPI: Patient is now 2 weeks out from redo left L4-5 minimally invasive laminectomy/microdiskectomy for recurrent herniation and severe radiculopathy.  Patient is very pleased with her surgical result.  She went back to work over the weekend.  This is especially good for her because she was having financial difficulties as a result of having to be out of work due to her extreme pain before surgery.  She feels like she is getting around very well.  Her pain in the the back is actually much improved and she no longer has to walk with a limp.  She does not have pain shooting down the leg.  She did forget to take some of her medications last night and had some isolated intermittent pains in her left foot but this has resolved.  Overall, she is doing remarkably better than before surgery and she is very happy with how things are going.  She states this is also helped her mental status and anxiety quite a bit.  She is sleeping better than she was before.      OBJECTIVE:     Vitals:    01/31/24 0909   BP: 120/72   Pulse: 96         Physical Exam:  General: well developed, well nourished, no distress  Head: normocephalic, atraumatic  Neurologic: Alert and oriented. Thought content  appropriate  GCS: Motor: 6/Verbal: 5/Eyes: 4 GCS Total: 15  Language: No aphasia  Speech: No dysarthria  Cranial nerves: face symmetric, tongue midline, CN II-XII grossly intact.   Eyes: pupils equal, round, reactive to light with accommodation, EOMI.   Pulmonary: normal respirations, not labored, no accessory muscles used  Sensory: intact to light touch throughout  Motor Strength: Moves all extremities spontaneously with good tone. Full strength.    DTR's - diminished patellar reflexes bilaterally    Gait: normal      Incision is clean, dry, intact, well healed    Diagnostic Results:  No new diagnostic testing    ASSESSMENT/PLAN:     Lizzie Saunders is a 38 y.o. female 3 months status post minimally invasive redo left L4-5 laminectomy/microdiskectomy.  -patient's symptoms continue to improve postoperatively.  She is really doing quite well and excited for this next chapter in her career.   -patient can go back to work full-time. She can use her LSO as needed.   -I emphasized with the patient that she should really work on moving with a neutral back for the rest of her life as she has a compromised disc, especially at L4-5.  She should learn to bend at the knees and at the hips, but not to bend at the waist.  She understands this and is doing well with this  -patient does not need to make another appointment to see me at this time. We discussed that she could have other issues with her back in the future but for now, 3 months s/p redo left L4/5 galloway/disc she is doing as well as can be expected.    Boom Pierre  Neurosurgery

## 2024-04-21 ENCOUNTER — HOSPITAL ENCOUNTER (OUTPATIENT)
Facility: OTHER | Age: 39
Discharge: HOME | End: 2024-04-22
Attending: EMERGENCY MEDICINE | Admitting: OBSTETRICS & GYNECOLOGY
Payer: MEDICAID

## 2024-04-21 DIAGNOSIS — N76.0 BACTERIAL VAGINOSIS: ICD-10-CM

## 2024-04-21 DIAGNOSIS — N73.0 PID (ACUTE PELVIC INFLAMMATORY DISEASE): ICD-10-CM

## 2024-04-21 DIAGNOSIS — B96.89 BACTERIAL VAGINOSIS: ICD-10-CM

## 2024-04-21 DIAGNOSIS — N70.93 TUBO-OVARIAN ABSCESS: ICD-10-CM

## 2024-04-21 DIAGNOSIS — N93.9 VAGINAL BLEEDING: ICD-10-CM

## 2024-04-21 LAB
ALBUMIN SERPL BCP-MCNC: 4.3 G/DL (ref 3.5–5.2)
ALP SERPL-CCNC: 66 U/L (ref 55–135)
ALT SERPL W/O P-5'-P-CCNC: 12 U/L (ref 10–44)
ANION GAP SERPL CALC-SCNC: 12 MMOL/L (ref 8–16)
AST SERPL-CCNC: 17 U/L (ref 10–40)
B-HCG UR QL: NEGATIVE
BACTERIA #/AREA URNS AUTO: ABNORMAL /HPF
BACTERIA GENITAL QL WET PREP: ABNORMAL
BASOPHILS # BLD AUTO: 0.07 K/UL (ref 0–0.2)
BASOPHILS NFR BLD: 1 % (ref 0–1.9)
BILIRUB SERPL-MCNC: 1.3 MG/DL (ref 0.1–1)
BILIRUB UR QL STRIP: NEGATIVE
BUN SERPL-MCNC: 8 MG/DL (ref 6–20)
CALCIUM SERPL-MCNC: 10.2 MG/DL (ref 8.7–10.5)
CHLORIDE SERPL-SCNC: 102 MMOL/L (ref 95–110)
CLARITY UR REFRACT.AUTO: ABNORMAL
CLUE CELLS VAG QL WET PREP: ABNORMAL
CO2 SERPL-SCNC: 24 MMOL/L (ref 23–29)
COLOR UR AUTO: YELLOW
CREAT SERPL-MCNC: 0.7 MG/DL (ref 0.5–1.4)
CTP QC/QA: YES
DIFFERENTIAL METHOD BLD: ABNORMAL
EOSINOPHIL # BLD AUTO: 0.1 K/UL (ref 0–0.5)
EOSINOPHIL NFR BLD: 1.6 % (ref 0–8)
ERYTHROCYTE [DISTWIDTH] IN BLOOD BY AUTOMATED COUNT: 13.8 % (ref 11.5–14.5)
EST. GFR  (NO RACE VARIABLE): >60 ML/MIN/1.73 M^2
FILAMENT FUNGI VAG WET PREP-#/AREA: ABNORMAL
GLUCOSE SERPL-MCNC: 81 MG/DL (ref 70–110)
GLUCOSE UR QL STRIP: NEGATIVE
HCT VFR BLD AUTO: 41.1 % (ref 37–48.5)
HGB BLD-MCNC: 13.4 G/DL (ref 12–16)
HGB UR QL STRIP: ABNORMAL
HYALINE CASTS UR QL AUTO: 0 /LPF
IMM GRANULOCYTES # BLD AUTO: 0.03 K/UL (ref 0–0.04)
IMM GRANULOCYTES NFR BLD AUTO: 0.4 % (ref 0–0.5)
KETONES UR QL STRIP: ABNORMAL
LEUKOCYTE ESTERASE UR QL STRIP: ABNORMAL
LYMPHOCYTES # BLD AUTO: 2 K/UL (ref 1–4.8)
LYMPHOCYTES NFR BLD: 27.8 % (ref 18–48)
MCH RBC QN AUTO: 27.2 PG (ref 27–31)
MCHC RBC AUTO-ENTMCNC: 32.6 G/DL (ref 32–36)
MCV RBC AUTO: 83 FL (ref 82–98)
MICROSCOPIC COMMENT: ABNORMAL
MONOCYTES # BLD AUTO: 0.6 K/UL (ref 0.3–1)
MONOCYTES NFR BLD: 8.6 % (ref 4–15)
NEUTROPHILS # BLD AUTO: 4.3 K/UL (ref 1.8–7.7)
NEUTROPHILS NFR BLD: 60.6 % (ref 38–73)
NITRITE UR QL STRIP: NEGATIVE
NRBC BLD-RTO: 0 /100 WBC
PH UR STRIP: 6 [PH] (ref 5–8)
PLATELET # BLD AUTO: 614 K/UL (ref 150–450)
PMV BLD AUTO: 8.7 FL (ref 9.2–12.9)
POCT GLUCOSE: 75 MG/DL (ref 70–110)
POCT GLUCOSE: 87 MG/DL (ref 70–110)
POTASSIUM SERPL-SCNC: 4 MMOL/L (ref 3.5–5.1)
PROT SERPL-MCNC: 8.1 G/DL (ref 6–8.4)
PROT UR QL STRIP: ABNORMAL
RBC # BLD AUTO: 4.93 M/UL (ref 4–5.4)
RBC #/AREA URNS AUTO: 3 /HPF (ref 0–4)
SODIUM SERPL-SCNC: 138 MMOL/L (ref 136–145)
SP GR UR STRIP: 1.02 (ref 1–1.03)
SPECIMEN SOURCE: ABNORMAL
SQUAMOUS #/AREA URNS AUTO: 6 /HPF
T VAGINALIS GENITAL QL WET PREP: ABNORMAL
UNSPECIFIED CRY UR QL COMP ASSIST: <1
URN SPEC COLLECT METH UR: ABNORMAL
WBC # BLD AUTO: 7.09 K/UL (ref 3.9–12.7)
WBC #/AREA URNS AUTO: 76 /HPF (ref 0–5)
WBC #/AREA VAG WET PREP: ABNORMAL
YEAST GENITAL QL WET PREP: ABNORMAL

## 2024-04-21 PROCEDURE — 80053 COMPREHEN METABOLIC PANEL: CPT | Performed by: EMERGENCY MEDICINE

## 2024-04-21 PROCEDURE — 25000003 PHARM REV CODE 250: Performed by: EMERGENCY MEDICINE

## 2024-04-21 PROCEDURE — 87491 CHLMYD TRACH DNA AMP PROBE: CPT | Performed by: STUDENT IN AN ORGANIZED HEALTH CARE EDUCATION/TRAINING PROGRAM

## 2024-04-21 PROCEDURE — 87077 CULTURE AEROBIC IDENTIFY: CPT | Performed by: EMERGENCY MEDICINE

## 2024-04-21 PROCEDURE — G0378 HOSPITAL OBSERVATION PER HR: HCPCS

## 2024-04-21 PROCEDURE — 87088 URINE BACTERIA CULTURE: CPT | Performed by: EMERGENCY MEDICINE

## 2024-04-21 PROCEDURE — 25000003 PHARM REV CODE 250: Performed by: STUDENT IN AN ORGANIZED HEALTH CARE EDUCATION/TRAINING PROGRAM

## 2024-04-21 PROCEDURE — 85025 COMPLETE CBC W/AUTO DIFF WBC: CPT | Performed by: EMERGENCY MEDICINE

## 2024-04-21 PROCEDURE — 87186 SC STD MICRODIL/AGAR DIL: CPT | Performed by: EMERGENCY MEDICINE

## 2024-04-21 PROCEDURE — 81001 URINALYSIS AUTO W/SCOPE: CPT | Performed by: EMERGENCY MEDICINE

## 2024-04-21 PROCEDURE — 96372 THER/PROPH/DIAG INJ SC/IM: CPT | Performed by: STUDENT IN AN ORGANIZED HEALTH CARE EDUCATION/TRAINING PROGRAM

## 2024-04-21 PROCEDURE — 81025 URINE PREGNANCY TEST: CPT | Performed by: EMERGENCY MEDICINE

## 2024-04-21 PROCEDURE — 63600175 PHARM REV CODE 636 W HCPCS: Performed by: STUDENT IN AN ORGANIZED HEALTH CARE EDUCATION/TRAINING PROGRAM

## 2024-04-21 PROCEDURE — 63600175 PHARM REV CODE 636 W HCPCS

## 2024-04-21 PROCEDURE — 87086 URINE CULTURE/COLONY COUNT: CPT | Performed by: EMERGENCY MEDICINE

## 2024-04-21 PROCEDURE — 87210 SMEAR WET MOUNT SALINE/INK: CPT | Performed by: STUDENT IN AN ORGANIZED HEALTH CARE EDUCATION/TRAINING PROGRAM

## 2024-04-21 PROCEDURE — 96374 THER/PROPH/DIAG INJ IV PUSH: CPT

## 2024-04-21 PROCEDURE — 99285 EMERGENCY DEPT VISIT HI MDM: CPT | Mod: 25

## 2024-04-21 PROCEDURE — 25000003 PHARM REV CODE 250

## 2024-04-21 PROCEDURE — 99222 1ST HOSP IP/OBS MODERATE 55: CPT | Mod: ,,, | Performed by: OBSTETRICS & GYNECOLOGY

## 2024-04-21 RX ORDER — IBUPROFEN 600 MG/1
600 TABLET ORAL EVERY 6 HOURS PRN
Status: DISCONTINUED | OUTPATIENT
Start: 2024-04-21 | End: 2024-04-22 | Stop reason: HOSPADM

## 2024-04-21 RX ORDER — METFORMIN HYDROCHLORIDE 500 MG/1
1000 TABLET, EXTENDED RELEASE ORAL
Status: DISCONTINUED | OUTPATIENT
Start: 2024-04-22 | End: 2024-04-22 | Stop reason: HOSPADM

## 2024-04-21 RX ORDER — METRONIDAZOLE 500 MG/1
500 TABLET ORAL
Status: COMPLETED | OUTPATIENT
Start: 2024-04-21 | End: 2024-04-21

## 2024-04-21 RX ORDER — DOXYCYCLINE HYCLATE 100 MG
100 TABLET ORAL EVERY 12 HOURS
Status: DISCONTINUED | OUTPATIENT
Start: 2024-04-21 | End: 2024-04-22 | Stop reason: HOSPADM

## 2024-04-21 RX ORDER — DOXYCYCLINE HYCLATE 100 MG
100 TABLET ORAL
Status: COMPLETED | OUTPATIENT
Start: 2024-04-21 | End: 2024-04-21

## 2024-04-21 RX ORDER — ALPRAZOLAM 0.5 MG/1
0.5 TABLET ORAL
Status: COMPLETED | OUTPATIENT
Start: 2024-04-21 | End: 2024-04-21

## 2024-04-21 RX ORDER — IBUPROFEN 200 MG
16 TABLET ORAL
Status: DISCONTINUED | OUTPATIENT
Start: 2024-04-21 | End: 2024-04-22 | Stop reason: HOSPADM

## 2024-04-21 RX ORDER — INSULIN ASPART 100 [IU]/ML
0-5 INJECTION, SOLUTION INTRAVENOUS; SUBCUTANEOUS
Status: DISCONTINUED | OUTPATIENT
Start: 2024-04-21 | End: 2024-04-22 | Stop reason: HOSPADM

## 2024-04-21 RX ORDER — DOXYCYCLINE 100 MG/1
100 CAPSULE ORAL 2 TIMES DAILY
Qty: 28 CAPSULE | Refills: 0 | Status: SHIPPED | OUTPATIENT
Start: 2024-04-21 | End: 2024-04-22 | Stop reason: HOSPADM

## 2024-04-21 RX ORDER — METRONIDAZOLE 500 MG/1
500 TABLET ORAL EVERY 12 HOURS
Qty: 28 TABLET | Refills: 0 | Status: SHIPPED | OUTPATIENT
Start: 2024-04-21 | End: 2024-04-21

## 2024-04-21 RX ORDER — SODIUM CHLORIDE 0.9 % (FLUSH) 0.9 %
10 SYRINGE (ML) INJECTION
Status: DISCONTINUED | OUTPATIENT
Start: 2024-04-21 | End: 2024-04-22 | Stop reason: HOSPADM

## 2024-04-21 RX ORDER — PREGABALIN 25 MG/1
25 CAPSULE ORAL 2 TIMES DAILY
Status: DISCONTINUED | OUTPATIENT
Start: 2024-04-21 | End: 2024-04-22 | Stop reason: HOSPADM

## 2024-04-21 RX ORDER — METRONIDAZOLE 500 MG/1
500 TABLET ORAL EVERY 12 HOURS
Qty: 28 TABLET | Refills: 0 | Status: SHIPPED | OUTPATIENT
Start: 2024-04-21 | End: 2024-05-05

## 2024-04-21 RX ORDER — IBUPROFEN 200 MG
24 TABLET ORAL
Status: DISCONTINUED | OUTPATIENT
Start: 2024-04-21 | End: 2024-04-22 | Stop reason: HOSPADM

## 2024-04-21 RX ORDER — ATORVASTATIN CALCIUM 20 MG/1
40 TABLET, FILM COATED ORAL NIGHTLY
Status: DISCONTINUED | OUTPATIENT
Start: 2024-04-21 | End: 2024-04-22 | Stop reason: HOSPADM

## 2024-04-21 RX ORDER — GLUCAGON 1 MG
1 KIT INJECTION
Status: DISCONTINUED | OUTPATIENT
Start: 2024-04-21 | End: 2024-04-22 | Stop reason: HOSPADM

## 2024-04-21 RX ORDER — DOXYCYCLINE 100 MG/1
100 CAPSULE ORAL 2 TIMES DAILY
Qty: 28 CAPSULE | Refills: 0 | Status: SHIPPED | OUTPATIENT
Start: 2024-04-21 | End: 2024-04-21

## 2024-04-21 RX ORDER — KETOROLAC TROMETHAMINE 30 MG/ML
15 INJECTION, SOLUTION INTRAMUSCULAR; INTRAVENOUS ONCE
Status: COMPLETED | OUTPATIENT
Start: 2024-04-21 | End: 2024-04-21

## 2024-04-21 RX ORDER — OXYCODONE HYDROCHLORIDE 10 MG/1
10 TABLET ORAL EVERY 4 HOURS PRN
Status: DISCONTINUED | OUTPATIENT
Start: 2024-04-21 | End: 2024-04-22 | Stop reason: HOSPADM

## 2024-04-21 RX ORDER — CEFTRIAXONE 1 G/1
0.5 INJECTION, POWDER, FOR SOLUTION INTRAMUSCULAR; INTRAVENOUS
Status: COMPLETED | OUTPATIENT
Start: 2024-04-21 | End: 2024-04-21

## 2024-04-21 RX ORDER — ONDANSETRON 8 MG/1
8 TABLET, ORALLY DISINTEGRATING ORAL EVERY 8 HOURS PRN
Status: DISCONTINUED | OUTPATIENT
Start: 2024-04-21 | End: 2024-04-22 | Stop reason: HOSPADM

## 2024-04-21 RX ORDER — METRONIDAZOLE 500 MG/1
500 TABLET ORAL EVERY 12 HOURS
Qty: 28 TABLET | Refills: 0 | Status: SHIPPED | OUTPATIENT
Start: 2024-04-21 | End: 2024-04-22 | Stop reason: HOSPADM

## 2024-04-21 RX ORDER — OXYCODONE HYDROCHLORIDE 5 MG/1
5 TABLET ORAL EVERY 4 HOURS PRN
Status: DISCONTINUED | OUTPATIENT
Start: 2024-04-21 | End: 2024-04-22 | Stop reason: HOSPADM

## 2024-04-21 RX ORDER — METRONIDAZOLE 7.5 MG/G
5 GEL VAGINAL 2 TIMES DAILY
Qty: 5 APPLICATOR | Refills: 0 | Status: SHIPPED | OUTPATIENT
Start: 2024-04-21 | End: 2024-04-21 | Stop reason: CLARIF

## 2024-04-21 RX ORDER — METRONIDAZOLE 250 MG/1
500 TABLET ORAL 2 TIMES DAILY
Status: DISCONTINUED | OUTPATIENT
Start: 2024-04-21 | End: 2024-04-22 | Stop reason: HOSPADM

## 2024-04-21 RX ORDER — ACETAMINOPHEN 500 MG
1000 TABLET ORAL
Status: COMPLETED | OUTPATIENT
Start: 2024-04-21 | End: 2024-04-21

## 2024-04-21 RX ORDER — IBUPROFEN 600 MG/1
600 TABLET ORAL
Status: COMPLETED | OUTPATIENT
Start: 2024-04-21 | End: 2024-04-21

## 2024-04-21 RX ADMIN — DOXYCYCLINE HYCLATE 100 MG: 100 TABLET, COATED ORAL at 03:04

## 2024-04-21 RX ADMIN — METRONIDAZOLE 500 MG: 500 TABLET ORAL at 03:04

## 2024-04-21 RX ADMIN — IBUPROFEN 600 MG: 600 TABLET, FILM COATED ORAL at 03:04

## 2024-04-21 RX ADMIN — ATORVASTATIN CALCIUM 40 MG: 20 TABLET, FILM COATED ORAL at 08:04

## 2024-04-21 RX ADMIN — METRONIDAZOLE 500 MG: 250 TABLET ORAL at 08:04

## 2024-04-21 RX ADMIN — DOXYCYCLINE HYCLATE 100 MG: 100 TABLET, COATED ORAL at 08:04

## 2024-04-21 RX ADMIN — ACETAMINOPHEN 1000 MG: 500 TABLET ORAL at 02:04

## 2024-04-21 RX ADMIN — METRONIDAZOLE 500 MG: 250 TABLET ORAL at 03:04

## 2024-04-21 RX ADMIN — OXYCODONE HYDROCHLORIDE 10 MG: 10 TABLET ORAL at 03:04

## 2024-04-21 RX ADMIN — PREGABALIN 25 MG: 25 CAPSULE ORAL at 08:04

## 2024-04-21 RX ADMIN — IBUPROFEN 600 MG: 600 TABLET, FILM COATED ORAL at 02:04

## 2024-04-21 RX ADMIN — ALPRAZOLAM 0.5 MG: 0.5 TABLET ORAL at 12:04

## 2024-04-21 RX ADMIN — CEFTRIAXONE 0.5 G: 1 INJECTION, POWDER, FOR SOLUTION INTRAMUSCULAR; INTRAVENOUS at 03:04

## 2024-04-21 RX ADMIN — KETOROLAC TROMETHAMINE 15 MG: 30 INJECTION, SOLUTION INTRAMUSCULAR; INTRAVENOUS at 01:04

## 2024-04-21 NOTE — ED NOTES
Transfer from [facility] for ovarian abscess and torsion rule out. Pt given oral and IM antibiotics prior to arrival.

## 2024-04-21 NOTE — CONSULTS
[facility] Emergency Dept  Obstetrics & Gynecology  Consult Note    Inpatient consult to Obstetrics  Consult performed by: Marlene Sue MD  Consult ordered by: Lucio Maharaj MD      In short patient presented to main campus ED with vaginal spotting and abdominal discomfort. After evaluation patient is being admitted to GYN service for observation, assessment of abdominal pain in AM and PID antibiotics. Please see H&P for further details.     Marlene Sue MD  Obstetrics and Gynecology, PGY-1

## 2024-04-21 NOTE — ED PROVIDER NOTES
Encounter Date: 4/21/2024       History     Chief Complaint   Patient presents with    Vaginal Bleeding     Concerned about STD.     Dysuria     38-year-old female with history of bipolar disorder, DM transferred by [facility] due to concern for TOA or ovarian torsion.  Patient states that she was at normal baseline until she had protected intercourse 4 days ago.  2 days ago she started having vaginal bleeding when it was not time for her cycle, with associated mild pelvic discomfort.  She started having some mild dysuria as well, making her concern for UTI, so she tried increasing her p.o. hydration with no improvement.  She reports having some mild vaginal discharge with her bleeding, which was lighter than her typical cycle.  She denies any known history of STD or other similar presentations.  She also had a brief episode of a sore throat that has since improved.      Review of patient's allergies indicates:   Allergen Reactions    Rosemary Anaphylaxis    Pecan nut Dermatitis    Sulfa (sulfonamide antibiotics) Itching     Past Medical History:   Diagnosis Date    Bipolar disorder     per patient report    Chronic pain     Depression     Diabetes mellitus     Diabetes mellitus, type 2     Driving safety issue     DRIVING AFTER RECEIVING DILAUDID INJECTION    Hx of psychiatric care     Neuropathy     per patient report    Psychiatric problem     Therapy      Past Surgical History:   Procedure Laterality Date    BACK SURGERY      EYE SURGERY      LUMBAR LAMINECTOMY WITH DISCECTOMY Left 10/12/2023    Procedure: MIS LEFT L 4-L5 HEMILAMINECTOMY AND DISCECTOMY, LUMBAR SPINE;  Surgeon: Boom Pierre MD;  Location: [facility] OR;  Service: Neurosurgery;  Laterality: Left;  MIS Left L4-5 hemilaminectomy and microdisectomy  -tiffanie table with austin frame, x-ray  -metrx     Family History   Problem Relation Name Age of Onset    Bipolar disorder Mother      Bipolar disorder Father      Cancer Paternal Grandfather        Social History     Tobacco Use    Smoking status: Former     Current packs/day: 0.00     Types: Cigarettes     Quit date: 9/1/2020     Years since quitting: 3.6    Smokeless tobacco: Never   Substance Use Topics    Alcohol use: Yes     Comment: rare    Drug use: Not Currently     Types: Marijuana     Comment: 10/9/23:Daily     Review of Systems   Constitutional:  Negative for fever.   HENT:  Positive for sore throat. Negative for congestion.    Eyes:  Negative for redness.   Respiratory:  Negative for shortness of breath.    Cardiovascular:  Negative for chest pain.   Genitourinary:  Positive for dysuria, pelvic pain, vaginal bleeding and vaginal pain.   Skin:  Negative for rash.   Neurological:  Negative for headaches.   Psychiatric/Behavioral:  Negative for confusion.        Physical Exam     Initial Vitals [04/21/24 0012]   BP Pulse Resp Temp SpO2   (!) 132/93 102 16 97.8 °F (36.6 °C) 99 %      MAP       --         Physical Exam    ED Course   Procedures  Labs Reviewed   VAGINAL SCREEN - Abnormal; Notable for the following components:       Result Value    Clue Cells Rare (*)     WBC - Vaginal Screen Rare (*)     Bacteria - Vaginal Screen Rare (*)     All other components within normal limits    Narrative:     Release to patient->Immediate   URINALYSIS, REFLEX TO URINE CULTURE - Abnormal; Notable for the following components:    Appearance, UA Hazy (*)     Protein, UA 1+ (*)     Ketones, UA 2+ (*)     Occult Blood UA 3+ (*)     Leukocytes, UA 2+ (*)     All other components within normal limits    Narrative:     Specimen Source->Urine   URINALYSIS MICROSCOPIC - Abnormal; Notable for the following components:    WBC, UA 76 (*)     All other components within normal limits    Narrative:     Specimen Source->Urine   CBC W/ AUTO DIFFERENTIAL - Abnormal; Notable for the following components:    Platelets 614 (*)     MPV 8.7 (*)     All other components within normal limits   COMPREHENSIVE METABOLIC PANEL -  Abnormal; Notable for the following components:    Total Bilirubin 1.3 (*)     All other components within normal limits   CULTURE, URINE   C. TRACHOMATIS/N. GONORRHOEAE BY AMP DNA   POCT URINE PREGNANCY          Imaging Results              US Pelvis Comp with Transvag NON-OB (xpd (Final result)  Result time 04/21/24 08:09:19      Final result by Maisha Erazo MD (04/21/24 08:09:19)                   Impression:      Enlarged right ovary with complex area with numerous septations, arterial flow difficult to obtain, venous flow was present, some degree of intermittent ovarian torsion cannot be entirely excluded      Electronically signed by: Maisha Erazo MD  Date:    04/21/2024  Time:    08:09               Narrative:    EXAMINATION:  US PELVIS COMP WITH TRANSVAG NON-OB (XPD)    CLINICAL HISTORY:  Adnexal tenderness, concern for PID clinically, concern for TOA;    TECHNIQUE:  Transabdominal sonography of the pelvis was performed, followed by transvaginal sonography to better evaluate the uterus and ovaries.    COMPARISON:  None.    FINDINGS:  Uterus:    Size: 6.7 x 2.4 x 4.4 cm    Masses: None    Endometrium: Normal in this pre menopausal patient, measuring 2 mm.    Right ovary:    Size: 3.0 x 1.5 x 2.3 cm    Appearance: Normal    Vascular flow: Normal.    Left ovary:    Size: 5.5 x 4.1 x 5.1 cm    Appearance: Abnormal complex area with numerous septations measuring 4.5 x 3.5 x 4.6 cm    Vascular Flow: Venous fluids obtained, arterial fluid is difficult to obtain..    Free Fluid:    None.                                       Medications   sodium chloride 0.9% flush 10 mL (has no administration in time range)   ondansetron disintegrating tablet 8 mg (has no administration in time range)   ibuprofen tablet 600 mg (has no administration in time range)   oxyCODONE immediate release tablet 5 mg (has no administration in time range)   oxyCODONE immediate release tablet Tab 10 mg (has no administration in  time range)   doxycycline tablet 100 mg (has no administration in time range)   metroNIDAZOLE tablet 500 mg (has no administration in time range)   ibuprofen tablet 600 mg (600 mg Oral Given 4/21/24 0220)   acetaminophen tablet 1,000 mg (1,000 mg Oral Given 4/21/24 0220)   cefTRIAXone injection 0.5 g (0.5 g Intramuscular Given 4/21/24 0325)   doxycycline tablet 100 mg (100 mg Oral Given 4/21/24 0324)   metroNIDAZOLE tablet 500 mg (500 mg Oral Given 4/21/24 0324)   ALPRAZolam tablet 0.5 mg (0.5 mg Oral Given 4/21/24 1243)   ketorolac injection 15 mg (15 mg Intravenous Given 4/21/24 1334)     Medical Decision Making      38-year-old female with history of bipolar disorder, DM transferred by [facility] due to concern for TOA or ovarian torsion.  After protected intercourse 4 days ago she started having irregular vaginal bleeding and lower pelvic pain, dysuria 2 days ago.  Workup at [facility] with CMT on pelvic exam, as well as ultrasound showing concern for right ovary with enlargement and multiple septations, difficult to ascertain arterial flow, concerning for TOA or torsion.  Patient was treated with oral antibiotics covering PID and transferred here for further gyn evaluation.  On exam patient afebrile and well-appearing, with mild diffuse lower abdominal tenderness but no acute abdomen.  Differential diagnosis includes PID/TOA, ovarian cyst, torsion, UTI.    Patient evaluated by gyn who do not think she has torsion at this time but will admit her for close observation, and continued treatment for PID.    Amount and/or Complexity of Data Reviewed  Labs: ordered.  Radiology: ordered.    Risk  OTC drugs.  Prescription drug management.                                      Clinical Impression:  Final diagnoses:  [N93.9] Vaginal bleeding  [N73.0] PID (acute pelvic inflammatory disease)  [N76.0, B96.89] Bacterial vaginosis  [N70.93] Tubo-ovarian abscess (Primary)          ED Disposition Condition    Observation Stable                 Lucio Maharaj MD  04/21/24 2978

## 2024-04-21 NOTE — ED TRIAGE NOTES
Lizzie Saunders, an 38 y.o. female presents to the ED as transfer for possible ovarian torsion and abscess. Patient complaining of intermittent RLQ abd pain.       Chief Complaint   Patient presents with    Vaginal Bleeding     Concerned about STD.     Dysuria     Review of patient's allergies indicates:   Allergen Reactions    Rosemary Anaphylaxis    Pecan nut Dermatitis    Sulfa (sulfonamide antibiotics) Itching     Past Medical History:   Diagnosis Date    Bipolar disorder     per patient report    Chronic pain     Depression     Diabetes mellitus     Diabetes mellitus, type 2     Driving safety issue     DRIVING AFTER RECEIVING DILAUDID INJECTION    Hx of psychiatric care     Neuropathy     per patient report    Psychiatric problem     Therapy

## 2024-04-21 NOTE — ED NOTES
Lizzie Saunders, a 38 y.o. female presents to the ED w/ complaint of dysuria and vaginal bleeding. Reports dysuria feels like hx UTI but worse. Also vaginal bleeding. Reports she's about 2wk out of her menstrual cycle and is usually regular. Reports scant vaginal bleeding. Denies fever or back pain    Triage note:  Chief Complaint   Patient presents with    Vaginal Bleeding     Concerned about STD.     Dysuria     Review of patient's allergies indicates:   Allergen Reactions    Rosemary Anaphylaxis    Pecan nut Dermatitis    Sulfa (sulfonamide antibiotics) Itching     Past Medical History:   Diagnosis Date    Bipolar disorder     per patient report    Chronic pain     Depression     Diabetes mellitus     Diabetes mellitus, type 2     Driving safety issue     DRIVING AFTER RECEIVING DILAUDID INJECTION    Hx of psychiatric care     Neuropathy     per patient report    Psychiatric problem     Therapy        LOC: The patient is awake, alert, and oriented to self, place, time, and situation. Pt is calm and cooperative. Affect is appropriate.  Speech is appropriate and clear.     APPEARANCE: Patient resting comfortably in no acute distress.  Patient is clean and well groomed. Appears older than stated age.    SKIN: The skin is warm and dry; color consistent with ethnicity.  Patient has normal skin turgor and moist mucus membranes.  Skin intact; no breakdown or bruising noted.     MUSCULOSKELETAL: Patient moving upper and lower extremities without difficulty; denies pain in the extremities or back.  Denies weakness.     RESPIRATORY: Airway is open and patent. Respirations spontaneous, even, easy, and non-labored.  No audible wheeze. Patient has a normal effort and rate.  No accessory muscle use noted. Denies cough.     CARDIAC:  Normal rate noted.  No peripheral edema noted. 2+ radial pulses bilaterally. No complaints of chest pain.      ABDOMEN: Lower abdominal tenderness to palpation.  No distention noted, however  obese. Pt denies nausea, vomiting, diarrhea, or constipation.    NEUROLOGIC: Eyes open spontaneously.  Behavior appropriate to situation.  Follows commands; facial expression symmetrical.  Purposeful motor response noted; normal sensation in all extremities. Pt denies headache; denies lightheadedness or dizziness; denies visual disturbances; denies loss of balance; denies unilateral weakness.

## 2024-04-21 NOTE — PLAN OF CARE
Pt received long acting insulin and prn insulin coverage if needed. Provided pt education on signs and symptoms of hypo/hyperglycemia and when to report to the nurse. Carbohydrate replacement snack left at the bedside in the event of hypoglycemia. No significant events over night, BS remained stable. WCTM

## 2024-04-21 NOTE — PROVIDER PROGRESS NOTES - EMERGENCY DEPT.
"Encounter Date: 4/21/2024    ED Physician Progress Notes        ED Resident HAND-OFF NOTE:  Lizzie Saunders is a 38 y.o. female who presented to the ED on 4/21/2024, patient C/O pelvic pain. I assumed care of patient from off-going ED physician team patient pending pelvic ultrasound.    On my evaluation, Lizzie Saunders appears well, hemodynamically stable and in NAD. Thus far, Lizzie Saunders has received:  Medications   ALPRAZolam tablet 0.5 mg (has no administration in time range)   ibuprofen tablet 600 mg (600 mg Oral Given 4/21/24 0220)   acetaminophen tablet 1,000 mg (1,000 mg Oral Given 4/21/24 0220)   cefTRIAXone injection 0.5 g (0.5 g Intramuscular Given 4/21/24 0325)   doxycycline tablet 100 mg (100 mg Oral Given 4/21/24 0324)   metroNIDAZOLE tablet 500 mg (500 mg Oral Given 4/21/24 0324)       /67   Pulse 87   Temp 98.5 °F (36.9 °C) (Oral)   Resp 18   Ht 5' 6" (1.676 m)   Wt 88.5 kg (195 lb)   SpO2 99%   Breastfeeding No   BMI 31.47 kg/m²         Disposition: I anticipate patient will transfer to [facility] further evaluation  ______________________  Jovanni Carrillo MD   Emergency Medicine Resident      UPDATE:  Ultrasound is consistent with septated mass.  Uncertain whether it was abscess.  There was also some concern for torsion without clear venous flow to and from the ovary.    Patient was asymptomatic upon reassessment.  However, we can not rule out tubo-ovarian abscess.  Therefore, we have transferred the patient to [facility] for Ob gyn service.    :  Vaginal bleeding  PID (acute pelvic inflammatory disease)  Bacterial vaginosis  Tubo-ovarian abscess (Primary)   "

## 2024-04-21 NOTE — DISCHARGE INSTRUCTIONS
You have bacterial vaginosis, which is not a sexually transmitted disease.  Please use the prescribed antibiotic vaginal gel for the next 5 nights.    In addition, because you had so much tenderness on your pelvic exam, we are concerned you could have pelvic inflammatory disease, which is a sexually transmitted disease.  Take the prescribed antibiotics as directed.  Any partner should be tested and treated as well.  You should not engage in sexual intercourse until you and your partners have completed treatment.

## 2024-04-21 NOTE — ED TRIAGE NOTES
Lizzie Saunders, a 38 y.o. female presents to the ED w/ complaint of vaginal bleeding after intercourse a few days ago. Pt also reports dysuria.     Triage note:  Chief Complaint   Patient presents with    Vaginal Bleeding     Concerned about STD.     Dysuria     Review of patient's allergies indicates:   Allergen Reactions    Rosemary Anaphylaxis    Pecan nut Dermatitis    Sulfa (sulfonamide antibiotics) Itching     Past Medical History:   Diagnosis Date    Bipolar disorder     per patient report    Chronic pain     Depression     Diabetes mellitus     Diabetes mellitus, type 2     Driving safety issue     DRIVING AFTER RECEIVING DILAUDID INJECTION    Hx of psychiatric care     Neuropathy     per patient report    Psychiatric problem     Therapy
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
## 2024-04-22 VITALS
WEIGHT: 190.5 LBS | OXYGEN SATURATION: 100 % | HEART RATE: 83 BPM | SYSTOLIC BLOOD PRESSURE: 129 MMHG | HEIGHT: 66 IN | BODY MASS INDEX: 30.62 KG/M2 | DIASTOLIC BLOOD PRESSURE: 74 MMHG | RESPIRATION RATE: 14 BRPM | TEMPERATURE: 98 F

## 2024-04-22 DIAGNOSIS — N83.209 CYST OF OVARY, UNSPECIFIED LATERALITY: Primary | ICD-10-CM

## 2024-04-22 LAB
C TRACH DNA SPEC QL NAA+PROBE: NOT DETECTED
N GONORRHOEA DNA SPEC QL NAA+PROBE: NOT DETECTED
POCT GLUCOSE: 76 MG/DL (ref 70–110)

## 2024-04-22 PROCEDURE — G0378 HOSPITAL OBSERVATION PER HR: HCPCS

## 2024-04-22 PROCEDURE — 25000003 PHARM REV CODE 250

## 2024-04-22 PROCEDURE — 99239 HOSP IP/OBS DSCHRG MGMT >30: CPT | Mod: ,,, | Performed by: OBSTETRICS & GYNECOLOGY

## 2024-04-22 RX ORDER — OXYCODONE HYDROCHLORIDE 5 MG/1
5 TABLET ORAL EVERY 4 HOURS PRN
Qty: 10 TABLET | Refills: 0 | Status: SHIPPED | OUTPATIENT
Start: 2024-04-22 | End: 2024-06-06 | Stop reason: CLARIF

## 2024-04-22 RX ORDER — ALPRAZOLAM 0.5 MG/1
0.5 TABLET ORAL ONCE
Status: COMPLETED | OUTPATIENT
Start: 2024-04-22 | End: 2024-04-22

## 2024-04-22 RX ORDER — METRONIDAZOLE 500 MG/1
500 TABLET ORAL 2 TIMES DAILY
Qty: 28 TABLET | Refills: 0 | Status: SHIPPED | OUTPATIENT
Start: 2024-04-22 | End: 2024-04-22 | Stop reason: HOSPADM

## 2024-04-22 RX ORDER — DOXYCYCLINE HYCLATE 100 MG
100 TABLET ORAL EVERY 12 HOURS
Qty: 28 TABLET | Refills: 0 | Status: SHIPPED | OUTPATIENT
Start: 2024-04-22 | End: 2024-05-06

## 2024-04-22 RX ORDER — IBUPROFEN 600 MG/1
600 TABLET ORAL EVERY 6 HOURS PRN
Qty: 30 TABLET | Refills: 0 | Status: ON HOLD | OUTPATIENT
Start: 2024-04-22 | End: 2024-06-12 | Stop reason: HOSPADM

## 2024-04-22 RX ADMIN — PREGABALIN 25 MG: 25 CAPSULE ORAL at 08:04

## 2024-04-22 RX ADMIN — DOXYCYCLINE HYCLATE 100 MG: 100 TABLET, COATED ORAL at 08:04

## 2024-04-22 RX ADMIN — ALPRAZOLAM 0.5 MG: 0.5 TABLET ORAL at 10:04

## 2024-04-22 RX ADMIN — METRONIDAZOLE 500 MG: 250 TABLET ORAL at 08:04

## 2024-04-22 RX ADMIN — METFORMIN HYDROCHLORIDE 1000 MG: 500 TABLET, EXTENDED RELEASE ORAL at 08:04

## 2024-04-22 NOTE — DISCHARGE SUMMARY
Discharge Summary  Gynecology      Admit Date: 4/21/2024    Discharge Date and Time: 4/22/2024     Attending Physician: Balbina Lazo MD    Principal Diagnoses: PID (acute pelvic inflammatory disease)    Active Hospital Problems    Diagnosis  POA    *PID (acute pelvic inflammatory disease) [N73.0]  Yes      Resolved Hospital Problems   No resolved problems to display.       Discharged Condition: good    Hospital Course: Patient presented on 4/21 with abdominal pain and subjective fever, found to have CMT on pelvic exam with US showing enlarged left ovary with septate lesion 4cm. Patient admitted for PO antibiotics for treatment of PID and serial exams due to concern for possible ovarian torsion. She received ceftriaxone, flagyl and doxycyline. She remained afebrile in the hospital. On HD#2 her pain was improved, and pelvic exam showed no CMT. Prior to discharge patient was able to void, ambulate, tolerate PO and pain was well controlled with PO meds. Patient will continue current antibiotics for 14 days. Follow up with GYN clinic in 72 hours. Follow up US scheduled in 6 weeks.     Consults: None    Significant Diagnostic Studies:  Recent Labs   Lab 04/21/24  1256   WBC 7.09   HGB 13.4   HCT 41.1   MCV 83   *        Disposition: Home or Self Care    Patient Instructions:   Current Discharge Medication List        START taking these medications    Details   doxycycline (VIBRA-TABS) 100 MG tablet Take 1 tablet (100 mg total) by mouth every 12 (twelve) hours. for 14 days  Qty: 28 tablet, Refills: 0      ibuprofen (ADVIL,MOTRIN) 600 MG tablet Take 1 tablet (600 mg total) by mouth every 6 (six) hours as needed for Pain.  Qty: 30 tablet, Refills: 0      metroNIDAZOLE (FLAGYL) 500 MG tablet Take 1 tablet (500 mg total) by mouth every 12 (twelve) hours. for 14 days  Qty: 28 tablet, Refills: 0      oxyCODONE (ROXICODONE) 5 MG immediate release tablet Take 1 tablet (5 mg total) by mouth every 4 (four) hours as needed  for Pain.  Qty: 10 tablet, Refills: 0    Comments: Quantity prescribed more than 7 day supply? No           CONTINUE these medications which have NOT CHANGED    Details   acetaminophen (TYLENOL) 650 MG TbSR Take 1 tablet (650 mg total) by mouth every 6 (six) hours as needed.  Qty: 30 tablet, Refills: 0      AJOVY AUTOINJECTOR 225 mg/1.5 mL autoinjector INJECT UNDER THE SKIN EVERY MONTH      atorvastatin (LIPITOR) 40 MG tablet TAKE 1 TABLET BY MOUTH EVERY DAY IN THE EVENING  Qty: 30 tablet, Refills: 0    Comments: Needs appt for further refills  Associated Diagnoses: Mixed hyperlipidemia      blood sugar diagnostic Strp To check BG two times daily, to use with insurance preferred meter  Qty: 50 strip, Refills: 5    Associated Diagnoses: Type 2 diabetes mellitus with hyperglycemia, with long-term current use of insulin      blood-glucose meter kit To check BG two  times daily, to use with insurance preferred meter  Qty: 1 each, Refills: 0    Associated Diagnoses: Type 2 diabetes mellitus with hyperglycemia, with long-term current use of insulin      famotidine (PEPCID) 20 MG tablet Take 1 tablet (20 mg total) by mouth 2 (two) times daily. for 7 days  Qty: 14 tablet, Refills: 0      insulin (LANTUS SOLOSTAR U-100 INSULIN) glargine 100 units/mL SubQ pen Inject 20 Units into the skin every evening.  Qty: 6 mL, Refills: 5    Associated Diagnoses: Type 2 diabetes mellitus with hyperglycemia, with long-term current use of insulin      lancets Misc To check BG two times daily, to use with insurance preferred meter  Qty: 50 each, Refills: 5    Associated Diagnoses: Type 2 diabetes mellitus with hyperglycemia, with long-term current use of insulin      LIDOcaine (LIDODERM) 5 % Place 1 patch onto the skin once daily. Remove & Discard patch within 12 hours or as directed by MD  Qty: 15 patch, Refills: 0      metFORMIN (GLUCOPHAGE-XR) 500 MG ER 24hr tablet Take 2 tablets (1,000 mg total) by mouth daily with breakfast.  Qty: 60  tablet, Refills: 5    Associated Diagnoses: Type 2 diabetes mellitus with hyperglycemia, with long-term current use of insulin      metoclopramide HCl (REGLAN) 10 MG tablet Take 1 tablet (10 mg total) by mouth 3 (three) times daily as needed (nausea/vomiting, abdominal pain).  Qty: 30 tablet, Refills: 0      ondansetron (ZOFRAN-ODT) 4 MG TbDL Take 1 tablet (4 mg total) by mouth every 6 (six) hours as needed (nausea).  Qty: 12 tablet, Refills: 0      pregabalin (LYRICA) 25 MG capsule Take 25 mg by mouth 2 (two) times daily.      rizatriptan (MAXALT) 10 MG tablet Take 10 mg by mouth once as needed for Migraine.      tirzepatide (MOUNJARO) 7.5 mg/0.5 mL PnIj Inject 7.5 mg into the skin every 7 days.           STOP taking these medications       diclofenac (VOLTAREN) 75 MG EC tablet Comments:   Reason for Stopping:         dulaglutide (TRULICITY) 1.5 mg/0.5 mL pen injector Comments:   Reason for Stopping:         HYDROcodone-acetaminophen (NORCO) 7.5-325 mg per tablet Comments:   Reason for Stopping:         polyethylene glycol (GLYCOLAX) 17 gram PwPk Comments:   Reason for Stopping:         tiZANidine (ZANAFLEX) 4 MG tablet Comments:   Reason for Stopping:               Discharge Procedure Orders   Diet Adult Regular     No driving until:   Order Comments: No driving until not taking narcotic pain medication.     Pelvic Rest   Order Comments: Pelvic rest until 6 weeks after discharge. Nothing in vagina -no sex, tampons, douching, etc.     Notify your health care provider if you experience any of the following:  temperature >100.4     Notify your health care provider if you experience any of the following:  persistent nausea and vomiting or diarrhea     Notify your health care provider if you experience any of the following:  severe uncontrolled pain     Notify your health care provider if you experience any of the following:  difficulty breathing or increased cough     Notify your health care provider if you experience  any of the following:  severe persistent headache     Notify your health care provider if you experience any of the following:  worsening rash     Notify your health care provider if you experience any of the following:  persistent dizziness, light-headedness, or visual disturbances     Notify your health care provider if you experience any of the following:  increased confusion or weakness     Notify your health care provider if you experience any of the following:   Order Comments: Heavy vaginal bleeding saturating more than 1 pad per hr for at least consecutive 2 hrs.     Activity as tolerated        Follow-up Information       Maxi Anna MD. Schedule an appointment as soon as possible for a visit .    Specialty: Internal Medicine  Contact information:  [facility] [facility] [address]  [phone]               Balbina Lazo, DO Follow up in 3 day(s).    Specialty: Obstetrics and Gynecology  Why: PID follow up  Contact information:  [address] [place] [address]  [phone]
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
## 2024-04-22 NOTE — CARE UPDATE
Resident to bedside for repeat abdominal exam.     Patient resting comfortably in bed, in NAD. Abdominal exam benign, soft and nontender without rebound or guarding.     Continue current regimen, repeat exam in AM    Sybil Montes MD  OB/GYN PGY-2

## 2024-04-22 NOTE — NURSING
Nurses Note -- 4 Eyes      4/21/2024   9:39 PM      Skin assessed during: Daily Assessment      [x] No Altered Skin Integrity Present    []Prevention Measures Documented      [] Yes- Altered Skin Integrity Present or Discovered   [] LDA Added if Not in Epic (Describe Wound)   [] New Altered Skin Integrity was Present on Admit and Documented in LDA   [] Wound Image Taken    Wound Care Consulted? No    Attending Nurse:  Rocio Frank RN/Staff Member:  Boom

## 2024-04-22 NOTE — PLAN OF CARE
Patient will discharge home. Appointments have been scheduled and added to AVS. Patient father will transport patient home. All CM needs have been met.    04/22/24 1205   Final Note   Assessment Type Final Discharge Note   Anticipated Discharge Disposition Home   Hospital Resources/Appts/Education Provided Provided patient/caregiver with written discharge plan information;Appointments scheduled and added to AVS   Post-Acute Status   Discharge Delays None known at this time     [facility] ([address])  Discharge Final Note    Primary Care Provider: Maxi Anna MD    Expected Discharge Date: 4/22/2024    Final Discharge Note (most recent)       Final Note - 04/22/24 1205          Final Note    Assessment Type Final Discharge Note (P)      Anticipated Discharge Disposition Home or Self Care (P)      Hospital Resources/Appts/Education Provided Provided patient/caregiver with written discharge plan information;Appointments scheduled and added to AVS (P)         Post-Acute Status    Discharge Delays None known at this time (P)                      Important Message from Medicare             Contact Info       Maxi Anna MD   Specialty: Internal Medicine   Relationship: PCP - General    [address] [place] [id number]   Phone: [phone]       Next Steps: Schedule an appointment as soon as possible for a visit    Balbina Lazo DO   Specialty: Obstetrics and Gynecology    [address] [address]   Phone: [phone]       Next Steps: Follow up in 3 day(s)    Instructions: PID follow up

## 2024-04-22 NOTE — PLAN OF CARE
Patient independent in ADLs, lives with room mates. Patient father will transport patient home at discharge.    04/22/24 1133   Discharge Assessment   Assessment Type Discharge Planning Assessment   Confirmed/corrected address, phone number and insurance Yes   Confirmed Demographics Correct on Facesheet   Source of Information patient   Communicated TETE with patient/caregiver Date not available/Unable to determine   People in Home friend(s)   Do you expect to return to your current living situation? Yes   Prior to hospitilization cognitive status: Alert/Oriented   Current cognitive status: Alert/Oriented   Equipment Currently Used at Home rollator   Readmission within 30 days? No   Do you take prescription medications? Yes   Do you have any problems affording any of your prescribed medications? No   Is the patient taking medications as prescribed? yes   How do you get to doctors appointments? car, drives self   Are you on dialysis? No   Do you take coumadin? No   Discharge Plan A Home with family   Discharge Plan B Home with family   DME Needed Upon Discharge  none   Discharge Plan discussed with: Patient   Transition of Care Barriers None   Physical Activity   On average, how many days per week do you engage in moderate to strenuous exercise (like a brisk walk)? 3 days   On average, how many minutes do you engage in exercise at this level? 60 min   Financial Resource Strain   How hard is it for you to pay for the very basics like food, housing, medical care, and heating? Not hard   Housing Stability   In the last 12 months, was there a time when you were not able to pay the mortgage or rent on time? N   In the past 12 months, how many times have you moved where you were living? 1   At any time in the past 12 months, were you homeless or living in a shelter (including now)? N   Transportation Needs   In the past 12 months, has lack of transportation kept you from medical appointments or from getting medications? no    In the past 12 months, has lack of transportation kept you from meetings, work, or from getting things needed for daily living? No   Food Insecurity   Within the past 12 months, you worried that your food would run out before you got the money to buy more. Never true   Within the past 12 months, the food you bought just didn't last and you didn't have money to get more. Never true   Stress   Do you feel stress - tense, restless, nervous, or anxious, or unable to sleep at night because your mind is troubled all the time - these days? To some exte   Social Connections   In a typical week, how many times do you talk on the phone with family, friends, or neighbors? Three   How often do you get together with friends or relatives? More than 3   How often do you attend Anabaptism or Judaism services? Never   Do you belong to any clubs or organizations such as Anabaptism groups, unions, fraternal or athletic groups, or school groups? No   How often do you attend meetings of the clubs or organizations you belong to? Never   Are you , , , , never , or living with a partner?    Alcohol Use   Q1: How often do you have a drink containing alcohol? Monthly or l   Q2: How many drinks containing alcohol do you have on a typical day when you are drinking? 1 or 2   Q3: How often do you have six or more drinks on one occasion? Never   OTHER   Name(s) of People in Home Room mates     [facility] ([address])  Initial Discharge Assessment       Primary Care Provider: Maxi Anna MD    Admission Diagnosis: Tubo-ovarian abscess [N70.93]  Vaginal bleeding [N93.9]  PID (acute pelvic inflammatory disease) [N73.0]  Bacterial vaginosis [N76.0, B96.89]    Admission Date: 4/21/2024  Expected Discharge Date: 4/22/2024    Transition of Care Barriers: (P) None    Payor: MEDICAID / Plan: [place]UE [place] [place] / Product Type: Managed Medicaid /     Extended Emergency Contact  Information  Primary Emergency Contact: Otto Hooper  Mobile Phone: [phone]  Relation: Significant other  Preferred language: English   needed? No    Discharge Plan A: (P) Home with family  Discharge Plan B: (P) Home with family      [id number] [place], [place] - [address]  [address]  [place] [address]  Phone: [phone] Fax: [phone]    [facility]  [address]  [place] [address]  Phone: [phone] Fax: [phone]    [id number] [place], [place] - [address] [facility]  [address] [facility] [address]  Phone: [phone] Fax: [phone]    [facility]  [address]  [facility] [address]  Phone: [phone] Fax: [phone]      Initial Assessment (most recent)       Adult Discharge Assessment - 04/22/24 1133          Discharge Assessment    Assessment Type Discharge Planning Assessment (P)      Confirmed/corrected address, phone number and insurance Yes (P)      Confirmed Demographics Correct on Facesheet (P)      Source of Information patient (P)      Communicated TETE with patient/caregiver Date not available/Unable to determine (P)      People in Home friend(s) (P)      Do you expect to return to your current living situation? Yes (P)      Prior to hospitilization cognitive status: Alert/Oriented (P)      Current cognitive status: Alert/Oriented (P)      Equipment Currently Used at Home rollator (P)      Readmission within 30 days? No (P)      Do you take prescription medications? Yes (P)      Do you have any problems affording any of your prescribed medications? No (P)      Is the patient taking medications as prescribed? yes (P)      How do you get to doctors appointments? car, drives self (P)      Are you on dialysis? No (P)      Do you take coumadin? No (P)      Discharge Plan A Home with family (P)      Discharge Plan B Home with family (P)      DME Needed Upon Discharge  none (P)       Discharge Plan discussed with: Patient (P)      Transition of Care Barriers None (P)         Physical Activity    On average, how many days per week do you engage in moderate to strenuous exercise (like a brisk walk)? 3 days (P)      On average, how many minutes do you engage in exercise at this level? 60 min (P)         Financial Resource Strain    How hard is it for you to pay for the very basics like food, housing, medical care, and heating? Not hard at all (P)         Housing Stability    In the last 12 months, was there a time when you were not able to pay the mortgage or rent on time? No (P)      In the past 12 months, how many times have you moved where you were living? 1 (P)      At any time in the past 12 months, were you homeless or living in a shelter (including now)? No (P)         Transportation Needs    In the past 12 months, has lack of transportation kept you from medical appointments or from getting medications? No (P)      In the past 12 months, has lack of transportation kept you from meetings, work, or from getting things needed for daily living? No (P)         Food Insecurity    Within the past 12 months, you worried that your food would run out before you got the money to buy more. Never true (P)      Within the past 12 months, the food you bought just didn't last and you didn't have money to get more. Never true (P)         Stress    Do you feel stress - tense, restless, nervous, or anxious, or unable to sleep at night because your mind is troubled all the time - these days? To some extent (P)         Social Connections    In a typical week, how many times do you talk on the phone with family, friends, or neighbors? Three times a week (P)      How often do you get together with friends or relatives? More than three times a week (P)      How often do you attend Episcopal or Yazidi services? Never (P)      Do you belong to any clubs or organizations such as Episcopal groups, unions, fraternal or  athletic groups, or school groups? No (P)      How often do you attend meetings of the clubs or organizations you belong to? Never (P)      Are you , , , , never , or living with a partner?  (P)         Alcohol Use    Q1: How often do you have a drink containing alcohol? Monthly or less (P)      Q2: How many drinks containing alcohol do you have on a typical day when you are drinking? 1 or 2 (P)      Q3: How often do you have six or more drinks on one occasion? Never (P)         OTHER    Name(s) of People in Home Room mates (P)

## 2024-04-22 NOTE — CARE UPDATE
Resident to bedside for evening evaluation.    S: Patient sleeping in bed. Tolerating PO without nausea or vomiting. Voiding normally, passing flatus, and ambulating independently.      O:   Temp:  [97.8 °F (36.6 °C)-98.5 °F (36.9 °C)] 98 °F (36.7 °C)  Pulse:  [] 85  Resp:  [16-18] 18  SpO2:  [95 %-100 %] 98 %  BP: (111-147)/(65-97) 133/83     PE:  Gen: NAD, AAO x3  CVS: regular rate  Resp: normal work of breathing  Abd: soft and minimally tender to palpation in suprapubic region      A/P:   - Continue serial abdominal exams and analgesia    Sybil Montes MD  OB/GYN PGY-2

## 2024-04-22 NOTE — PROGRESS NOTES
AVS reviewed with pt. Pt verbalizes understanding. PIV discontinued. No tele. Medications delivered to bedside. Pt to discharge to home with family.

## 2024-04-23 DIAGNOSIS — N30.00 ACUTE CYSTITIS WITHOUT HEMATURIA: Primary | ICD-10-CM

## 2024-04-23 LAB — BACTERIA UR CULT: ABNORMAL

## 2024-04-23 RX ORDER — NITROFURANTOIN 25; 75 MG/1; MG/1
100 CAPSULE ORAL 2 TIMES DAILY
Qty: 14 CAPSULE | Refills: 0 | Status: SHIPPED | OUTPATIENT
Start: 2024-04-23 | End: 2024-04-30

## 2024-04-24 ENCOUNTER — TELEPHONE (OUTPATIENT)
Dept: OBSTETRICS AND GYNECOLOGY | Facility: CLINIC | Age: 39
End: 2024-04-24
Payer: MEDICAID

## 2024-04-25 DIAGNOSIS — F41.9 ANXIETY: Primary | ICD-10-CM

## 2024-04-25 RX ORDER — ALPRAZOLAM 0.5 MG/1
0.5 TABLET ORAL 3 TIMES DAILY
Qty: 90 TABLET | Refills: 0 | Status: SHIPPED | OUTPATIENT
Start: 2024-04-25 | End: 2024-06-12

## 2024-05-02 ENCOUNTER — OFFICE VISIT (OUTPATIENT)
Dept: OBSTETRICS AND GYNECOLOGY | Facility: CLINIC | Age: 39
End: 2024-05-02
Payer: MEDICAID

## 2024-05-02 VITALS
WEIGHT: 196.88 LBS | HEIGHT: 66 IN | DIASTOLIC BLOOD PRESSURE: 84 MMHG | BODY MASS INDEX: 31.64 KG/M2 | SYSTOLIC BLOOD PRESSURE: 125 MMHG

## 2024-05-02 DIAGNOSIS — R11.0 NAUSEA: ICD-10-CM

## 2024-05-02 DIAGNOSIS — N73.0 PID (ACUTE PELVIC INFLAMMATORY DISEASE): Primary | ICD-10-CM

## 2024-05-02 DIAGNOSIS — N83.202 LEFT OVARIAN CYST: ICD-10-CM

## 2024-05-02 DIAGNOSIS — Z30.09 CONSULTATION FOR FEMALE STERILIZATION: ICD-10-CM

## 2024-05-02 DIAGNOSIS — Z79.4 TYPE 2 DIABETES MELLITUS WITHOUT COMPLICATION, WITH LONG-TERM CURRENT USE OF INSULIN: ICD-10-CM

## 2024-05-02 DIAGNOSIS — E11.9 TYPE 2 DIABETES MELLITUS WITHOUT COMPLICATION, WITH LONG-TERM CURRENT USE OF INSULIN: ICD-10-CM

## 2024-05-02 PROCEDURE — 1160F RVW MEDS BY RX/DR IN RCRD: CPT | Mod: CPTII,,, | Performed by: STUDENT IN AN ORGANIZED HEALTH CARE EDUCATION/TRAINING PROGRAM

## 2024-05-02 PROCEDURE — 3008F BODY MASS INDEX DOCD: CPT | Mod: CPTII,,, | Performed by: STUDENT IN AN ORGANIZED HEALTH CARE EDUCATION/TRAINING PROGRAM

## 2024-05-02 PROCEDURE — 99213 OFFICE O/P EST LOW 20 MIN: CPT | Mod: PBBFAC | Performed by: STUDENT IN AN ORGANIZED HEALTH CARE EDUCATION/TRAINING PROGRAM

## 2024-05-02 PROCEDURE — 1159F MED LIST DOCD IN RCRD: CPT | Mod: CPTII,,, | Performed by: STUDENT IN AN ORGANIZED HEALTH CARE EDUCATION/TRAINING PROGRAM

## 2024-05-02 PROCEDURE — 99214 OFFICE O/P EST MOD 30 MIN: CPT | Mod: S$PBB,,, | Performed by: STUDENT IN AN ORGANIZED HEALTH CARE EDUCATION/TRAINING PROGRAM

## 2024-05-02 PROCEDURE — 3074F SYST BP LT 130 MM HG: CPT | Mod: CPTII,,, | Performed by: STUDENT IN AN ORGANIZED HEALTH CARE EDUCATION/TRAINING PROGRAM

## 2024-05-02 PROCEDURE — 3079F DIAST BP 80-89 MM HG: CPT | Mod: CPTII,,, | Performed by: STUDENT IN AN ORGANIZED HEALTH CARE EDUCATION/TRAINING PROGRAM

## 2024-05-02 PROCEDURE — 99999 PR PBB SHADOW E&M-EST. PATIENT-LVL III: CPT | Mod: PBBFAC,,, | Performed by: STUDENT IN AN ORGANIZED HEALTH CARE EDUCATION/TRAINING PROGRAM

## 2024-05-02 RX ORDER — ONDANSETRON 4 MG/1
4 TABLET, ORALLY DISINTEGRATING ORAL 2 TIMES DAILY
Qty: 20 TABLET | Refills: 0 | Status: SHIPPED | OUTPATIENT
Start: 2024-05-02

## 2024-05-02 NOTE — PROGRESS NOTES
"History & Physical  Gynecology      SUBJECTIVE:     Chief Complaint: No chief complaint on file.       History of Present Illness:  Lizzie Saunders is a 37 YO   who presents today for follow up for PID. She was admitted to the GYN service on - for inpatient management of PID and discharged home on doxycycline/flagyl x 2 weeks which she has nearly completed. Since discharge, she reports her pain has been somewhat improved but is still present. She feels the pain more on the L side where her cyst is, but also reports having chronic low back pain that compounds this discomfort. She has been using the oxycodone sparingly, only taking one pill at night before bed so she can sleep more comfortably. She also endorses having nausea for the past week or so and occasional episodes of vomiting. She reports this started about a week after her hospitalization. She has been managing her nausea with PRN zofran that seems to be helping. She denies any intercourse since diagnosis.     She states she thought she was getting an ultrasound today to follow up the cyst. She states "can't you just take everything out" regarding her GYN organs and ovarian cyst. She states that she is nearly 39 and does not desire any future fertility. She is  and has never had children and does not desire them in the future. She states that she herself is adopted and she would prefer to adopt children if she ever changed her mind about having kids.       Regarding her GYN history, reports she last saw a GYN three years ago in [place], reports normal pap at that time. Denies history of abnormal pap smears, STDs. Endorses one prior SAB many years ago, but is actually not sure if she was truly pregnant as she did not take a pregnancy test when this happened. Reports regular monthly cycles that are light and last 2-3 days.     Review of patient's allergies indicates:   Allergen Reactions    Rosemary Anaphylaxis    Pecan nut Dermatitis    " Sulfa (sulfonamide antibiotics) Itching       Past Medical History:   Diagnosis Date    Bipolar disorder     per patient report    Chronic pain     Depression     Diabetes mellitus     Diabetes mellitus, type 2     Driving safety issue     DRIVING AFTER RECEIVING DILAUDID INJECTION    Hx of psychiatric care     Neuropathy     per patient report    Psychiatric problem     Therapy      Past Surgical History:   Procedure Laterality Date    BACK SURGERY      EYE SURGERY      LUMBAR LAMINECTOMY WITH DISCECTOMY Left 10/12/2023    Procedure: MIS LEFT L 4-L5 HEMILAMINECTOMY AND DISCECTOMY, LUMBAR SPINE;  Surgeon: Boom Pierre MD;  Location: [facility] OR;  Service: Neurosurgery;  Laterality: Left;  MIS Left L4-5 hemilaminectomy and microdisectomy  -Beckley table with austin frame, x-ray  -metrx     OB History          0    Para   0    Term   0       0    AB   0    Living   0         SAB   0    IAB   0    Ectopic   0    Multiple   0    Live Births                   Family History   Problem Relation Name Age of Onset    Bipolar disorder Mother      Bipolar disorder Father      Cancer Paternal Grandfather       Social History     Tobacco Use    Smoking status: Former     Current packs/day: 0.00     Types: Cigarettes     Quit date: 2020     Years since quitting: 3.6    Smokeless tobacco: Never   Substance Use Topics    Alcohol use: Yes     Comment: rare    Drug use: Not Currently     Types: Marijuana     Comment: 10/9/23:Daily       Current Outpatient Medications   Medication Sig Dispense Refill    acetaminophen (TYLENOL) 650 MG TbSR Take 1 tablet (650 mg total) by mouth every 6 (six) hours as needed. 30 tablet 0    AJOVY AUTOINJECTOR 225 mg/1.5 mL autoinjector INJECT UNDER THE SKIN EVERY MONTH      ALPRAZolam (XANAX) 0.5 MG tablet Take 1 tablet (0.5 mg total) by mouth 3 (three) times daily. 90 tablet 0    atorvastatin (LIPITOR) 40 MG tablet TAKE 1 TABLET BY MOUTH EVERY DAY IN THE EVENING 30 tablet 0    blood  sugar diagnostic Strp To check BG two times daily, to use with insurance preferred meter 50 strip 5    blood-glucose meter kit To check BG two  times daily, to use with insurance preferred meter 1 each 0    doxycycline (VIBRA-TABS) 100 MG tablet Take 1 tablet (100 mg total) by mouth every 12 (twelve) hours. for 14 days 28 tablet 0    famotidine (PEPCID) 20 MG tablet Take 1 tablet (20 mg total) by mouth 2 (two) times daily. for 7 days 14 tablet 0    ibuprofen (ADVIL,MOTRIN) 600 MG tablet Take 1 tablet (600 mg total) by mouth every 6 (six) hours as needed for Pain. 30 tablet 0    insulin (LANTUS SOLOSTAR U-100 INSULIN) glargine 100 units/mL SubQ pen Inject 20 Units into the skin every evening. 6 mL 5    lancets Misc To check BG two times daily, to use with insurance preferred meter 50 each 5    LIDOcaine (LIDODERM) 5 % Place 1 patch onto the skin once daily. Remove & Discard patch within 12 hours or as directed by MD 15 patch 0    metFORMIN (GLUCOPHAGE-XR) 500 MG ER 24hr tablet Take 2 tablets (1,000 mg total) by mouth daily with breakfast. 60 tablet 5    metoclopramide HCl (REGLAN) 10 MG tablet Take 1 tablet (10 mg total) by mouth 3 (three) times daily as needed (nausea/vomiting, abdominal pain). 30 tablet 0    metroNIDAZOLE (FLAGYL) 500 MG tablet Take 1 tablet (500 mg total) by mouth every 12 (twelve) hours. for 14 days 28 tablet 0    ondansetron (ZOFRAN-ODT) 4 MG TbDL Take 1 tablet (4 mg total) by mouth every 6 (six) hours as needed (nausea). 12 tablet 0    ondansetron (ZOFRAN-ODT) 4 MG TbDL Take 1 tablet (4 mg total) by mouth 2 (two) times daily. 20 tablet 0    oxyCODONE (ROXICODONE) 5 MG immediate release tablet Take 1 tablet (5 mg total) by mouth every 4 (four) hours as needed for Pain. 10 tablet 0    pregabalin (LYRICA) 25 MG capsule Take 25 mg by mouth 2 (two) times daily.      rizatriptan (MAXALT) 10 MG tablet Take 10 mg by mouth once as needed for Migraine.      tirzepatide (MOUNJARO) 7.5 mg/0.5 mL PnIj  Inject 7.5 mg into the skin every 7 days.       No current facility-administered medications for this visit.         Review of Systems:  Review of Systems   Constitutional:  Negative for activity change and fever.   Respiratory:  Negative for shortness of breath.    Cardiovascular:  Negative for chest pain.   Gastrointestinal:  Positive for abdominal pain.   Endocrine: Positive for diabetes.   Genitourinary:  Positive for pelvic pain. Negative for dysuria, menstrual problem, vaginal bleeding and vaginal discharge.   Musculoskeletal:  Positive for back pain.   Neurological:  Negative for headaches.   Psychiatric/Behavioral:  The patient is nervous/anxious.         OBJECTIVE:     Physical Exam:  Physical Exam  Vitals reviewed.   Constitutional:       Appearance: Normal appearance.   HENT:      Head: Normocephalic and atraumatic.   Cardiovascular:      Rate and Rhythm: Normal rate.   Pulmonary:      Effort: Pulmonary effort is normal. No respiratory distress.   Abdominal:      Palpations: Abdomen is soft.   Genitourinary:     Vagina: Normal. No bleeding.      Cervix: Normal. No cervical motion tenderness.      Uterus: Normal. Not tender.       Adnexa:         Right: No mass or tenderness.          Left: Tenderness present. No mass.        Comments: Evidence of some resorption of bilateral labia minora.  Adnexa not well palpated due to body habitus.   Skin:     General: Skin is warm and dry.   Neurological:      General: No focal deficit present.      Mental Status: She is alert and oriented to person, place, and time.   Psychiatric:         Mood and Affect: Mood normal.         Behavior: Behavior normal.           ASSESSMENT:       ICD-10-CM ICD-9-CM    1. PID (acute pelvic inflammatory disease)  N73.0 614.3       2. Nausea  R11.0 787.02       3. Consultation for female sterilization  Z30.09 V25.09 Case Request Operating Room: SALPINGECTOMY, LAPAROSCOPIC      4. Left ovarian cyst  N83.202 620.2              Plan:       Diagnoses and all orders for this visit:    PID (acute pelvic inflammatory disease)  - Patient diagnosed with PID and treated with doxy/flagyl and single dose rocephin on 4/21, doing well.   - Suspect her persistent discomfort is due to ovarian cyst and unrelated to PID   - Also diagnosed with UTI at time of admission, so this certainly could have clouded the picture of diagnosis of PID   - Continue doxy/flagyl for remainder of 14 day course    Nausea  - suspect nausea 2/2 antibiotics  - Additional zofran sent to pharmacy     Consultation for female sterilization  -     Case Request Operating Room: SALPINGECTOMY, LAPAROSCOPIC  - Patient expresses desire for permanent sterilization. Medicaid tubal consents signed today 5/2/24.   - Counseled on r/b/a to permanent sterilization, patient declines other forms of contraception, she is certain she never wants children.   - Case request placed for 6/12/2024. Plan for repeat A1c at RTC prior to surgery. RTC on 6/6/24 for preop appointment and to sign consents     Left ovarian cyst  - Reviewed images with staff in clinic, suspect hemorrhagic cyst  - Plan for repeat TVUS on 6/3 for interval follow up   - If persistent on repeat imaging, will performed ovarian cystectomy at time of bilateral salpingectomy     Other orders  -     ondansetron (ZOFRAN-ODT) 4 MG TbDL; Take 1 tablet (4 mg total) by mouth 2 (two) times daily.        No orders of the defined types were placed in this encounter.      Follow up in about 5 weeks (around 6/6/2024) for Preop Bilateral salpingectomy 6/12.     Counseling time: 45 minutes    Wendy Beard

## 2024-05-03 ENCOUNTER — PATIENT MESSAGE (OUTPATIENT)
Dept: OBSTETRICS AND GYNECOLOGY | Facility: HOSPITAL | Age: 39
End: 2024-05-03
Payer: MEDICAID

## 2024-05-03 ENCOUNTER — TELEPHONE (OUTPATIENT)
Dept: OBSTETRICS AND GYNECOLOGY | Facility: HOSPITAL | Age: 39
End: 2024-05-03
Payer: MEDICAID

## 2024-05-03 NOTE — TELEPHONE ENCOUNTER
Attempted to call patient, no answer LVM. [facility] message sent. Needs pap prior to surgery given LSIL pap in 2021. Will also obtain A1c at next visit. Move preop appt from 5/30. Message sent to clinic staff.

## 2024-05-15 ENCOUNTER — OFFICE VISIT (OUTPATIENT)
Dept: OBSTETRICS AND GYNECOLOGY | Facility: CLINIC | Age: 39
End: 2024-05-15
Payer: MEDICAID

## 2024-05-15 VITALS
DIASTOLIC BLOOD PRESSURE: 90 MMHG | SYSTOLIC BLOOD PRESSURE: 124 MMHG | BODY MASS INDEX: 31.46 KG/M2 | HEIGHT: 66 IN | WEIGHT: 195.75 LBS

## 2024-05-15 DIAGNOSIS — R35.0 URINARY FREQUENCY: ICD-10-CM

## 2024-05-15 DIAGNOSIS — N76.0 ACUTE VAGINITIS: Primary | ICD-10-CM

## 2024-05-15 DIAGNOSIS — Z87.42 HX OF ABNORMAL CERVICAL PAP SMEAR: ICD-10-CM

## 2024-05-15 DIAGNOSIS — Z12.4 CERVICAL CANCER SCREENING: ICD-10-CM

## 2024-05-15 PROCEDURE — 99214 OFFICE O/P EST MOD 30 MIN: CPT | Mod: S$PBB,,,

## 2024-05-15 PROCEDURE — 1160F RVW MEDS BY RX/DR IN RCRD: CPT | Mod: CPTII,,,

## 2024-05-15 PROCEDURE — 87624 HPV HI-RISK TYP POOLED RSLT: CPT

## 2024-05-15 PROCEDURE — 81514 NFCT DS BV&VAGINITIS DNA ALG: CPT

## 2024-05-15 PROCEDURE — 87186 SC STD MICRODIL/AGAR DIL: CPT

## 2024-05-15 PROCEDURE — 3074F SYST BP LT 130 MM HG: CPT | Mod: CPTII,,,

## 2024-05-15 PROCEDURE — 3008F BODY MASS INDEX DOCD: CPT | Mod: CPTII,,,

## 2024-05-15 PROCEDURE — 99213 OFFICE O/P EST LOW 20 MIN: CPT | Mod: PBBFAC

## 2024-05-15 PROCEDURE — 3080F DIAST BP >= 90 MM HG: CPT | Mod: CPTII,,,

## 2024-05-15 PROCEDURE — 88175 CYTOPATH C/V AUTO FLUID REDO: CPT

## 2024-05-15 PROCEDURE — 1159F MED LIST DOCD IN RCRD: CPT | Mod: CPTII,,,

## 2024-05-15 PROCEDURE — 87086 URINE CULTURE/COLONY COUNT: CPT

## 2024-05-15 PROCEDURE — 87088 URINE BACTERIA CULTURE: CPT

## 2024-05-15 PROCEDURE — 99999 PR PBB SHADOW E&M-EST. PATIENT-LVL III: CPT | Mod: PBBFAC,,,

## 2024-05-15 PROCEDURE — 87077 CULTURE AEROBIC IDENTIFY: CPT

## 2024-05-15 RX ORDER — NYSTATIN AND TRIAMCINOLONE ACETONIDE 100000; 1 [USP'U]/G; MG/G
CREAM TOPICAL
Qty: 30 G | Refills: 1 | Status: SHIPPED | OUTPATIENT
Start: 2024-05-15 | End: 2025-05-15

## 2024-05-15 NOTE — PROGRESS NOTES
"CC: vaginal itching    HPI:  Lizzie Saunders is a 38 y.o. female  presents to walk in clinic with complaint of vaginal discharge, itching for the past 3 days. Reports white discharge. She is SA with multiple partners, but recently tested negative for STDs and has not had intercourse since. She also reports urgency with urination and stabbing pain with urination.       Past Medical History:   Diagnosis Date    Bipolar disorder     per patient report    Chronic pain     Depression     Diabetes mellitus     Diabetes mellitus, type 2     Driving safety issue     DRIVING AFTER RECEIVING DILAUDID INJECTION    Hx of psychiatric care     Neuropathy     per patient report    Psychiatric problem     Therapy      Past Surgical History:   Procedure Laterality Date    BACK SURGERY      EYE SURGERY      LUMBAR LAMINECTOMY WITH DISCECTOMY Left 10/12/2023    Procedure: MIS LEFT L 4-L5 HEMILAMINECTOMY AND DISCECTOMY, LUMBAR SPINE;  Surgeon: Boom Pierre MD;  Location: [facility];  Service: Neurosurgery;  Laterality: Left;  MIS Left L4-5 hemilaminectomy and microdisectomy  -tiffanie table with austin frame, x-ray  -metrx     Social History     Tobacco Use    Smoking status: Former     Current packs/day: 0.00     Types: Cigarettes     Quit date: 2020     Years since quitting: 3.7    Smokeless tobacco: Never   Substance Use Topics    Alcohol use: Yes     Comment: rare    Drug use: Not Currently     Types: Marijuana     Comment: 10/9/23:Daily     Family History   Problem Relation Name Age of Onset    Bipolar disorder Mother      Bipolar disorder Father      Cancer Paternal Grandfather       OB History    Para Term  AB Living   0 0 0 0 0 0   SAB IAB Ectopic Multiple Live Births   0 0 0 0         BP (!) 124/90   Ht 5' 6" (1.676 m)   Wt 88.8 kg (195 lb 12.3 oz)   LMP 2024   BMI 31.60 kg/m²     ROS:  GENERAL: No fever, chills, fatigability or weight loss.  VULVAR: No pain, no lesions and no " itching.  VAGINAL: No relaxation, no abnormal bleeding and no lesions. +itching, +discharge   ABDOMEN: No abdominal pain. Denies nausea. Denies vomiting. No diarrhea. No constipation  BREAST: Denies pain. No lumps. No discharge.  URINARY: No incontinence, no nocturia, no frequency and + dysuria.  CARDIOVASCULAR: No chest pain. No shortness of breath. No leg cramps.  NEUROLOGICAL: No headaches. No vision changes.    PHYSICAL EXAM:  VULVA: normal appearing vulva with no masses, tenderness or lesions   VAGINA: normal appearing vagina with normal color. Thin white discharge, no lesions   CERVIX: normal appearing cervix without discharge or lesions     ASSESSMENT and PLAN:    ICD-10-CM ICD-9-CM    1. Acute vaginitis  N76.0 616.10 Vaginosis Screen by DNA Probe      nystatin-triamcinolone (MYCOLOG II) cream      2. Urinary frequency  R35.0 788.41 CULTURE, URINE      3. Cervical cancer screening  Z12.4 V76.2 Liquid-Based Pap Smear, Screening      HPV High Risk Genotypes, PCR      4. Hx of abnormal cervical Pap smear  Z87.42 V13.29 Liquid-Based Pap Smear, Screening      HPV High Risk Genotypes, PCR        - AFFIRM collected   - Mycolog for external irritation  - U dip negative, urine culture ordered   - Pap and HPV updated   - Discussed getting HA1C     Patient was counseled today on vaginitis prevention including :  a. avoiding feminine products such as deoderant soaps, body wash, bubble bath, douches, scented toilet paper, deoderant tampons or pads, feminine wipes, chronic pad use, etc.  b. avoiding other vulvovaginal irritants such as long hot baths, humidity, tight, synthetic clothing, chlorine and sitting around in wet bathing suits  c. wearing cotton underwear, avoiding thong underwear and no underwear to bed  d. taking showers instead of baths and use a hair dryer on cool setting afterwards to dry  e. wearing cotton to exercise and shower immediately after exercise and change clothes  f. using polyurethane condoms  without spermicide if sexually active and symptoms are triggered by intercourse    FOLLOW UP: PRN lack of improvement.      MARLIN Grajeda

## 2024-05-16 ENCOUNTER — TELEPHONE (OUTPATIENT)
Dept: OBSTETRICS AND GYNECOLOGY | Facility: CLINIC | Age: 39
End: 2024-05-16
Payer: MEDICAID

## 2024-05-16 NOTE — TELEPHONE ENCOUNTER
----- Message from Mellisa Mata MD sent at 5/16/2024  1:13 PM CDT -----  Regarding: Appointment Request  Beba Torres!    This patient was scheduled today to see us in clinic, however she had a pap yesterday.     She should have an US scheduled 6/3 and her surgery is scheduled 6/12. Could we make her a preop appointment for 6/6 to sign consents and to make sure that her cyst is still present after her US on the 3rd?    Thank you!!  Mellisa

## 2024-05-17 LAB
BACTERIA UR CULT: ABNORMAL
BACTERIAL VAGINOSIS DNA: NEGATIVE
CANDIDA GLABRATA DNA: POSITIVE
CANDIDA KRUSEI DNA: NEGATIVE
CANDIDA RRNA VAG QL PROBE: POSITIVE
T VAGINALIS RRNA GENITAL QL PROBE: NEGATIVE

## 2024-05-20 DIAGNOSIS — B37.9 CANDIDA INFECTION: ICD-10-CM

## 2024-05-20 DIAGNOSIS — N30.00 ACUTE CYSTITIS WITHOUT HEMATURIA: Primary | ICD-10-CM

## 2024-05-20 RX ORDER — FLUCONAZOLE 150 MG/1
150 TABLET ORAL
Qty: 2 TABLET | Refills: 0 | Status: SHIPPED | OUTPATIENT
Start: 2024-05-20 | End: 2024-06-06 | Stop reason: CLARIF

## 2024-05-20 RX ORDER — NITROFURANTOIN 25; 75 MG/1; MG/1
100 CAPSULE ORAL 2 TIMES DAILY
Qty: 10 CAPSULE | Refills: 0 | Status: SHIPPED | OUTPATIENT
Start: 2024-05-20 | End: 2024-05-25

## 2024-05-23 LAB
FINAL PATHOLOGIC DIAGNOSIS: NORMAL
Lab: NORMAL

## 2024-05-24 LAB
HPV HR 12 DNA SPEC QL NAA+PROBE: POSITIVE
HPV16 AG SPEC QL: NEGATIVE
HPV18 DNA SPEC QL NAA+PROBE: NEGATIVE

## 2024-05-29 ENCOUNTER — PATIENT MESSAGE (OUTPATIENT)
Dept: OBSTETRICS AND GYNECOLOGY | Facility: CLINIC | Age: 39
End: 2024-05-29
Payer: MEDICAID

## 2024-05-30 ENCOUNTER — CLINICAL SUPPORT (OUTPATIENT)
Dept: OBSTETRICS AND GYNECOLOGY | Facility: CLINIC | Age: 39
End: 2024-05-30
Payer: MEDICAID

## 2024-05-30 DIAGNOSIS — B97.7 HPV (HUMAN PAPILLOMA VIRUS) INFECTION: Primary | ICD-10-CM

## 2024-05-30 DIAGNOSIS — B97.7 HPV (HUMAN PAPILLOMA VIRUS) INFECTION: ICD-10-CM

## 2024-05-30 DIAGNOSIS — Z23 NEED FOR HPV VACCINATION: Primary | ICD-10-CM

## 2024-05-30 PROCEDURE — 99999PBSHW HPV VACCINE 9-VALENT 3 DOSE IM: Mod: PBBFAC,,,

## 2024-05-30 PROCEDURE — 90471 IMMUNIZATION ADMIN: CPT | Mod: PBBFAC

## 2024-05-30 NOTE — PROGRESS NOTES
Here for Gardasil # 1 of 3 injection, without complaint at this time. Reports no pain prior to or post injection. Advised to wait in lobby 15 minutes post injection and report any adverse reactions.    Return to clinic in 8 weeks for next injection.     Site: ld

## 2024-06-03 ENCOUNTER — ANESTHESIA EVENT (OUTPATIENT)
Dept: SURGERY | Facility: OTHER | Age: 39
End: 2024-06-03
Payer: MEDICAID

## 2024-06-03 ENCOUNTER — HOSPITAL ENCOUNTER (OUTPATIENT)
Dept: RADIOLOGY | Facility: HOSPITAL | Age: 39
Discharge: HOME | End: 2024-06-03
Payer: MEDICAID

## 2024-06-03 DIAGNOSIS — N83.209 CYST OF OVARY, UNSPECIFIED LATERALITY: ICD-10-CM

## 2024-06-03 PROCEDURE — 76830 TRANSVAGINAL US NON-OB: CPT | Mod: 26,,, | Performed by: RADIOLOGY

## 2024-06-03 PROCEDURE — 76856 US EXAM PELVIC COMPLETE: CPT | Mod: 26,,, | Performed by: RADIOLOGY

## 2024-06-03 PROCEDURE — 76856 US EXAM PELVIC COMPLETE: CPT | Mod: TC

## 2024-06-03 RX ORDER — PREGABALIN 75 MG/1
75 CAPSULE ORAL ONCE
Status: CANCELLED | OUTPATIENT
Start: 2024-06-03 | End: 2024-06-03

## 2024-06-03 RX ORDER — ACETAMINOPHEN 500 MG
1000 TABLET ORAL
Status: CANCELLED | OUTPATIENT
Start: 2024-06-03 | End: 2024-06-03

## 2024-06-03 RX ORDER — LIDOCAINE HYDROCHLORIDE 10 MG/ML
0.5 INJECTION, SOLUTION EPIDURAL; INFILTRATION; INTRACAUDAL; PERINEURAL ONCE
Status: CANCELLED | OUTPATIENT
Start: 2024-06-03 | End: 2024-06-03

## 2024-06-03 RX ORDER — SODIUM CHLORIDE, SODIUM LACTATE, POTASSIUM CHLORIDE, CALCIUM CHLORIDE 600; 310; 30; 20 MG/100ML; MG/100ML; MG/100ML; MG/100ML
INJECTION, SOLUTION INTRAVENOUS CONTINUOUS
Status: CANCELLED | OUTPATIENT
Start: 2024-06-03

## 2024-06-06 ENCOUNTER — HOSPITAL ENCOUNTER (OUTPATIENT)
Dept: PREADMISSION TESTING | Facility: OTHER | Age: 39
Discharge: HOME | End: 2024-06-06
Attending: STUDENT IN AN ORGANIZED HEALTH CARE EDUCATION/TRAINING PROGRAM
Payer: MEDICAID

## 2024-06-06 ENCOUNTER — OFFICE VISIT (OUTPATIENT)
Dept: OBSTETRICS AND GYNECOLOGY | Facility: CLINIC | Age: 39
End: 2024-06-06
Payer: MEDICAID

## 2024-06-06 VITALS
HEART RATE: 80 BPM | WEIGHT: 195 LBS | DIASTOLIC BLOOD PRESSURE: 72 MMHG | RESPIRATION RATE: 16 BRPM | OXYGEN SATURATION: 100 % | TEMPERATURE: 97 F | HEIGHT: 66 IN | SYSTOLIC BLOOD PRESSURE: 121 MMHG | BODY MASS INDEX: 31.34 KG/M2

## 2024-06-06 VITALS
DIASTOLIC BLOOD PRESSURE: 68 MMHG | HEIGHT: 66 IN | BODY MASS INDEX: 30.7 KG/M2 | SYSTOLIC BLOOD PRESSURE: 135 MMHG | WEIGHT: 191 LBS

## 2024-06-06 DIAGNOSIS — Z30.09 UNWANTED FERTILITY: Primary | ICD-10-CM

## 2024-06-06 PROCEDURE — 3008F BODY MASS INDEX DOCD: CPT | Mod: CPTII,,,

## 2024-06-06 PROCEDURE — 3075F SYST BP GE 130 - 139MM HG: CPT | Mod: CPTII,,,

## 2024-06-06 PROCEDURE — 99213 OFFICE O/P EST LOW 20 MIN: CPT | Mod: PBBFAC

## 2024-06-06 PROCEDURE — 3044F HG A1C LEVEL LT 7.0%: CPT | Mod: CPTII,,,

## 2024-06-06 PROCEDURE — 99213 OFFICE O/P EST LOW 20 MIN: CPT | Mod: S$PBB,,,

## 2024-06-06 PROCEDURE — 99999 PR PBB SHADOW E&M-EST. PATIENT-LVL III: CPT | Mod: PBBFAC,,,

## 2024-06-06 PROCEDURE — 3078F DIAST BP <80 MM HG: CPT | Mod: CPTII,,,

## 2024-06-06 PROCEDURE — 1159F MED LIST DOCD IN RCRD: CPT | Mod: CPTII,,,

## 2024-06-06 RX ORDER — DAPAGLIFLOZIN 10 MG/1
10 TABLET, FILM COATED ORAL
COMMUNITY
Start: 2024-05-20

## 2024-06-06 NOTE — PROGRESS NOTES
"History & Physical  [facility]       SUBJECTIVE:     Chief Complaint: Pre-op     History of Present Illness:  Lizzie Saunders is a 38 y.o.  presents for pre-op visit. Patient desires laparoscopic salpingectomy. She is a G0 but reports she does not desire fertility. Patient herself is adopted and states that if she does wish to parent in the future she would want to pursue adoption. Medicaid tubal consents were signed 24.     Active medications, medical history, surgical history, family history, social history, and allergies all reviewed and updated in chart.  OB History          0    Para   0    Term   0       0    AB   0    Living   0         SAB   0    IAB   0    Ectopic   0    Multiple   0    Live Births                     Review of Systems   Constitutional:  Negative for chills and fever.   HENT:  Negative for nasal congestion and mouth sores.    Eyes:  Negative for visual disturbance.   Respiratory:  Negative for cough and shortness of breath.    Cardiovascular:  Negative for chest pain, palpitations and leg swelling.   Gastrointestinal:  Negative for constipation, diarrhea, nausea and vomiting.   Genitourinary:  Negative for dysuria, pelvic pain, vaginal bleeding and vaginal discharge.   Musculoskeletal:  Negative for joint swelling.   Integumentary:  Negative for rash.   Neurological:  Negative for syncope, numbness and headaches.   Psychiatric/Behavioral:  The patient is not nervous/anxious.          OBJECTIVE:   /68   Ht 5' 6" (1.676 m)   Wt 86.6 kg (191 lb)   LMP 05/15/2024 (Approximate)    Physical Exam  Constitutional:       Appearance: Normal appearance.   HENT:      Head: Normocephalic and atraumatic.   Eyes:      Extraocular Movements: Extraocular movements intact.   Pulmonary:      Effort: Pulmonary effort is normal. No respiratory distress.   Abdominal:      General: Abdomen is flat. There is no distension.      Palpations: Abdomen is soft.      " Tenderness: There is no abdominal tenderness. There is no guarding or rebound.   Musculoskeletal:         General: Normal range of motion.      Cervical back: Normal range of motion. No tenderness.   Neurological:      General: No focal deficit present.      Mental Status: She is alert and oriented to person, place, and time.   Skin:     General: Skin is warm and dry.   Psychiatric:         Mood and Affect: Mood normal.         Thought Content: Thought content normal.         Judgment: Judgment normal.   Vitals reviewed. Exam conducted with a chaperone present.          ASSESSMENT:   The encounter diagnosis was Unwanted fertility.      Plan:   Lizzie was seen today for pre-op exam.    Diagnoses and all orders for this visit:    Unwanted fertility  - Patient expresses desire for permanent sterilization. Medicaid tubal consents signed today 5/2/24.   - Counseled on r/b/a to permanent sterilization, patient declines other forms of contraception, she is certain she never wants children.   - Signed [facility] consents   - All questions answered    Ananya Nunn MD  OB/GYN PGY1

## 2024-06-06 NOTE — PROGRESS NOTES
History & Physical  [facility]       SUBJECTIVE:     Chief Complaint: Pre-op     History of Present Illness:  Lizzie Saunders is a 38 y.o.  presents for pre-op visit. Patient desires laparoscopic salpingectomy. She is a G0 but reports she does not desire fertility. Patient herself is adopted and states that if she does wish to parent in the future she would want to pursue adoption. Medicaid tubal consents were signed 0    Active medications, medical history, surgical history, family history, social history, and allergies all reviewed and updated in chart.  OB History          0    Para   0    Term   0       0    AB   0    Living   0         SAB   0    IAB   0    Ectopic   0    Multiple   0    Live Births                     Review of Systems      OBJECTIVE:   LMP 2024    OBGyn Exam     ASSESSMENT:   There were no encounter diagnoses.      Plan:   There are no diagnoses linked to this encounter.      Vicky Blackmon MD PGY-2  Obstetrics and Gynecology  [facility]

## 2024-06-06 NOTE — DISCHARGE INSTRUCTIONS
Information to Prepare you for your Surgery    PRE-ADMIT TESTING -  [phone]    [address]  [facility]          Your surgery has been scheduled at [facility]. We are pleased to have the opportunity to serve you. For Further Information please call [phone].    On the day of surgery please report to the Information Desk on the 1st floor.    CONTACT YOUR PHYSICIAN'S OFFICE THE DAY PRIOR TO YOUR SURGERY TO OBTAIN YOUR ARRIVAL TIME.     The evening before surgery do not eat anything after 9 p.m. ( this includes hard candy, chewing gum and mints).  You may only have GATORADE, POWERADE AND WATER  from 9 p.m. until you leave your home.   DO NOT DRINK ANY LIQUIDS ON THE WAY TO THE HOSPITAL.      Why does your anesthesiologist allow you to drink Gatorade/Powerade before surgery?  Gatorade/Powerade helps to increase your comfort before surgery and to decrease your nausea after surgery. The carbohydrates in Gatorade/Powerade help reduce your body's stress response to surgery.  If you are a diabetic-drink only water prior to surgery.    Outpatient Surgery- May allow 2 adult (18 and older) Support Persons (1 being the designated ) for all surgical/procedural patients. A breastfeeding mother will be allowed her infant and 2 adult Support Persons. No one under the age of 18 will be allowed in the building. No swapping out of visitors in the [facility].      SPECIAL MEDICATION INSTRUCTIONS: TAKE medications checked off by the Anesthesiologist on your Medication List.    Angiogram Patients: Take medications as instructed by your physician, including aspirin.     Surgery Patients:    If you take ASPIRIN - Your PHYSICIAN/SURGEON will need to inform you IF/OR when you need to stop taking aspirin prior to your surgery.     The week prior to surgery do not ot take any medications containing IBUPROFEN or NSAIDS ( Advil, Motrin, Goodys, BC, Aleve, Naproxen etc)  If you are not sure if you should take a medicine please call your surgeon's office.  Ok to take Tylenol    Do Not Wear any make-up (especially eye make-up) to surgery. Please remove any false eyelashes or eyelash extensions. If you arrive the day of surgery with makeup/eyelashes on you will be required to remove prior to surgery. (There is a risk of corneal abrasions if eye makeup/eyelash extensions are not removed)      Leave all valuables at home.   Do Not wear any jewelry or watches, including any metal in body piercings. Jewelry must be removed prior to coming to the hospital.  There is a possibility that rings that are unable to be removed may be cut off if they are on the surgical extremity.    Please remove all hair extensions, wigs, clips and any other metal accessories/ ornaments from your hair.  These items may pose a flammable/fire risk in Surgery and must be removed.    Do not shave your surgical area at least 5 days prior to your surgery. The surgical prep will be performed at the hospital according to Infection Control regulations.    Contact Lens must be removed before surgery. Either do not wear the contact lens or bring a case and solution for storage.  Please bring a container for eyeglasses or dentures as required.  Bring any paperwork your physician has provided, such as consent forms,  history and physicals, doctor's orders, etc.   Bring comfortable clothes that are loose fitting to wear upon discharge. Take into consideration the type of surgery being performed.  Maintain your diet as advised per your physician the day prior to surgery.      Adequate rest the night before surgery is advised.   Park in the Parking lot behind the hospital or in the [facility] across the street from the parking lot. Parking is complimentary.  If you will be discharged the same day as your procedure, please arrange for a responsible adult to drive you home or to accompany you if traveling by taxi.    YOU WILL NOT BE PERMITTED TO DRIVE OR TO LEAVE THE HOSPITAL ALONE AFTER SURGERY.   If you are being discharged the same day, it is strongly recommended that you arrange for someone to remain with you for the first 24 hrs following your surgery.    The Surgeon will speak to your family/visitor after your surgery regarding the outcome of your surgery and post op care.  The Surgeon may speak to you after your surgery, but there is a possibility you may not remember the details.  Please check with your family members regarding the conversation with the Surgeon.    We strongly recommend whoever is bringing you home be present for discharge instructions.  This will ensure a thorough understanding for your post op home care.          Thank you for your cooperation.  The Staff of [facility].            Bathing Instructions with Hibiclens    Shower the evening before and morning of your procedure with Chlorhexidine (Hibiclens)  do not use Chlorhexidine on your face or genitals. Do not get in your eyes.  Wash your face with water and your regular face wash/soap  Use your regular shampoo  Apply Chlorhexidine (Hibiclens) directly on your skin or on a wet washcloth and wash gently. When showering: Move away from the shower stream when applying Chlorhexidine (Hibiclens) to avoid rinsing off too soon.  Rinse thoroughly with warm water  Do not dilute Chlorhexidine (Hibiclens)   Dry off as usual, do not use any deodorant, powder, body lotions, perfume, after shave or cologne.

## 2024-06-06 NOTE — ANESTHESIA PREPROCEDURE EVALUATION
06/06/2024  Lizzie Saunders is a 38 y.o., female.      Pre-op Assessment    I have reviewed the Patient Summary Reports.     I have reviewed the Nursing Notes. I have reviewed the NPO Status.   I have reviewed the Medications.     Review of Systems  Anesthesia Hx:  No problems with previous Anesthesia             Denies Family Hx of Anesthesia complications.    Denies Personal Hx of Anesthesia complications.                    Social:  Non-Smoker       Hematology/Oncology:  Hematology Normal   Oncology Normal                                   EENT/Dental:  EENT/Dental Normal           Cardiovascular:  Cardiovascular Normal                                            Pulmonary:  Pulmonary Normal                       Renal/:  Renal/ Normal                 Hepatic/GI:  Hepatic/GI Normal                 Musculoskeletal:         Spine Disorders: lumbar            Neurological:  Neurology Normal                                      Endocrine:  Diabetes           Dermatological:  Skin Normal    Psych:  Psychiatric History                  Physical Exam    Dental:  Partial Dentures, Periodontal disease        Anesthesia Plan  Type of Anesthesia, risks & benefits discussed:    Anesthesia Type: Gen ETT  Intra-op Monitoring Plan: Standard ASA Monitors  Post Op Pain Control Plan: multimodal analgesia  Induction:  IV  Airway Plan: Video  Informed Consent: Informed consent signed with the Patient and all parties understand the risks and agree with anesthesia plan.  All questions answered.   ASA Score: 2  Anesthesia Plan Notes: Back surg at [facility] last year    Ready For Surgery From Anesthesia Perspective.     .

## 2024-06-07 ENCOUNTER — TELEPHONE (OUTPATIENT)
Dept: OBSTETRICS AND GYNECOLOGY | Facility: HOSPITAL | Age: 39
End: 2024-06-07
Payer: MEDICAID

## 2024-06-07 NOTE — TELEPHONE ENCOUNTER
Called patient to discuss A1c results. A1c 6.6, down from 8.8 eight months ago. Congratulated patient on improvement and encouraged her to continue what she is doing. She voiced understanding and thanks. Asked if patient had any questions prior to surgery 6/12, she did not.     Ananya Nunn MD  OB/GYN PGY1

## 2024-06-11 ENCOUNTER — TELEPHONE (OUTPATIENT)
Dept: OBSTETRICS AND GYNECOLOGY | Facility: HOSPITAL | Age: 39
End: 2024-06-11
Payer: MEDICAID

## 2024-06-11 NOTE — TELEPHONE ENCOUNTER
Called patient to discuss upcoming surgery. Surgery scheduled for 1130am, patient asked to arrive at 930am.    Unable to answer further questions as phone connection was lost and patient did not answer on repeat call.    Shireen Vazquez MD  OBGYN, PGY-3

## 2024-06-12 ENCOUNTER — HOSPITAL ENCOUNTER (OUTPATIENT)
Facility: OTHER | Age: 39
Discharge: HOME | End: 2024-06-12
Attending: STUDENT IN AN ORGANIZED HEALTH CARE EDUCATION/TRAINING PROGRAM | Admitting: STUDENT IN AN ORGANIZED HEALTH CARE EDUCATION/TRAINING PROGRAM
Payer: MEDICAID

## 2024-06-12 ENCOUNTER — TELEPHONE (OUTPATIENT)
Dept: OBSTETRICS AND GYNECOLOGY | Facility: CLINIC | Age: 39
End: 2024-06-12
Payer: MEDICAID

## 2024-06-12 ENCOUNTER — ANESTHESIA (OUTPATIENT)
Dept: SURGERY | Facility: OTHER | Age: 39
End: 2024-06-12
Payer: MEDICAID

## 2024-06-12 DIAGNOSIS — Z90.79 STATUS POST BILATERAL SALPINGECTOMY: Primary | ICD-10-CM

## 2024-06-12 DIAGNOSIS — Z30.09 UNWANTED FERTILITY: ICD-10-CM

## 2024-06-12 LAB
B-HCG UR QL: NEGATIVE
CTP QC/QA: YES
GLUCOSE SERPL-MCNC: 193 MG/DL (ref 70–110)
POCT GLUCOSE: 167 MG/DL (ref 70–110)
POCT GLUCOSE: 193 MG/DL (ref 70–110)

## 2024-06-12 PROCEDURE — 27201423 OPTIME MED/SURG SUP & DEVICES STERILE SUPPLY: Performed by: STUDENT IN AN ORGANIZED HEALTH CARE EDUCATION/TRAINING PROGRAM

## 2024-06-12 PROCEDURE — 25000003 PHARM REV CODE 250: Performed by: NURSE ANESTHETIST, CERTIFIED REGISTERED

## 2024-06-12 PROCEDURE — 63600175 PHARM REV CODE 636 W HCPCS: Performed by: ANESTHESIOLOGY

## 2024-06-12 PROCEDURE — 71000015 HC POSTOP RECOV 1ST HR: Performed by: STUDENT IN AN ORGANIZED HEALTH CARE EDUCATION/TRAINING PROGRAM

## 2024-06-12 PROCEDURE — 82962 GLUCOSE BLOOD TEST: CPT | Performed by: STUDENT IN AN ORGANIZED HEALTH CARE EDUCATION/TRAINING PROGRAM

## 2024-06-12 PROCEDURE — 25000003 PHARM REV CODE 250: Performed by: ANESTHESIOLOGY

## 2024-06-12 PROCEDURE — 37000008 HC ANESTHESIA 1ST 15 MINUTES: Performed by: STUDENT IN AN ORGANIZED HEALTH CARE EDUCATION/TRAINING PROGRAM

## 2024-06-12 PROCEDURE — 71000016 HC POSTOP RECOV ADDL HR: Performed by: STUDENT IN AN ORGANIZED HEALTH CARE EDUCATION/TRAINING PROGRAM

## 2024-06-12 PROCEDURE — 36000709 HC OR TIME LEV III EA ADD 15 MIN: Performed by: STUDENT IN AN ORGANIZED HEALTH CARE EDUCATION/TRAINING PROGRAM

## 2024-06-12 PROCEDURE — D9220A PRA ANESTHESIA: Mod: ANES,,, | Performed by: ANESTHESIOLOGY

## 2024-06-12 PROCEDURE — D9220A PRA ANESTHESIA: Mod: CRNA,,, | Performed by: NURSE ANESTHETIST, CERTIFIED REGISTERED

## 2024-06-12 PROCEDURE — 88302 TISSUE EXAM BY PATHOLOGIST: CPT | Mod: 26,,, | Performed by: PATHOLOGY

## 2024-06-12 PROCEDURE — 58661 LAPAROSCOPY REMOVE ADNEXA: CPT | Mod: 50,,, | Performed by: STUDENT IN AN ORGANIZED HEALTH CARE EDUCATION/TRAINING PROGRAM

## 2024-06-12 PROCEDURE — 71000033 HC RECOVERY, INTIAL HOUR: Performed by: STUDENT IN AN ORGANIZED HEALTH CARE EDUCATION/TRAINING PROGRAM

## 2024-06-12 PROCEDURE — 63600175 PHARM REV CODE 636 W HCPCS: Performed by: NURSE ANESTHETIST, CERTIFIED REGISTERED

## 2024-06-12 PROCEDURE — 25000003 PHARM REV CODE 250: Performed by: STUDENT IN AN ORGANIZED HEALTH CARE EDUCATION/TRAINING PROGRAM

## 2024-06-12 PROCEDURE — 25000003 PHARM REV CODE 250

## 2024-06-12 PROCEDURE — 37000009 HC ANESTHESIA EA ADD 15 MINS: Performed by: STUDENT IN AN ORGANIZED HEALTH CARE EDUCATION/TRAINING PROGRAM

## 2024-06-12 PROCEDURE — 36000708 HC OR TIME LEV III 1ST 15 MIN: Performed by: STUDENT IN AN ORGANIZED HEALTH CARE EDUCATION/TRAINING PROGRAM

## 2024-06-12 PROCEDURE — 81025 URINE PREGNANCY TEST: CPT | Performed by: ANESTHESIOLOGY

## 2024-06-12 PROCEDURE — 88302 TISSUE EXAM BY PATHOLOGIST: CPT | Performed by: PATHOLOGY

## 2024-06-12 PROCEDURE — 71000039 HC RECOVERY, EACH ADD'L HOUR: Performed by: STUDENT IN AN ORGANIZED HEALTH CARE EDUCATION/TRAINING PROGRAM

## 2024-06-12 RX ORDER — OXYCODONE HYDROCHLORIDE 5 MG/1
5 TABLET ORAL
Status: DISCONTINUED | OUTPATIENT
Start: 2024-06-12 | End: 2024-06-12 | Stop reason: HOSPADM

## 2024-06-12 RX ORDER — SEMAGLUTIDE 2.68 MG/ML
INJECTION, SOLUTION SUBCUTANEOUS
COMMUNITY
Start: 2024-01-06

## 2024-06-12 RX ORDER — PHENYLEPHRINE HYDROCHLORIDE 10 MG/ML
INJECTION INTRAVENOUS
Status: DISCONTINUED | OUTPATIENT
Start: 2024-06-12 | End: 2024-06-12

## 2024-06-12 RX ORDER — DIPHENHYDRAMINE HYDROCHLORIDE 50 MG/ML
25 INJECTION INTRAMUSCULAR; INTRAVENOUS EVERY 6 HOURS PRN
Status: DISCONTINUED | OUTPATIENT
Start: 2024-06-12 | End: 2024-06-12 | Stop reason: HOSPADM

## 2024-06-12 RX ORDER — ROCURONIUM BROMIDE 10 MG/ML
INJECTION, SOLUTION INTRAVENOUS
Status: DISCONTINUED | OUTPATIENT
Start: 2024-06-12 | End: 2024-06-12

## 2024-06-12 RX ORDER — MIDAZOLAM HYDROCHLORIDE 1 MG/ML
INJECTION INTRAMUSCULAR; INTRAVENOUS
Status: DISCONTINUED | OUTPATIENT
Start: 2024-06-12 | End: 2024-06-12

## 2024-06-12 RX ORDER — MEPERIDINE HYDROCHLORIDE 25 MG/ML
12.5 INJECTION INTRAMUSCULAR; INTRAVENOUS; SUBCUTANEOUS ONCE AS NEEDED
Status: DISCONTINUED | OUTPATIENT
Start: 2024-06-12 | End: 2024-06-12 | Stop reason: HOSPADM

## 2024-06-12 RX ORDER — NAPROXEN 500 MG/1
500 TABLET ORAL 2 TIMES DAILY PRN
Status: ON HOLD | COMMUNITY
Start: 2024-01-15 | End: 2024-06-12 | Stop reason: HOSPADM

## 2024-06-12 RX ORDER — SODIUM CHLORIDE 0.9 % (FLUSH) 0.9 %
3 SYRINGE (ML) INJECTION
Status: DISCONTINUED | OUTPATIENT
Start: 2024-06-12 | End: 2024-06-12 | Stop reason: HOSPADM

## 2024-06-12 RX ORDER — DEXAMETHASONE SODIUM PHOSPHATE 4 MG/ML
INJECTION, SOLUTION INTRA-ARTICULAR; INTRALESIONAL; INTRAMUSCULAR; INTRAVENOUS; SOFT TISSUE
Status: DISCONTINUED | OUTPATIENT
Start: 2024-06-12 | End: 2024-06-12

## 2024-06-12 RX ORDER — LIDOCAINE HYDROCHLORIDE 10 MG/ML
0.5 INJECTION, SOLUTION EPIDURAL; INFILTRATION; INTRACAUDAL; PERINEURAL ONCE
Status: DISCONTINUED | OUTPATIENT
Start: 2024-06-12 | End: 2024-06-12 | Stop reason: HOSPADM

## 2024-06-12 RX ORDER — ACETAMINOPHEN 500 MG
1000 TABLET ORAL
Status: COMPLETED | OUTPATIENT
Start: 2024-06-12 | End: 2024-06-12

## 2024-06-12 RX ORDER — PROPOFOL 10 MG/ML
VIAL (ML) INTRAVENOUS
Status: DISCONTINUED | OUTPATIENT
Start: 2024-06-12 | End: 2024-06-12

## 2024-06-12 RX ORDER — ONDANSETRON HYDROCHLORIDE 2 MG/ML
INJECTION, SOLUTION INTRAVENOUS
Status: DISCONTINUED | OUTPATIENT
Start: 2024-06-12 | End: 2024-06-12

## 2024-06-12 RX ORDER — SODIUM CHLORIDE 9 MG/ML
INJECTION, SOLUTION INTRAVENOUS CONTINUOUS
Status: DISCONTINUED | OUTPATIENT
Start: 2024-06-12 | End: 2024-06-12 | Stop reason: HOSPADM

## 2024-06-12 RX ORDER — OXYCODONE HYDROCHLORIDE 5 MG/1
5 TABLET ORAL EVERY 4 HOURS PRN
Qty: 8 TABLET | Refills: 0 | Status: SHIPPED | OUTPATIENT
Start: 2024-06-12

## 2024-06-12 RX ORDER — AMOXICILLIN 250 MG
1 CAPSULE ORAL DAILY
Qty: 30 TABLET | Refills: 0 | Status: SHIPPED | OUTPATIENT
Start: 2024-06-12

## 2024-06-12 RX ORDER — MUPIROCIN 20 MG/G
OINTMENT TOPICAL
Status: DISCONTINUED | OUTPATIENT
Start: 2024-06-12 | End: 2024-06-12 | Stop reason: HOSPADM

## 2024-06-12 RX ORDER — IBUPROFEN 600 MG/1
600 TABLET ORAL EVERY 6 HOURS
Qty: 30 TABLET | Refills: 0 | Status: SHIPPED | OUTPATIENT
Start: 2024-06-12

## 2024-06-12 RX ORDER — LIDOCAINE HYDROCHLORIDE 20 MG/ML
INJECTION INTRAVENOUS
Status: DISCONTINUED | OUTPATIENT
Start: 2024-06-12 | End: 2024-06-12

## 2024-06-12 RX ORDER — KETOROLAC TROMETHAMINE 30 MG/ML
INJECTION, SOLUTION INTRAMUSCULAR; INTRAVENOUS
Status: DISCONTINUED | OUTPATIENT
Start: 2024-06-12 | End: 2024-06-12

## 2024-06-12 RX ORDER — PREGABALIN 75 MG/1
75 CAPSULE ORAL ONCE
Status: DISCONTINUED | OUTPATIENT
Start: 2024-06-12 | End: 2024-06-12 | Stop reason: HOSPADM

## 2024-06-12 RX ORDER — SODIUM CHLORIDE, SODIUM LACTATE, POTASSIUM CHLORIDE, CALCIUM CHLORIDE 600; 310; 30; 20 MG/100ML; MG/100ML; MG/100ML; MG/100ML
INJECTION, SOLUTION INTRAVENOUS CONTINUOUS
Status: DISCONTINUED | OUTPATIENT
Start: 2024-06-12 | End: 2024-06-12 | Stop reason: HOSPADM

## 2024-06-12 RX ORDER — FAMOTIDINE 20 MG/1
20 TABLET, FILM COATED ORAL
Status: COMPLETED | OUTPATIENT
Start: 2024-06-12 | End: 2024-06-12

## 2024-06-12 RX ORDER — SIMETHICONE 80 MG
1 TABLET,CHEWABLE ORAL ONCE
Status: COMPLETED | OUTPATIENT
Start: 2024-06-12 | End: 2024-06-12

## 2024-06-12 RX ORDER — HYDROMORPHONE HYDROCHLORIDE 2 MG/ML
0.4 INJECTION, SOLUTION INTRAMUSCULAR; INTRAVENOUS; SUBCUTANEOUS EVERY 5 MIN PRN
Status: DISCONTINUED | OUTPATIENT
Start: 2024-06-12 | End: 2024-06-12 | Stop reason: HOSPADM

## 2024-06-12 RX ORDER — PROCHLORPERAZINE EDISYLATE 5 MG/ML
5 INJECTION INTRAMUSCULAR; INTRAVENOUS EVERY 30 MIN PRN
Status: DISCONTINUED | OUTPATIENT
Start: 2024-06-12 | End: 2024-06-12 | Stop reason: HOSPADM

## 2024-06-12 RX ORDER — DEXTROMETHORPHAN HYDROBROMIDE, GUAIFENESIN 5; 100 MG/5ML; MG/5ML
650 LIQUID ORAL EVERY 6 HOURS
Qty: 30 TABLET | Refills: 0 | Status: SHIPPED | OUTPATIENT
Start: 2024-06-12

## 2024-06-12 RX ORDER — FENTANYL CITRATE 50 UG/ML
INJECTION, SOLUTION INTRAMUSCULAR; INTRAVENOUS
Status: DISCONTINUED | OUTPATIENT
Start: 2024-06-12 | End: 2024-06-12

## 2024-06-12 RX ADMIN — HYDROMORPHONE HYDROCHLORIDE 0.4 MG: 2 INJECTION INTRAMUSCULAR; INTRAVENOUS; SUBCUTANEOUS at 01:06

## 2024-06-12 RX ADMIN — OXYCODONE HYDROCHLORIDE 5 MG: 5 TABLET ORAL at 01:06

## 2024-06-12 RX ADMIN — FENTANYL CITRATE 100 MCG: 50 INJECTION, SOLUTION INTRAMUSCULAR; INTRAVENOUS at 12:06

## 2024-06-12 RX ADMIN — PHENYLEPHRINE HYDROCHLORIDE 100 MCG: 10 INJECTION INTRAVENOUS at 12:06

## 2024-06-12 RX ADMIN — ROCURONIUM BROMIDE 50 MG: 10 SOLUTION INTRAVENOUS at 12:06

## 2024-06-12 RX ADMIN — SIMETHICONE 80 MG: 80 TABLET, CHEWABLE ORAL at 04:06

## 2024-06-12 RX ADMIN — KETOROLAC TROMETHAMINE 30 MG: 30 INJECTION, SOLUTION INTRAMUSCULAR; INTRAVENOUS at 01:06

## 2024-06-12 RX ADMIN — DEXAMETHASONE SODIUM PHOSPHATE 8 MG: 4 INJECTION, SOLUTION INTRAMUSCULAR; INTRAVENOUS at 12:06

## 2024-06-12 RX ADMIN — ONDANSETRON HYDROCHLORIDE 4 MG: 2 INJECTION INTRAMUSCULAR; INTRAVENOUS at 12:06

## 2024-06-12 RX ADMIN — LIDOCAINE HYDROCHLORIDE 100 MG: 20 INJECTION, SOLUTION INTRAVENOUS at 12:06

## 2024-06-12 RX ADMIN — PROPOFOL 160 MG: 10 INJECTION, EMULSION INTRAVENOUS at 12:06

## 2024-06-12 RX ADMIN — SODIUM CHLORIDE, SODIUM LACTATE, POTASSIUM CHLORIDE, AND CALCIUM CHLORIDE: 600; 310; 30; 20 INJECTION, SOLUTION INTRAVENOUS at 12:06

## 2024-06-12 RX ADMIN — MUPIROCIN: 20 OINTMENT TOPICAL at 10:06

## 2024-06-12 RX ADMIN — ACETAMINOPHEN 1000 MG: 500 TABLET ORAL at 10:06

## 2024-06-12 RX ADMIN — FAMOTIDINE 20 MG: 20 TABLET ORAL at 10:06

## 2024-06-12 RX ADMIN — MIDAZOLAM HYDROCHLORIDE 2 MG: 1 INJECTION, SOLUTION INTRAMUSCULAR; INTRAVENOUS at 12:06

## 2024-06-12 RX ADMIN — PROCHLORPERAZINE EDISYLATE 2.5 MG: 5 INJECTION INTRAMUSCULAR; INTRAVENOUS at 02:06

## 2024-06-12 NOTE — INTERVAL H&P NOTE
The patient has been examined and the H&P has been reviewed:    I concur with the findings and no changes have occurred since H&P was written.    Surgery risks, benefits and alternative options discussed and understood by patient/family.    Lizzie Saunders is 38 y.o.  presenting for scheduled lap BS.    Temp:  [97.4 °F (36.3 °C)] 97.4 °F (36.3 °C)  Pulse:  [67] 67  Resp:  [16] 16  SpO2:  [100 %] 100 %  BP: (103)/(62) 103/62    General: NAD, alert, oriented, cooperative  HEENT: NCAT, EOM grossly intact  Lungs: Normal WOB  Heart: regular rate  Abdomen: soft, nondistended, nontender, no rebound or guarding    Consents in chart. Pre-operative heparin not indicated. All questions answered and concerns addressed. To OR for planned procedure.      Ananya Nunn MD  OB/GYN PGY1

## 2024-06-12 NOTE — ANESTHESIA PROCEDURE NOTES
Intubation    Date/Time: 6/12/2024 12:24 PM    Performed by: Louisa Barron  Authorized by: Russ San MD    Intubation:     Induction:  Intravenous    Intubated:  Postinduction    Mask Ventilation:  Easy mask    Attempts:  1    Attempted By:  CRNA    Method of Intubation:  Video laryngoscopy    Blade:  C[place]anza 3    Laryngeal View Grade: Grade I - full view of cords      Difficult Airway Encountered?: No      Complications:  None    Airway Device:  Oral endotracheal tube    Airway Device Size:  7.0    Style/Cuff Inflation:  Cuffed (inflated to minimal occlusive pressure)    Tube secured:  21    Secured at:  The lips    Placement Verified By:  Capnometry    Complicating Factors:  None    Findings Post-Intubation:  BS equal bilateral and atraumatic/condition of teeth unchanged

## 2024-06-12 NOTE — DISCHARGE INSTRUCTIONS
Abdominal Laparoscopy Discharge Instructions      Activity  Limit your activity for 4-6 weeks.  Dont lift anything heavier than 5-10 pounds.  Avoid strenuous activities, such as mowing the lawn, vacuuming, or playing sports.  Limit your activity to short, slow walks. Gradually increase your pace and distance as you feel able.  Listen to your body. If an activity causes pain, stop.  Rest when you are tired.  Dont have sexual intercourse or use tampons or douches until your doctor says its safe to do so.    Home Care  Always keep your incision clean and dry  Shower as instructed in 24 hours. You may wash your incision gently with mild soap and warm water and pat dry.  Avoid tub baths, hot tubs and swimming pools until seen by your physician for a post-op follow up.  If you have steri strips they should fall off in 7-10 days.    If you have band aids covering your incisions change daily or when soiled.  The band aids may be removed post op day 1 if they are clean and dry.  Take your medication exactly as instructed by your doctor.  Return to your diet as you feel able. Eat a healthy, well-balanced diet.  Avoid constipation.  Use laxatives, stool softeners, or enemas as directed by your doctor.  Eat more high-fiber foods.  Drink 6-8 glasses of water every day, unless directed otherwise.  Follow-Up  Make a follow-up appointment as directed by our staff.       When to Call Your Doctor  Call your doctor right away if you have any of the following:  Fever above 101.5°F  (38.6°C) or chills  Bright red vaginal bleeding or a foul-smelling discharge  Vaginal bleeding that soaks more than one sanitary pad per hour  Trouble urinating or burning sensation when you urinate  Severe abdominal pain or bloating  Redness, swelling, or drainage at your incision site  Shortness of breath        ________________________________________________________________  FOR EMERGENCIES:  If any unusual problems or difficulties occur, contact                  ________________________ or the resident at [phone] (page ) or at the Clinic office, [phone].

## 2024-06-12 NOTE — ANESTHESIA POSTPROCEDURE EVALUATION
Anesthesia Post Evaluation    Patient: Lizzie Saunders    Procedure(s) Performed: Procedure(s) (LRB):  SALPINGECTOMY, LAPAROSCOPIC (N/A)    Final Anesthesia Type: general      Patient location during evaluation: PACU  Patient participation: Yes- Able to Participate  Level of consciousness: awake and alert  Post-procedure vital signs: reviewed and stable  Pain management: adequate  Airway patency: patent    PONV status at discharge: No PONV  Anesthetic complications: no      Cardiovascular status: blood pressure returned to baseline  Respiratory status: spontaneous ventilation  Hydration status: euvolemic  Follow-up not needed.          Vitals Value Taken Time   /60 06/12/24 1610   Temp 36.7 °C (98 °F) 06/12/24 1510   Pulse 72 06/12/24 1610   Resp 16 06/12/24 1610   SpO2 99 % 06/12/24 1610         Event Time   Out of Recovery 15:07:00         Pain/Osmin Score: Pain Rating Prior to Med Admin: 8 (6/12/2024  1:54 PM)  Osmin Score: 10 (6/12/2024  4:10 PM)

## 2024-06-12 NOTE — OP NOTE
OPERATIVE REPORT    DATE OF OPERATION: 2024      PREOPERATIVE DIAGNOSIS:   1. Undesired Fertility.     POSTOPERATIVE DIAGNOSIS:   Same    OPERATION PERFORMED:   Laparoscopic bilateral salpingectomy     SURGEON:  Jenn Vazquez MD     ASSISTANT: Ananya Nunn MD PGY1    ANESTHESIA: General.     COMPLICATIONS: None.     EBL: <10 mL    IVF: per anesthesia report    UOP: 275mL    FINDINGS:   Normal appearing external genitalia and vagina.  Cervix with little descensus toward vaginal introitus with traction on single-tooth tenaculum.  Abdomen insufflated with Veress needle then entered with 5mm trocar at the umbilicus under direct visualization of laparoscope.  Bilateral lower quadrant 5 mm trocars placed.   Complete abdominal survey revealed normal-appearing stomach, liver edge, and bowel.  Survey of pelvic structures revealed normal-appearing uterus, bilateral tubes and ovaries, bladder and posterior cul-de-sac.  Bilateral fallopian tubes removed with LigaSure bipolar electrocautery and sent to pathology.    SPECIMENS:  1. Right and left fallopian tubes    INDICATION FOR OPERATION:   This patient is a 38 y.o.  with undesired fertility. Full details and risks of the procedure were discussed at length with the patient, including permanence of the procedure, and the patient was in full agreement.     PROCEDURE IN DETAIL:   The patient was taken to the operating room, where general anesthesia was administered and found to be adequate. She was then placed in dorsal lithotomy position using Yellow Fin stirrups, and prepped and draped in the usual sterile manner, both vaginally and abdominally. A surgical timeout was performed with patient's name, date of birth, allergies, and procedure to be performed verbalized. All OR staff were in agreement. No preoperative antibiotics were administered as none were indicated.    Speculum was then placed in the vagina, and the anterior lip of the cervix was grasped with a  single tooth tenaculum. The cervix did not pull. Then, a Danitza uterine manipulator was placed in through the cervix. The speculum and tenaculum were then removed. A muñiz catheter was also placed.    Attention was then turned to the abdomen where perforating towel clamps were placed on either side of the umbilicus, and the anterior abdominal wall was tented upward. A Veress needle was introduced into the peritoneal cavity with placement confirmed via saline drop test. A pneumoperitoneum was then created with CO2 gas. Then, an incision was made below the umbilicus. A 5 mm trocar was introduced through the infraumbilical incision site under direct visualization. This showed good entry to the peritoneal cavity. The abdominal cavity was thoroughly surveyed. No abnormalities noted. Normal appearing uterus, ovaries, and fallopian tubes were noted.    Then, an incision was made in the left lower quadrant, and 5mm trocar was introduced under direct visualization. An incision was also made in the right lower quadrant, and 5mm trocar was introduced, also under direct visualization. Good hemostasis was noted and there was no evidence of injury from abdominal entry. A grasper was placed through the right port and the end of the right fallopian tube was grasped to provide traction. The Ligasure was used to remove the entire fallopian tube, paying careful attention to stay directly inferior to the tube and away from the ovary. The right tube was then transected at the cornua of the uterus using the Ligasure. Excellent hemostasis was noted. The right tube was removed from the right port site..  A grasper was then placed through the left port and the end of the left fallopian tube was grasped to provide traction. The Ligasure was used to remove the entire fallopian tube, paying careful attention to stay directly inferior to the tube and away from the ovary. The tube was then transected at the cornua of the uterus using the Ligasure.  Excellent hemostasis was noted. The left tube was removed through the right port without difficulty. Pelvis was re inspected and again noted to be hemostasic. The right and left ovary appeared to be within normal limits.     The procedure was then completed by removing the right and left lateral trocars under direct visualization. The laparoscope was removed and the abdomen was deflated. Finally, the infraumbilical trocar sheath was removed. The skin was closed with 4-0 monocryl in a subcuticular fashion. All incisions were covered with Steri-Strips and small bandages.     All the instruments were removed from the cervix and vagina. Sponge, instrument, and needle counts were correct x2. The patient was awakened from anesthesia without difficulty and was transferred to the recovery room in stable condition.     Ananya Nunn MD  OB/GYN PGY1

## 2024-06-12 NOTE — TRANSFER OF CARE
"Anesthesia Transfer of Care Note    Patient: Lizzie Saunders    Procedure(s) Performed: Procedure(s) (LRB):  SALPINGECTOMY, LAPAROSCOPIC (N/A)    Patient location: PACU    Anesthesia Type: general    Transport from OR: Transported from OR on 6-10 L/min O2 by face mask with adequate spontaneous ventilation    Post pain: adequate analgesia    Post assessment: no apparent anesthetic complications and tolerated procedure well    Post vital signs: stable    Level of consciousness: awake    Nausea/Vomiting: no nausea/vomiting    Complications: none    Transfer of care protocol was followed      Last vitals: Visit Vitals  BP (!) 147/70 (BP Location: Right arm, Patient Position: Lying)   Pulse 73   Temp 36.3 °C (97.3 °F) (Skin)   Resp 16   Ht 5' 6" (1.676 m)   Wt 88.5 kg (195 lb)   LMP 05/15/2024 (Approximate)   SpO2 98%   Breastfeeding No   BMI 31.47 kg/m²     "
1-2x/week

## 2024-06-12 NOTE — OR NURSING
VSS on RA. Pain is tolerable per pt. X3 lap sites with steri strips and bandaids, CDI. PIV CDI. Meets PACU discharge criteria. Ready for transfer to phase II. Family notified.

## 2024-06-12 NOTE — DISCHARGE SUMMARY
"Discharge Summary  Gynecology      Admit Date: 2024    Discharge Date and Time: 2024     Attending Physician: Jenn Vazquez MD    Principal Diagnoses: Status post bilateral salpingectomy    Active Hospital Problems    Diagnosis  POA    *Status post bilateral salpingectomy [Z90.79]  Not Applicable      Resolved Hospital Problems   No resolved problems to display.       Procedures: Procedure(s) (LRB):  SALPINGECTOMY, LAPAROSCOPIC (N/A)    Discharged Condition: good    Hospital Course:   Lizzie Saunders is a 38 y.o. y.o.  female who presented on 2024   for above procedures for the treatment of undesired fertility. Patient tolerated procedure. PMH significant for HLD, Obesity, mood disorder & PID. Post-operative course was uncomplicated.  On day of discharge, patient was urinating, ambulating, and tolerating a regular diet without difficulty. Pain was well controlled on PO medication. She was discharged home on POD#0 in stable condition with instructions to follow up with the GYN resident clinic in 4 weeks.     Consults: None    Significant Diagnostic Studies:  No results for input(s): "WBC", "HGB", "HCT", "MCV", "PLT" in the last 168 hours.     Treatments:   1. Surgery as above    Disposition: Home or Self Care    Patient Instructions:   Current Discharge Medication List        START taking these medications    Details   oxyCODONE (ROXICODONE) 5 MG immediate release tablet Take 1 tablet (5 mg total) by mouth every 4 (four) hours as needed for Pain.  Qty: 8 tablet, Refills: 0    Comments: Quantity prescribed more than 7 day supply? No      senna-docusate 8.6-50 mg (SENNA WITH DOCUSATE SODIUM) 8.6-50 mg per tablet Take 1 tablet by mouth once daily.  Qty: 30 tablet, Refills: 0           CONTINUE these medications which have CHANGED    Details   acetaminophen (TYLENOL) 650 MG TbSR Take 1 tablet (650 mg total) by mouth every 6 (six) hours.  Qty: 30 tablet, Refills: 0      ibuprofen " (ADVIL,MOTRIN) 600 MG tablet Take 1 tablet (600 mg total) by mouth every 6 (six) hours.  Qty: 30 tablet, Refills: 0           CONTINUE these medications which have NOT CHANGED    Details   AJOVY AUTOINJECTOR 225 mg/1.5 mL autoinjector       ALPRAZolam (XANAX) 0.5 MG tablet Take 1 tablet (0.5 mg total) by mouth 3 (three) times daily.  Qty: 90 tablet, Refills: 0    Associated Diagnoses: Anxiety      atorvastatin (LIPITOR) 40 MG tablet TAKE 1 TABLET BY MOUTH EVERY DAY IN THE EVENING  Qty: 30 tablet, Refills: 0    Comments: Needs appt for further refills  Associated Diagnoses: Mixed hyperlipidemia      FARXIGA 10 mg tablet Take 10 mg by mouth.      insulin (LANTUS SOLOSTAR U-100 INSULIN) glargine 100 units/mL SubQ pen Inject 20 Units into the skin every evening.  Qty: 6 mL, Refills: 5    Associated Diagnoses: Type 2 diabetes mellitus with hyperglycemia, with long-term current use of insulin      metFORMIN (GLUCOPHAGE-XR) 500 MG ER 24hr tablet Take 2 tablets (1,000 mg total) by mouth daily with breakfast.  Qty: 60 tablet, Refills: 5    Associated Diagnoses: Type 2 diabetes mellitus with hyperglycemia, with long-term current use of insulin      OZEMPIC 2 mg/dose (8 mg/3 mL) PnIj Inject into the skin.      pregabalin (LYRICA) 25 MG capsule Take 25 mg by mouth 2 (two) times daily.      blood sugar diagnostic Strp To check BG two times daily, to use with insurance preferred meter  Qty: 50 strip, Refills: 5    Associated Diagnoses: Type 2 diabetes mellitus with hyperglycemia, with long-term current use of insulin      blood-glucose meter kit To check BG two  times daily, to use with insurance preferred meter  Qty: 1 each, Refills: 0    Associated Diagnoses: Type 2 diabetes mellitus with hyperglycemia, with long-term current use of insulin      lancets Misc To check BG two times daily, to use with insurance preferred meter  Qty: 50 each, Refills: 5    Associated Diagnoses: Type 2 diabetes mellitus with hyperglycemia, with  long-term current use of insulin      LIDOcaine (LIDODERM) 5 % Place 1 patch onto the skin once daily. Remove & Discard patch within 12 hours or as directed by MD  Qty: 15 patch, Refills: 0      metoclopramide HCl (REGLAN) 10 MG tablet Take 1 tablet (10 mg total) by mouth 3 (three) times daily as needed (nausea/vomiting, abdominal pain).  Qty: 30 tablet, Refills: 0      nystatin-triamcinolone (MYCOLOG II) cream Apply to affected area 2 times daily  Qty: 30 g, Refills: 1    Associated Diagnoses: Acute vaginitis      !! ondansetron (ZOFRAN-ODT) 4 MG TbDL Take 1 tablet (4 mg total) by mouth every 6 (six) hours as needed (nausea).  Qty: 12 tablet, Refills: 0      !! ondansetron (ZOFRAN-ODT) 4 MG TbDL Take 1 tablet (4 mg total) by mouth 2 (two) times daily.  Qty: 20 tablet, Refills: 0      rizatriptan (MAXALT) 10 MG tablet Take 10 mg by mouth once as needed for Migraine.       !! - Potential duplicate medications found. Please discuss with provider.        STOP taking these medications       naproxen (NAPROSYN) 500 MG tablet Comments:   Reason for Stopping:               Discharge Procedure Orders   Diet general     Lifting restrictions   Order Comments: No lifting greater than 15 pounds for six weeks.     Other restrictions (specify):   Order Comments: PELVIC REST:  No douching, tampons, or intercourse for 6 weeks.    If prescribed, vaginal estrogen cream may be used during the postoperative period.     DRIVING:  No driving while on narcotics. Driving may be resumed initially with a competent passenger one to two weeks after surgery if no longer taking narcotics.     EXERCISE:  For six weeks your exercise should be limited to walking. You may walk as far as you wish, as long as you increase your level of exertion gradually and avoid slippery surfaces. You may climb stairs as needed to get around, but should not use stair climbing for exercise.     Remove dressing in 24 hours   Order Comments: If you have a bandage on  wound, you may remove it the day after dismissal.  If you had steri-strips remove them once they begin to peel off (usually 2 weeks). Keep incision clean and dry.  Inspect the incision daily for signs and symptoms of infection.     Wound care routine (specify)   Order Comments: WOUND CARE:  If you have a band-aid or bandage on your wound, you may remove it the day after dismissal.  You may wash the wound with mild soap and water.   You may shower at any time but should avoid immersing any abdominal incisions in water for at least two weeks after surgery or until the wound is completely healed. If given, please shower with Hibiclens soap until bottle is completely finished. Keep your wound clean and dry.  You should observe your incision for signs of infection which include redness, warmth, drainage or fever.     Call MD for:  temperature >100.4     Call MD for:  persistent nausea and vomiting     Call MD for:  severe uncontrolled pain     Call MD for:  difficulty breathing, headache or visual disturbances     Call MD for:  redness, tenderness, or signs of infection (pain, swelling, redness, odor or green/yellow discharge around incision site)     Call MD for:  hives     Call MD for:   Order Comments: inability to void, urine is ketchup colored or you have large clots, vaginal bleeding is heavier than a period.    VAGINAL DISCHARGE: You may develop a vaginal discharge and intermittent vaginal spotting after surgery and up to 6 weeks postoperatively.  The discharge may have an odor and may change in color but it is normal.  This is due to dissolving stiches.  Contact your surgical team if you develop vaginal or vulvar irritation along with a discharge.  Also contact your surgical team if you have vaginal discharge that smells like urine or stool.    CONSTIPATION REMEDIES: Patients are often constipated after surgery or with use of oral narcotic medicine. You should continue to take the stool softener, Senokot-S during  the next several weeks, and consume adequate amounts of water.  If you have not had a bowel movement for 3 days after discharge, or are uncomfortable and unable to pass stool, please try one or all of the following measures:  1.  Milk of Magnesia - 30 cc by mouth every 12 hours   2.  Dulcolax suppository - One suppository per rectum every 4-6 hours   3.  Metamucil, Fibercon or other bulk former - use as directed  4.  Fleets Enema        PAIN MEDICATIONS:     Take your pain medications as instructed. It is best to take pain medications before your pain becomes severe. This will allow you to take less medication yet have better pain relief. For the first 2 or 3 days it may be helpful to take your pain medications on a regular schedule (e.g. every 4 to 6 hours). This will help you to keep your pain under better control. You should then begin to take fewer medications each day until you no longer need them. Do not take pain medication on an empty stomach. This may lead to nausea and vomiting.     Voiding Trial   Order Comments: Please backfill bladder with 300 cc then remove muñiz. Patient has 30 minutes to void at least 200 ccs. Please page GYN pager [phone]) with results.     Activity as tolerated   Order Comments: Return to normal activity slowly as you feel able.  For 6 weeks your exercise should be limited to walking.  You may walk as far as you wish, as long as you increase your level of exertion gradually and avoid slippery surfaces.    If you had a hysterectomy at the surgery do not insert anything in your vagina for 9 weeks.     Shower on day dressing removed (No bath)   Order Comments: You may shower at any time but should avoid immersing any abdominal incisions in water for at least 2 weeks after surgery or until the wound is completely healed.  If given, please shower with Hibaclens soap until bottle is completely finish            Ananya Nunn MD  OB/GYN PGY1

## 2024-06-12 NOTE — PLAN OF CARE
Lizzie Saunders has met all discharge criteria from Phase II. Vital Signs are stable, ambulating  without difficulty. Discharge instructions given, patient verbalized understanding. Discharged from facility via wheelchair in stable condition.

## 2024-06-12 NOTE — TELEPHONE ENCOUNTER
----- Message from Ananya Nunn MD sent at 6/12/2024  2:25 PM CDT -----  Regarding: Post Op appointment  Hello,    This patient had surgery today and needs a post op appointment in the resident clinic in 4 weeks.    Thank you,    Ananya Nunn MD  OB/GYN PGY1

## 2024-06-13 ENCOUNTER — TELEPHONE (OUTPATIENT)
Dept: OBSTETRICS AND GYNECOLOGY | Facility: HOSPITAL | Age: 39
End: 2024-06-13
Payer: MEDICAID

## 2024-06-13 VITALS
RESPIRATION RATE: 16 BRPM | HEIGHT: 66 IN | OXYGEN SATURATION: 99 % | SYSTOLIC BLOOD PRESSURE: 106 MMHG | WEIGHT: 195 LBS | BODY MASS INDEX: 31.34 KG/M2 | TEMPERATURE: 98 F | HEART RATE: 72 BPM | DIASTOLIC BLOOD PRESSURE: 60 MMHG

## 2024-06-13 NOTE — TELEPHONE ENCOUNTER
Called patient to see how she is doing post op. Patient states pain from surgery is well controlled but she is experiencing discomfort when lying flat from gas pain. She is tolerating a regular diet without nausea & vomiting. She is voiding spontaneously and passing flatus without difficulty. Informed patient of post op visit July 11th at 4PM. Patient voiced understanding, all questions answered.    Ananya Nunn MD  OB/GYN PGY1

## 2024-06-17 LAB
FINAL PATHOLOGIC DIAGNOSIS: NORMAL
GROSS: NORMAL
Lab: NORMAL

## 2024-07-02 ENCOUNTER — PATIENT OUTREACH (OUTPATIENT)
Dept: ADMINISTRATIVE | Facility: OTHER | Age: 39
End: 2024-07-02
Payer: MEDICAID

## 2024-07-02 NOTE — PROGRESS NOTES
CHW - Initial Contact    This Community Health Worker  updated the Social Determinant of Health questionnaire with patient via telephone today.    Pt identified barriers of most importance are: Housing stability, food and financial stability     Referrals to community agencies completed with patient/caregiver consent outside of [facility] include: No  Referrals were put through [facility] - no:   Support and Services: No  Other information discussed the patient needs / wants help with: SDOH accurate. Patient stated she is having housing stability , food insecurity  and financial stability issues. Patient states she is currently living  with friends. Patient doesn't have employment at the moment but is actively looking. Patient states she receives social security benefits and also SNAP benefits ($177 monthly). CHW advised I will f/u regarding scheduling a time and date to assist patient with filling out  a PBV application.   Follow up required: Yes. PBV application   Follow-up Outreach - Due: 7/8/2024

## 2024-07-03 ENCOUNTER — OFFICE VISIT (OUTPATIENT)
Dept: PRIMARY CARE CLINIC | Facility: CLINIC | Age: 39
End: 2024-07-03
Payer: MEDICAID

## 2024-07-03 ENCOUNTER — PATIENT MESSAGE (OUTPATIENT)
Dept: ADMINISTRATIVE | Facility: OTHER | Age: 39
End: 2024-07-03
Payer: MEDICAID

## 2024-07-03 VITALS
DIASTOLIC BLOOD PRESSURE: 67 MMHG | TEMPERATURE: 99 F | HEIGHT: 66 IN | HEART RATE: 73 BPM | OXYGEN SATURATION: 97 % | BODY MASS INDEX: 31.16 KG/M2 | WEIGHT: 193.88 LBS | SYSTOLIC BLOOD PRESSURE: 106 MMHG

## 2024-07-03 DIAGNOSIS — E11.40 TYPE 2 DIABETES MELLITUS WITH DIABETIC NEUROPATHY, WITH LONG-TERM CURRENT USE OF INSULIN: Primary | ICD-10-CM

## 2024-07-03 DIAGNOSIS — Z13.29 SCREENING FOR THYROID DISORDER: ICD-10-CM

## 2024-07-03 DIAGNOSIS — F32.A ANXIETY AND DEPRESSION: ICD-10-CM

## 2024-07-03 DIAGNOSIS — Z01.00 DIABETIC EYE EXAM: ICD-10-CM

## 2024-07-03 DIAGNOSIS — Z97.3 WEARS GLASSES: ICD-10-CM

## 2024-07-03 DIAGNOSIS — Z11.59 ENCOUNTER FOR HEPATITIS C SCREENING TEST FOR LOW RISK PATIENT: ICD-10-CM

## 2024-07-03 DIAGNOSIS — Z79.4 TYPE 2 DIABETES MELLITUS WITH DIABETIC NEUROPATHY, WITH LONG-TERM CURRENT USE OF INSULIN: Primary | ICD-10-CM

## 2024-07-03 DIAGNOSIS — Z72.0 TOBACCO ABUSE: ICD-10-CM

## 2024-07-03 DIAGNOSIS — F60.3 BORDERLINE PERSONALITY DISORDER: ICD-10-CM

## 2024-07-03 DIAGNOSIS — F31.9 BIPOLAR 1 DISORDER: ICD-10-CM

## 2024-07-03 DIAGNOSIS — E78.2 MIXED HYPERLIPIDEMIA: ICD-10-CM

## 2024-07-03 DIAGNOSIS — E11.9 DIABETIC EYE EXAM: ICD-10-CM

## 2024-07-03 DIAGNOSIS — F41.9 ANXIETY AND DEPRESSION: ICD-10-CM

## 2024-07-03 DIAGNOSIS — Z75.8 DOES NOT HAVE PRIMARY CARE PROVIDER: ICD-10-CM

## 2024-07-03 DIAGNOSIS — Z00.00 ADULT GENERAL MEDICAL EXAMINATION: ICD-10-CM

## 2024-07-03 DIAGNOSIS — Z11.4 SCREENING FOR HIV (HUMAN IMMUNODEFICIENCY VIRUS): ICD-10-CM

## 2024-07-03 PROCEDURE — 3008F BODY MASS INDEX DOCD: CPT | Mod: CPTII,,, | Performed by: NURSE PRACTITIONER

## 2024-07-03 PROCEDURE — 99999 PR PBB SHADOW E&M-EST. PATIENT-LVL V: CPT | Mod: PBBFAC,,, | Performed by: NURSE PRACTITIONER

## 2024-07-03 PROCEDURE — 3074F SYST BP LT 130 MM HG: CPT | Mod: CPTII,,, | Performed by: NURSE PRACTITIONER

## 2024-07-03 PROCEDURE — 99215 OFFICE O/P EST HI 40 MIN: CPT | Mod: S$PBB,,, | Performed by: NURSE PRACTITIONER

## 2024-07-03 PROCEDURE — 3078F DIAST BP <80 MM HG: CPT | Mod: CPTII,,, | Performed by: NURSE PRACTITIONER

## 2024-07-03 PROCEDURE — 3044F HG A1C LEVEL LT 7.0%: CPT | Mod: CPTII,,, | Performed by: NURSE PRACTITIONER

## 2024-07-03 PROCEDURE — 99215 OFFICE O/P EST HI 40 MIN: CPT | Mod: PBBFAC,PN | Performed by: NURSE PRACTITIONER

## 2024-07-03 RX ORDER — INSULIN PUMP SYRINGE, 3 ML
EACH MISCELLANEOUS
Qty: 1 EACH | Refills: 0 | Status: SHIPPED | OUTPATIENT
Start: 2024-07-03 | End: 2025-07-03

## 2024-07-03 RX ORDER — PEN NEEDLE, DIABETIC 32GX 5/32"
NEEDLE, DISPOSABLE MISCELLANEOUS
COMMUNITY
Start: 2024-03-05

## 2024-07-03 RX ORDER — RIZATRIPTAN BENZOATE 10 MG/1
TABLET, ORALLY DISINTEGRATING ORAL
COMMUNITY
Start: 2024-03-05

## 2024-07-03 RX ORDER — DULOXETIN HYDROCHLORIDE 60 MG/1
CAPSULE, DELAYED RELEASE ORAL
COMMUNITY
End: 2024-07-03

## 2024-07-03 RX ORDER — LANCETS
EACH MISCELLANEOUS
Qty: 100 EACH | Refills: 2 | Status: SHIPPED | OUTPATIENT
Start: 2024-07-03

## 2024-07-03 RX ORDER — INSULIN GLARGINE-YFGN 100 [IU]/ML
INJECTION, SOLUTION SUBCUTANEOUS
COMMUNITY
Start: 2024-03-05

## 2024-07-03 RX ORDER — SERTRALINE HYDROCHLORIDE 25 MG/1
25 TABLET, FILM COATED ORAL DAILY
Qty: 30 TABLET | Refills: 0 | Status: SHIPPED | OUTPATIENT
Start: 2024-07-03 | End: 2024-08-02

## 2024-07-03 NOTE — PROGRESS NOTES
Subjective:       Patient ID: Lizzie Saunders is a 38 y.o. female.    Chief Complaint: Establish Care    Ms. Lizzie Saunders is a 38 year old female, new to me, presents to the clinic for general medical examination and establish care.  No PCP. Medical and surgical history in addition to problem list reviewed as listed below.     Anxiety  Presents for initial visit. Onset was 1 to 6 months ago. The problem has been gradually worsening. Symptoms include decreased concentration, depressed mood, excessive worry, insomnia, irritability, nervous/anxious behavior, panic and restlessness. Patient reports no chest pain, dizziness, nausea, palpitations or shortness of breath. Symptoms occur most days. The severity of symptoms is interfering with daily activities. The symptoms are aggravated by family issues (relocation). The patient sleeps 6 hours per night. The quality of sleep is fair. Nighttime awakenings: one to two.     Her past medical history is significant for depression and suicidal ideation.   DepressionPatient presents with the following symptoms: decreased concentration, depressed mood, excessive worry, insomnia, irritability, nervousness/anxiety, panic and restlessness.  Patient is not experiencing: palpitations and shortness of breath.      Last examination 2022, wears glasses.    Past Medical History:   Diagnosis Date    Bipolar disorder     per patient report    Chronic pain     Depression     Diabetes mellitus     Diabetes mellitus, type 2     Driving safety issue     DRIVING AFTER RECEIVING DILAUDID INJECTION    Hx of psychiatric care     Neuropathy     per patient report    Psychiatric problem     Therapy         Past Surgical History:   Procedure Laterality Date    BACK SURGERY      EYE SURGERY      LAPAROSCOPIC SALPINGECTOMY N/A 6/12/2024    Procedure: SALPINGECTOMY, LAPAROSCOPIC;  Surgeon: Jenn Vazquez MD;  Location: [facility];  Service: OB/GYN;  Laterality: N/A;    LUMBAR LAMINECTOMY WITH  DISCECTOMY Left 10/12/2023    Procedure: MIS LEFT L 4-L5 HEMILAMINECTOMY AND DISCECTOMY, LUMBAR SPINE;  Surgeon: Boom Pierre MD;  Location: [facility];  Service: Neurosurgery;  Laterality: Left;  MIS Left L4-5 hemilaminectomy and microdisectomy  -tiffanie table with austin frame, x-ray  -metrx        Family History   Problem Relation Name Age of Onset    Bipolar disorder Mother      Bipolar disorder Father      Cancer Paternal Grandfather         Social History     Tobacco Use   Smoking Status Some Days    Types: Vaping with nicotine   Smokeless Tobacco Never       Social History     Social History Narrative    Patient tearful and anxious on admit; unable to answer all admit questions at this time. 2/3/17 @ 21:45       Review of patient's allergies indicates:   Allergen Reactions    Rosemary Anaphylaxis    Pecan nut Dermatitis    Sulfa (sulfonamide antibiotics) Itching    Liraglutide Nausea And Vomiting     Vomiting         Review of Systems   Constitutional:  Positive for irritability.   Respiratory:  Negative for chest tightness and shortness of breath.    Cardiovascular:  Negative for chest pain and palpitations.   Gastrointestinal:  Negative for nausea and vomiting.   Neurological:  Negative for dizziness, light-headedness and headaches.   Psychiatric/Behavioral:  Positive for decreased concentration and depression. The patient is nervous/anxious and has insomnia.          Objective:        Vitals:    07/03/24 1113   BP: 106/67   Pulse: 73   Temp: 99.3 °F (37.4 °C)        Physical Exam  Constitutional:       Appearance: Normal appearance.   Pulmonary:      Effort: Pulmonary effort is normal. No respiratory distress.   Neurological:      Mental Status: She is alert and oriented to person, place, and time.         Assessment:       1. Type 2 diabetes mellitus with diabetic neuropathy, with long-term current use of insulin    2. Wears glasses    3. Diabetic eye exam    4. Adult general medical examination    5.  Mixed hyperlipidemia    6. Encounter for hepatitis C screening test for low risk patient    7. Screening for thyroid disorder    8. Screening for HIV (human immunodeficiency virus)    9. Tobacco abuse    10. Anxiety and depression    11. Bipolar 1 disorder    12. Borderline personality disorder        Plan:       Type 2 diabetes mellitus with diabetic neuropathy, with long-term current use of insulin  Stable, continuing current management.     -     Microalbumin/creatinine urine ratio  -     Ambulatory referral/consult to Ophthalmology; Future; Expected date: 07/03/2024  -     blood-glucose meter kit; To check BG 2-3 times daily, to use with insurance preferred meter  Dispense: 1 each; Refill: 0  -     lancets Misc; To check BG 2-3 times daily, to use with insurance preferred meter  Dispense: 100 each; Refill: 2  -     blood sugar diagnostic Strp; To check BG 2-3 times daily, to use with insurance preferred meter  Dispense: 100 each; Refill: 2    Wears glasses    Diabetic eye exam    Adult general medical examination  Counseled patient on importance of health prevention screening, immunizations, and overall wellness.   Immunizations reviewed.      Mixed hyperlipidemia  Stable, continuing current management.     -     Lipid Panel; Future; Expected date: 07/03/2024    Encounter for hepatitis C screening test for low risk patient  -     Hepatitis C Antibody; Future; Expected date: 07/03/2024    Screening for thyroid disorder  -     T4, Free; Future; Expected date: 07/03/2024  -     TSH; Future; Expected date: 07/03/2024    Screening for HIV (human immunodeficiency virus)  -     HIV 1/2 Ag/Ab (4th Gen); Future; Expected date: 07/03/2024    Tobacco abuse  Comments:  started age 12    Anxiety and depression  Stable, continuing current management.     -     sertraline (ZOLOFT) 25 MG tablet; Take 1 tablet (25 mg total) by mouth once daily.  Dispense: 30 tablet; Refill: 0  -     Ambulatory referral/consult to Psychiatry;  "Future; Expected date: 07/03/2024    Bipolar 1 disorder  Stable, continuing current management.     -     Ambulatory referral/consult to Psychiatry; Future; Expected date: 07/03/2024    Borderline personality disorder  Stable, continuing current management.     -     Ambulatory referral/consult to Psychiatry; Future; Expected date: 07/03/2024    Does not have primary care provider   Establishing care with Dr. Aguillon.     Labs pending.    Health maintenance review/updated.    RTC to evaluate any other concerns not interested at this encounter.     Medication List with Changes/Refills   New Medications    BLOOD SUGAR DIAGNOSTIC STRP    To check BG 2-3 times daily, to use with insurance preferred meter    BLOOD-GLUCOSE METER KIT    To check BG 2-3 times daily, to use with insurance preferred meter    LANCETS MISC    To check BG 2-3 times daily, to use with insurance preferred meter    SERTRALINE (ZOLOFT) 25 MG TABLET    Take 1 tablet (25 mg total) by mouth once daily.   Current Medications    ACETAMINOPHEN (TYLENOL) 650 MG TBSR    Take 1 tablet (650 mg total) by mouth every 6 (six) hours.    AJOVY AUTOINJECTOR 225 MG/1.5 ML AUTOINJECTOR        ATORVASTATIN (LIPITOR) 40 MG TABLET    TAKE 1 TABLET BY MOUTH EVERY DAY IN THE EVENING    BD HEATHER 2ND GEN PEN NEEDLE 32 GAUGE X 5/32" NDLE    use as directed    FARXIGA 10 MG TABLET    Take 10 mg by mouth.    IBUPROFEN (ADVIL,MOTRIN) 600 MG TABLET    Take 1 tablet (600 mg total) by mouth every 6 (six) hours.    INSULIN (LANTUS SOLOSTAR U-100 INSULIN) GLARGINE 100 UNITS/ML SUBQ PEN    Inject 20 Units into the skin every evening.    INSULIN GLARGINE-YFGN 100 UNIT/ML (3 ML) INPN    INJECT 15 UNITS SUBCUTANEOUSLY IN THE EVENING    LIDOCAINE (LIDODERM) 5 %    Place 1 patch onto the skin once daily. Remove & Discard patch within 12 hours or as directed by MD    METFORMIN (GLUCOPHAGE-XR) 500 MG ER 24HR TABLET    Take 2 tablets (1,000 mg total) by mouth daily with breakfast.    " NYSTATIN-TRIAMCINOLONE (MYCOLOG II) CREAM    Apply to affected area 2 times daily    ONDANSETRON (ZOFRAN-ODT) 4 MG TBDL    Take 1 tablet (4 mg total) by mouth 2 (two) times daily.    OXYCODONE (ROXICODONE) 5 MG IMMEDIATE RELEASE TABLET    Take 1 tablet (5 mg total) by mouth every 4 (four) hours as needed for Pain.    PREGABALIN (LYRICA) 25 MG CAPSULE    Take 25 mg by mouth 2 (two) times daily.    RIZATRIPTAN (MAXALT-MLT) 10 MG DISINTEGRATING TABLET    TAKE 1 TAB AT ONSET OF HEADACHE IF NO RELIEF MAY REPEAT 1 TAB AFTER AT LEAST 2 HRS MAX =3TABS/24HR   Discontinued Medications    ALPRAZOLAM (XANAX) 0.5 MG TABLET    Take 1 tablet (0.5 mg total) by mouth 3 (three) times daily.    BLOOD SUGAR DIAGNOSTIC STRP    To check BG two times daily, to use with insurance preferred meter    BLOOD-GLUCOSE METER KIT    To check BG two  times daily, to use with insurance preferred meter    DULOXETINE (CYMBALTA) 60 MG CAPSULE    Take 1 capsule every day by oral route.    LANCETS MISC    To check BG two times daily, to use with insurance preferred meter    METOCLOPRAMIDE HCL (REGLAN) 10 MG TABLET    Take 1 tablet (10 mg total) by mouth 3 (three) times daily as needed (nausea/vomiting, abdominal pain).    ONDANSETRON (ZOFRAN-ODT) 4 MG TBDL    Take 1 tablet (4 mg total) by mouth every 6 (six) hours as needed (nausea).    OZEMPIC 2 MG/DOSE (8 MG/3 ML) PNIJ    Inject into the skin.    RIZATRIPTAN (MAXALT) 10 MG TABLET    Take 10 mg by mouth once as needed for Migraine.    SENNA-DOCUSATE 8.6-50 MG (SENNA WITH DOCUSATE SODIUM) 8.6-50 MG PER TABLET    Take 1 tablet by mouth once daily.        Follow up in about 1 month (around 8/3/2024), or if symptoms worsen or fail to improve, for Benita Duarte, MSN, APRN, FNP-C.    I spent a total of 45 minutes on the day of the visit.This includes face to face time and non-face to face time preparing to see the patient (eg, review of tests), obtaining and/or reviewing separately obtained history,  documenting clinical information in the electronic or other health record, independently interpreting results and communicating results to the patient/family/caregiver, or care coordinator.   Benita Duarte APRN, MSN, FNP-C

## 2024-07-03 NOTE — PATIENT INSTRUCTIONS
"Lorena Isbell is a 56 y.o. female here for a non-provider visit for:   FLU    Reason for immunization: Annual Flu Vaccine  Immunization records indicate need for vaccine: Yes, confirmed with Epic  Minimum interval has been met for this vaccine: Yes  ABN completed: Not Indicated    VIS Dated  8/6/21 was given to patient: Yes  All IAC Questionnaire questions were answered \"No.\"    Patient tolerated injection and no adverse effects were observed or reported: Yes    Pt scheduled for next dose in series: No  " Mayte Berg, [facility]   Janis Wall, [facility], [facility]  (Contact for Referrals)  CONTACT INFORMATION  [phone]  [phone]  [email or web address]  [email or web address]  [email or web address] [address] [address]     Please get your labs done at any [facility] facility that has a laboratory, you do not need an appointment.     I will communicate your laboratory and/or imaging results with you through your [facility] account that was set up through the portal, it was a pleasure meeting and taking care of you today.

## 2024-07-03 NOTE — PROGRESS NOTES
CHW - Follow Up    This Community Health Worker completed a follow up visit with patient via telephone today.  Pt/Caregiver reported: Housing stability issues   Community Health Worker provided: CHW assisted with completing [employer] [facility] application with patient over the phone. CHW sent email confirmation to patient.  Follow up required: Yes. Food [place] , and employment   Follow-up Outreach - Due: 7/9/2024

## 2024-07-03 NOTE — PROGRESS NOTES
CHW - Follow Up    This Community Health Worker completed a follow up visit with patient during clinic visit today.  Pt/Caregiver reported: Patient states she needs assistance with a finding a job, housing and and local food [place].  Community Health Worker provided: CHW advised patient I will call her Monday to complete  PBV and affordable housing application. CHW will also look into services to help patient find a job.    Follow up required: Yes. PBV application, affordable housing and job search   Follow-up Outreach - Due: 7/8/2024

## 2024-07-11 ENCOUNTER — OFFICE VISIT (OUTPATIENT)
Dept: OBSTETRICS AND GYNECOLOGY | Facility: CLINIC | Age: 39
End: 2024-07-11
Payer: MEDICAID

## 2024-07-11 VITALS
WEIGHT: 191.38 LBS | BODY MASS INDEX: 30.76 KG/M2 | HEIGHT: 66 IN | DIASTOLIC BLOOD PRESSURE: 68 MMHG | SYSTOLIC BLOOD PRESSURE: 99 MMHG

## 2024-07-11 DIAGNOSIS — Z90.79 STATUS POST BILATERAL SALPINGECTOMY: Primary | ICD-10-CM

## 2024-07-11 PROCEDURE — 3078F DIAST BP <80 MM HG: CPT | Mod: CPTII,,,

## 2024-07-11 PROCEDURE — 1159F MED LIST DOCD IN RCRD: CPT | Mod: CPTII,,,

## 2024-07-11 PROCEDURE — 3074F SYST BP LT 130 MM HG: CPT | Mod: CPTII,,,

## 2024-07-11 PROCEDURE — 99999 PR PBB SHADOW E&M-EST. PATIENT-LVL III: CPT | Mod: PBBFAC,,,

## 2024-07-11 PROCEDURE — 99213 OFFICE O/P EST LOW 20 MIN: CPT | Mod: PBBFAC

## 2024-07-11 PROCEDURE — 3008F BODY MASS INDEX DOCD: CPT | Mod: CPTII,,,

## 2024-07-11 PROCEDURE — 99212 OFFICE O/P EST SF 10 MIN: CPT | Mod: S$PBB,,,

## 2024-07-11 PROCEDURE — 3044F HG A1C LEVEL LT 7.0%: CPT | Mod: CPTII,,,

## 2024-07-11 NOTE — PROGRESS NOTES
"Past medical, surgical, social, family, and obstetric histories; medications; prior records and results; and available outside records were reviewed and updated in the EMR.  Pertinent findings were noted below.    Reason for Visit   Post Op    HPI     Patient's last menstrual period was 2024 (exact date).    Lizzie Saunders is a 38 y.o.  who is 1 month s/p laparoscopic bilateral salpingectomy for undesired fertility. She reports she is doing well. She reports no pain and is tolerating a regular diet with no nausea/vomiting. She reports she is current;y on her period but denies other vaginal bleeding, vaginal discharge, and vaginal itching/irritation. She is ambulating, voiding spontaneously, and having bowel movements without difficulty.     Pathology:   Final Pathologic Diagnosis 1. The left fallopian tube has a small benign paratubal cyst  2. The right fallopian tube has small benign paratubal cysts       Contraception: s/p BTL  Pap: 5/15/24 NILM/HPV+ (non-16/18)  Mammogram: N/A    Exam   BP 99/68   Ht 5' 6" (1.676 m)   Wt 86.8 kg (191 lb 5.8 oz)   LMP 2024 (Exact Date)   BMI 30.89 kg/m²     Physical Exam  Constitutional:       Appearance: Normal appearance.   HENT:      Head: Normocephalic and atraumatic.   Eyes:      Extraocular Movements: Extraocular movements intact.   Cardiovascular:      Rate and Rhythm: Normal rate.   Pulmonary:      Effort: Pulmonary effort is normal. No respiratory distress.   Abdominal:      General: Abdomen is flat. There is no distension.      Palpations: Abdomen is soft.      Tenderness: There is no abdominal tenderness. There is no guarding or rebound.      Comments: Incision: 3 laparoscopic incisions (supraumbilical, RLQ & LLQ) all c/d/I, well healed   Musculoskeletal:         General: Normal range of motion.      Cervical back: Normal range of motion.   Neurological:      Mental Status: She is alert. Mental status is at baseline.   Skin:     General: " Skin is warm and dry.   Psychiatric:         Mood and Affect: Mood normal.         Thought Content: Thought content normal.         Judgment: Judgment normal.       Assessment and Plan   Status post bilateral salpingectomy      Meeting post operative milestones  Discussed pathology results with patient  All questions answered    Follow up: In one year for annual or PRN     Ananya Nunn MD  OB/GYN PGY-2

## 2024-07-18 ENCOUNTER — PATIENT MESSAGE (OUTPATIENT)
Dept: ADMINISTRATIVE | Facility: OTHER | Age: 39
End: 2024-07-18
Payer: MEDICAID

## 2024-08-07 ENCOUNTER — LAB VISIT (OUTPATIENT)
Dept: LAB | Facility: HOSPITAL | Age: 39
End: 2024-08-07
Attending: NURSE PRACTITIONER
Payer: MEDICAID

## 2024-08-07 ENCOUNTER — OFFICE VISIT (OUTPATIENT)
Dept: PRIMARY CARE CLINIC | Facility: CLINIC | Age: 39
End: 2024-08-07
Payer: MEDICAID

## 2024-08-07 ENCOUNTER — PATIENT OUTREACH (OUTPATIENT)
Dept: ADMINISTRATIVE | Facility: OTHER | Age: 39
End: 2024-08-07
Payer: MEDICAID

## 2024-08-07 ENCOUNTER — PATIENT MESSAGE (OUTPATIENT)
Dept: PRIMARY CARE CLINIC | Facility: CLINIC | Age: 39
End: 2024-08-07

## 2024-08-07 ENCOUNTER — PATIENT OUTREACH (OUTPATIENT)
Dept: PRIMARY CARE CLINIC | Facility: CLINIC | Age: 39
End: 2024-08-07
Payer: MEDICAID

## 2024-08-07 VITALS
WEIGHT: 196.75 LBS | HEART RATE: 77 BPM | BODY MASS INDEX: 31.62 KG/M2 | TEMPERATURE: 99 F | SYSTOLIC BLOOD PRESSURE: 97 MMHG | OXYGEN SATURATION: 98 % | HEIGHT: 66 IN | DIASTOLIC BLOOD PRESSURE: 67 MMHG

## 2024-08-07 DIAGNOSIS — Z11.59 ENCOUNTER FOR HEPATITIS C SCREENING TEST FOR LOW RISK PATIENT: ICD-10-CM

## 2024-08-07 DIAGNOSIS — E66.09 CLASS 1 OBESITY DUE TO EXCESS CALORIES WITH SERIOUS COMORBIDITY AND BODY MASS INDEX (BMI) OF 31.0 TO 31.9 IN ADULT: ICD-10-CM

## 2024-08-07 DIAGNOSIS — F41.9 ANXIETY AND DEPRESSION: ICD-10-CM

## 2024-08-07 DIAGNOSIS — E11.40 TYPE 2 DIABETES MELLITUS WITH DIABETIC NEUROPATHY, WITH LONG-TERM CURRENT USE OF INSULIN: Primary | ICD-10-CM

## 2024-08-07 DIAGNOSIS — F32.A ANXIETY AND DEPRESSION: ICD-10-CM

## 2024-08-07 DIAGNOSIS — Z13.29 SCREENING FOR THYROID DISORDER: ICD-10-CM

## 2024-08-07 DIAGNOSIS — E78.2 MIXED HYPERLIPIDEMIA: Chronic | ICD-10-CM

## 2024-08-07 DIAGNOSIS — Z79.4 TYPE 2 DIABETES MELLITUS WITH DIABETIC NEUROPATHY, WITH LONG-TERM CURRENT USE OF INSULIN: Primary | ICD-10-CM

## 2024-08-07 DIAGNOSIS — Z72.0 VAPES NICOTINE CONTAINING SUBSTANCE: ICD-10-CM

## 2024-08-07 DIAGNOSIS — E78.2 MIXED HYPERLIPIDEMIA: ICD-10-CM

## 2024-08-07 DIAGNOSIS — Z11.4 SCREENING FOR HIV (HUMAN IMMUNODEFICIENCY VIRUS): ICD-10-CM

## 2024-08-07 DIAGNOSIS — Z76.0 ENCOUNTER FOR MEDICATION REFILL: ICD-10-CM

## 2024-08-07 LAB
CHOLEST SERPL-MCNC: 258 MG/DL (ref 120–199)
CHOLEST/HDLC SERPL: 5.5 {RATIO} (ref 2–5)
HCV AB SERPL QL IA: NORMAL
HDLC SERPL-MCNC: 47 MG/DL (ref 40–75)
HDLC SERPL: 18.2 % (ref 20–50)
HIV 1+2 AB+HIV1 P24 AG SERPL QL IA: NORMAL
LDLC SERPL CALC-MCNC: 176.6 MG/DL (ref 63–159)
NONHDLC SERPL-MCNC: 211 MG/DL
T4 FREE SERPL-MCNC: 0.87 NG/DL (ref 0.71–1.51)
TRIGL SERPL-MCNC: 172 MG/DL (ref 30–150)
TSH SERPL DL<=0.005 MIU/L-ACNC: 0.82 UIU/ML (ref 0.4–4)

## 2024-08-07 PROCEDURE — 99215 OFFICE O/P EST HI 40 MIN: CPT | Mod: PBBFAC,PN | Performed by: NURSE PRACTITIONER

## 2024-08-07 PROCEDURE — 3044F HG A1C LEVEL LT 7.0%: CPT | Mod: CPTII,,, | Performed by: NURSE PRACTITIONER

## 2024-08-07 PROCEDURE — 84443 ASSAY THYROID STIM HORMONE: CPT | Performed by: NURSE PRACTITIONER

## 2024-08-07 PROCEDURE — 80061 LIPID PANEL: CPT | Performed by: NURSE PRACTITIONER

## 2024-08-07 PROCEDURE — 84439 ASSAY OF FREE THYROXINE: CPT | Performed by: NURSE PRACTITIONER

## 2024-08-07 PROCEDURE — 3008F BODY MASS INDEX DOCD: CPT | Mod: CPTII,,, | Performed by: NURSE PRACTITIONER

## 2024-08-07 PROCEDURE — 3074F SYST BP LT 130 MM HG: CPT | Mod: CPTII,,, | Performed by: NURSE PRACTITIONER

## 2024-08-07 PROCEDURE — 87389 HIV-1 AG W/HIV-1&-2 AB AG IA: CPT | Performed by: NURSE PRACTITIONER

## 2024-08-07 PROCEDURE — G9016 DEMO-SMOKING CESSATION COUN: HCPCS | Mod: ,,, | Performed by: NURSE PRACTITIONER

## 2024-08-07 PROCEDURE — 86803 HEPATITIS C AB TEST: CPT | Performed by: NURSE PRACTITIONER

## 2024-08-07 PROCEDURE — 3078F DIAST BP <80 MM HG: CPT | Mod: CPTII,,, | Performed by: NURSE PRACTITIONER

## 2024-08-07 PROCEDURE — 36415 COLL VENOUS BLD VENIPUNCTURE: CPT | Performed by: NURSE PRACTITIONER

## 2024-08-07 PROCEDURE — 99214 OFFICE O/P EST MOD 30 MIN: CPT | Mod: 25,S$PBB,, | Performed by: NURSE PRACTITIONER

## 2024-08-07 PROCEDURE — 99999 PR PBB SHADOW E&M-EST. PATIENT-LVL V: CPT | Mod: PBBFAC,,, | Performed by: NURSE PRACTITIONER

## 2024-08-07 PROCEDURE — 1159F MED LIST DOCD IN RCRD: CPT | Mod: CPTII,,, | Performed by: NURSE PRACTITIONER

## 2024-08-07 RX ORDER — LANCETS 33 GAUGE
EACH MISCELLANEOUS
COMMUNITY
Start: 2024-07-03

## 2024-08-07 RX ORDER — SERTRALINE HYDROCHLORIDE 25 MG/1
25 TABLET, FILM COATED ORAL DAILY
Qty: 30 TABLET | Refills: 0 | Status: SHIPPED | OUTPATIENT
Start: 2024-08-07 | End: 2024-09-06

## 2024-08-07 RX ORDER — BLOOD-GLUCOSE METER
EACH MISCELLANEOUS
COMMUNITY
Start: 2024-07-03

## 2024-08-14 ENCOUNTER — OFFICE VISIT (OUTPATIENT)
Dept: PRIMARY CARE CLINIC | Facility: CLINIC | Age: 39
End: 2024-08-14
Payer: MEDICAID

## 2024-08-14 DIAGNOSIS — F17.290 VAPING NICOTINE DEPENDENCE, TOBACCO PRODUCT: ICD-10-CM

## 2024-08-14 DIAGNOSIS — E78.2 MIXED HYPERLIPIDEMIA: Primary | ICD-10-CM

## 2024-08-14 DIAGNOSIS — Z98.890 S/P LUMBAR MICRODISCECTOMY: ICD-10-CM

## 2024-08-14 DIAGNOSIS — M54.17 LUMBOSACRAL RADICULOPATHY AT L5: ICD-10-CM

## 2024-08-14 DIAGNOSIS — G43.009 MIGRAINE WITHOUT AURA AND WITHOUT STATUS MIGRAINOSUS, NOT INTRACTABLE: ICD-10-CM

## 2024-08-14 DIAGNOSIS — F31.4 BIPOLAR DISORDER, CURRENT EPISODE DEPRESSED, SEVERE, WITHOUT PSYCHOTIC FEATURES: ICD-10-CM

## 2024-08-14 DIAGNOSIS — E11.40 TYPE 2 DIABETES MELLITUS WITH DIABETIC NEUROPATHY, WITH LONG-TERM CURRENT USE OF INSULIN: ICD-10-CM

## 2024-08-14 DIAGNOSIS — Z79.4 TYPE 2 DIABETES MELLITUS WITH DIABETIC NEUROPATHY, WITH LONG-TERM CURRENT USE OF INSULIN: ICD-10-CM

## 2024-08-14 PROCEDURE — 3044F HG A1C LEVEL LT 7.0%: CPT | Mod: CPTII,95,, | Performed by: FAMILY MEDICINE

## 2024-08-14 PROCEDURE — 1160F RVW MEDS BY RX/DR IN RCRD: CPT | Mod: CPTII,95,, | Performed by: FAMILY MEDICINE

## 2024-08-14 PROCEDURE — 99214 OFFICE O/P EST MOD 30 MIN: CPT | Mod: 95,,, | Performed by: FAMILY MEDICINE

## 2024-08-14 PROCEDURE — 1159F MED LIST DOCD IN RCRD: CPT | Mod: CPTII,95,, | Performed by: FAMILY MEDICINE

## 2024-08-14 RX ORDER — ATORVASTATIN CALCIUM 80 MG/1
80 TABLET, FILM COATED ORAL DAILY
Qty: 90 TABLET | Refills: 3 | Status: SHIPPED | OUTPATIENT
Start: 2024-08-14 | End: 2025-08-14

## 2024-08-14 RX ORDER — LIDOCAINE 50 MG/G
1 PATCH TOPICAL DAILY
Qty: 90 PATCH | Refills: 3 | Status: SHIPPED | OUTPATIENT
Start: 2024-08-14

## 2024-08-14 NOTE — PROGRESS NOTES
The patient location is: [place]  The chief complaint leading to consultation is:     Visit type: audiovisual    Face to Face time with patient:   *** minutes of total time spent on the encounter, which includes face to face time and non-face to face time preparing to see the patient (eg, review of tests), Obtaining and/or reviewing separately obtained history, Documenting clinical information in the electronic or other health record, Independently interpreting results (not separately reported) and communicating results to the patient/family/caregiver, or Care coordination (not separately reported).         Each patient to whom he or she provides medical services by telemedicine is:  (1) informed of the relationship between the physician and patient and the respective role of any other health care provider with respect to management of the patient; and (2) notified that he or she may decline to receive medical services by telemedicine and may withdraw from such care at any time.    Notes:    HPI:  Patient is a 38-year-old female with a history of diabetes with neuropathy involving the lower extremities, degenerative disc disease of the lumbar spine status post lumbar micro diskectomy in October 24, hyperlipidemia, migraine headaches, obesity and bipolar disorder here to establish care.

## 2024-08-14 NOTE — PROGRESS NOTES
The patient location is:  [place]  The chief complaint leading to consultation is:  Establish care    Visit type: audiovisual    Face to Face time with patient:   21 minutes of total time spent on the encounter, which includes face to face time and non-face to face time preparing to see the patient (eg, review of tests), Obtaining and/or reviewing separately obtained history, Documenting clinical information in the electronic or other health record, Independently interpreting results (not separately reported) and communicating results to the patient/family/caregiver, or Care coordination (not separately reported).         Each patient to whom he or she provides medical services by telemedicine is:  (1) informed of the relationship between the physician and patient and the respective role of any other health care provider with respect to management of the patient; and (2) notified that he or she may decline to receive medical services by telemedicine and may withdraw from such care at any time.    Notes:      Subjective     Patient ID: Lizzie Saunders is a 38 y.o. female.    Chief Complaint: No chief complaint on file.    HPI:  HPI:  Patient is a 38-year-old female with a history of diabetes with neuropathy involving the lower extremities, degenerative disc disease of the lumbar spine status post lumbar micro diskectomy in October 24, hyperlipidemia, migraine headaches, obesity and bipolar disorder here to establish care.  Patient's diabetes is fairly well controlled with a recent hemoglobin A1c of 6.6%.  Patient states she has lost over 100 lb since 2020 with changes in her diet and activity.  She continues to have uncontrolled hyperlipidemia and states she has been compliant with her Lipitor 40 mg daily.  She tries to avoid fatty foods and eat healthy.  She  Review of Systems       Objective     Physical Exam  Pulmonary:      Effort: Pulmonary effort is normal.   Neurological:      Mental Status: She is  alert.            Assessment and Plan     1. Mixed hyperlipidemia  Lipids uncontrolled.  Repeat lipids.  Increase Lipitor 80 mg daily  -     atorvastatin (LIPITOR) 80 MG tablet; Take 1 tablet (80 mg total) by mouth once daily.  Dispense: 90 tablet; Refill: 3    2. S/P lumbar microdiscectomy  Overview:  10/12/23: MIS Left L4-5 laminectomy and discectomy with Left L5 foraminotomy with Dr. Pierre       3. Lumbosacral radiculopathy at L5  Chronic pain, refill for Lidoderm sent  -     LIDOcaine (LIDODERM) 5 %; Place 1 patch onto the skin once daily. Remove & Discard patch within 12 hours or as directed by MD  Dispense: 90 patch; Refill: 3    4. Type 2 diabetes mellitus with diabetic neuropathy, with long-term current use of insulin  Blood sugars well controlled  -     Microalbumin/creatinine urine ratio; Future; Expected date: 08/14/2024    5. Vaping nicotine dependence, tobacco product  Encourage smoking cessation    6. Bipolar disorder, current episode depressed, severe, without psychotic features  On Zoloft    7. Migraine without aura and without status migrainosus, not intractable  Continue Maxalt as needed               No follow-ups on file.

## 2024-08-22 ENCOUNTER — PATIENT MESSAGE (OUTPATIENT)
Dept: ADMINISTRATIVE | Facility: HOSPITAL | Age: 39
End: 2024-08-22
Payer: MEDICAID

## 2024-08-23 ENCOUNTER — PATIENT OUTREACH (OUTPATIENT)
Dept: ADMINISTRATIVE | Facility: HOSPITAL | Age: 39
End: 2024-08-23
Payer: MEDICAID

## 2024-08-23 NOTE — PROGRESS NOTES
Health Maintenance Due   Topic Date Due    Eye Exam  05/21/2022    Diabetes Urine Screening  04/11/2023    Foot Exam  05/31/2024    - LVM to contact the office to schedule lab, eye exam. mailed reminder.

## 2024-12-04 DIAGNOSIS — E11.9 TYPE 2 DIABETES MELLITUS WITHOUT COMPLICATION: ICD-10-CM

## 2024-12-04 DIAGNOSIS — Z12.31 OTHER SCREENING MAMMOGRAM: ICD-10-CM

## 2025-03-03 ENCOUNTER — PATIENT MESSAGE (OUTPATIENT)
Dept: ADMINISTRATIVE | Facility: HOSPITAL | Age: 40
End: 2025-03-03
Payer: MEDICAID

## 2025-03-20 ENCOUNTER — PATIENT OUTREACH (OUTPATIENT)
Dept: ADMINISTRATIVE | Facility: HOSPITAL | Age: 40
End: 2025-03-20
Payer: MEDICAID

## 2025-03-20 DIAGNOSIS — Z79.4 TYPE 2 DIABETES MELLITUS WITH DIABETIC NEUROPATHY, WITH LONG-TERM CURRENT USE OF INSULIN: Primary | ICD-10-CM

## 2025-03-20 DIAGNOSIS — E11.40 TYPE 2 DIABETES MELLITUS WITH DIABETIC NEUROPATHY, WITH LONG-TERM CURRENT USE OF INSULIN: Primary | ICD-10-CM

## 2025-04-01 DIAGNOSIS — E11.9 TYPE 2 DIABETES MELLITUS WITHOUT COMPLICATION, UNSPECIFIED WHETHER LONG TERM INSULIN USE: ICD-10-CM

## 2025-04-01 DIAGNOSIS — M51.26 HERNIATION OF LEFT SIDE OF L4-L5 INTERVERTEBRAL DISC: Primary | ICD-10-CM

## 2025-05-24 ENCOUNTER — HOSPITAL ENCOUNTER (EMERGENCY)
Facility: HOSPITAL | Age: 40
Discharge: HOME | End: 2025-05-24
Attending: EMERGENCY MEDICINE
Payer: MEDICAID

## 2025-05-24 VITALS
WEIGHT: 196 LBS | TEMPERATURE: 99 F | HEART RATE: 85 BPM | BODY MASS INDEX: 31.5 KG/M2 | RESPIRATION RATE: 18 BRPM | DIASTOLIC BLOOD PRESSURE: 68 MMHG | OXYGEN SATURATION: 98 % | HEIGHT: 66 IN | SYSTOLIC BLOOD PRESSURE: 150 MMHG

## 2025-05-24 DIAGNOSIS — T78.40XA ALLERGIC REACTION, INITIAL ENCOUNTER: Primary | ICD-10-CM

## 2025-05-24 DIAGNOSIS — L25.9 CONTACT DERMATITIS, UNSPECIFIED CONTACT DERMATITIS TYPE, UNSPECIFIED TRIGGER: ICD-10-CM

## 2025-05-24 DIAGNOSIS — L29.9 PRURITIC CONDITION: ICD-10-CM

## 2025-05-24 PROCEDURE — 96372 THER/PROPH/DIAG INJ SC/IM: CPT | Performed by: PHYSICIAN ASSISTANT

## 2025-05-24 PROCEDURE — 25000003 PHARM REV CODE 250: Performed by: PHYSICIAN ASSISTANT

## 2025-05-24 PROCEDURE — 63600175 PHARM REV CODE 636 W HCPCS: Performed by: PHYSICIAN ASSISTANT

## 2025-05-24 PROCEDURE — 99284 EMERGENCY DEPT VISIT MOD MDM: CPT | Mod: 25

## 2025-05-24 RX ORDER — HYDROXYZINE HYDROCHLORIDE 25 MG/1
25 TABLET, FILM COATED ORAL EVERY 4 HOURS PRN
Qty: 15 TABLET | Refills: 0 | Status: SHIPPED | OUTPATIENT
Start: 2025-05-24

## 2025-05-24 RX ORDER — DIPHENHYDRAMINE HYDROCHLORIDE 50 MG/ML
25 INJECTION, SOLUTION INTRAMUSCULAR; INTRAVENOUS
Status: COMPLETED | OUTPATIENT
Start: 2025-05-24 | End: 2025-05-24

## 2025-05-24 RX ORDER — FAMOTIDINE 20 MG/1
20 TABLET, FILM COATED ORAL
Status: COMPLETED | OUTPATIENT
Start: 2025-05-24 | End: 2025-05-24

## 2025-05-24 RX ADMIN — DIPHENHYDRAMINE HYDROCHLORIDE 25 MG: 50 INJECTION, SOLUTION INTRAMUSCULAR; INTRAVENOUS at 09:05

## 2025-05-24 RX ADMIN — FAMOTIDINE 20 MG: 20 TABLET, FILM COATED ORAL at 09:05

## 2025-05-24 NOTE — ED PROVIDER NOTES
"Encounter Date: 5/24/2025       History     Chief Complaint   Patient presents with    Allergic Reaction     States awakened today "normal"= while at work this morning at 0600 (works as ), noticed she began "itching all over" rash to upper chest and abdomen- denies any respiratory issues= took (1) benadryl prior to arrival     40 yo F, presenting to the ER for an urgent evaluation c/o suspected allergic reaction. Pt c/o itching, burning, and redness to upper chest and bilateral upper arms that began suddenly after arriving at "I guess Im having some sort of allergic reaction. I was fine when I woke up this morning, and within 15 minutes after I got to the restaurant, my chest turned red and started itching. I can't stop scratching it and now it sort of burns. I don't think that I got anything on me or anything". She reports taking single tablet of Benadryl at 0630 without improvement. She denies any swelling of face, lips, tongue, or throat. She denies any previous allergic reactions or anaphylaxis in the past. She denies any CP or SOB. She denies any difficulty breathing, speaking, or swallowing.       Review of patient's allergies indicates:   Allergen Reactions    Rosemary Anaphylaxis    Pecan nut Dermatitis    Sulfa (sulfonamide antibiotics) Itching    Liraglutide Nausea And Vomiting     Vomiting      Past Medical History:   Diagnosis Date    Bipolar disorder     per patient report    Chronic pain     Depression     Diabetes mellitus     Diabetes mellitus, type 2     Driving safety issue     DRIVING AFTER RECEIVING DILAUDID INJECTION    Hx of psychiatric care     Neuropathy     per patient report    Psychiatric problem     Therapy      Past Surgical History:   Procedure Laterality Date    BACK SURGERY      EYE SURGERY      LAPAROSCOPIC SALPINGECTOMY N/A 6/12/2024    Procedure: SALPINGECTOMY, LAPAROSCOPIC;  Surgeon: Jenn Vazquez MD;  Location: [facility];  Service: OB/GYN;  Laterality: N/A;    " LUMBAR LAMINECTOMY WITH DISCECTOMY Left 10/12/2023    Procedure: MIS LEFT L 4-L5 HEMILAMINECTOMY AND DISCECTOMY, LUMBAR SPINE;  Surgeon: Boom Pierre MD;  Location: [facility] OR;  Service: Neurosurgery;  Laterality: Left;  MIS Left L4-5 hemilaminectomy and microdisectomy  -tiffanie table with austin frame, x-ray  -metrx     Family History   Problem Relation Name Age of Onset    Bipolar disorder Mother      Bipolar disorder Father      Cancer Paternal Grandfather       Social History[1]  Review of Systems   Constitutional:  Negative for chills, diaphoresis and fever.   HENT:  Negative for drooling, facial swelling, mouth sores, sore throat, trouble swallowing and voice change.    Eyes:  Negative for photophobia, pain, discharge, redness, itching and visual disturbance.   Respiratory:  Negative for apnea, cough, choking, chest tightness, shortness of breath, wheezing and stridor.    Cardiovascular:  Negative for palpitations.   Gastrointestinal:  Negative for abdominal pain, diarrhea, nausea and vomiting.   Genitourinary:  Negative for difficulty urinating.   Musculoskeletal:  Negative for arthralgias, back pain, gait problem, joint swelling, myalgias and neck pain.   Skin:  Positive for color change and rash.   Allergic/Immunologic: Negative for immunocompromised state.   Neurological:  Negative for dizziness, tremors, seizures, syncope, speech difficulty, weakness, light-headedness, numbness and headaches.   Hematological:  Negative for adenopathy.   Psychiatric/Behavioral:  Negative for confusion. The patient is nervous/anxious.        Physical Exam     Initial Vitals [05/24/25 0836]   BP Pulse Resp Temp SpO2   (!) 154/74 87 17 99.3 °F (37.4 °C) 98 %      MAP       --         Physical Exam    Nursing note and vitals reviewed.  Constitutional: She appears well-developed and well-nourished. She is not diaphoretic.   HENT:   Head: Normocephalic. Mouth/Throat: Oropharynx is clear and moist.   No angioedema. No swelling  of face, lips, tongue, or throat. No periorbital swelling. Benign oropharynx. Normal voice. No stridor.    Eyes: Conjunctivae are normal. Right eye exhibits no discharge. Left eye exhibits no discharge.   Neck: Neck supple. No JVD present.   Cardiovascular:  Normal rate and intact distal pulses.           Pulmonary/Chest: No stridor. No respiratory distress. She has no wheezes.   Abdominal: She exhibits no distension. There is no abdominal tenderness.   Musculoskeletal:         General: No tenderness or edema. Normal range of motion.      Cervical back: Neck supple.     Neurological: She is alert and oriented to person, place, and time. She has normal strength. No sensory deficit.   Skin: Skin is warm and dry.   Light/faint splotchy erythema to center of chest and across abdomen. No urticaria/hives appreciated. No edema or induration. Pt lightly scratching area throughout interview/exam. See images.    Psychiatric: Judgment and thought content normal.               ED Course   Procedures  Labs Reviewed - No data to display         Imaging Results    None          Medications   diphenhydrAMINE injection 25 mg (25 mg Intramuscular Given 5/24/25 0904)   famotidine tablet 20 mg (20 mg Oral Given 5/24/25 0904)     Medical Decision Making  Risk  Prescription drug management.                          Medical Decision Making:   Initial Assessment:   40 yo F, presents to ER for suspected allergic reaction, c/o localized redness and pruritus of anterior trunk and upper arms. No known allergen. No angioedema. Took Benadryl 25 PTA.   Differential Diagnosis:   Allergic reaction, contact dermatitis, rash, etc   ED Management:  Vital signs reviewed   Patient instructed to wash hands, areas with soap and water - in discussion may have touched surface at work with cleaning/chemical residue then touched chest.   Contact dermatitis suspected based on history and exam   Pt given Benadryl and Pepcid and placed in CCR for monitoring      Update: Notified by RN that patient reports feeling better after treatment and is requesting discharge   Patient given instructions regarding allergic reactions and contact dermatitis and advised to avoid any/all potential allergens   Patient advised to take anti-histamines as directed as needed   Patient advised to follow up with PCP in the next 1-2 days for re-check   ED return precautions advised              Clinical Impression:  Final diagnoses:  [T78.40XA] Allergic reaction, initial encounter (Primary)  [L25.9] Contact dermatitis, unspecified contact dermatitis type, unspecified trigger  [L29.9] Pruritic condition          ED Disposition Condition    Discharge Stable          ED Prescriptions       Medication Sig Dispense Start Date End Date Auth. Provider    hydrOXYzine HCL (ATARAX) 25 MG tablet Take 1 tablet (25 mg total) by mouth every 4 (four) hours as needed for Itching. 15 tablet 5/24/2025 -- Rj De La Cruz PA-C          Follow-up Information       Follow up With Specialties Details Why Contact Info    Carol Aguillon MD Family Medicine Schedule an appointment as soon as possible for a visit in 2 days  [address] [place] [phone]      [facility] - Emergency Dept Emergency Medicine  If symptoms worsen [address] [facility] [address] [phone]                   [1]   Social History  Tobacco Use    Smoking status: Some Days     Types: Vaping with nicotine     Passive exposure: Current    Smokeless tobacco: Never   Substance Use Topics    Alcohol use: Yes     Comment: rare; none 24 hr prior to surg    Drug use: Yes     Types: Marijuana     Comment: Daily        Rj De La Cruz PA-C  05/26/25 [id number]

## 2025-05-24 NOTE — ED NOTES
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

## 2025-06-30 ENCOUNTER — TELEPHONE (OUTPATIENT)
Dept: PRIMARY CARE CLINIC | Facility: CLINIC | Age: 40
End: 2025-06-30
Payer: MEDICAID

## 2025-07-15 ENCOUNTER — PATIENT MESSAGE (OUTPATIENT)
Dept: PRIMARY CARE CLINIC | Facility: CLINIC | Age: 40
End: 2025-07-15
Payer: MEDICAID

## 2025-08-28 ENCOUNTER — PATIENT OUTREACH (OUTPATIENT)
Dept: ADMINISTRATIVE | Facility: HOSPITAL | Age: 40
End: 2025-08-28
Payer: MEDICAID

## 2025-08-28 DIAGNOSIS — Z13.220 SCREENING FOR CHOLESTEROL LEVEL: Primary | ICD-10-CM

## (undated) DEVICE — PACK LAPAROSCOPY BAPTIST

## (undated) DEVICE — SEALER LIGASURE LAP 37CM 5MM

## (undated) DEVICE — TUBE FRAZIER 5MM 2FT SOFT TIP

## (undated) DEVICE — SUT VICRYL+ 27 UR-6 VIOL

## (undated) DEVICE — POSITIONER IV ARMBOARD FOAM

## (undated) DEVICE — GLOVE BIOGEL PI MICRO SZ 7.5

## (undated) DEVICE — DURAPREP SURG SCRUB 26ML

## (undated) DEVICE — DRESSING ABSRBNT ISLAND 3.6X8

## (undated) DEVICE — SYR ONLY LUER LOCK 20CC

## (undated) DEVICE — DRESSING SURGICAL 1/2X1/2

## (undated) DEVICE — SEE MEDLINE ITEM 157131

## (undated) DEVICE — TRAY NEURO OMC

## (undated) DEVICE — SYR 10CC LUER LOCK

## (undated) DEVICE — HEMOSTAT SURGICEL 4X8IN

## (undated) DEVICE — GAUZE SPONGE 4X4 12PLY

## (undated) DEVICE — TOWEL OR DISP STRL BLUE 4/PK

## (undated) DEVICE — JELLY SURGILUBE 5GR

## (undated) DEVICE — SOL POVIDONE SCRUB IODINE 4 OZ

## (undated) DEVICE — NDL INSUFFLATION VERRES 120MM

## (undated) DEVICE — SOL NACL IRR 1000ML BTL

## (undated) DEVICE — UNDERGLOVE BIOGEL PI SZ 6.5 LF

## (undated) DEVICE — DRAPE OPMI STERILE

## (undated) DEVICE — ELECTRODE REM PLYHSV RETURN 9

## (undated) DEVICE — NDL 18GA X1 1/2 REG BEVEL

## (undated) DEVICE — SEE MEDLINE ITEM 157194

## (undated) DEVICE — SEE MEDLINE ITEM 157150

## (undated) DEVICE — DRAPE STERI INSTRUMENT 1018

## (undated) DEVICE — DRAPE INCISE IOBAN 2 23X17IN

## (undated) DEVICE — GLOVE SENSICARE PI GRN 6.5

## (undated) DEVICE — KIT POSITIONING WILSON FRAME

## (undated) DEVICE — PACK ENDOSCOPY GENERAL

## (undated) DEVICE — SUT VICRYL 2 0 CT 2

## (undated) DEVICE — DRESSING MEPILEX BORDER 4 X 4

## (undated) DEVICE — KIT WING PAD POSITIONING

## (undated) DEVICE — TROCAR KII FIOS 5MM X 100MM

## (undated) DEVICE — NDL HYPO REG 25G X 1 1/2

## (undated) DEVICE — BUR BONE CUT MICRO TPS 3X3.8MM

## (undated) DEVICE — SOL POVIDONE PREP IODINE 4 OZ

## (undated) DEVICE — SYS SEE SHARP SCP ANTIFG LNG

## (undated) DEVICE — TIP RUMI BLUE DISPOSABLE 5/BX

## (undated) DEVICE — CORD FOR BIPOLAR FORCEPS 12

## (undated) DEVICE — SKINMARKER & RULER REGULAR X-F

## (undated) DEVICE — COVER OVERHEAD SURG LT BLUE

## (undated) DEVICE — COVER SNAP 36IN X 30IN

## (undated) DEVICE — SEE MEDLINE ITEM 156905

## (undated) DEVICE — SEE MEDLINE ITEM 157110

## (undated) DEVICE — BURR MIS CURVED 3.0MM

## (undated) DEVICE — SUT MCRYL PLUS 4-0 PS2 27IN

## (undated) DEVICE — SOL IRR SOD CHL .9% POUR

## (undated) DEVICE — SOL NORMAL USPCA 0.9%

## (undated) DEVICE — UNDERGLOVES BIOGEL PI SIZE 8

## (undated) DEVICE — CLIPPER BLADE MOD 4406 (CAREF)

## (undated) DEVICE — GLOVE SENSICARE PI SURG 6.5